# Patient Record
Sex: MALE | Race: BLACK OR AFRICAN AMERICAN | Employment: FULL TIME | URBAN - METROPOLITAN AREA
[De-identification: names, ages, dates, MRNs, and addresses within clinical notes are randomized per-mention and may not be internally consistent; named-entity substitution may affect disease eponyms.]

---

## 2021-02-20 ENCOUNTER — HOSPITAL ENCOUNTER (EMERGENCY)
Age: 26
Discharge: HOME | End: 2021-02-21
Attending: EMERGENCY MEDICINE

## 2021-02-20 VITALS
WEIGHT: 220 LBS | HEIGHT: 75 IN | BODY MASS INDEX: 27.35 KG/M2 | SYSTOLIC BLOOD PRESSURE: 153 MMHG | HEART RATE: 99 BPM | OXYGEN SATURATION: 98 % | RESPIRATION RATE: 16 BRPM | DIASTOLIC BLOOD PRESSURE: 85 MMHG | TEMPERATURE: 98 F

## 2021-02-20 DIAGNOSIS — L02.419 AXILLARY ABSCESS: Primary | ICD-10-CM

## 2021-02-20 DIAGNOSIS — L05.91 PILONIDAL CYST: ICD-10-CM

## 2021-02-20 PROCEDURE — 99283 EMERGENCY DEPT VISIT LOW MDM: CPT

## 2021-02-20 PROCEDURE — 75810000289 HC I&D ABSCESS SIMP/COMP/MULT

## 2021-02-20 NOTE — Clinical Note
[facility] EMERGENCY DEPT 
[address] [facility] [place] [address] 
[phone] Work/School Note Date: 2/20/2021 To Whom It May concern: 
 
David Dueñas was seen and treated today in the emergency room by the following provider(s): 
Attending Provider: Felipe Sierra MD. David Dueñas is excused from work/school on 2/21/2021 through 2/24/2021. He is medically clear to return to work/school on 2/25/2021. Sincerely, Janelle Galloway MD

## 2021-02-21 PROCEDURE — 74011250637 HC RX REV CODE- 250/637: Performed by: EMERGENCY MEDICINE

## 2021-02-21 PROCEDURE — 74011000250 HC RX REV CODE- 250: Performed by: EMERGENCY MEDICINE

## 2021-02-21 RX ORDER — LIDOCAINE HYDROCHLORIDE AND EPINEPHRINE 20; 10 MG/ML; UG/ML
1.5 INJECTION, SOLUTION INFILTRATION; PERINEURAL
Status: COMPLETED | OUTPATIENT
Start: 2021-02-21 | End: 2021-02-21

## 2021-02-21 RX ORDER — DOXYCYCLINE HYCLATE 100 MG
100 TABLET ORAL
Status: COMPLETED | OUTPATIENT
Start: 2021-02-21 | End: 2021-02-21

## 2021-02-21 RX ORDER — OXYCODONE AND ACETAMINOPHEN 5; 325 MG/1; MG/1
1 TABLET ORAL
Qty: 18 TAB | Refills: 0 | Status: SHIPPED | OUTPATIENT
Start: 2021-02-21 | End: 2021-02-26

## 2021-02-21 RX ORDER — OXYCODONE AND ACETAMINOPHEN 5; 325 MG/1; MG/1
2 TABLET ORAL
Status: COMPLETED | OUTPATIENT
Start: 2021-02-21 | End: 2021-02-21

## 2021-02-21 RX ORDER — DOXYCYCLINE HYCLATE 100 MG
100 TABLET ORAL 2 TIMES DAILY
Qty: 14 TAB | Refills: 0 | Status: SHIPPED | OUTPATIENT
Start: 2021-02-21 | End: 2021-02-28

## 2021-02-21 RX ADMIN — LIDOCAINE HYDROCHLORIDE AND EPINEPHRINE 30 MG: 20; 10 INJECTION, SOLUTION INFILTRATION; PERINEURAL at 00:17

## 2021-02-21 RX ADMIN — OXYCODONE AND ACETAMINOPHEN 2 TABLET: 5; 325 TABLET ORAL at 00:17

## 2021-02-21 RX ADMIN — DOXYCYCLINE HYCLATE 100 MG: 100 TABLET, COATED ORAL at 01:08

## 2021-02-21 NOTE — ED NOTES
Patient c/o abscess to both arm pits for \"months\". Right arm pit has open wound, not draining. Skin warm, and swollen. Abscess to left armpit, skin is intact but warm and swollen. Denies fever. Resting quietly on stretcher in NAD. Emergency Department Nursing Plan of Care       The Nursing Plan of Care is developed from the Nursing assessment and Emergency Department Attending provider initial evaluation. The plan of care may be reviewed in the ED Provider note.     The Plan of Care was developed with the following considerations:   Patient / Family readiness to learn indicated by:verbalized understanding  Persons(s) to be included in education: patient  Barriers to Learning/Limitations:No    Signed     [place]    2/21/2021   12:23 AM

## 2021-02-21 NOTE — ED TRIAGE NOTES
Patient presents to the ED with c/o his \"skin leaking\" on the right arm and lower back/upper buttocks. Pt stated he has a cut on the right arm. Unsure of when it appeared and doesn't remember cutting himself. Pt changing into gown.

## 2021-02-21 NOTE — ED NOTES

## 2021-02-21 NOTE — ED NOTES
Patient has been instructed that they have been given oxycodone which contains opioids, benzodiazepines, or other sedating drugs. Patient is aware that they  will need to refrain from driving or operating heavy machinery after taking this medication. Patient also instructed that they need to avoid drinking alcohol and using other products containing opioids, benzodiazepines, or other sedating drugs. Patient verbalized understanding. Patient states he is walking not driving. Adrenal insufficiency

## 2021-02-23 NOTE — ED PROVIDER NOTES
EMERGENCY DEPARTMENT HISTORY AND PHYSICAL EXAM    Please note that this dictation was completed with [employer], the computer voice recognition software. Quite often unanticipated grammatical, syntax, homophones, and other interpretive errors are inadvertently transcribed by the computer software. Please disregard these errors. Please excuse any errors that have escaped final proofreading. Date: 2/20/2021  Patient Name: Meghann Rendon  Patient Age and Sex: 22 y.o. male    History of Presenting Illness     Chief Complaint   Patient presents with    Skin Problem       History Provided By: Patient    HPI: Meghann Rendon, is a 22 y.o. male who has no known past medical history, presents to the ED with a tender and enlarging mass in left armpit, a similar mass was on the right side but has popped and drained purulent fluid. He also has some drainage from a lesion in his lower back. He denies a history of recurrent abscesses. States that all lesions were first noticed by the patient at the beginning of February, he initially assumed that it was an ingrown hair and would eventually pop and improved. However the mass in his left armpit has been steadily growing and becoming more painful. He has no systemic symptoms. No other complaints. He works as a  at [employer]. No recent travel or unusual exposures. Pt denies any other alleviating or exacerbating factors. No other associated signs or symptoms. There are no other complaints, changes or physical findings at this time. PCP: Brandee Moran MD    Past History   All documented elements of the PSFH reviewed and verified by me. -Teo Box MD    Past Medical History:  History reviewed. No pertinent past medical history. Past Surgical History:  History reviewed. No pertinent surgical history. Family History:  History reviewed. No pertinent family history.     Social History:  Social History     Tobacco Use    Smoking status: Never Smoker   Substance Use Topics    Alcohol use: No     Frequency: Never    Drug use: No       Allergies: Allergies   Allergen Reactions    Penicillins Anaphylaxis    Pcn [Penicillins] Swelling       Review of Systems   All other systems reviewed and negative    Review of Systems   Constitutional: Negative for appetite change and fever. HENT: Negative. Eyes: Negative. Respiratory: Negative for cough and shortness of breath. Cardiovascular: Negative for chest pain and palpitations. Gastrointestinal: Negative for abdominal pain, nausea and vomiting. Endocrine: Negative. Genitourinary: Negative for dysuria, flank pain and hematuria. Musculoskeletal: Negative for back pain and joint swelling. Skin:        Axillary abscess, one has drained. Draining smaller lesion to low back. Neurological: Negative for dizziness, weakness, light-headedness, numbness and headaches. Hematological: Negative for adenopathy. All other systems reviewed and are negative. Physical Exam   Reviewed patients vital signs and nursing note    Physical Exam  Vitals signs and nursing note reviewed. HENT:      Head: Atraumatic. Mouth/Throat:      Mouth: Mucous membranes are moist.   Eyes:      General: No scleral icterus. Extraocular Movements: Extraocular movements intact. Conjunctiva/sclera: Conjunctivae normal.      Pupils: Pupils are equal, round, and reactive to light. Neck:      Musculoskeletal: Normal range of motion and neck supple. Cardiovascular:      Rate and Rhythm: Normal rate and regular rhythm. Pulses: Normal pulses. Heart sounds: Normal heart sounds. Pulmonary:      Effort: Pulmonary effort is normal.      Breath sounds: Normal breath sounds. Abdominal:      Palpations: Abdomen is soft. Tenderness: There is no abdominal tenderness. Musculoskeletal: Normal range of motion. Skin:     General: Skin is warm and dry.       Capillary Refill: Capillary refill takes less than 2 seconds. Comments: Left axilla: fluctuant mass, tender to touch, c/w abscess  Right axilla: 0.5cm opening where what was an abscess seems to have now fully drained. No erythema or fluctuance noted  Low back: pilonidal cyst most likely with open sinuses. No swelling, fluctuance, erythema or tenderness. Neurological:      General: No focal deficit present. Mental Status: He is alert. Psychiatric:         Mood and Affect: Mood normal.         Behavior: Behavior normal.         Diagnostic Study Results     Labs - I have personally reviewed and interpreted all laboratory results. Sara Benitez MD, MSc  No results found for this or any previous visit (from the past 24 hour(s)). Radiologic Studies - I have personally reviewed and interpreted all imaging studies and agree with radiology interpretation and report. Musa Dailey MD, MSc  No orders to display         US of left axilla - circumscribed fluid accumulation, cw abscess    Medical Decision Making   I am the first provider for this patient. Records Reviewed: I reviewed our electronic medical record system for any past medical records that were available that may contribute to the patient's current condition, including their PMH, surgical history, social and family history. Reviewed the nursing notes and vital signs from today's visit. Nursing notes will be reviewed as they become available in realtime while the pt has been in the ED. In addition, I read most recent discharge summaries, if available and reviewed prior ECGs or imaging studies for comparison purposes. Sara Benitez MD Msc    Vital Signs-Reviewed the patient's vital signs.  - no fever    Provider Notes (Medical Decision Making): The patient is a well-appearing 20-year-old male who presents with 3 distinct lesions, all of which he first noticed approximately a month ago. No fevers, chills, night sweats, changes in appetite or any other symptoms. He states that he has never had an abscesses in the past.  On exam:  -Left axilla: Palpable and fluctuant mass that is tender to touch, warm, ultrasound images as pictured above. Concerning for abscess. Right axilla:  -0.5 cm linear opening where an accumulated abscess seems to have drained. There is no further fluid palpable in the area. The still open but no longer draining. Does not seem to be infected. Low back:  -Open sinus at the top of the cleft of the buttocks, intermittently draining clear fluid. There is no surrounding erythema, fluctuance, swelling. Not tender to touch. Not warm to touch. DDx: Abscess, Hidradenitis suppurativa, pilonidal cyst, pilonidal abscess, pilonidal sinus  Plan: Incision and drainage of left axilla. Irrigation and wound care of left, no fluids to be drained from that site is did subcutaneously. Pilonidal sinus has no evidence of abscess formation. However, as there is high risk for abscess and infection, close follow-up is absolutely necessary. Procedure Note - Incision and Drainage:   5:20 AM  Performed by: laron  Complexity: complex  Skin prepped with Betadine. Sterile field established. Anesthesia achieved with 5 mLs of Lidocaine 1% with epinephrine using a local infiltration of 5 mL lidocaine 1% with epinephrine. Abscess to axilla(e):left was incised with # 11 blade, and 6mLs of purulent and serous drainage was expressed. Wound probed and irrigated. Area was packed using 1 inch iodoform gauze. Sterile dressing applied. Estimated blood loss: minimal  The procedure took 31-45 minutes, and pt tolerated well. Procedure Note - Bedside Ultrasound:  5:20 AM  Performed by: laron  US of left axilla, right axilla, lower back performed at beside, showing circumscribed fluid collection in left axilla. No fluid collection on right. No fluid collection under pilonidal sinus. The procedure took 16-30 minutes, and pt tolerated well.     ED Course:   Initial assessment performed. The patients presenting problems have been discussed, and they are in agreement with the care plan formulated and outlined with them. I have encouraged them to ask questions as they arise throughout their visit. Progress note:  Patient has been reassessed and reports feeling considerably better, has normal vital signs and feels comfortable going home. I think this is reasonable as no findings today suggest a life-threatening condition. Very close follow-up either with primary care doctor or with emergency department/urgent care in 3 days for wound check, gauze repacking, assessment of pilonidal sinus. Patient confirms ability that he will do so. We will send him out on oral antibiotics, strict return precautions reviewed. DISPOSITION: DISCHARGE  The patient's results have been reviewed with patient and available family and/or caregiver. They verbally convey their understanding and agreement of the patient's signs, symptoms, diagnosis, treatment and prognosis and additionally agree to follow up as recommended in the discharge instructions or to return to the Emergency Department should the patient's condition change prior to their follow-up appointment. The patient and available family and/or caregiver verbally agree with the care plan and all of their questions have been answered. The discharge instructions have also been provided to the them with educational information regarding the patient's diagnosis as well a list of reasons why the patient would want to return to the ER prior to their follow-up appointment should any concerns arise, the patient's condition change or symptoms worsen. Radha Welch MD, Msc    PLAN:  Discharge Medication List as of 2/21/2021  2:00 AM      START taking these medications    Details   doxycycline (VIBRA-TABS) 100 mg tablet Take 1 Tab by mouth two (2) times a day for 7 days. , Normal, Disp-14 Tab, R-0      oxyCODONE-acetaminophen (Percocet) 5-325 mg per tablet Take 1 Tab by mouth every six (6) hours as needed for Pain for up to 5 days. Max Daily Amount: 4 Tabs., Normal, Disp-18 Tab, R-0         CONTINUE these medications which have NOT CHANGED    Details   ibuprofen (MOTRIN) 400 mg tablet Take 1 Tab by mouth every six (6) hours as needed for Pain. Print, 400 mg, Disp-20 Tab, R-0      HYDROcodone-acetaminophen (NORCO) 5-325 mg per tablet Take 1 Tab by mouth every four (4) hours as needed for Pain. Print, 1 Tab, Disp-20 Tab, R-0         1.   2.     Follow-up Information     Follow up With Specialties Details Why Contact Info    primary care doctor  Schedule an appointment as soon as possible for a visit   refer to attached list    [facility] EMERGENCY DEPT Emergency Medicine In 3 days For wound re-check [address]        3. Return to ED if worse        I, Marysol Khan MD, am the attending of record for this patient encounter. Diagnosis     Clinical Impression:   1. Axillary abscess    2. Pilonidal cyst        Attestation:  I personally performed the services described in this documentation on this date 2/20/2021 for patient Julius Trevino.   Yuliya Davis MD

## 2021-03-17 ENCOUNTER — HOSPITAL ENCOUNTER (EMERGENCY)
Age: 26
Discharge: HOME | End: 2021-03-17
Attending: EMERGENCY MEDICINE

## 2021-03-17 ENCOUNTER — APPOINTMENT (OUTPATIENT)
Dept: GENERAL RADIOLOGY | Age: 26
End: 2021-03-17
Attending: PHYSICIAN ASSISTANT

## 2021-03-17 VITALS
DIASTOLIC BLOOD PRESSURE: 57 MMHG | TEMPERATURE: 99.4 F | HEART RATE: 101 BPM | RESPIRATION RATE: 20 BRPM | SYSTOLIC BLOOD PRESSURE: 137 MMHG | HEIGHT: 75 IN | BODY MASS INDEX: 27.35 KG/M2 | WEIGHT: 220 LBS | OXYGEN SATURATION: 99 %

## 2021-03-17 DIAGNOSIS — E88.09 HYPOALBUMINEMIA: ICD-10-CM

## 2021-03-17 DIAGNOSIS — R60.9 PERIPHERAL EDEMA: Primary | ICD-10-CM

## 2021-03-17 LAB
ALBUMIN SERPL-MCNC: 2.9 G/DL (ref 3.5–5)
ALBUMIN/GLOB SERPL: 0.5 {RATIO} (ref 1.1–2.2)
ALP SERPL-CCNC: 145 U/L (ref 45–117)
ALT SERPL-CCNC: 64 U/L (ref 12–78)
ANION GAP SERPL CALC-SCNC: 10 MMOL/L (ref 5–15)
APPEARANCE UR: ABNORMAL
AST SERPL-CCNC: 33 U/L (ref 15–37)
BACTERIA URNS QL MICRO: ABNORMAL /HPF
BASOPHILS # BLD: 0 K/UL (ref 0–0.1)
BASOPHILS NFR BLD: 0 % (ref 0–1)
BILIRUB SERPL-MCNC: 0.6 MG/DL (ref 0.2–1)
BILIRUB UR QL: NEGATIVE
BNP SERPL-MCNC: 61 PG/ML (ref 0–125)
BUN SERPL-MCNC: 11 MG/DL (ref 6–20)
BUN/CREAT SERPL: 9 (ref 12–20)
CALCIUM SERPL-MCNC: 9.2 MG/DL (ref 8.5–10.1)
CHLORIDE SERPL-SCNC: 98 MMOL/L (ref 97–108)
CO2 SERPL-SCNC: 28 MMOL/L (ref 21–32)
COLOR UR: ABNORMAL
CREAT SERPL-MCNC: 1.24 MG/DL (ref 0.7–1.3)
DIFFERENTIAL METHOD BLD: ABNORMAL
EOSINOPHIL # BLD: 0.3 K/UL (ref 0–0.4)
EOSINOPHIL NFR BLD: 2 % (ref 0–7)
EPITH CASTS URNS QL MICRO: ABNORMAL /LPF
ERYTHROCYTE [DISTWIDTH] IN BLOOD BY AUTOMATED COUNT: 14.1 % (ref 11.5–14.5)
GLOBULIN SER CALC-MCNC: 5.8 G/DL (ref 2–4)
GLUCOSE SERPL-MCNC: 118 MG/DL (ref 65–100)
GLUCOSE UR STRIP.AUTO-MCNC: NEGATIVE MG/DL
HCT VFR BLD AUTO: 37.2 % (ref 36.6–50.3)
HGB BLD-MCNC: 11.4 G/DL (ref 12.1–17)
HGB UR QL STRIP: ABNORMAL
IMM GRANULOCYTES # BLD AUTO: 0.1 K/UL (ref 0–0.04)
IMM GRANULOCYTES NFR BLD AUTO: 1 % (ref 0–0.5)
KETONES UR QL STRIP.AUTO: NEGATIVE MG/DL
LEUKOCYTE ESTERASE UR QL STRIP.AUTO: ABNORMAL
LYMPHOCYTES # BLD: 1.2 K/UL (ref 0.8–3.5)
LYMPHOCYTES NFR BLD: 8 % (ref 12–49)
MCH RBC QN AUTO: 22.5 PG (ref 26–34)
MCHC RBC AUTO-ENTMCNC: 30.6 G/DL (ref 30–36.5)
MCV RBC AUTO: 73.4 FL (ref 80–99)
MONOCYTES # BLD: 1 K/UL (ref 0–1)
MONOCYTES NFR BLD: 7 % (ref 5–13)
NEUTS SEG # BLD: 12.3 K/UL (ref 1.8–8)
NEUTS SEG NFR BLD: 82 % (ref 32–75)
NITRITE UR QL STRIP.AUTO: NEGATIVE
NRBC # BLD: 0 K/UL (ref 0–0.01)
NRBC BLD-RTO: 0 PER 100 WBC
PH UR STRIP: 7 [PH] (ref 5–8)
PLATELET # BLD AUTO: 473 K/UL (ref 150–400)
PMV BLD AUTO: 8.6 FL (ref 8.9–12.9)
POTASSIUM SERPL-SCNC: 4 MMOL/L (ref 3.5–5.1)
PROT SERPL-MCNC: 8.7 G/DL (ref 6.4–8.2)
PROT UR STRIP-MCNC: ABNORMAL MG/DL
RBC # BLD AUTO: 5.07 M/UL (ref 4.1–5.7)
RBC #/AREA URNS HPF: ABNORMAL /HPF (ref 0–5)
SODIUM SERPL-SCNC: 136 MMOL/L (ref 136–145)
SP GR UR REFRACTOMETRY: 1.02 (ref 1–1.03)
TROPONIN I SERPL-MCNC: <0.05 NG/ML
UROBILINOGEN UR QL STRIP.AUTO: 2 EU/DL (ref 0.2–1)
WBC # BLD AUTO: 14.8 K/UL (ref 4.1–11.1)
WBC URNS QL MICRO: ABNORMAL /HPF (ref 0–4)

## 2021-03-17 PROCEDURE — 87591 N.GONORRHOEAE DNA AMP PROB: CPT

## 2021-03-17 PROCEDURE — 85025 COMPLETE CBC W/AUTO DIFF WBC: CPT

## 2021-03-17 PROCEDURE — 99284 EMERGENCY DEPT VISIT MOD MDM: CPT

## 2021-03-17 PROCEDURE — 71045 X-RAY EXAM CHEST 1 VIEW: CPT

## 2021-03-17 PROCEDURE — 36415 COLL VENOUS BLD VENIPUNCTURE: CPT

## 2021-03-17 PROCEDURE — 83880 ASSAY OF NATRIURETIC PEPTIDE: CPT

## 2021-03-17 PROCEDURE — 81001 URINALYSIS AUTO W/SCOPE: CPT

## 2021-03-17 PROCEDURE — 74011250637 HC RX REV CODE- 250/637: Performed by: PHYSICIAN ASSISTANT

## 2021-03-17 PROCEDURE — 80053 COMPREHEN METABOLIC PANEL: CPT

## 2021-03-17 PROCEDURE — 84484 ASSAY OF TROPONIN QUANT: CPT

## 2021-03-17 RX ORDER — FUROSEMIDE 40 MG/1
40 TABLET ORAL
Status: COMPLETED | OUTPATIENT
Start: 2021-03-17 | End: 2021-03-17

## 2021-03-17 RX ORDER — FUROSEMIDE 40 MG/1
40 TABLET ORAL DAILY
Qty: 7 TAB | Refills: 0 | Status: SHIPPED | OUTPATIENT
Start: 2021-03-17 | End: 2021-03-24

## 2021-03-17 RX ORDER — FUROSEMIDE 40 MG/1
40 TABLET ORAL DAILY
Status: DISCONTINUED | OUTPATIENT
Start: 2021-03-18 | End: 2021-03-17

## 2021-03-17 RX ADMIN — FUROSEMIDE 40 MG: 40 TABLET ORAL at 20:12

## 2021-03-17 NOTE — LETTER
[address] 
[facility] EMERGENCY DEPT 
[address] [place] [id number] 
[phone] Work/School Note Date: 3/17/2021 To Whom It May concern: 
 
Lisset Perea was seen and treated today in the emergency room by the following provider(s): 
Attending Provider: Audie Roque MD 
Physician Assistant: Dona Boateng. Lisset Perea may return to work on 3/20/2021 or earlier if feeling better. Sincerely, Joy De Dios RN

## 2021-03-17 NOTE — ED NOTES
Pt arrives in the ED with complaints of bilateral lower leg swelling with pain x 17 days. Pt denies cardiac history or history of similar problem.

## 2021-03-17 NOTE — ED PROVIDER NOTES
EMERGENCY DEPARTMENT HISTORY AND PHYSICAL EXAM      Date: 3/17/2021  Patient Name: Ghanshyam Figueroa    History of Presenting Illness     Chief Complaint   Patient presents with    Leg Pain    Skin Problem       History Provided By: Patient and girlfriend    HPI: Ghanshyam Figueroa, 22 y.o. male with no significant PMHx presents to the ED with cc of b/l LE edema, worsening x 2 weeks. Patient states his legs feel heavy and tense, and he is fatigued easily going up and down the stairs because of the weight of his legs. Patient acknowledges that he can no longer see the bones of either of his ankles. Patient denies history of similar sxs. He denies similar edema elsewhere, chest pain, shortness of breath, changes in urination, fevers, recent illness, new medication use, heavy alcohol use, calf pain or history of blood clots. There are no other complaints, changes, or physical findings at this time. PCP: You Moran MD    No current facility-administered medications on file prior to encounter. Current Outpatient Medications on File Prior to Encounter   Medication Sig Dispense Refill    ibuprofen (MOTRIN) 400 mg tablet Take 1 Tab by mouth every six (6) hours as needed for Pain. 20 Tab 0    HYDROcodone-acetaminophen (NORCO) 5-325 mg per tablet Take 1 Tab by mouth every four (4) hours as needed for Pain. 20 Tab 0       Past History     Past Medical History:  History reviewed. No pertinent past medical history. Past Surgical History:  History reviewed. No pertinent surgical history. Family History:  History reviewed. No pertinent family history. Social History:  Social History     Tobacco Use    Smoking status: Never Smoker    Smokeless tobacco: Never Used   Substance Use Topics    Alcohol use: No     Frequency: Never    Drug use: No       Allergies:   Allergies   Allergen Reactions    Penicillins Anaphylaxis    Pcn [Penicillins] Swelling         Review of Systems   Review of Systems   Constitutional: Negative for fever. HENT: Negative for congestion. Eyes: Negative for visual disturbance. Respiratory: Negative for shortness of breath. Cardiovascular: Negative for chest pain. Gastrointestinal: Negative for abdominal pain. Endocrine: Negative for polydipsia and polyuria. Genitourinary: Negative for difficulty urinating. Musculoskeletal:        Bilateral lower extremity edema   Skin: Negative for rash. Allergic/Immunologic: Negative for immunocompromised state. Neurological: Negative for dizziness. Hematological: Does not bruise/bleed easily. Psychiatric/Behavioral: Negative for agitation. Physical Exam   Physical Exam  Vitals signs and nursing note reviewed. Constitutional:       General: He is not in acute distress. Appearance: Normal appearance. He is not toxic-appearing or diaphoretic. HENT:      Head: Normocephalic and atraumatic. Nose: Nose normal.      Mouth/Throat:      Mouth: Mucous membranes are moist.   Eyes:      Extraocular Movements: Extraocular movements intact. Conjunctiva/sclera: Conjunctivae normal.      Pupils: Pupils are equal, round, and reactive to light. Neck:      Musculoskeletal: Normal range of motion and neck supple. Trachea: Phonation normal.   Cardiovascular:      Rate and Rhythm: Regular rhythm. Tachycardia present. Comments: 2+ DP pulses b/l  Pulmonary:      Effort: Pulmonary effort is normal.      Breath sounds: Normal breath sounds. Abdominal:      General: There is no distension. Palpations: Abdomen is soft. Tenderness: There is no abdominal tenderness. There is no guarding. Musculoskeletal: Normal range of motion. Comments: Significant edema of the feet and ankles, causing loss of bony contour of ankles, trace pitting edema extends up to mid shin. No posterior calf tenderness, edema, or erythema. Ambulatory. Skin:     General: Skin is warm and dry.    Neurological: General: No focal deficit present. Mental Status: He is alert and oriented to person, place, and time. GCS: GCS eye subscore is 4. GCS verbal subscore is 5. GCS motor subscore is 6. Psychiatric:         Mood and Affect: Mood normal.         Behavior: Behavior normal.         Diagnostic Study Results     Labs -     Recent Results (from the past 12 hour(s))   CBC WITH AUTOMATED DIFF    Collection Time: 03/17/21  6:45 PM   Result Value Ref Range    WBC 14.8 (H) 4.1 - 11.1 K/uL    RBC 5.07 4. 10 - 5.70 M/uL    HGB 11.4 (L) 12.1 - 17.0 g/dL    HCT 37.2 36.6 - 50.3 %    MCV 73.4 (L) 80.0 - 99.0 FL    MCH 22.5 (L) 26.0 - 34.0 PG    MCHC 30.6 30.0 - 36.5 g/dL    RDW 14.1 11.5 - 14.5 %    PLATELET 311 (H) 323 - 400 K/uL    MPV 8.6 (L) 8.9 - 12.9 FL    NRBC 0.0 0  WBC    ABSOLUTE NRBC 0.00 0.00 - 0.01 K/uL    NEUTROPHILS 82 (H) 32 - 75 %    LYMPHOCYTES 8 (L) 12 - 49 %    MONOCYTES 7 5 - 13 %    EOSINOPHILS 2 0 - 7 %    BASOPHILS 0 0 - 1 %    IMMATURE GRANULOCYTES 1 (H) 0.0 - 0.5 %    ABS. NEUTROPHILS 12.3 (H) 1.8 - 8.0 K/UL    ABS. LYMPHOCYTES 1.2 0.8 - 3.5 K/UL    ABS. MONOCYTES 1.0 0.0 - 1.0 K/UL    ABS. EOSINOPHILS 0.3 0.0 - 0.4 K/UL    ABS. BASOPHILS 0.0 0.0 - 0.1 K/UL    ABS. IMM. GRANS. 0.1 (H) 0.00 - 0.04 K/UL    DF AUTOMATED     METABOLIC PANEL, COMPREHENSIVE    Collection Time: 03/17/21  6:45 PM   Result Value Ref Range    Sodium 136 136 - 145 mmol/L    Potassium 4.0 3.5 - 5.1 mmol/L    Chloride 98 97 - 108 mmol/L    CO2 28 21 - 32 mmol/L    Anion gap 10 5 - 15 mmol/L    Glucose 118 (H) 65 - 100 mg/dL    BUN 11 6 - 20 MG/DL    Creatinine 1.24 0.70 - 1.30 MG/DL    BUN/Creatinine ratio 9 (L) 12 - 20      GFR est AA >60 >60 ml/min/1.73m2    GFR est non-AA >60 >60 ml/min/1.73m2    Calcium 9.2 8.5 - 10.1 MG/DL    Bilirubin, total 0.6 0.2 - 1.0 MG/DL    ALT (SGPT) 64 12 - 78 U/L    AST (SGOT) 33 15 - 37 U/L    Alk.  phosphatase 145 (H) 45 - 117 U/L    Protein, total 8.7 (H) 6.4 - 8.2 g/dL    Albumin 2.9 (L) 3.5 - 5.0 g/dL    Globulin 5.8 (H) 2.0 - 4.0 g/dL    A-G Ratio 0.5 (L) 1.1 - 2.2     NT-PRO BNP    Collection Time: 03/17/21  6:45 PM   Result Value Ref Range    NT pro-BNP 61 0 - 125 PG/ML   TROPONIN I    Collection Time: 03/17/21  6:45 PM   Result Value Ref Range    Troponin-I, Qt. <0.05 <0.05 ng/mL   URINALYSIS W/ RFLX MICROSCOPIC    Collection Time: 03/17/21  7:29 PM   Result Value Ref Range    Color YELLOW/STRAW      Appearance CLOUDY (A) CLEAR      Specific gravity 1.025 1.003 - 1.030      pH (UA) 7.0 5.0 - 8.0      Protein TRACE (A) NEG mg/dL    Glucose Negative NEG mg/dL    Ketone Negative NEG mg/dL    Bilirubin Negative NEG      Blood SMALL (A) NEG      Urobilinogen 2.0 (H) 0.2 - 1.0 EU/dL    Nitrites Negative NEG      Leukocyte Esterase TRACE (A) NEG     URINE MICROSCOPIC ONLY    Collection Time: 03/17/21  7:29 PM   Result Value Ref Range    WBC 0-4 0 - 4 /hpf    RBC 0-5 0 - 5 /hpf    Epithelial cells FEW FEW /lpf    Bacteria 2+ (A) NEG /hpf       Radiologic Studies -   XR CHEST PORT   Final Result   No evidence of acute cardiopulmonary process. CT Results  (Last 48 hours)    None        CXR Results  (Last 48 hours)               03/17/21 1840  XR CHEST PORT Final result    Impression:  No evidence of acute cardiopulmonary process. Narrative:  INDICATION: Peripheral edema. FINDINGS: AP portable imaging of the chest performed at 637 demonstrates a   normal cardiomediastinal silhouette. The lungs are clear bilaterally. No   significant osseous abnormalities are seen. Medical Decision Making   I am the first provider for this patient. I reviewed the vital signs, available nursing notes, past medical history, past surgical history, family history and social history. Vital Signs-Reviewed the patient's vital signs.   Patient Vitals for the past 12 hrs:   Temp Pulse Resp BP SpO2   03/17/21 2000  (!) 101 20 (!) 137/57 99 %   03/17/21 1930  99 20 129/70 99 % 03/17/21 1900  (!) 106 18 137/65 100 %   03/17/21 1800 99.4 °F (37.4 °C) (!) 103 18 117/73 96 %       Records Reviewed: Nursing Notes    Provider Notes (Medical Decision Making):   Previously healthy 40-year-old male presents with lower extremity edema of unclear etiology, worsening over the last 2 weeks. Prior to onset 2 weeks ago, patient was in his normal state of health. Discussed all work-up findings with patient. He has trace leuk esterase in his urine, which I believe is unrelated. Patient is agreeable to adding on GC/CT. Regarding the edema, will start pt on lasix 40 mg daily x 7 days. Pt to f/u with PCP (referral provided) for further workup, and cardiology for likely outpt echo. ED return precautions given. Pt is in agreement with this plan. ED Course:   Initial assessment performed. The patients presenting problems have been discussed, and they are in agreement with the care plan formulated and outlined with them. I have encouraged them to ask questions as they arise throughout their visit. Critical Care Time: None    Disposition:  D/c    PLAN:  1. Discharge Medication List as of 3/17/2021  7:58 PM      START taking these medications    Details   furosemide (Lasix) 40 mg tablet Take 1 Tab by mouth daily for 7 days. , Normal, Disp-7 Tab, R-0         CONTINUE these medications which have NOT CHANGED    Details   ibuprofen (MOTRIN) 400 mg tablet Take 1 Tab by mouth every six (6) hours as needed for Pain. Print, 400 mg, Disp-20 Tab, R-0      HYDROcodone-acetaminophen (NORCO) 5-325 mg per tablet Take 1 Tab by mouth every four (4) hours as needed for Pain. Print, 1 Tab, Disp-20 Tab, R-0           2.    Follow-up Information     Follow up With Specialties Details Why [address] [facility]  Call  For follow up [facility] [phone] [facility]    Charles Fitzpatrick NP Cardiology, Nurse Practitioner Call  For follow up  [address] [place] [address]  [phone]          Return to ED if worse     Diagnosis     Clinical Impression:   1. Peripheral edema    2. Hypoalbuminemia          Please note that this dictation was completed with [employer], the computer voice recognition software. Quite often unanticipated grammatical, syntax, homophones, and other interpretive errors are inadvertently transcribed by the computer software. Please disregards these errors. Please excuse any errors that have escaped final proofreading.

## 2021-03-17 NOTE — ED NOTES
Emergency Department Nursing Plan of Care       The Nursing Plan of Care is developed from the Nursing assessment and Emergency Department Attending provider initial evaluation. The plan of care may be reviewed in the ED Provider note.     The Plan of Care was developed with the following considerations:   Patient / Family readiness to learn indicated by:successful return demonstration  Persons(s) to be included in education: patient  Barriers to Learning/Limitations:No    [facility]    3/17/2021   6:08 PM

## 2021-03-17 NOTE — ED TRIAGE NOTES
C/o bilateral lower leg, ankle, and foot pain x the beginning of march without injury. Pt also reporting skin problem to bilateral lower legs x 2-3 days.

## 2021-03-18 NOTE — ED NOTES
Discharge instructions were given to the patient by Suzan Quick RN. The patient left the Emergency Department ambulatory, alert and oriented and in no acute distress with 1 prescriptions. The patient was encouraged to call or return to the ED for worsening issues or problems and was encouraged to schedule a follow up appointment for continuing care. Case management to be consulted to help get pt insurance and a follow up cardiology appointment for additional testing. The patient verbalized understanding of discharge instructions and prescriptions, all questions were answered. The patient has no further concerns at this time.

## 2021-03-18 NOTE — ED NOTES
Assumed pt care at this time. Pt noted to need a lot of explanation and comforting during process, pt appears anxious. Pt visitor at bedside appears supportive. Pt verbalizes all understanding.

## 2021-03-19 LAB
C TRACH DNA SPEC QL NAA+PROBE: NEGATIVE
N GONORRHOEA DNA SPEC QL NAA+PROBE: NEGATIVE
SAMPLE TYPE: NORMAL
SERVICE CMNT-IMP: NORMAL
SPECIMEN SOURCE: NORMAL

## 2021-03-26 ENCOUNTER — TELEPHONE (OUTPATIENT)
Dept: CASE MANAGEMENT | Age: 26
End: 2021-03-26

## 2021-03-26 NOTE — TELEPHONE ENCOUNTER
CM 2nd call to patient needs INS, PCP and cardiology follow-up. CM left patient a VM to call back.     [address] [facility]  [phone]

## 2021-04-13 ENCOUNTER — HOSPITAL ENCOUNTER (EMERGENCY)
Age: 26
Discharge: HOME | End: 2021-04-14
Attending: EMERGENCY MEDICINE
Payer: MEDICAID

## 2021-04-13 DIAGNOSIS — L73.2 HIDRADENITIS SUPPURATIVA OF LEFT AXILLA: Primary | ICD-10-CM

## 2021-04-13 DIAGNOSIS — L73.2 HIDRADENITIS SUPPURATIVA OF RIGHT AXILLA: ICD-10-CM

## 2021-04-13 PROCEDURE — 99283 EMERGENCY DEPT VISIT LOW MDM: CPT

## 2021-04-14 VITALS
RESPIRATION RATE: 16 BRPM | HEART RATE: 103 BPM | TEMPERATURE: 98 F | BODY MASS INDEX: 22.5 KG/M2 | WEIGHT: 181 LBS | DIASTOLIC BLOOD PRESSURE: 57 MMHG | OXYGEN SATURATION: 98 % | HEIGHT: 75 IN | SYSTOLIC BLOOD PRESSURE: 113 MMHG

## 2021-04-14 PROCEDURE — 74011250637 HC RX REV CODE- 250/637: Performed by: EMERGENCY MEDICINE

## 2021-04-14 RX ORDER — DOXYCYCLINE HYCLATE 100 MG
100 TABLET ORAL 2 TIMES DAILY
Qty: 224 TAB | Refills: 0 | Status: SHIPPED | OUTPATIENT
Start: 2021-04-14 | End: 2021-06-02

## 2021-04-14 RX ORDER — DOXYCYCLINE HYCLATE 100 MG
100 TABLET ORAL
Status: COMPLETED | OUTPATIENT
Start: 2021-04-14 | End: 2021-04-14

## 2021-04-14 RX ORDER — CLINDAMYCIN PHOSPHATE 10 UG/ML
LOTION TOPICAL 2 TIMES DAILY
Qty: 1 BOTTLE | Refills: 3 | Status: SHIPPED | OUTPATIENT
Start: 2021-04-14 | End: 2021-06-02

## 2021-04-14 RX ADMIN — DOXYCYCLINE HYCLATE 100 MG: 100 TABLET, COATED ORAL at 01:04

## 2021-04-14 NOTE — ED TRIAGE NOTES
Pt arrives with c/o pain/ foul smelling draining to arm pits x Jan 2021. Pt states it didn't get bad until March. Pt states \"it smells like something is decaying. \"

## 2021-04-14 NOTE — ED NOTES
Non adherent pads applied to bilateral axillas. Several 4x4 guaze pads applied and secured w/ multiple wraps of bulky/fluff dressing. N/V intact prior to and post application of dressing. Pt provided w/ materials for wound care and dressing changes at home.

## 2021-04-14 NOTE — ED NOTES
Emergency Department Nursing Plan of Care       The Nursing Plan of Care is developed from the Nursing assessment and Emergency Department Attending provider initial evaluation. The plan of care may be reviewed in the ED Provider note.     The Plan of Care was developed with the following considerations:   Patient / Family readiness to learn indicated by:verbalized understanding  Persons(s) to be included in education: patient  Barriers to Learning/Limitations:No    Signed     Juanito Martinez    4/14/2021   1:45 AM

## 2021-04-14 NOTE — ED NOTES
Patient (s) given copy of dc instructions and 2 script(s). Patient (s) verbalized understanding of instructions and script (s). Patient given a current medication reconciliation form and verbalized understanding of their medications. Patient (s) verbalized understanding of the importance of discussing medications with  his or her physician or clinic they will be following up with. Patient alert and oriented and in no acute distress. Patient discharged home ambulatory.      Pt to follow up w/ [facility]

## 2021-04-14 NOTE — ED PROVIDER NOTES
EMERGENCY DEPARTMENT HISTORY AND PHYSICAL EXAM      Date: 4/13/2021  Patient Name: Saray Estes    History of Presenting Illness     Chief Complaint   Patient presents with    Abscess       History Provided By: Patient    HPI: Saray Estes, 22 y.o. male  Without significant past medical history presenting today with bilateral axilla swelling and drainage. The patient says that he has been dealing with this since January. He says that he started having swelling in bilateral axilla in January, came to the emergency department in February and had a axillary abscess in the left armpit that was drained. He says that since that time he has continued to have drainage, foul-smelling leakage of fluid and he notes that the armpits of his shirts have become soiled daily. He denies any fevers. He has not seen any physician ever since the initial abscess regarding this problem. The patient also has not seen anybody as an outpatient for the symptoms. No other complaints at this time. There are no other complaints, changes, or physical findings at this time. PCP: Anna Moran MD    No current facility-administered medications on file prior to encounter. Current Outpatient Medications on File Prior to Encounter   Medication Sig Dispense Refill    ibuprofen (MOTRIN) 400 mg tablet Take 1 Tab by mouth every six (6) hours as needed for Pain. 20 Tab 0    HYDROcodone-acetaminophen (NORCO) 5-325 mg per tablet Take 1 Tab by mouth every four (4) hours as needed for Pain. 20 Tab 0       Past History     Past Medical History:  History reviewed. No pertinent past medical history. Past Surgical History:  History reviewed. No pertinent surgical history. Family History:  History reviewed. No pertinent family history.     Social History:  Social History     Tobacco Use    Smoking status: Never Smoker    Smokeless tobacco: Never Used   Substance Use Topics    Alcohol use: Yes     Frequency: Never Comment: Socially     Drug use: No       Allergies: Allergies   Allergen Reactions    Penicillins Anaphylaxis    Pcn [Penicillins] Swelling         Review of Systems   Constitutional: No  fever  Skin: +  rash  HEENT: No  nasal congestion  Resp: No cough  CV: No chest pain  GI: No vomiting  : No dysuria  MSK: No joint pain  Neuro: No numbness  Psych: No anxiety      Physical Exam     Patient Vitals for the past 12 hrs:   Temp Pulse Resp BP SpO2   04/14/21 0125  (!) 103 16 (!) 113/57 98 %   04/14/21 0040     97 %   04/14/21 0000    106/63 97 %   04/13/21 2345 98 °F (36.7 °C) (!) 122 18 130/69 97 %     General: alert, No acute distress  Eyes: EOMI, normal conjunctiva  ENT: moist mucous membranes. Neck: Active, full ROM of neck. Skin: Bilateral axilla with swelling, purulent drainage material, erythematous tissue, and 1 area on the left axilla with skin opening and drainage, 1 area on the right axilla 0.5 cm with skin opening approximately 0.5 cm in drainage, purulent and foul-smelling bilaterally            Lungs: Equal chest expansion. no respiratory distress. clear to auscultation bilaterally No accessory muscle usage  Heart: regular rate     no peripheral edema   2+ radial pulses and DPs bilaterally  Abd:  non distended soft, nontender. No rebound tenderness. No guarding  Back: Full ROM  MSK: Full, active ROM in all 4 extremities. Neuro: Alert and oriented to Person, Place, Time and Situation; normal speech;   Psych: Cooperative with exam; Appropriate mood and affect             Diagnostic Study Results     Labs -   No results found for this or any previous visit (from the past 12 hour(s)). Radiologic Studies -   No orders to display     CT Results  (Last 48 hours)    None        CXR Results  (Last 48 hours)    None          Medical Decision Making   I am the first provider for this patient.     I reviewed the vital signs, available nursing notes, past medical history, past surgical history, family history and social history. Vital Signs-Reviewed the patient's vital signs. Patient Vitals for the past 12 hrs:   Temp Pulse Resp BP SpO2   04/14/21 0125  (!) 103 16 (!) 113/57 98 %   04/14/21 0040     97 %   04/14/21 0000    106/63 97 %   04/13/21 2345 98 °F (36.7 °C) (!) 122 18 130/69 97 %         Provider Notes (Medical Decision Making):     Differential Diagnosis: Abscess, cellulitis, hidradenitis suppurativa    Initial Plan: We will treat with oral and topical antibiotics. We discussed following up with dermatology and we discussed that surgery is a option in the future. No signs of systemic infection at this point. ED Course:   Initial assessment performed. The patients presenting problems have been discussed, and they are in agreement with the care plan formulated and outlined with them. I have encouraged them to ask questions as they arise throughout their visit. Rafa Marsh MD, am the attending of record for this patient encounter. Dispo: Discharged. The patient has been re-evaluated and is ready for discharge. Reviewed available results with patient. Counseled patient on diagnosis and care plan. Patient has expressed understanding, and all questions have been answered. Patient agrees with plan and agrees to follow up as recommended, or to return to the ED if their symptoms worsen. Discharge instructions have been provided and explained to the patient, along with reasons to return to the ED. PLAN:  Discharge Medication List as of 4/14/2021 12:57 AM      START taking these medications    Details   doxycycline (VIBRA-TABS) 100 mg tablet Take 1 Tab by mouth two (2) times a day for 112 days. , Normal, Disp-224 Tab, R-0      clindamycin (CLEOCIN T) 1 % lotion Apply  to affected area two (2) times a day.  use thin film on affected area, Normal, Disp-1 Bottle, R-3         CONTINUE these medications which have NOT CHANGED    Details   ibuprofen (MOTRIN) 400 mg tablet Take 1 Tab by mouth every six (6) hours as needed for Pain. Print, 400 mg, Disp-20 Tab, R-0      HYDROcodone-acetaminophen (NORCO) 5-325 mg per tablet Take 1 Tab by mouth every four (4) hours as needed for Pain. Print, 1 Tab, Disp-20 Tab, R-0           2. Follow-up Information     Follow up With Specialties Details Why [facility]    [address]   [address] [id number] [phone]        3. Return to ED if worse       Diagnosis     Clinical Impression:   1. Hidradenitis suppurativa of left axilla    2. Hidradenitis suppurativa of right axilla        Attestations:    Marnie Pierson MD    Please note that this dictation was completed with [employer], the computer voice recognition software. Quite often unanticipated grammatical, syntax, homophones, and other interpretive errors are inadvertently transcribed by the computer software. Please disregard these errors. Please excuse any errors that have escaped final proofreading. Thank you.

## 2021-05-16 NOTE — PROGRESS NOTES
[facility] @ [address] , [place]  Subjective/HPI: New Patient     Flori Jimenez is a 22 y.o. male with no significant medical history has been referred from the ER for lower extremity edema. Seen 3/17/21 ProBNP 61.  Patient's weight at the time 220 pounds was given 1 week dose of furosemide 40 mg with near resolution of his lower extremity edema, has had significant weight loss of 50 pounds in 2 months. He reports he does not have an appetite, staying hydrated with 5-6 bottles of water a day, no over-the-counter supplements or over-the-counter medications. He denies alcohol tobacco or drug use. Denies any family history of CAD or heart conditions. He denies orthopnea or paroxysmal nocturnal dyspnea. Intermittently feels dizzy without any precipitating or alleviating factors. Denies fluttering or palpitations. PCP Provider  Natalya Moran MD  No past medical history on file. No past surgical history on file. Allergies   Allergen Reactions    Penicillins Anaphylaxis    Pcn [Penicillins] Swelling      No family history on file. Current Outpatient Medications   Medication Sig    doxycycline (VIBRA-TABS) 100 mg tablet Take 1 Tab by mouth two (2) times a day for 112 days.  clindamycin (CLEOCIN T) 1 % lotion Apply  to affected area two (2) times a day. use thin film on affected area    ibuprofen (MOTRIN) 400 mg tablet Take 1 Tab by mouth every six (6) hours as needed for Pain.  HYDROcodone-acetaminophen (NORCO) 5-325 mg per tablet Take 1 Tab by mouth every four (4) hours as needed for Pain. No current facility-administered medications for this visit.        Vitals:    05/17/21 1518 05/17/21 1601   BP: (!) 88/69 122/84   Pulse: (!) 123 96   Resp: 16    SpO2: 96%    Weight: 172 lb (78 kg)    Height: 6' 3\" (1.905 m)      Social History     Socioeconomic History    Marital status: SINGLE     Spouse name: Not on file    Number of children: Not on file    Years of education: Not on file    Highest education level: Not on file   Occupational History    Not on file   Social Needs    Financial resource strain: Not on file    Food insecurity     Worry: Not on file     Inability: Not on file    Transportation needs     Medical: Not on file     Non-medical: Not on file   Tobacco Use    Smoking status: Never Smoker    Smokeless tobacco: Never Used   Substance and Sexual Activity    Alcohol use: Yes     Frequency: Monthly or less     Drinks per session: 1 or 2     Comment: Socially     Drug use: Never    Sexual activity: Not on file   Lifestyle    Physical activity     Days per week: Not on file     Minutes per session: Not on file    Stress: Not on file   Relationships    Social connections     Talks on phone: Not on file     Gets together: Not on file     Attends Tenriism service: Not on file     Active member of club or organization: Not on file     Attends meetings of clubs or organizations: Not on file     Relationship status: Not on file    Intimate partner violence     Fear of current or ex partner: Not on file     Emotionally abused: Not on file     Physically abused: Not on file     Forced sexual activity: Not on file   Other Topics Concern    Not on file   Social History Narrative    ** Merged History Encounter **            I have reviewed the nurses notes, vitals, problem list, allergy list, medical history, family, social history and medications. Review of Symptoms  11 systems reviewed, negative other than as stated in the HPI      Physical Exam:      General: Well developed, in no acute distress, cooperative, fatigued appearing  HEENT: No carotid bruits, no JVD, trach is midline. Neck Supple, PERRL, EOM intact. Heart:  Normal S1/S2 negative S3 or S4. Regular, no murmur, gallop or rub.    Mildly tachycardic  Respiratory: Clear bilaterally x 4, no wheezing or rales  Abdomen:   Soft, non-tender, no masses, bowel sounds are active. Extremities:  No edema, normal cap refill, no cyanosis, atraumatic. Neuro: A&Ox3, speech clear, gait stable. Skin: Skin color is normal. No rashes or lesions. Non diaphoretic  Vascular: 2+ pulses symmetric in all extremities    Cardiographics    ECG: Sinus tachycardia        Cardiology Labs:  No results found for: CHOL, CHOLX, CHLST, CHOLV, 513164, HDL, HDLP, LDL, LDLC, DLDLP, TGLX, TRIGL, TRIGP, CHHD, CHHDX    Lab Results   Component Value Date/Time    Sodium 136 03/17/2021 06:45 PM    Potassium 4.0 03/17/2021 06:45 PM    Chloride 98 03/17/2021 06:45 PM    CO2 28 03/17/2021 06:45 PM    Anion gap 10 03/17/2021 06:45 PM    Glucose 118 (H) 03/17/2021 06:45 PM    BUN 11 03/17/2021 06:45 PM    Creatinine 1.24 03/17/2021 06:45 PM    BUN/Creatinine ratio 9 (L) 03/17/2021 06:45 PM    GFR est AA >60 03/17/2021 06:45 PM    GFR est non-AA >60 03/17/2021 06:45 PM    Calcium 9.2 03/17/2021 06:45 PM    Bilirubin, total 0.6 03/17/2021 06:45 PM    Alk. phosphatase 145 (H) 03/17/2021 06:45 PM    Protein, total 8.7 (H) 03/17/2021 06:45 PM    Albumin 2.9 (L) 03/17/2021 06:45 PM    Globulin 5.8 (H) 03/17/2021 06:45 PM    A-G Ratio 0.5 (L) 03/17/2021 06:45 PM    ALT (SGPT) 64 03/17/2021 06:45 PM           Assessment:     Assessment:     Diagnoses and all orders for this visit:    1. Tachycardia  -     ECHO ADULT COMPLETE; Future  -     MAGNESIUM; Future  -     METABOLIC PANEL, COMPREHENSIVE  -     TSH 3RD GENERATION    2. Leg swelling  -     AMB POC EKG ROUTINE W/ 12 LEADS, INTER & REP  -     ECHO ADULT COMPLETE; Future  -     MAGNESIUM; Future  -     METABOLIC PANEL, COMPREHENSIVE  -     TSH 3RD GENERATION    3. Leg edema  -     ECHO ADULT COMPLETE; Future  -     MAGNESIUM; Future  -     METABOLIC PANEL, COMPREHENSIVE  -     TSH 3RD GENERATION        ICD-10-CM ICD-9-CM    1.  Tachycardia  R00.0 785.0 ECHO ADULT COMPLETE      MAGNESIUM      METABOLIC PANEL, COMPREHENSIVE      TSH 3RD GENERATION      MAGNESIUM   2. Leg swelling  M79.89 729.81 AMB POC EKG ROUTINE W/ 12 LEADS, INTER & REP      ECHO ADULT COMPLETE      MAGNESIUM      METABOLIC PANEL, COMPREHENSIVE      TSH 3RD GENERATION      MAGNESIUM   3. Leg edema  R60.0 782.3 ECHO ADULT COMPLETE      MAGNESIUM      METABOLIC PANEL, COMPREHENSIVE      TSH 3RD GENERATION      MAGNESIUM     Orders Placed This Encounter    MAGNESIUM     Standing Status:   Future     Number of Occurrences:   1     Standing Expiration Date:   76/99/2626    METABOLIC PANEL, COMPREHENSIVE    TSH 3RD GENERATION    AMB POC EKG ROUTINE W/ 12 LEADS, INTER & REP     Order Specific Question:   Reason for Exam:     Answer:   screen        Plan:     Patient is a 45-year-old male referred by emergency room back in March for lower extremity edema had been placed on 1 week of furosemide 40 mg with resolution of the edema, he has had 50 pound weight loss since that ER visit according to our scale today. Mildly tachycardic, reporting decreased p.o. intake of food due to loss of appetite, staying well-hydrated. He has a upcoming appointment with primary care to discuss his weight loss, he had also been referred to dermatology at [facility] for recent ER visit with axillary abscesses. From cardiac standpoint we will evaluate tachycardia with 2D echocardiogram, TSH, magnesium and repeat is metabolic panel. We will follow-up with patient once results are posted. Ernestina Valle NP      Please note that this dictation was completed with [employer], the computer voice recognition software. Quite often unanticipated grammatical, syntax, homophones, and other interpretive errors are inadvertently transcribed by the computer software. Please disregard these errors. Please excuse any errors that have escaped final proofreading. Thank you.

## 2021-05-17 ENCOUNTER — OFFICE VISIT (OUTPATIENT)
Dept: CARDIOLOGY CLINIC | Age: 26
End: 2021-05-17
Payer: MEDICAID

## 2021-05-17 VITALS
OXYGEN SATURATION: 96 % | HEIGHT: 75 IN | DIASTOLIC BLOOD PRESSURE: 84 MMHG | BODY MASS INDEX: 21.39 KG/M2 | RESPIRATION RATE: 16 BRPM | HEART RATE: 96 BPM | SYSTOLIC BLOOD PRESSURE: 122 MMHG | WEIGHT: 172 LBS

## 2021-05-17 DIAGNOSIS — M79.89 LEG SWELLING: ICD-10-CM

## 2021-05-17 DIAGNOSIS — R00.0 TACHYCARDIA: Primary | ICD-10-CM

## 2021-05-17 DIAGNOSIS — R60.0 LEG EDEMA: ICD-10-CM

## 2021-05-17 PROCEDURE — 99203 OFFICE O/P NEW LOW 30 MIN: CPT | Performed by: NURSE PRACTITIONER

## 2021-05-17 PROCEDURE — 93000 ELECTROCARDIOGRAM COMPLETE: CPT | Performed by: NURSE PRACTITIONER

## 2021-05-17 NOTE — LETTER
5/17/2021 Patient: Conard Dubin YOB: 1995 Date of Visit: 5/17/2021 Brittney Moran MD 
[address] [id number] [address] Fax: [phone] Dear Chiquita Barber MD, Thank you for referring Mr. Conard Dubin to [address] [place] [address] for evaluation. My notes for this consultation are attached. If you have questions, please do not hesitate to call me. I look forward to following your patient along with you. Sincerely, Yaneth Bowen NP

## 2021-05-17 NOTE — PROGRESS NOTES
Identified pt with two pt identifiers(name and ). Reviewed record in preparation for visit and have obtained necessary documentation.   Chief Complaint   Patient presents with    New Patient    Leg Swelling        Health Maintenance Due   Topic    Hepatitis C Screening     COVID-19 Vaccine (1)    DTaP/Tdap/Td series (1 - Tdap)        Visit Vitals  BP (!) 88/69 (BP 1 Location: Right arm, BP Patient Position: Sitting, BP Cuff Size: Large adult)   Pulse (!) 123   Resp 16   Ht 6' 3\" (1.905 m)   Wt 172 lb (78 kg)   SpO2 96%   BMI 21.50 kg/m²     Pain Scale: 7/10

## 2021-05-21 ENCOUNTER — HOSPITAL ENCOUNTER (OUTPATIENT)
Dept: NON INVASIVE DIAGNOSTICS | Age: 26
Discharge: HOME | End: 2021-05-21
Payer: MEDICAID

## 2021-05-21 DIAGNOSIS — R00.0 TACHYCARDIA: ICD-10-CM

## 2021-05-21 DIAGNOSIS — R60.0 LEG EDEMA: ICD-10-CM

## 2021-05-21 DIAGNOSIS — M79.89 LEG SWELLING: ICD-10-CM

## 2021-05-21 LAB
ECHO AO ROOT DIAM: 2.54 CM
ECHO AV AREA PLAN: 2.67 CM2
ECHO EST RA PRESSURE: 5 MMHG
ECHO LA AREA 4C: 8.42 CM2
ECHO LA MAJOR AXIS: 2.73 CM
ECHO LA VOL 4C: 11.74 ML (ref 18–58)
ECHO LV EDV A4C: 120.4 ML
ECHO LV EJECTION FRACTION A4C: 75 PERCENT
ECHO LV ESV A4C: 30.07 ML
ECHO LV INTERNAL DIMENSION DIASTOLIC: 4.62 CM (ref 4.2–5.9)
ECHO LV INTERNAL DIMENSION SYSTOLIC: 3.34 CM
ECHO LV IVSD: 0.84 CM (ref 0.6–1)
ECHO LV MASS 2D: 157.8 G (ref 88–224)
ECHO LV POSTERIOR WALL DIASTOLIC: 1.14 CM (ref 0.6–1)
ECHO LVOT DIAM: 2.25 CM
ECHO LVOT PEAK GRADIENT: 5.23 MMHG
ECHO LVOT PEAK VELOCITY: 114.31 CM/S
ECHO MV A VELOCITY: 76.17 CM/S
ECHO MV AREA PHT: 10.34 CM2
ECHO MV AREA PHT: 8.66 CM2
ECHO MV AREA PLAN: 5.72 CM2
ECHO MV E DECELERATION TIME (DT): 73.35 MS
ECHO MV E VELOCITY: 53.42 CM/S
ECHO MV E/A RATIO: 0.7
ECHO MV MAX VELOCITY: 89.92 CM/S
ECHO MV MEAN GRADIENT: 1.45 MMHG
ECHO MV PEAK GRADIENT: 3.23 MMHG
ECHO MV PRESSURE HALF TIME (PHT): 21.27 MS
ECHO MV PRESSURE HALF TIME (PHT): 25.4 MS
ECHO MV VTI: 11.34 CM
ECHO RA AREA 4C: 13.06 CM2
ECHO TV REGURGITANT MAX VELOCITY: 166.84 CM/S
ECHO TV REGURGITANT MAX VELOCITY: 297.31 CM/S

## 2021-05-21 PROCEDURE — 93306 TTE W/DOPPLER COMPLETE: CPT

## 2021-05-24 NOTE — PROGRESS NOTES
Placed call to pt. Two pt identifiers confirmed. Pt informed that per NP Ricka Barley is normal.  Still needs to go for that lab work I had recommended/he should have a lab slip. \"  Pt verbalized understanding of information discussed w/ no further questions at this time.

## 2021-05-24 NOTE — PROGRESS NOTES
Q: Please call patient echocardiogram is normal.  Still needs to go for that lab work I had recommended/he should have a lab slip.

## 2021-06-02 ENCOUNTER — OFFICE VISIT (OUTPATIENT)
Dept: INTERNAL MEDICINE CLINIC | Age: 26
End: 2021-06-02
Payer: MEDICAID

## 2021-06-02 VITALS
BODY MASS INDEX: 19.89 KG/M2 | SYSTOLIC BLOOD PRESSURE: 93 MMHG | DIASTOLIC BLOOD PRESSURE: 66 MMHG | HEART RATE: 104 BPM | HEIGHT: 75 IN | WEIGHT: 160 LBS | OXYGEN SATURATION: 96 % | TEMPERATURE: 98 F | RESPIRATION RATE: 16 BRPM

## 2021-06-02 DIAGNOSIS — L73.2 HIDRADENITIS: ICD-10-CM

## 2021-06-02 DIAGNOSIS — R73.09 ELEVATED GLUCOSE: ICD-10-CM

## 2021-06-02 DIAGNOSIS — Z76.89 ESTABLISHING CARE WITH NEW DOCTOR, ENCOUNTER FOR: Primary | ICD-10-CM

## 2021-06-02 PROCEDURE — 83036 HEMOGLOBIN GLYCOSYLATED A1C: CPT | Performed by: NURSE PRACTITIONER

## 2021-06-02 PROCEDURE — 99203 OFFICE O/P NEW LOW 30 MIN: CPT | Performed by: NURSE PRACTITIONER

## 2021-06-02 RX ORDER — IBUPROFEN 800 MG/1
800 TABLET ORAL
Qty: 60 TABLET | Refills: 0 | Status: ON HOLD | OUTPATIENT
Start: 2021-06-02 | End: 2021-06-17

## 2021-06-02 RX ORDER — TRAMADOL HYDROCHLORIDE 50 MG/1
50 TABLET ORAL
Qty: 12 TABLET | Refills: 0 | Status: SHIPPED | OUTPATIENT
Start: 2021-06-02 | End: 2021-06-05

## 2021-06-02 RX ORDER — SULFAMETHOXAZOLE AND TRIMETHOPRIM 800; 160 MG/1; MG/1
1 TABLET ORAL 2 TIMES DAILY
Qty: 20 TABLET | Refills: 0 | Status: ON HOLD | OUTPATIENT
Start: 2021-06-02 | End: 2021-06-17

## 2021-06-02 NOTE — PATIENT INSTRUCTIONS
Cleanse with antibacterial soap, rinse with saline and change dressing twice daily Do NOT miss doses of the antibiotic

## 2021-06-02 NOTE — PROGRESS NOTES
Subjective: (As above and below)     Chief Complaint   Patient presents with   [facility].     pt dx w/ hidradenitis    Skin Problem     pt reports 2 holes on buttocks and arms that constantly draining every day since february, pt last in ED 4/13/2021;; pt would like skin under armpits looked at pt states skin feels \"unusal\"     Winona Prader is a 22y.o. year old male who presents to est care      Hidradenitis: dx in April- he has abscesses to both axilla and to gluteal fold  Since April s/s have not improved. He had an I&D to ? L axilla  Most recently he was rx'd doxycycline= he admits to not taking abx regularly  He has pain and purulent drainage from all sites  No fevers, chills  He is putting ?eucerin cream on and washing          Reviewed PmHx, RxHx, FmHx, SocHx, AllgHx and updated in chart. History reviewed. No pertinent family history. History reviewed. No pertinent past medical history. Social History     Socioeconomic History    Marital status: SINGLE     Spouse name: Not on file    Number of children: Not on file    Years of education: Not on file    Highest education level: Not on file   Tobacco Use    Smoking status: Never Smoker    Smokeless tobacco: Never Used   Vaping Use    Vaping Use: Never used   Substance and Sexual Activity    Alcohol use: Yes     Comment: Socially     Drug use: Never   Social History Narrative    ** Merged History Encounter **          Social Determinants of Health     Financial Resource Strain:     Difficulty of Paying Living Expenses:    Food Insecurity:     Worried About Running Out of Food in the Last Year:     [address] in the Last Year:    Transportation Needs:     Lack of Transportation (Medical):      Lack of Transportation (Non-Medical):    Physical Activity:     Days of Exercise per Week:     Minutes of Exercise per Session:    Stress:     Feeling of Stress :    Social Connections:     Frequency of Communication with Friends and Family:     Frequency of Social Gatherings with Friends and Family:     Attends [employer] Services:     Active Member of Clubs or Organizations:     Attends Club or Organization Meetings:     Marital Status:           Current Outpatient Medications   Medication Sig    trimethoprim-sulfamethoxazole (BACTRIM DS, SEPTRA DS) 160-800 mg per tablet Take 1 Tablet by mouth two (2) times a day for 10 days.  traMADoL (ULTRAM) 50 mg tablet Take 1 Tablet by mouth every eight (8) hours as needed for Pain for up to 3 days. Max Daily Amount: 150 mg. For severe pain    ibuprofen (MOTRIN) 800 mg tablet Take 1 Tablet by mouth every eight (8) hours as needed for Pain. With food     No current facility-administered medications for this visit. Review of Systems:   Constitutional:    Negative for fever and chills, negative diaphoresis. HEENT:              Negative for neck pain and stiffness. Eyes:                  Negative for visual disturbance, itching, redness or discharge. Respiratory:        Negative for cough and shortness of breath. Cardiovascular:  Negative for chest pain and palpitations. Gastrointestinal: Negative for nausea, vomiting, abdominal pain, diarrhea or constipation. Genitourinary:     Negative for dysuria and frequency. Musculoskeletal: Negative for falls, tenderness and swelling. Skin:                   +abscess  Neurological:       Negative for dizzyness, seizure, loss of consciousness, weakness and numbness.      Objective:     Vitals:    06/02/21 0810 06/02/21 0854   BP: 93/66    Pulse: (!) 140 (!) 104   Resp: 16    Temp: 98 °F (36.7 °C)    TempSrc: Temporal    SpO2: 96%    Weight: 160 lb (72.6 kg)    Height: 6' 3\" (1.905 m)            Physical Examination: General appearance - oriented to person, place, and time and appears in pain  Mental status - alert, oriented to person, place, and time  Chest - clear to auscultation, no wheezes, rales or rhonchi, symmetric air entry  Heart - normal rate, regular rhythm, normal S1, S2, no murmurs, rubs, clicks or gallops  Skin - bilateral axilla: he has foul smelling, purulent drainage coming from both boils  Gluteal fold: also w/ foul smell and purulent drainage  No surrounding cellulitis        Assessment/normal coloration and turgor, no rashes, no suspicious skin lesions noted Plan:      Cleansed wounds w/ saline, expressed drainage as able due to pain,   Discussed need to take abx as rx'd, add bactrim as PCN allergic  No eucerin, discussed wound care      1. Establishing care with new doctor, encounter for    - HIV 1/2 AG/AB, 4TH GENERATION,W RFLX CONFIRM; Future    2. Hidradenitis    - trimethoprim-sulfamethoxazole (BACTRIM DS, SEPTRA DS) 160-800 mg per tablet; Take 1 Tablet by mouth two (2) times a day for 10 days. Dispense: 20 Tablet; Refill: 0  - REFERRAL TO GENERAL SURGERY  - traMADoL (ULTRAM) 50 mg tablet; Take 1 Tablet by mouth every eight (8) hours as needed for Pain for up to 3 days. Max Daily Amount: 150 mg. For severe pain  Dispense: 12 Tablet; Refill: 0    3. Elevated glucose    - AMB POC HEMOGLOBIN A1C        I have discussed the diagnosis with the patient and the intended plan as seen in the above orders. The patient has received an after-visit summary and questions were answered concerning future plans. Pt conveyed understanding of plan. Medication Side Effects and Warnings were discussed with patient: yes  Patient Labs were reviewed: yes  Patient Past Records were reviewed:  yes    Tiki Terrazas.  Sudha Alonso NP

## 2021-06-02 NOTE — PROGRESS NOTES
Chief Complaint   Patient presents with   [facility].     pt dx w/ hidradenitis    Skin Problem     pt reports 2 holes on buttocks and arms that constantly draining every day since february, pt last in ED 4/13/2021;; pt would like skin under armpits looked at pt states skin feels \"unusal\"       1. Have you been to the ER, urgent care clinic since your last visit? Hospitalized since your last visit? Yes When: 04/13/2021 Where: [facility] Reason for visit: hidradenitis    2. Have you seen or consulted any other health care providers outside of the [address] [place] [address] since your last visit? Include any pap smears or colon screening.  No

## 2021-06-03 ENCOUNTER — TELEPHONE (OUTPATIENT)
Dept: CARDIOLOGY CLINIC | Age: 26
End: 2021-06-03

## 2021-06-03 LAB
ALBUMIN SERPL-MCNC: 3.6 G/DL (ref 4.1–5.2)
ALBUMIN/GLOB SERPL: 0.8 {RATIO} (ref 1.2–2.2)
ALP SERPL-CCNC: 192 IU/L (ref 48–121)
ALT SERPL-CCNC: 14 IU/L (ref 0–44)
AST SERPL-CCNC: 13 IU/L (ref 0–40)
BILIRUB SERPL-MCNC: 0.5 MG/DL (ref 0–1.2)
BUN SERPL-MCNC: 9 MG/DL (ref 6–20)
BUN/CREAT SERPL: 11 (ref 9–20)
CALCIUM SERPL-MCNC: 9.6 MG/DL (ref 8.7–10.2)
CHLORIDE SERPL-SCNC: 93 MMOL/L (ref 96–106)
CO2 SERPL-SCNC: 25 MMOL/L (ref 20–29)
CREAT SERPL-MCNC: 0.85 MG/DL (ref 0.76–1.27)
GLOBULIN SER CALC-MCNC: 4.4 G/DL (ref 1.5–4.5)
GLUCOSE SERPL-MCNC: 126 MG/DL (ref 65–99)
HBA1C MFR BLD HPLC: 5.5 %
MAGNESIUM SERPL-MCNC: 2.1 MG/DL (ref 1.6–2.3)
POTASSIUM SERPL-SCNC: 4.8 MMOL/L (ref 3.5–5.2)
PROT SERPL-MCNC: 8 G/DL (ref 6–8.5)
SODIUM SERPL-SCNC: 136 MMOL/L (ref 134–144)
TSH SERPL DL<=0.005 MIU/L-ACNC: 1.84 UIU/ML (ref 0.45–4.5)

## 2021-06-03 NOTE — TELEPHONE ENCOUNTER
----- Message from Milo Correa NP sent at 6/3/2021 10:35 AM EDT -----  Natalia Wellington please call patient lab work shows his thyroid function is normal, kidney function is normal, electrolytes are normal.  Will send results to his primary care.

## 2021-06-03 NOTE — PROGRESS NOTES
Waqas Vu please call patient lab work shows his thyroid function is normal, kidney function is normal, electrolytes are normal.  Will send results to his primary care.

## 2021-06-03 NOTE — TELEPHONE ENCOUNTER
Pt informed of test results and recommendations. Pt verbalized understanding. Per Pt PCP changed to DAVION Young. Labs faxed.

## 2021-06-04 ENCOUNTER — HOSPITAL ENCOUNTER (EMERGENCY)
Age: 26
Discharge: ANOTHER HOSPITAL | End: 2021-06-04
Attending: EMERGENCY MEDICINE | Admitting: EMERGENCY MEDICINE
Payer: MEDICAID

## 2021-06-04 ENCOUNTER — OFFICE VISIT (OUTPATIENT)
Dept: INTERNAL MEDICINE CLINIC | Age: 26
End: 2021-06-04
Payer: MEDICAID

## 2021-06-04 ENCOUNTER — HOSPITAL ENCOUNTER (INPATIENT)
Age: 26
LOS: 7 days | Discharge: ANOTHER HOSPITAL | DRG: 720 | End: 2021-06-11
Attending: INTERNAL MEDICINE | Admitting: INTERNAL MEDICINE
Payer: MEDICAID

## 2021-06-04 VITALS
OXYGEN SATURATION: 97 % | HEART RATE: 138 BPM | WEIGHT: 150 LBS | SYSTOLIC BLOOD PRESSURE: 98 MMHG | RESPIRATION RATE: 20 BRPM | DIASTOLIC BLOOD PRESSURE: 57 MMHG | TEMPERATURE: 97.6 F | BODY MASS INDEX: 18.65 KG/M2 | HEIGHT: 75 IN

## 2021-06-04 VITALS
DIASTOLIC BLOOD PRESSURE: 62 MMHG | RESPIRATION RATE: 18 BRPM | TEMPERATURE: 98 F | HEIGHT: 75 IN | BODY MASS INDEX: 18.9 KG/M2 | SYSTOLIC BLOOD PRESSURE: 117 MMHG | OXYGEN SATURATION: 99 % | HEART RATE: 83 BPM | WEIGHT: 152 LBS

## 2021-06-04 DIAGNOSIS — L73.2 HIDRADENITIS SUPPURATIVA: Primary | ICD-10-CM

## 2021-06-04 DIAGNOSIS — L73.2 HIDRADENITIS SUPPURATIVA: ICD-10-CM

## 2021-06-04 DIAGNOSIS — L73.2 HIDRADENITIS: Primary | ICD-10-CM

## 2021-06-04 PROBLEM — L08.9 SOFT TISSUE INFECTION: Status: ACTIVE | Noted: 2021-06-04

## 2021-06-04 LAB
ALBUMIN SERPL-MCNC: 2.3 G/DL (ref 3.5–5)
ALBUMIN/GLOB SERPL: 0.4 {RATIO} (ref 1.1–2.2)
ALP SERPL-CCNC: 152 U/L (ref 45–117)
ALT SERPL-CCNC: 15 U/L (ref 12–78)
ANION GAP SERPL CALC-SCNC: 9 MMOL/L (ref 5–15)
AST SERPL-CCNC: 14 U/L (ref 15–37)
BASOPHILS # BLD: 0 K/UL (ref 0–0.1)
BASOPHILS NFR BLD: 0 % (ref 0–1)
BILIRUB SERPL-MCNC: 0.5 MG/DL (ref 0.2–1)
BUN SERPL-MCNC: 5 MG/DL (ref 6–20)
BUN/CREAT SERPL: 5 (ref 12–20)
CALCIUM SERPL-MCNC: 9 MG/DL (ref 8.5–10.1)
CHLORIDE SERPL-SCNC: 97 MMOL/L (ref 97–108)
CO2 SERPL-SCNC: 30 MMOL/L (ref 21–32)
CREAT SERPL-MCNC: 0.97 MG/DL (ref 0.7–1.3)
DIFFERENTIAL METHOD BLD: ABNORMAL
EOSINOPHIL # BLD: 0.2 K/UL (ref 0–0.4)
EOSINOPHIL NFR BLD: 1 % (ref 0–7)
ERYTHROCYTE [DISTWIDTH] IN BLOOD BY AUTOMATED COUNT: 16.3 % (ref 11.5–14.5)
GLOBULIN SER CALC-MCNC: 5.8 G/DL (ref 2–4)
GLUCOSE SERPL-MCNC: 112 MG/DL (ref 65–100)
HCT VFR BLD AUTO: 30.1 % (ref 36.6–50.3)
HGB BLD-MCNC: 9.1 G/DL (ref 12.1–17)
IMM GRANULOCYTES # BLD AUTO: 0.2 K/UL (ref 0–0.04)
IMM GRANULOCYTES NFR BLD AUTO: 1 % (ref 0–0.5)
LACTATE SERPL-SCNC: 1.9 MMOL/L (ref 0.4–2)
LYMPHOCYTES # BLD: 0.7 K/UL (ref 0.8–3.5)
LYMPHOCYTES NFR BLD: 4 % (ref 12–49)
MCH RBC QN AUTO: 21.7 PG (ref 26–34)
MCHC RBC AUTO-ENTMCNC: 30.2 G/DL (ref 30–36.5)
MCV RBC AUTO: 71.7 FL (ref 80–99)
MONOCYTES # BLD: 1 K/UL (ref 0–1)
MONOCYTES NFR BLD: 6 % (ref 5–13)
NEUTS SEG # BLD: 15.1 K/UL (ref 1.8–8)
NEUTS SEG NFR BLD: 88 % (ref 32–75)
NRBC # BLD: 0 K/UL (ref 0–0.01)
NRBC BLD-RTO: 0 PER 100 WBC
PLATELET # BLD AUTO: 470 K/UL (ref 150–400)
PMV BLD AUTO: 8.7 FL (ref 8.9–12.9)
POTASSIUM SERPL-SCNC: 4.1 MMOL/L (ref 3.5–5.1)
PROT SERPL-MCNC: 8.1 G/DL (ref 6.4–8.2)
RBC # BLD AUTO: 4.2 M/UL (ref 4.1–5.7)
RBC MORPH BLD: ABNORMAL
SODIUM SERPL-SCNC: 136 MMOL/L (ref 136–145)
WBC # BLD AUTO: 17.2 K/UL (ref 4.1–11.1)

## 2021-06-04 PROCEDURE — 74011000258 HC RX REV CODE- 258: Performed by: NURSE PRACTITIONER

## 2021-06-04 PROCEDURE — 96366 THER/PROPH/DIAG IV INF ADDON: CPT

## 2021-06-04 PROCEDURE — 96365 THER/PROPH/DIAG IV INF INIT: CPT

## 2021-06-04 PROCEDURE — 74011250637 HC RX REV CODE- 250/637: Performed by: NURSE PRACTITIONER

## 2021-06-04 PROCEDURE — 80053 COMPREHEN METABOLIC PANEL: CPT

## 2021-06-04 PROCEDURE — 99285 EMERGENCY DEPT VISIT HI MDM: CPT

## 2021-06-04 PROCEDURE — 36415 COLL VENOUS BLD VENIPUNCTURE: CPT

## 2021-06-04 PROCEDURE — 85025 COMPLETE CBC W/AUTO DIFF WBC: CPT

## 2021-06-04 PROCEDURE — 74011250636 HC RX REV CODE- 250/636: Performed by: NURSE PRACTITIONER

## 2021-06-04 PROCEDURE — 65270000029 HC RM PRIVATE

## 2021-06-04 PROCEDURE — 99213 OFFICE O/P EST LOW 20 MIN: CPT | Performed by: NURSE PRACTITIONER

## 2021-06-04 PROCEDURE — 96374 THER/PROPH/DIAG INJ IV PUSH: CPT

## 2021-06-04 PROCEDURE — 83605 ASSAY OF LACTIC ACID: CPT

## 2021-06-04 PROCEDURE — 87040 BLOOD CULTURE FOR BACTERIA: CPT

## 2021-06-04 RX ORDER — CLINDAMYCIN PHOSPHATE 600 MG/50ML
600 INJECTION INTRAVENOUS
Status: DISCONTINUED | OUTPATIENT
Start: 2021-06-04 | End: 2021-06-04

## 2021-06-04 RX ORDER — HYDROCODONE BITARTRATE AND ACETAMINOPHEN 5; 325 MG/1; MG/1
1 TABLET ORAL
Status: CANCELLED | OUTPATIENT
Start: 2021-06-04 | End: 2021-06-04

## 2021-06-04 RX ORDER — LEVOFLOXACIN 5 MG/ML
500 INJECTION, SOLUTION INTRAVENOUS
Status: DISCONTINUED | OUTPATIENT
Start: 2021-06-04 | End: 2021-06-04

## 2021-06-04 RX ORDER — HYDROMORPHONE HYDROCHLORIDE 1 MG/ML
1 INJECTION, SOLUTION INTRAMUSCULAR; INTRAVENOUS; SUBCUTANEOUS ONCE
Status: COMPLETED | OUTPATIENT
Start: 2021-06-04 | End: 2021-06-04

## 2021-06-04 RX ORDER — HYDROCODONE BITARTRATE AND ACETAMINOPHEN 5; 325 MG/1; MG/1
1 TABLET ORAL
Status: COMPLETED | OUTPATIENT
Start: 2021-06-04 | End: 2021-06-04

## 2021-06-04 RX ORDER — SODIUM CHLORIDE 0.9 % (FLUSH) 0.9 %
5-10 SYRINGE (ML) INJECTION AS NEEDED
Status: DISCONTINUED | OUTPATIENT
Start: 2021-06-04 | End: 2021-06-04 | Stop reason: HOSPADM

## 2021-06-04 RX ADMIN — SODIUM CHLORIDE 1000 ML: 9 INJECTION, SOLUTION INTRAVENOUS at 16:00

## 2021-06-04 RX ADMIN — SODIUM CHLORIDE 1000 ML: 9 INJECTION, SOLUTION INTRAVENOUS at 16:53

## 2021-06-04 RX ADMIN — MEROPENEM 500 MG: 500 INJECTION, POWDER, FOR SOLUTION INTRAVENOUS at 16:53

## 2021-06-04 RX ADMIN — VANCOMYCIN HYDROCHLORIDE 1500 MG: 1 INJECTION, POWDER, LYOPHILIZED, FOR SOLUTION INTRAVENOUS at 17:32

## 2021-06-04 RX ADMIN — HYDROMORPHONE HYDROCHLORIDE 1 MG: 1 INJECTION, SOLUTION INTRAMUSCULAR; INTRAVENOUS; SUBCUTANEOUS at 16:53

## 2021-06-04 RX ADMIN — HYDROCODONE BITARTRATE AND ACETAMINOPHEN 1 TABLET: 5; 325 TABLET ORAL at 14:59

## 2021-06-04 NOTE — PROGRESS NOTES
Chief Complaint   Patient presents with    Dressing Change     pt states he is feeling more dizzy than usual        1. Have you been to the ER, urgent care clinic since your last visit? Hospitalized since your last visit? No    2. Have you seen or consulted any other health care providers outside of the [address] [place] [address] since your last visit? Include any pap smears or colon screening.  No

## 2021-06-04 NOTE — ED NOTES
TRANSFER - OUT REPORT:    Verbal report given to Artmonserrat Alvarez RN(name) on Gerry Pavon  being transferred to Telemetry(unit) for routine progression of care       Report consisted of patients Situation, Background, Assessment and   Recommendations(SBAR). Information from the following report(s) SBAR, Kardex, ED Summary, [facility] and Recent Results was reviewed with the receiving nurse. Lines: 20 gauge left and right AC  Peripheral IV 06/04/21 Left Antecubital (Active)   Site Assessment Clean, dry, & intact 06/04/21 1551   Phlebitis Assessment 0 06/04/21 1551   Infiltration Assessment 0 06/04/21 1551   Dressing Status Clean, dry, & intact 06/04/21 1551   Dressing Type 4 X 4;Tape;Transparent 06/04/21 1551   Hub Color/Line Status Orange;Flushed;Patent 06/04/21 1551   Action Taken Blood drawn 06/04/21 1551       Peripheral IV 06/04/21 Right Forearm (Active)   Site Assessment Clean, dry, & intact 06/04/21 1622   Phlebitis Assessment 0 06/04/21 1622   Infiltration Assessment 0 06/04/21 1622   Dressing Status Clean, dry, & intact 06/04/21 1622   Dressing Type Transparent;Tape 06/04/21 1622   Hub Color/Line Status Pink;Patent; Flushed 06/04/21 1622   Alcohol Cap Used No 06/04/21 1622        Opportunity for questions and clarification was provided.       Patient transported with:   Monitor

## 2021-06-04 NOTE — ED NOTES
Patient is alert and oriented x 4 and in no acute distress at this time. Respirations are at a regular rate, depth, and pattern. Patient updated on plan of care and has no questions or concerns at this time. Call bell within reach. Will continue to monitor. Please reference nursing assessment. Emergency Department Nursing Plan of Care       The Nursing Plan of Care is developed from the Nursing assessment and Emergency Department Attending provider initial evaluation. The plan of care may be reviewed in the ED Provider note.     The Plan of Care was developed with the following considerations:   Patient / Family readiness to learn indicated by:verbalized understanding and successful return demonstration  Persons(s) to be included in education: patient  Barriers to Learning/Limitations:No    Signed     [address] Jaziel Mancini RN    6/4/2021   2:45 PM

## 2021-06-04 NOTE — PROGRESS NOTES
Subjective: (As above and below)     Chief Complaint   Patient presents with    Dressing Change     pt states he is feeling more dizzy than usual      Winona Prader is a 22y.o. year old male who presents for fu on wound check- infected abscesses to bilateral axilla and buttocks    He is taking doxycycline but did not  the bactrim that was rx'd 2 days ago due to transportation, cost issues    His wounds are draining, he is dizzy, he is in a lot of pain  No fevers    Reviewed PmHx, RxHx, FmHx, SocHx, AllgHx and updated in chart. History reviewed. No pertinent family history. History reviewed. No pertinent past medical history. Social History     Socioeconomic History    Marital status: SINGLE     Spouse name: Not on file    Number of children: Not on file    Years of education: Not on file    Highest education level: Not on file   Tobacco Use    Smoking status: Never Smoker    Smokeless tobacco: Never Used   Vaping Use    Vaping Use: Never used   Substance and Sexual Activity    Alcohol use: Yes     Comment: Socially     Drug use: Never   Social History Narrative    ** Merged History Encounter **          Social Determinants of Health     Financial Resource Strain:     Difficulty of Paying Living Expenses:    Food Insecurity:     Worried About Running Out of Food in the Last Year:     [address] in the Last Year:    Transportation Needs:     Lack of Transportation (Medical):      Lack of Transportation (Non-Medical):    Physical Activity:     Days of Exercise per Week:     Minutes of Exercise per Session:    Stress:     Feeling of Stress :    Social Connections:     Frequency of Communication with Friends and Family:     Frequency of Social Gatherings with Friends and Family:     Attends [employer] Services:     Active Member of Clubs or Organizations:     Attends Club or Organization Meetings:     Marital Status:           Current Outpatient Medications   Medication Sig  trimethoprim-sulfamethoxazole (BACTRIM DS, SEPTRA DS) 160-800 mg per tablet Take 1 Tablet by mouth two (2) times a day for 10 days.  traMADoL (ULTRAM) 50 mg tablet Take 1 Tablet by mouth every eight (8) hours as needed for Pain for up to 3 days. Max Daily Amount: 150 mg. For severe pain (Patient not taking: Reported on 6/4/2021)    ibuprofen (MOTRIN) 800 mg tablet Take 1 Tablet by mouth every eight (8) hours as needed for Pain. With food (Patient not taking: Reported on 6/4/2021)     No current facility-administered medications for this visit. Review of Systems:   Constitutional:    Negative for fever and chills, negative diaphoresis. HEENT:              Negative for neck pain and stiffness. Eyes:                  Negative for visual disturbance, itching, redness or discharge. Respiratory:        Negative for cough and shortness of breath. Cardiovascular:  Negative for chest pain and palpitations. Gastrointestinal: Negative for nausea, vomiting, abdominal pain, diarrhea or constipation. Genitourinary:     Negative for dysuria and frequency. Musculoskeletal: Negative for falls, tenderness and swelling. Skin:                    Negative for rash, masses or lesions. +infected abscess  Neurological:       Negative for seizure, loss of consciousness, weakness and numbness.      Objective:     Vitals:    06/04/21 1308   BP: (!) 98/57   Pulse: (!) 138   Resp: 20   Temp: 97.6 °F (36.4 °C)   TempSrc: Temporal   SpO2: 97%   Weight: 150 lb (68 kg)   Height: 6' 3\" (1.905 m)       Results for orders placed or performed in visit on 06/02/21   HIV 1/2 AG/AB, 4TH GENERATION,W RFLX CONFIRM   Result Value Ref Range    HIV 1/2 Interpretation NONREACTIVE NONREACTIVE      HIV 1/2 result comment SEE NOTE           Physical Examination: General appearance - in pain, can barely take off his shirt or position himself on exam table  Skin - abscess: draining large amounts of purulent foul smelling drainage  Unable to tolerate wound care    Assessment/ Plan:      1. Hidradenitis  He did not get 2nd abx, he is in pain unable to tolerate wound care, foul smelling infection, dizziness, tachycardia and low BP, pt is advised to go to the ED he states he will        I have discussed the diagnosis with the patient and the intended plan as seen in the above orders. The patient has received an after-visit summary and questions were answered concerning future plans. Pt conveyed understanding of plan. Medication Side Effects and Warnings were discussed with patient: yes  Patient Labs were reviewed: yes  Patient Past Records were reviewed:  yes    Kanwal Colin.  Luis Antonio Winston NP

## 2021-06-05 LAB
ANION GAP SERPL CALC-SCNC: 6 MMOL/L (ref 5–15)
BASOPHILS # BLD: 0 K/UL (ref 0–0.1)
BASOPHILS NFR BLD: 0 % (ref 0–1)
BUN SERPL-MCNC: 4 MG/DL (ref 6–20)
BUN/CREAT SERPL: 9 (ref 12–20)
CALCIUM SERPL-MCNC: 8.3 MG/DL (ref 8.5–10.1)
CHLORIDE SERPL-SCNC: 104 MMOL/L (ref 97–108)
CO2 SERPL-SCNC: 27 MMOL/L (ref 21–32)
CREAT SERPL-MCNC: 0.46 MG/DL (ref 0.7–1.3)
DIFFERENTIAL METHOD BLD: ABNORMAL
EOSINOPHIL # BLD: 0.3 K/UL (ref 0–0.4)
EOSINOPHIL NFR BLD: 2 % (ref 0–7)
ERYTHROCYTE [DISTWIDTH] IN BLOOD BY AUTOMATED COUNT: 16.4 % (ref 11.5–14.5)
FERRITIN SERPL-MCNC: 570 NG/ML (ref 26–388)
GLUCOSE SERPL-MCNC: 88 MG/DL (ref 65–100)
HCT VFR BLD AUTO: 25.2 % (ref 36.6–50.3)
HGB BLD-MCNC: 7.4 G/DL (ref 12.1–17)
IMM GRANULOCYTES # BLD AUTO: 0.2 K/UL (ref 0–0.04)
IMM GRANULOCYTES NFR BLD AUTO: 1 % (ref 0–0.5)
IRON SATN MFR SERPL: 14 % (ref 20–50)
IRON SERPL-MCNC: 17 UG/DL (ref 35–150)
LYMPHOCYTES # BLD: 1.2 K/UL (ref 0.8–3.5)
LYMPHOCYTES NFR BLD: 9 % (ref 12–49)
MCH RBC QN AUTO: 21.4 PG (ref 26–34)
MCHC RBC AUTO-ENTMCNC: 29.4 G/DL (ref 30–36.5)
MCV RBC AUTO: 72.8 FL (ref 80–99)
MONOCYTES # BLD: 1.3 K/UL (ref 0–1)
MONOCYTES NFR BLD: 9 % (ref 5–13)
NEUTS SEG # BLD: 11.4 K/UL (ref 1.8–8)
NEUTS SEG NFR BLD: 79 % (ref 32–75)
NRBC # BLD: 0 K/UL (ref 0–0.01)
NRBC BLD-RTO: 0 PER 100 WBC
PLATELET # BLD AUTO: 399 K/UL (ref 150–400)
PMV BLD AUTO: 8.8 FL (ref 8.9–12.9)
POTASSIUM SERPL-SCNC: 3.5 MMOL/L (ref 3.5–5.1)
RBC # BLD AUTO: 3.46 M/UL (ref 4.1–5.7)
SODIUM SERPL-SCNC: 137 MMOL/L (ref 136–145)
TIBC SERPL-MCNC: 122 UG/DL (ref 250–450)
WBC # BLD AUTO: 14.5 K/UL (ref 4.1–11.1)

## 2021-06-05 PROCEDURE — 36415 COLL VENOUS BLD VENIPUNCTURE: CPT

## 2021-06-05 PROCEDURE — 94760 N-INVAS EAR/PLS OXIMETRY 1: CPT

## 2021-06-05 PROCEDURE — 83550 IRON BINDING TEST: CPT

## 2021-06-05 PROCEDURE — 80048 BASIC METABOLIC PNL TOTAL CA: CPT

## 2021-06-05 PROCEDURE — 87147 CULTURE TYPE IMMUNOLOGIC: CPT

## 2021-06-05 PROCEDURE — 65270000029 HC RM PRIVATE

## 2021-06-05 PROCEDURE — 85025 COMPLETE CBC W/AUTO DIFF WBC: CPT

## 2021-06-05 PROCEDURE — 74011250637 HC RX REV CODE- 250/637: Performed by: INTERNAL MEDICINE

## 2021-06-05 PROCEDURE — 82728 ASSAY OF FERRITIN: CPT

## 2021-06-05 PROCEDURE — 74011250636 HC RX REV CODE- 250/636: Performed by: INTERNAL MEDICINE

## 2021-06-05 PROCEDURE — 87070 CULTURE OTHR SPECIMN AEROBIC: CPT

## 2021-06-05 RX ORDER — ENOXAPARIN SODIUM 100 MG/ML
40 INJECTION SUBCUTANEOUS DAILY
Status: DISCONTINUED | OUTPATIENT
Start: 2021-06-05 | End: 2021-06-11 | Stop reason: HOSPADM

## 2021-06-05 RX ORDER — ACETAMINOPHEN 650 MG/1
650 SUPPOSITORY RECTAL
Status: DISCONTINUED | OUTPATIENT
Start: 2021-06-05 | End: 2021-06-11 | Stop reason: HOSPADM

## 2021-06-05 RX ORDER — POLYETHYLENE GLYCOL 3350 17 G/17G
17 POWDER, FOR SOLUTION ORAL DAILY
Status: DISCONTINUED | OUTPATIENT
Start: 2021-06-05 | End: 2021-06-11 | Stop reason: HOSPADM

## 2021-06-05 RX ORDER — ONDANSETRON 2 MG/ML
4 INJECTION INTRAMUSCULAR; INTRAVENOUS
Status: DISCONTINUED | OUTPATIENT
Start: 2021-06-05 | End: 2021-06-11 | Stop reason: HOSPADM

## 2021-06-05 RX ORDER — BISACODYL 5 MG
5 TABLET, DELAYED RELEASE (ENTERIC COATED) ORAL DAILY PRN
Status: DISCONTINUED | OUTPATIENT
Start: 2021-06-05 | End: 2021-06-11 | Stop reason: HOSPADM

## 2021-06-05 RX ORDER — OXYCODONE HYDROCHLORIDE 5 MG/1
10 TABLET ORAL
Status: DISCONTINUED | OUTPATIENT
Start: 2021-06-05 | End: 2021-06-07

## 2021-06-05 RX ORDER — PROMETHAZINE HYDROCHLORIDE 25 MG/1
12.5 TABLET ORAL
Status: DISCONTINUED | OUTPATIENT
Start: 2021-06-05 | End: 2021-06-11 | Stop reason: HOSPADM

## 2021-06-05 RX ORDER — ACETAMINOPHEN 325 MG/1
650 TABLET ORAL
Status: DISCONTINUED | OUTPATIENT
Start: 2021-06-05 | End: 2021-06-11 | Stop reason: HOSPADM

## 2021-06-05 RX ORDER — SODIUM CHLORIDE 0.9 % (FLUSH) 0.9 %
5-40 SYRINGE (ML) INJECTION EVERY 8 HOURS
Status: DISCONTINUED | OUTPATIENT
Start: 2021-06-05 | End: 2021-06-11 | Stop reason: HOSPADM

## 2021-06-05 RX ORDER — OXYCODONE HYDROCHLORIDE 5 MG/1
5 TABLET ORAL
Status: DISCONTINUED | OUTPATIENT
Start: 2021-06-05 | End: 2021-06-07

## 2021-06-05 RX ORDER — LEVOFLOXACIN 750 MG/1
750 TABLET ORAL EVERY 24 HOURS
Status: DISCONTINUED | OUTPATIENT
Start: 2021-06-05 | End: 2021-06-11 | Stop reason: HOSPADM

## 2021-06-05 RX ORDER — SODIUM CHLORIDE 0.9 % (FLUSH) 0.9 %
5-40 SYRINGE (ML) INJECTION AS NEEDED
Status: DISCONTINUED | OUTPATIENT
Start: 2021-06-05 | End: 2021-06-11 | Stop reason: HOSPADM

## 2021-06-05 RX ADMIN — LEVOFLOXACIN 750 MG: 750 TABLET, FILM COATED ORAL at 02:17

## 2021-06-05 RX ADMIN — Medication 10 ML: at 18:08

## 2021-06-05 RX ADMIN — OXYCODONE 5 MG: 5 TABLET ORAL at 18:21

## 2021-06-05 RX ADMIN — Medication 10 ML: at 01:53

## 2021-06-05 RX ADMIN — Medication 10 ML: at 22:31

## 2021-06-05 RX ADMIN — POLYETHYLENE GLYCOL 3350 17 G: 17 POWDER, FOR SOLUTION ORAL at 08:49

## 2021-06-05 RX ADMIN — VANCOMYCIN HYDROCHLORIDE 750 MG: 750 INJECTION, POWDER, LYOPHILIZED, FOR SOLUTION INTRAVENOUS at 09:12

## 2021-06-05 RX ADMIN — ENOXAPARIN SODIUM 40 MG: 40 INJECTION, SOLUTION INTRAVENOUS; SUBCUTANEOUS at 08:49

## 2021-06-05 RX ADMIN — OXYCODONE 5 MG: 5 TABLET ORAL at 01:52

## 2021-06-05 RX ADMIN — VANCOMYCIN HYDROCHLORIDE 750 MG: 750 INJECTION, POWDER, LYOPHILIZED, FOR SOLUTION INTRAVENOUS at 01:52

## 2021-06-05 RX ADMIN — VANCOMYCIN HYDROCHLORIDE 750 MG: 750 INJECTION, POWDER, LYOPHILIZED, FOR SOLUTION INTRAVENOUS at 18:08

## 2021-06-05 NOTE — PROGRESS NOTES
Patient arrived to unit via [facility] approx at. 2030. Patient was cleaned up and wounds assessed. Patient has  multiple abcess/lesions under gloria axilla, groin, and coccyx. Wounds weeping a large amount of puss looking drainage. Wounds were cleaned with wound cleanser. gauzed soaked with wound cleanser place in the folds of axilla and groin. Patient was asked if he wanted to be on his side for comfort off his coccyx. He stated he wanted to be on his back for a bit. Pillows placed in room for when patient wants to turn. Patient given a urinal and call bell.  Hospitalist called and made aware that patient was on floor

## 2021-06-05 NOTE — PROGRESS NOTES
Problem: Pressure Injury - Risk of  Goal: *Prevention of pressure injury  Description: Document Dann Scale and appropriate interventions in the flowsheet.   Outcome: Progressing Towards Goal  Note: Pressure Injury Interventions:                                            Problem: Patient Education: Go to Patient Education Activity  Goal: Patient/Family Education  Outcome: Progressing Towards Goal

## 2021-06-05 NOTE — PROGRESS NOTES
Hospitalist Progress Note    NAME: Winona Prader   :  1995   MRN:  [id number]       Assessment / Plan:  Sepsis POA  Hidradenitis suppurativa POA with draining abscesses in groin and axilla  Has failed outpatient treatment  Unable to follow-up with dermatology due to logistics and cost  Taking courses of antibiotics including tetracyclines without improvement  Copious drainage from skin lesions in the groin and axilla bilaterally  Elevated white count, tachycardic, pain  IV vancomycin and Levaquin, transition back to oral antibiotics at discharge based on cultures  Check cultures of drainage from groin wounds  Cultures in lab  And will definitely need outpatient dermatology follow-up on discharge. Wound care consult  Infectious disease consulted as well. We will follow-up recommendations. Anemia likely chronic disease POA  HgB 9.1, slowly dropping  Suspect chronic disease  Pending  iron studies  DVT prophylaxis with lovenox  Code status: Full code  Body mass index is 18.99 kg/m².: 18.5 - 24.9 Normal weight     Subjective:     Chief Complaint / Reason for Physician Visit  \"Patient said that he does not have fever but has pain in the armpit areas he could not move his upper extremities as he want. A lot of drainage from the armpits. \". Discussed with RN events overnight. Review of Systems:  Symptom Y/N Comments  Symptom Y/N Comments   Fever/Chills y   Chest Pain y    Poor Appetite    Edema     Cough y   Abdominal Pain y    Sputum    Joint Pain     SOB/THAKUR y   Pruritis/Rash     Nausea/vomit    Tolerating PT/OT     Diarrhea    Tolerating Diet     Constipation    Other       Could NOT obtain due to:      Objective:     VITALS:   Last 24hrs VS reviewed since prior progress note.  Most recent are:  Patient Vitals for the past 24 hrs:   Temp Pulse Resp BP SpO2   21 0815 99.2 °F (37.3 °C) 93 18 131/75 98 %   21 0310 99.7 °F (37.6 °C) 97 18 132/69 99 %   21 2201 98.4 °F (36.9 °C) 83 18 133/74 100 %     No intake or output data in the 24 hours ending 06/05/21 1023     PHYSICAL EXAM:  General: WD, WN. Alert, cooperative, no acute distress    EENT:  EOMI. Anicteric sclerae. MMM  Resp:  CTA bilaterally, no wheezing or rales. No accessory muscle use  CV:  Regular  rhythm,  No edema  GI:  Soft, Non distended, Non tender.  +Bowel sounds  Neurologic:  Alert and oriented X 3, normal speech,   Psych:   Good insight. Not anxious nor agitated  Skin:  No rashes. No jaundice  Both armpits and groin area have draining nodular cystic infected lesions. Reviewed most current lab test results and cultures  YES  Reviewed most current radiology test results   YES  Review and summation of old records today    NO  Reviewed patient's current orders and MAR    YES  PMH/ reviewed - no change compared to H&P  ________________________________________________________________________  Care Plan discussed with:    Comments   Patient y    Family      RN y    Care Manager     Consultant                       y Multidiciplinary team rounds were held today with , nursing, pharmacist and clinical coordinator. Patient's plan of care was discussed; medications were reviewed and discharge planning was addressed. ________________________________________________________________________  Total NON critical care TIME: 35   Minutes    Total CRITICAL CARE TIME Spent:   Minutes non procedure based      Comments   >50% of visit spent in counseling and coordination of care     ________________________________________________________________________  Caitlyn Mc MD     Procedures: see electronic medical records for all procedures/Xrays and details which were not copied into this note but were reviewed prior to creation of Plan. LABS:  I reviewed today's most current labs and imaging studies.   Pertinent labs include:  Recent Labs     06/05/21  0149 06/04/21  1544   WBC 14.5* 17.2*   HGB 7.4* 9.1*   HCT 25.2* 30.1*    470*     Recent Labs     06/05/21  0149 06/04/21  1544    136   K 3.5 4.1    97   CO2 27 30   GLU 88 112*   BUN 4* 5*   CREA 0.46* 0.97   CA 8.3* 9.0   ALB  --  2.3*   TBILI  --  0.5   ALT  --  15       Signed:  Len Blake MD

## 2021-06-05 NOTE — H&P
Hospitalist Admission Note    NAME:  Maria Del Carmen Cerrato   :   1995   MRN:   [id number]     Date of admit: 2021    PCP: Ella Garcia., NP    Assessment/Plan:     Sepsis POA  Hidradenitis suppurativa POA with draining abscesses in groin and axilla  Has failed outpatient treatment   Unable to follow-up with dermatology due to logistics and cost   Taking courses of antibiotics including tetracyclines without improvement  Copious drainage from skin lesions in the groin and axilla bilaterally  Moderate to severe pain  Being followed at [address] [facility], seen several days ago   Patient could not get the Bactrim prescription filled  Came back for wound check today at [address] [place] [address]  Elevated white count, tachycardic, pain  They want admit for IV antibiotics, [address] [place] [address] hospitalist preferred transfer for ID consult at [facility]  IV vancomycin and Levaquin, transition back to oral antibiotics at discharge based on cultures  Check cultures of drainage from groin wounds  Cultures in lab  AED consult  What he really needs is referral to a dermatology specialist for long term management         He has severe disease and likely needs escalation of therapy beyond antibiotics    He apparently tried to get into [facility], was referred to centers either [place] or [address] [place] [address]   Could not travel to these clinics  Wound care consult  May eventually need surgery evaluation  As needed oxycodone    Anemia likely chronic disease POA  HgB 9.1, slowly dropping  Suspect chronic disease  Check iron studies    Given the patient's current clinical presentation, I have a high level of concern for decompensation if discharged from the emergency department. My assessment of this patient's clinical condition and my plan of care is as noted above.     DVT prophylaxis with lovenox    Code status: Full code  NOK:    History     CHIEF COMPLAINT: Transferred from [address] [place] [address] with sepsis and hidradenitis suppurativa refractory to outpatient treatment    HISTORY OF PRESENT ILLNESS:    69-year-old male    Several month history of painful skin lesions in the axillae and groin bilaterally  Diagnosed with hidradenitis suppurativa  Has taken courses of antibiotics including tetracyclines without improvement  Been followed at [facility] recently  Seen several days ago with increasing pain and drainage, placed on Bactrim  For logistical reasons he apparently could not fill the prescription  Came back today for wound check, noted to be septic, increased drainage from wounds  Was to admit for IV antibiotics  Hospitalist at [facility] preferred transfer to [facility]    Currently stable except for moderate to severe pain in the wounds especially with cleaning  There was copious drainage or any other wounds bilaterally especially in the groin  Were called to admit the patient    Past medical history:  1. Hidradenitis suppurativa      Social History     Tobacco Use    Smoking status: Never Smoker    Smokeless tobacco: Never Used   Substance Use Topics    Alcohol use: Yes     Comment: Socially         Family history:  No family history of hidradenitis suppurativa  No family history of diabetes  No children of his own    Allergies   Allergen Reactions    Penicillins Anaphylaxis    Pcn [Penicillins] Swelling        Prior to Admission medications    Medication Sig Start Date End Date Taking? Authorizing Provider   trimethoprim-sulfamethoxazole (BACTRIM DS, SEPTRA DS) 160-800 mg per tablet Take 1 Tablet by mouth two (2) times a day for 10 days. 6/2/21 6/12/21  Monica Montana, NP   traMADoL Ova Cristino) 50 mg tablet Take 1 Tablet by mouth every eight (8) hours as needed for Pain for up to 3 days. Max Daily Amount: 150 mg. For severe pain  Patient not taking: Reported on 6/4/2021 6/2/21 6/5/21  Monica Montana, NP   ibuprofen (MOTRIN) 800 mg tablet Take 1 Tablet by mouth every eight (8) hours as needed for Pain.  With food  Patient not taking: Reported on 2021   Jenny Escamilla NP       Review of symptoms:     POSITIVE= Bold  Negative = not bold  General:  fever, chills, sweats, generalized weakness  Eyes:    blurred vision, eye pain, double vision  ENT:    Coryza, sore throat, trouble swallowing  Respiratory:   cough, sputum, SOB  Cardiology:   chest pain, orthopnea, PND, edema  Gastrointestinal:  abdominal pain , N/V, diarrhea, constipation, melena or BRBPR  Genitourinary:  Urgency, dysuria, hematuria  Muskuloskeletal :  Joint redness, swelling or acute joint pain, myalgias  Hematology:  easy bruising, nose or gum bleeding  Dermatological: Rash with abscesses and drainage, ulceration  Endocrine:   Polyuria or polydipsia, heat or hold intolerance  Neurological:  Headache, focal motor or sensory changes     Speech difficulties, memory loss  Psychological: depression, agitation      Objective:   VITALS:    Patient Vitals for the past 24 hrs:   Temp Pulse Resp BP SpO2   21 2201 98.4 °F (36.9 °C) 83 18 133/74 100 %     Temp (24hrs), Av.1 °F (36.7 °C), Min:97.6 °F (36.4 °C), Max:98.4 °F (36.9 °C)           Wt Readings from Last 12 Encounters:   21 68.9 kg (152 lb)   21 68 kg (150 lb)   21 72.6 kg (160 lb)   21 78 kg (172 lb)   21 82.1 kg (181 lb)   21 99.8 kg (220 lb)   21 99.8 kg (220 lb)   11/15/14 76.9 kg (169 lb 8.5 oz) (73 %, Z= 0.62)*   13 74.1 kg (74 %, Z= 0.65)*   11 68.9 kg (78 %, Z= 0.76)*   11 68.9 kg (78 %, Z= 0.77)*     * Growth percentiles are based on CDC (Boys, 2-20 Years) data.          PHYSICAL EXAM:     I had a face to face encounter and independently examined this patient on 21  as outlined below:    General:    Alert, cooperative in no distress     HEENT: Normocephalic, atraumatic    PERRL, Sclera no icterus    Nasal mucosa without masses or discharge  Hearing intact to voice    Oropharynx without erythema or exudate Neck:  No meningismus, trachea midline, no carotid bruits     Thyroid not enlarged, no nodules or tenderness  Lungs:   Clear to auscultation bilaterally. No wheezing or rales    No accessory muscle use or retractions. Heart:   Regular rate and rhythm,  no murmur or gallop. No LE edema  Abdomen:   Soft, non-tender. Not distended. Bowel sounds normal.     No masses, No Hepatosplenomegaly, No Rebound or guarding  Lymph nodes: No cervical or inguinal SANDY  Musculoskeletal:  No Joint swelling, erythema, warmth. No Cyanosis or clubbing  Skin:     Multiple draining skin lesions in axilla and groin bilaterally draining copious pus    Not Jaundiced   No nodules or thickening  Neurologic: Alert and oriented X 3, follows commands     Cranial nerves 2 to 12 intact    Symmetric motor strength bilaterally       LAB DATA REVIEWED:    Recent Results (from the past 12 hour(s))   LACTIC ACID    Collection Time: 06/04/21  3:44 PM   Result Value Ref Range    Lactic acid 1.9 0.4 - 2.0 MMOL/L   METABOLIC PANEL, COMPREHENSIVE    Collection Time: 06/04/21  3:44 PM   Result Value Ref Range    Sodium 136 136 - 145 mmol/L    Potassium 4.1 3.5 - 5.1 mmol/L    Chloride 97 97 - 108 mmol/L    CO2 30 21 - 32 mmol/L    Anion gap 9 5 - 15 mmol/L    Glucose 112 (H) 65 - 100 mg/dL    BUN 5 (L) 6 - 20 MG/DL    Creatinine 0.97 0.70 - 1.30 MG/DL    BUN/Creatinine ratio 5 (L) 12 - 20      GFR est AA >60 >60 ml/min/1.73m2    GFR est non-AA >60 >60 ml/min/1.73m2    Calcium 9.0 8.5 - 10.1 MG/DL    Bilirubin, total 0.5 0.2 - 1.0 MG/DL    ALT (SGPT) 15 12 - 78 U/L    AST (SGOT) 14 (L) 15 - 37 U/L    Alk. phosphatase 152 (H) 45 - 117 U/L    Protein, total 8.1 6.4 - 8.2 g/dL    Albumin 2.3 (L) 3.5 - 5.0 g/dL    Globulin 5.8 (H) 2.0 - 4.0 g/dL    A-G Ratio 0.4 (L) 1.1 - 2.2     CBC WITH AUTOMATED DIFF    Collection Time: 06/04/21  3:44 PM   Result Value Ref Range    WBC 17.2 (H) 4.1 - 11.1 K/uL    RBC 4.20 4. 10 - 5.70 M/uL    HGB 9.1 (L) 12.1 - 17.0 g/dL HCT 30.1 (L) 36.6 - 50.3 %    MCV 71.7 (L) 80.0 - 99.0 FL    MCH 21.7 (L) 26.0 - 34.0 PG    MCHC 30.2 30.0 - 36.5 g/dL    RDW 16.3 (H) 11.5 - 14.5 %    PLATELET 459 (H) 724 - 400 K/uL    MPV 8.7 (L) 8.9 - 12.9 FL    NRBC 0.0 0  WBC    ABSOLUTE NRBC 0.00 0.00 - 0.01 K/uL    NEUTROPHILS 88 (H) 32 - 75 %    LYMPHOCYTES 4 (L) 12 - 49 %    MONOCYTES 6 5 - 13 %    EOSINOPHILS 1 0 - 7 %    BASOPHILS 0 0 - 1 %    IMMATURE GRANULOCYTES 1 (H) 0.0 - 0.5 %    ABS. NEUTROPHILS 15.1 (H) 1.8 - 8.0 K/UL    ABS. LYMPHOCYTES 0.7 (L) 0.8 - 3.5 K/UL    ABS. MONOCYTES 1.0 0.0 - 1.0 K/UL    ABS. EOSINOPHILS 0.2 0.0 - 0.4 K/UL    ABS. BASOPHILS 0.0 0.0 - 0.1 K/UL    ABS. IMM. GRANS. 0.2 (H) 0.00 - 0.04 K/UL    DF SMEAR SCANNED      RBC COMMENTS ANISOCYTOSIS  1+           I saw the patient personally, took a history and did a complete physical exam at the bedside. I performed complex decision making in coming up with a diagnostic and treatment plan for the patient. I reviewed the patient's past medical records, current laboratory and radiology results, and actual Xray films/EKG. I have also discussed this case with the involved ED physician.     Care Plan discussed with:    Patient, Family, ED Doc    Risk of deterioration:  High    Total Time Coordinating Admission:  55   minutes    Total Critical Care Time:         King Davis MD

## 2021-06-05 NOTE — PROGRESS NOTES
Problem: Pressure Injury - Risk of  Goal: *Prevention of pressure injury  Description: Document Dann Scale and appropriate interventions in the flowsheet. Outcome: Progressing Towards Goal  Note: Pressure Injury Interventions:  Sensory Interventions: Assess changes in LOC, Assess need for specialty bed         Activity Interventions: PT/OT evaluation, Chair cushion    Mobility Interventions: Assess need for specialty bed, Pressure redistribution bed/mattress (bed type), Turn and reposition approx. every two hours(pillow and wedges)    Nutrition Interventions: Document food/fluid/supplement intake, Discuss nutritional consult with provider    Friction and Shear Interventions: Apply protective barrier, creams and emollients, HOB 30 degrees or less                Problem: Falls - Risk of  Goal: *Absence of Falls  Description: Document Omid Fall Risk and appropriate interventions in the flowsheet.   Outcome: Progressing Towards Goal  Note: Fall Risk Interventions:            Medication Interventions: Bed/chair exit alarm, Patient to call before getting OOB, Teach patient to arise slowly

## 2021-06-05 NOTE — PROGRESS NOTES
Consult received. Chart reviewed. Abscess drainage cultures sent. Continue vancomycin and levofloxacin. Leukocytosis is coming down. Allergy of anaphylaxis listed to penicillins. Will see in consultation 6/7/21. Perferctserve with Qs.          Burak Ortiz MD  Infectious Diseases

## 2021-06-06 LAB
ALBUMIN SERPL-MCNC: 2.1 G/DL (ref 3.5–5)
ALBUMIN/GLOB SERPL: 0.4 {RATIO} (ref 1.1–2.2)
ALP SERPL-CCNC: 159 U/L (ref 45–117)
ALT SERPL-CCNC: 12 U/L (ref 12–78)
ANION GAP SERPL CALC-SCNC: 4 MMOL/L (ref 5–15)
AST SERPL-CCNC: 13 U/L (ref 15–37)
BASOPHILS # BLD: 0 K/UL (ref 0–0.1)
BASOPHILS NFR BLD: 0 % (ref 0–1)
BILIRUB SERPL-MCNC: 0.6 MG/DL (ref 0.2–1)
BUN SERPL-MCNC: 5 MG/DL (ref 6–20)
BUN/CREAT SERPL: 10 (ref 12–20)
CALCIUM SERPL-MCNC: 9 MG/DL (ref 8.5–10.1)
CHLORIDE SERPL-SCNC: 100 MMOL/L (ref 97–108)
CO2 SERPL-SCNC: 31 MMOL/L (ref 21–32)
CREAT SERPL-MCNC: 0.51 MG/DL (ref 0.7–1.3)
DATE LAST DOSE: NORMAL
DATE LAST DOSE: NORMAL
DIFFERENTIAL METHOD BLD: ABNORMAL
EOSINOPHIL # BLD: 0.7 K/UL (ref 0–0.4)
EOSINOPHIL NFR BLD: 4 % (ref 0–7)
ERYTHROCYTE [DISTWIDTH] IN BLOOD BY AUTOMATED COUNT: 16.5 % (ref 11.5–14.5)
GLOBULIN SER CALC-MCNC: 5.4 G/DL (ref 2–4)
GLUCOSE SERPL-MCNC: 91 MG/DL (ref 65–100)
HCT VFR BLD AUTO: 28.3 % (ref 36.6–50.3)
HGB BLD-MCNC: 8.3 G/DL (ref 12.1–17)
IMM GRANULOCYTES # BLD AUTO: 0 K/UL (ref 0–0.04)
IMM GRANULOCYTES NFR BLD AUTO: 0 % (ref 0–0.5)
LYMPHOCYTES # BLD: 1.5 K/UL (ref 0.8–3.5)
LYMPHOCYTES NFR BLD: 9 % (ref 12–49)
MCH RBC QN AUTO: 21.2 PG (ref 26–34)
MCHC RBC AUTO-ENTMCNC: 29.3 G/DL (ref 30–36.5)
MCV RBC AUTO: 72.2 FL (ref 80–99)
MONOCYTES # BLD: 1.1 K/UL (ref 0–1)
MONOCYTES NFR BLD: 7 % (ref 5–13)
NEUTS BAND NFR BLD MANUAL: 2 %
NEUTS SEG # BLD: 13 K/UL (ref 1.8–8)
NEUTS SEG NFR BLD: 78 % (ref 32–75)
NRBC # BLD: 0 K/UL (ref 0–0.01)
NRBC BLD-RTO: 0 PER 100 WBC
PLATELET # BLD AUTO: 445 K/UL (ref 150–400)
PMV BLD AUTO: 8.7 FL (ref 8.9–12.9)
POTASSIUM SERPL-SCNC: 3.8 MMOL/L (ref 3.5–5.1)
PROT SERPL-MCNC: 7.5 G/DL (ref 6.4–8.2)
RBC # BLD AUTO: 3.92 M/UL (ref 4.1–5.7)
RBC MORPH BLD: ABNORMAL
REPORTED DOSE,DOSE: NORMAL UNITS
REPORTED DOSE,DOSE: NORMAL UNITS
REPORTED DOSE/TIME,TMG: NORMAL
REPORTED DOSE/TIME,TMG: NORMAL
SODIUM SERPL-SCNC: 135 MMOL/L (ref 136–145)
VANCOMYCIN TROUGH SERPL-MCNC: 6.9 UG/ML (ref 5–10)
VANCOMYCIN TROUGH SERPL-MCNC: 9.4 UG/ML (ref 5–10)
WBC # BLD AUTO: 16.3 K/UL (ref 4.1–11.1)

## 2021-06-06 PROCEDURE — 74011250637 HC RX REV CODE- 250/637: Performed by: INTERNAL MEDICINE

## 2021-06-06 PROCEDURE — 77030040718 HC DRSG HYDRGEL SKINTEGRITY MDII -A

## 2021-06-06 PROCEDURE — 80202 ASSAY OF VANCOMYCIN: CPT

## 2021-06-06 PROCEDURE — 36415 COLL VENOUS BLD VENIPUNCTURE: CPT

## 2021-06-06 PROCEDURE — 74011250636 HC RX REV CODE- 250/636: Performed by: INTERNAL MEDICINE

## 2021-06-06 PROCEDURE — 94760 N-INVAS EAR/PLS OXIMETRY 1: CPT

## 2021-06-06 PROCEDURE — 65270000029 HC RM PRIVATE

## 2021-06-06 PROCEDURE — 74011250636 HC RX REV CODE- 250/636: Performed by: STUDENT IN AN ORGANIZED HEALTH CARE EDUCATION/TRAINING PROGRAM

## 2021-06-06 PROCEDURE — 85025 COMPLETE CBC W/AUTO DIFF WBC: CPT

## 2021-06-06 PROCEDURE — 80053 COMPREHEN METABOLIC PANEL: CPT

## 2021-06-06 RX ORDER — METRONIDAZOLE 500 MG/100ML
500 INJECTION, SOLUTION INTRAVENOUS EVERY 12 HOURS
Status: DISCONTINUED | OUTPATIENT
Start: 2021-06-06 | End: 2021-06-10

## 2021-06-06 RX ADMIN — VANCOMYCIN HYDROCHLORIDE 750 MG: 750 INJECTION, POWDER, LYOPHILIZED, FOR SOLUTION INTRAVENOUS at 02:15

## 2021-06-06 RX ADMIN — Medication 10 ML: at 15:21

## 2021-06-06 RX ADMIN — VANCOMYCIN HYDROCHLORIDE 750 MG: 750 INJECTION, POWDER, LYOPHILIZED, FOR SOLUTION INTRAVENOUS at 21:48

## 2021-06-06 RX ADMIN — ENOXAPARIN SODIUM 40 MG: 40 INJECTION, SOLUTION INTRAVENOUS; SUBCUTANEOUS at 08:40

## 2021-06-06 RX ADMIN — OXYCODONE 5 MG: 5 TABLET ORAL at 08:38

## 2021-06-06 RX ADMIN — METRONIDAZOLE 500 MG: 500 INJECTION, SOLUTION INTRAVENOUS at 17:43

## 2021-06-06 RX ADMIN — Medication 10 ML: at 21:48

## 2021-06-06 RX ADMIN — OXYCODONE 10 MG: 5 TABLET ORAL at 01:05

## 2021-06-06 RX ADMIN — OXYCODONE 5 MG: 5 TABLET ORAL at 17:53

## 2021-06-06 RX ADMIN — Medication 10 ML: at 05:25

## 2021-06-06 RX ADMIN — VANCOMYCIN HYDROCHLORIDE 750 MG: 750 INJECTION, POWDER, LYOPHILIZED, FOR SOLUTION INTRAVENOUS at 10:34

## 2021-06-06 RX ADMIN — LEVOFLOXACIN 750 MG: 750 TABLET, FILM COATED ORAL at 02:14

## 2021-06-06 RX ADMIN — OXYCODONE 10 MG: 5 TABLET ORAL at 21:47

## 2021-06-06 RX ADMIN — VANCOMYCIN HYDROCHLORIDE 750 MG: 750 INJECTION, POWDER, LYOPHILIZED, FOR SOLUTION INTRAVENOUS at 15:19

## 2021-06-06 NOTE — PROGRESS NOTES
Patient seen in consultation this morning. Full note to follow. Low grade fever yesterday. Leukocytosis persistent. Continue empiric vanc-levaquin. Will also start IV flagyl. Await drainage cultures. Patient reports he does not know or remember his allergy to penicillins. He does NOT remember anyone telling him symptoms/signs of hx of anaphylaxis in him. He does not even remember when he last took it. These questionable allergies were listed in 2011 and 2014 in the chart here. I discussed with patient that allergies are usually outgrown with time, and his history is suggesting true allergy to be questionable. I have offered him that we can try him on a low dose pencicillins while in the hospital as already being monitored. I discussed with him that if this works, then it opens him to receive several simpler and less toxic and better abx for his conditions in the future. Patient said he will think about it. If he decides to go ahead with it, I will discuss with pharmacy and we will go ahead with testing. This is NOT going to happen on 6/6/21. For his HS, patient needs aggressive dermatological evaluation. Will follow.        Maurizio Rodriguez MD  Infectious Diseases

## 2021-06-06 NOTE — PROGRESS NOTES
Pt has several abcesses located in the following areas: groin(left and right), axilla (left and right), and gluteal cleft. Wound care performed using wound cleanser, aquacel ag, medihoney, exudry, and abd pads. Pt tolerated procedure well.

## 2021-06-06 NOTE — PROGRESS NOTES
Pharmacy Automatic Renal Dosing Protocol - Antimicrobials    Indication for Antimicrobials: Hidradenitis suppurativa    Current Regimen of Each Antimicrobial:  Vancomycin 750 mg IV every 8 hr (Start Date ; Day # 3/7)  Levofloxacin 750 mg PO every 24 hours (Start Date ; Day #2)    Previous Antimicrobial Therapy:  Meropenem    Goal Level: 10-15  (AUC: 400 - 600 mg/hr/Liter/day)     Date Dose & Interval Measured (mcg/mL) Extrapolated (mcg/mL)    @ 0120 750 mg every 8 hours 6.9 6 ()                 Date & time of next level:  @ 2100    Significant Cultures:    Blood: NGTD- final   Wound: pending   Wound: pending    Paralysis, amputations, malnutrition: None     Labs:  Recent Labs     21  0120 21  0149 21  1544   CREA 0.51* 0.46* 0.97   BUN 5* 4* 5*   WBC 16.3* 14.5* 17.2*     Temp (24hrs), Av.2 °F (37.3 °C), Min:98.5 °F (36.9 °C), Max:100.5 °F (38.1 °C)      Is the Patient on Dialysis? No    Creatinine Clearance (mL/min):   CrCl (Actual Body Weight): 157.2  CrCl (Adjusted Body Weight): 178.6  CrCl (Ideal Body Weight): 192.8    Impression/Plan:   Vancomycin trough resulted as subtherapeutic at 6 mcg/mL. Will change to 750 mg IV every 6 hours for an estimated trough of 9.9 mcg/mL. Will be conservative with increasing at this time and given the frequency of the interval a level can be drawn quickly. Will continue current regimen for levofloxacin as appropriate for indication and renal function. Antimicrobial stop date 7 days     Pharmacy will follow daily and adjust medications as appropriate for renal function and/or serum levels. Thank you,  Stuart Pendleton, SUREKHAD    Recommended duration of therapy  [email or web address]/Pharmacy/Clinical%20Companion/[email or web address]%20ABX%20therapy. docx    Renal Dosing  [email or web address]/Pharmacy/Clinical%20Companion/Renal%[email or web address]. pdf

## 2021-06-06 NOTE — PROGRESS NOTES
Hospitalist Progress Note    NAME: Arlon Gaucher   :  1995   MRN:  [id number]       Assessment / Plan:  Sepsis POA  Hidradenitis suppurativa POA with draining abscesses in groin and axilla  Has failed outpatient treatment  Unable to follow-up with dermatology due to logistics and cost  Taking courses of antibiotics including tetracyclines without improvement  Copious drainage from skin lesions in the groin and axilla bilaterally  Wounds in the armpit seen today nothing to drain but it is open and continuously draining. IV vancomycin and Levaquin ongoing. ID to see patient tomorrow as well. Check cultures of drainage from groin wounds  And will definitely need outpatient dermatology follow-up on discharge. Wound care consult  Anemia likely chronic disease POA  HgB 9.1, slowly dropping  Suspect chronic disease  Pending  iron studies  DVT prophylaxis with lovenox  Code status: Full code  Body mass index is 18.99 kg/m².: 18.5 - 24.9 Normal weight     Subjective:     Chief Complaint / Reason for Physician Visit  \"Patient was seen and examined very grateful on our services. Still having difficulty opening the armpits from the pain in both armpits. Denied any fever or chills. He admitted that he still having a lot of drainage from both armpits. \". Discussed with RN events overnight. Review of Systems:  Symptom Y/N Comments  Symptom Y/N Comments   Fever/Chills y   Chest Pain y    Poor Appetite    Edema     Cough y   Abdominal Pain y    Sputum    Joint Pain     SOB/THAKUR y   Pruritis/Rash     Nausea/vomit    Tolerating PT/OT     Diarrhea    Tolerating Diet     Constipation    Other       Could NOT obtain due to:      Objective:     VITALS:   Last 24hrs VS reviewed since prior progress note.  Most recent are:  Patient Vitals for the past 24 hrs:   Temp Pulse Resp BP SpO2   21 1159 98 °F (36.7 °C) 88 16 110/62 100 %   21 0736 98.5 °F (36.9 °C) 85 18 (!) 101/59 100 %   21 0345 98.8 °F (37.1 °C) 90 18 114/67 98 %   06/05/21 2307 98.7 °F (37.1 °C) 95 18 (!) 131/57 98 %   06/05/21 1821 (!) 100.5 °F (38.1 °C) 97 18 (!) 106/55 97 %   06/05/21 1548 99.4 °F (37.4 °C) 94 18 119/66 100 %       Intake/Output Summary (Last 24 hours) at 6/6/2021 1313  Last data filed at 6/6/2021 1038  Gross per 24 hour   Intake 480 ml   Output 3625 ml   Net -3145 ml        PHYSICAL EXAM:  General: WD, WN. Alert, cooperative, no acute distress    EENT:  EOMI. Anicteric sclerae. MMM  Resp:  CTA bilaterally, no wheezing or rales. No accessory muscle use  CV:  Regular  rhythm,  No edema  GI:  Soft, Non distended, Non tender.  +Bowel sounds  Neurologic:  Alert and oriented X 3, normal speech,   Psych:   Good insight. Not anxious nor agitated  Skin:  No rashes. No jaundice  Both armpits and groin area have draining nodular cystic infected lesions. Reviewed most current lab test results and cultures  YES  Reviewed most current radiology test results   YES  Review and summation of old records today    NO  Reviewed patient's current orders and MAR    YES  PMH/ reviewed - no change compared to H&P  ________________________________________________________________________  Care Plan discussed with:    Comments   Patient y    Family      RN y    Care Manager     Consultant                       y Multidiciplinary team rounds were held today with , nursing, pharmacist and clinical coordinator. Patient's plan of care was discussed; medications were reviewed and discharge planning was addressed.      ________________________________________________________________________  Total NON critical care TIME: 35   Minutes    Total CRITICAL CARE TIME Spent:   Minutes non procedure based      Comments   >50% of visit spent in counseling and coordination of care     ________________________________________________________________________  Darrian Stokes MD     Procedures: see electronic medical records for all procedures/Xrays and details which were not copied into this note but were reviewed prior to creation of Plan. LABS:  I reviewed today's most current labs and imaging studies. Pertinent labs include:  Recent Labs     06/06/21 0120 06/05/21 0149 06/04/21  1544   WBC 16.3* 14.5* 17.2*   HGB 8.3* 7.4* 9.1*   HCT 28.3* 25.2* 30.1*   * 399 470*     Recent Labs     06/06/21 0120 06/05/21 0149 06/04/21  1544   * 137 136   K 3.8 3.5 4.1    104 97   CO2 31 27 30   GLU 91 88 112*   BUN 5* 4* 5*   CREA 0.51* 0.46* 0.97   CA 9.0 8.3* 9.0   ALB 2.1*  --  2.3*   TBILI 0.6  --  0.5   ALT 12  --  15       Signed:  Todd Winkler MD

## 2021-06-07 LAB
ALBUMIN SERPL-MCNC: 2.3 G/DL (ref 3.5–5)
ALBUMIN/GLOB SERPL: 0.4 {RATIO} (ref 1.1–2.2)
ALP SERPL-CCNC: 134 U/L (ref 45–117)
ALT SERPL-CCNC: 13 U/L (ref 12–78)
ANION GAP SERPL CALC-SCNC: 5 MMOL/L (ref 5–15)
AST SERPL-CCNC: 15 U/L (ref 15–37)
BACTERIA SPEC CULT: ABNORMAL
BASOPHILS # BLD: 0 K/UL (ref 0–0.1)
BASOPHILS NFR BLD: 0 % (ref 0–1)
BILIRUB SERPL-MCNC: 0.3 MG/DL (ref 0.2–1)
BUN SERPL-MCNC: 5 MG/DL (ref 6–20)
BUN/CREAT SERPL: 8 (ref 12–20)
CALCIUM SERPL-MCNC: 9.1 MG/DL (ref 8.5–10.1)
CHLORIDE SERPL-SCNC: 99 MMOL/L (ref 97–108)
CO2 SERPL-SCNC: 31 MMOL/L (ref 21–32)
CREAT SERPL-MCNC: 0.66 MG/DL (ref 0.7–1.3)
DIFFERENTIAL METHOD BLD: ABNORMAL
EOSINOPHIL # BLD: 0.8 K/UL (ref 0–0.4)
EOSINOPHIL NFR BLD: 5 % (ref 0–7)
ERYTHROCYTE [DISTWIDTH] IN BLOOD BY AUTOMATED COUNT: 16.7 % (ref 11.5–14.5)
GLOBULIN SER CALC-MCNC: 5.7 G/DL (ref 2–4)
GLUCOSE SERPL-MCNC: 99 MG/DL (ref 65–100)
GRAM STN SPEC: ABNORMAL
HCT VFR BLD AUTO: 31.8 % (ref 36.6–50.3)
HGB BLD-MCNC: 9.3 G/DL (ref 12.1–17)
IMM GRANULOCYTES # BLD AUTO: 0 K/UL (ref 0–0.04)
IMM GRANULOCYTES NFR BLD AUTO: 0 % (ref 0–0.5)
LYMPHOCYTES # BLD: 1.5 K/UL (ref 0.8–3.5)
LYMPHOCYTES NFR BLD: 10 % (ref 12–49)
MCH RBC QN AUTO: 21.2 PG (ref 26–34)
MCHC RBC AUTO-ENTMCNC: 29.2 G/DL (ref 30–36.5)
MCV RBC AUTO: 72.4 FL (ref 80–99)
METAMYELOCYTES NFR BLD MANUAL: 2 %
MONOCYTES # BLD: 0.6 K/UL (ref 0–1)
MONOCYTES NFR BLD: 4 % (ref 5–13)
NEUTS BAND NFR BLD MANUAL: 2 %
NEUTS SEG # BLD: 12.1 K/UL (ref 1.8–8)
NEUTS SEG NFR BLD: 77 % (ref 32–75)
NRBC # BLD: 0 K/UL (ref 0–0.01)
NRBC BLD-RTO: 0 PER 100 WBC
PLATELET # BLD AUTO: 462 K/UL (ref 150–400)
PMV BLD AUTO: 8.6 FL (ref 8.9–12.9)
POTASSIUM SERPL-SCNC: 4 MMOL/L (ref 3.5–5.1)
PROT SERPL-MCNC: 8 G/DL (ref 6.4–8.2)
RBC # BLD AUTO: 4.39 M/UL (ref 4.1–5.7)
RBC MORPH BLD: ABNORMAL
SERVICE CMNT-IMP: ABNORMAL
SERVICE CMNT-IMP: ABNORMAL
SODIUM SERPL-SCNC: 135 MMOL/L (ref 136–145)
WBC # BLD AUTO: 15.3 K/UL (ref 4.1–11.1)

## 2021-06-07 PROCEDURE — 80053 COMPREHEN METABOLIC PANEL: CPT

## 2021-06-07 PROCEDURE — 65270000029 HC RM PRIVATE

## 2021-06-07 PROCEDURE — 74011250636 HC RX REV CODE- 250/636: Performed by: STUDENT IN AN ORGANIZED HEALTH CARE EDUCATION/TRAINING PROGRAM

## 2021-06-07 PROCEDURE — 74011250637 HC RX REV CODE- 250/637: Performed by: NURSE PRACTITIONER

## 2021-06-07 PROCEDURE — 74011250636 HC RX REV CODE- 250/636: Performed by: INTERNAL MEDICINE

## 2021-06-07 PROCEDURE — 36415 COLL VENOUS BLD VENIPUNCTURE: CPT

## 2021-06-07 PROCEDURE — 2709999900 HC NON-CHARGEABLE SUPPLY

## 2021-06-07 PROCEDURE — 94760 N-INVAS EAR/PLS OXIMETRY 1: CPT

## 2021-06-07 PROCEDURE — 99253 IP/OBS CNSLTJ NEW/EST LOW 45: CPT | Performed by: STUDENT IN AN ORGANIZED HEALTH CARE EDUCATION/TRAINING PROGRAM

## 2021-06-07 PROCEDURE — 85025 COMPLETE CBC W/AUTO DIFF WBC: CPT

## 2021-06-07 PROCEDURE — 74011250637 HC RX REV CODE- 250/637: Performed by: INTERNAL MEDICINE

## 2021-06-07 RX ORDER — OXYCODONE HYDROCHLORIDE 5 MG/1
5 TABLET ORAL
Status: DISCONTINUED | OUTPATIENT
Start: 2021-06-07 | End: 2021-06-11 | Stop reason: HOSPADM

## 2021-06-07 RX ORDER — OXYCODONE HYDROCHLORIDE 5 MG/1
10 TABLET ORAL
Status: DISCONTINUED | OUTPATIENT
Start: 2021-06-07 | End: 2021-06-11 | Stop reason: HOSPADM

## 2021-06-07 RX ORDER — IBUPROFEN 400 MG/1
800 TABLET ORAL
Status: DISCONTINUED | OUTPATIENT
Start: 2021-06-07 | End: 2021-06-11 | Stop reason: HOSPADM

## 2021-06-07 RX ADMIN — METRONIDAZOLE 500 MG: 500 INJECTION, SOLUTION INTRAVENOUS at 16:12

## 2021-06-07 RX ADMIN — OXYCODONE 5 MG: 5 TABLET ORAL at 17:45

## 2021-06-07 RX ADMIN — OXYCODONE 10 MG: 5 TABLET ORAL at 02:32

## 2021-06-07 RX ADMIN — ENOXAPARIN SODIUM 40 MG: 40 INJECTION, SOLUTION INTRAVENOUS; SUBCUTANEOUS at 11:18

## 2021-06-07 RX ADMIN — VANCOMYCIN HYDROCHLORIDE 1000 MG: 1 INJECTION, POWDER, LYOPHILIZED, FOR SOLUTION INTRAVENOUS at 17:46

## 2021-06-07 RX ADMIN — LEVOFLOXACIN 750 MG: 750 TABLET, FILM COATED ORAL at 01:41

## 2021-06-07 RX ADMIN — OXYCODONE 10 MG: 5 TABLET ORAL at 21:45

## 2021-06-07 RX ADMIN — VANCOMYCIN HYDROCHLORIDE 1000 MG: 1 INJECTION, POWDER, LYOPHILIZED, FOR SOLUTION INTRAVENOUS at 01:26

## 2021-06-07 RX ADMIN — VANCOMYCIN HYDROCHLORIDE 1000 MG: 1 INJECTION, POWDER, LYOPHILIZED, FOR SOLUTION INTRAVENOUS at 22:07

## 2021-06-07 RX ADMIN — Medication 10 ML: at 05:06

## 2021-06-07 RX ADMIN — VANCOMYCIN HYDROCHLORIDE 1000 MG: 1 INJECTION, POWDER, LYOPHILIZED, FOR SOLUTION INTRAVENOUS at 11:15

## 2021-06-07 RX ADMIN — Medication 10 ML: at 21:46

## 2021-06-07 RX ADMIN — Medication 10 ML: at 16:17

## 2021-06-07 RX ADMIN — METRONIDAZOLE 500 MG: 500 INJECTION, SOLUTION INTRAVENOUS at 04:31

## 2021-06-07 NOTE — PROGRESS NOTES
Bedside shift change report given to Tori Colvin (oncoming nurse) by Amy Carrillo RN (offgoing nurse).   Report included the following information SBAR

## 2021-06-07 NOTE — PROGRESS NOTES
Problem: Pressure Injury - Risk of  Goal: *Prevention of pressure injury  Description: Document Dann Scale and appropriate interventions in the flowsheet. Outcome: Progressing Towards Goal  Note: Pressure Injury Interventions:  Sensory Interventions: Assess changes in LOC         Activity Interventions: Chair cushion, Increase time out of bed, Pressure redistribution bed/mattress(bed type)    Mobility Interventions: Chair cushion, HOB 30 degrees or less, Suspension boots    Nutrition Interventions: Document food/fluid/supplement intake    Friction and Shear Interventions: Feet elevated on foot rest, Lift sheet, Sit at 90-degree angle                Problem: Patient Education: Go to Patient Education Activity  Goal: Patient/Family Education  Outcome: Progressing Towards Goal     Problem: Falls - Risk of  Goal: *Absence of Falls  Description: Document [facility] Fall Risk and appropriate interventions in the flowsheet.   Outcome: Progressing Towards Goal  Note: Fall Risk Interventions:            Medication Interventions: Bed/chair exit alarm, Patient to call before getting OOB                   Problem: Patient Education: Go to Patient Education Activity  Goal: Patient/Family Education  Outcome: Progressing Towards Goal

## 2021-06-07 NOTE — PROGRESS NOTES
Transition of Care Plan:    RUR:  8    Per Rounds: MD anticipated DC 6/9 or 6/10? Disposition: to be determined. Looking for IV ABX and Wound Care Instructions. Pt does not have insurance. Pt has applied for Medicaid but it is PENDING.    -CM provided him with a copy of Care Card Application. Make sure Pt can afford medication if Pt goes home. If Pt needs Long Term IV ABX/Wound Care might need to look into possible transfer to [address] [place] [address]? Follow up appointments: PCP: DAVION Duenas:   Specialist: ID?  DME needed: to be determined. Transportation at Discharge: Aunt: Tee Alvarado can transport him home at discharge. Pt does not drive. He uses Lyft or friends and family to transport him. Keys or means to access home:      yes  IM Medicare letter: n/a no insurance  Caregiver Contact:GrandMother; Mavis Hess: [phone]  AuntRmaya Beltran: [phone]  Discharge Caregiver contacted prior to discharge? CM called and talked with Pt Grandmother, Mavis Hess and let her know that we will be giving updates as the plan progresses. CM left  for his Yumiko Lynn since she will probably be able to transport him home just to touch base with her. Reason for Admission:  Pt was admitted on 6/4/21 for painful and worsening axillary and groin wounds that started about 6 months ago. Dx: Hidradentis Suppurativa. RUR Score:          8           Plan for utilizing home health: To Be Determined. - No insurance? Medicaid is pending.      PCP: First and Last name:  Que Villegas NP     Name of Practice:  [facility]   Are you a current patient: Yes/No:  yes   Approximate date of last visit: 6/4/21 Can you participate in a virtual visit with your PCP:  yes                    Mayra Coleman (ACP) Conversation      Date of Conversation: 06/07/21  Conducted with: Patient with [address]: Grandmother: Elliot Byrne: [phone]    Content/Action Overview:   DECLINED ACP conversation - will revisit periodically   Reviewed DNR/DNI and patient elects Full Code (Attempt Resuscitation)    Length of Voluntary ACP Conversation in minutes:  <16 minutes (Non-Billable)    Ephraim Cody     Pt is alert and oriented. Lives with 3 roommates in two story home with 14 steps to enter. Grandmother/Aunt lives closeby. No DME  No HH or SNF experience. Pharmacy: [facility].   No Insurance at this time. CM will continue to monitor discharge plan. Care Management Interventions  PCP Verified by CM: Yes  Palliative Care Criteria Met (RRAT>21 & CHF Dx)?: No  Mode of Transport at Discharge: Other (see comment)  Transition of Care Consult (CM Consult): Discharge Planning  MyChart Signup: No  Discharge Durable Medical Equipment: No  Physical Therapy Consult: No  Occupational Therapy Consult: No  Current Support Network:  Other  Confirm Follow Up Transport: Family  Discharge Location  Discharge Placement: [address] [place]  Ext [phone]

## 2021-06-07 NOTE — PROGRESS NOTES
Vancomycin trough is 9.4 (extrapolates to 9.36 / auc 344)  Will adjust to vancomycin 1000 mg iv every 6 hr for projected trough of 12.48 / auc 458  Will begin dosing at 0200 on 6/7 when calculated trough is [address]

## 2021-06-07 NOTE — PROGRESS NOTES
End of Shift Note    Bedside shift change report given to Aleksey Lopez (oncoming nurse) by Elaine De La Cruz RN (offgoing nurse). Report included the following information SBAR    Shift worked:  7p-7a     Shift summary and any significant changes:    none   Concerns for physician to address: none   Zone phone for oncoming shift:  [phone]     Activity:  Activity Level: Up with Assistance  Number times ambulated in hallways past shift: 0  Number of times OOB to chair past shift: 0    Cardiac:   Cardiac Monitoring: No      Cardiac Rhythm: Sinus Rhythm    Access:   Current line(s): PIV     Genitourinary:   Urinary status: voiding    Respiratory:   O2 Device: None (Room air)  Chronic home O2 use?: NO  Incentive spirometer at bedside: NO     GI:  Last Bowel Movement Date: 06/04/21  Current diet:  ADULT DIET Regular  Passing flatus: YES  Tolerating current diet: YES       Pain Management:   Patient states pain is manageable on current regimen: YES    Skin:  Dann Score: 19  Interventions: float heels and increase time out of bed    Patient Safety:  Fall Score:  Total Score: 1  Interventions: gripper socks and pt to call before getting OOB       Length of Stay:  Expected LOS: - - -  Actual LOS: [facility]

## 2021-06-07 NOTE — PROGRESS NOTES
Hospitalist Progress Note    NAME: Portia Faustin   :  1995   MRN:  [id number]       Assessment / Plan:  Sepsis POA  Hidradenitis suppurativa POA with draining abscesses in groin and axilla  Has failed outpatient treatment  Unable to follow-up with dermatology due to logistics and cost  Taking courses of antibiotics including tetracyclines without improvement  Copious drainage from skin lesions in the groin and axilla bilaterally  Wounds in the armpit seen today nothing to drain but it is open and continuously draining. IV vancomycin and Levaquin ongoing. ID On board already. And Flagyl added. Check cultures of drainage from groin wounds  And will definitely need outpatient dermatology follow-up on discharge. Wound care consulted, advised team to have them today. Anemia likely chronic disease POA  HgB 9.1, slowly dropping  Suspect chronic disease  Pending  iron studies  DVT prophylaxis with lovenox  Code status: Full code  Body mass index is 18.99 kg/m².: 18.5 - 24.9 Normal weight     Subjective:     Chief Complaint / Reason for Physician Visit  Was seen and examined. Patient said that he still having the drainage from both armpits. He is groin and back is better  Still having the pain on the armpits . But overall fine he said. \". Discussed with RN events overnight. Review of Systems:  Symptom Y/N Comments  Symptom Y/N Comments   Fever/Chills y   Chest Pain y    Poor Appetite    Edema     Cough y   Abdominal Pain y    Sputum    Joint Pain     SOB/THAKUR y   Pruritis/Rash     Nausea/vomit    Tolerating PT/OT     Diarrhea    Tolerating Diet     Constipation    Other       Could NOT obtain due to:      Objective:     VITALS:   Last 24hrs VS reviewed since prior progress note.  Most recent are:  Patient Vitals for the past 24 hrs:   Temp Pulse Resp BP SpO2   21 2222 98.9 °F (37.2 °C) 97 18 108/62 97 %   21 2051 98.6 °F (37 °C) 87 18 116/64 97 %   21 1550 98.9 °F (37.2 °C) 86 18 120/71 100 %   06/06/21 1159 98 °F (36.7 °C) 88 16 110/62 100 %       Intake/Output Summary (Last 24 hours) at 6/7/2021 0753  Last data filed at 6/7/2021 0142  Gross per 24 hour   Intake 980 ml   Output 2300 ml   Net -1320 ml        PHYSICAL EXAM:  General: WD, WN. Alert, cooperative, no acute distress    EENT:  EOMI. Anicteric sclerae. MMM  Resp:  CTA bilaterally, no wheezing or rales. No accessory muscle use  CV:  Regular  rhythm,  No edema  GI:  Soft, Non distended, Non tender.  +Bowel sounds  Neurologic:  Alert and oriented X 3, normal speech,   Psych:   Good insight. Not anxious nor agitated  Skin:  No rashes. No jaundice  Both armpits and groin area have draining nodular cystic infected lesions. Reviewed most current lab test results and cultures  YES  Reviewed most current radiology test results   YES  Review and summation of old records today    NO  Reviewed patient's current orders and MAR    YES  PMH/ reviewed - no change compared to H&P  ________________________________________________________________________  Care Plan discussed with:    Comments   Patient y    Family      RN y    Care Manager     Consultant                       y Multidiciplinary team rounds were held today with , nursing, pharmacist and clinical coordinator. Patient's plan of care was discussed; medications were reviewed and discharge planning was addressed. ________________________________________________________________________  Total NON critical care TIME: 35   Minutes    Total CRITICAL CARE TIME Spent:   Minutes non procedure based      Comments   >50% of visit spent in counseling and coordination of care     ________________________________________________________________________  Aries Mcconnell MD     Procedures: see electronic medical records for all procedures/Xrays and details which were not copied into this note but were reviewed prior to creation of Plan.       LABS:  I reviewed today's most current labs and imaging studies. Pertinent labs include:  Recent Labs     06/07/21  0042 06/06/21  0120 06/05/21  0149   WBC 15.3* 16.3* 14.5*   HGB 9.3* 8.3* 7.4*   HCT 31.8* 28.3* 25.2*   * 445* 399     Recent Labs     06/07/21  0042 06/06/21  0120 06/05/21  0149 06/04/21  1544   * 135* 137 136   K 4.0 3.8 3.5 4.1   CL 99 100 104 97   CO2 31 31 27 30   GLU 99 91 88 112*   BUN 5* 5* 4* 5*   CREA 0.66* 0.51* 0.46* 0.97   CA 9.1 9.0 8.3* 9.0   ALB 2.3* 2.1*  --  2.3*   TBILI 0.3 0.6  --  0.5   ALT 13 12  --  15       Signed:  Gigi Yates MD

## 2021-06-07 NOTE — CONSULTS
Infectious Disease Consult Note      Reason for Consult: Hidradenitis suppurativa wounds    Date of Consultation: June 7, 2021  Date of Admission: 6/4/2021  Referring Physician: Dr. Uri Sibley      HPI:  Jonelle Dao is a 22y.o. year old male with no significant PMH, who presented on 6/4/21 for painful and worsening axillary and groin wounds. Patient reports that he started having these wounds around 6 months ago. He has been evaluated by EDs and PCP who have diagnosed it as Hidradenitis suppurativa. His PCP put him on doxycycline 100mg BID about a month ago for this. However, he reports it to not be helping him at all. He was seen at [facility] on 4/13/21. He has been referred to Dermatology at [facility] who patient reports have said them have no availability and was asked to go to dermatology in [facility] or [facility] but this was not feasible for him to drive to. In the ED here, patient has been afebrile. HDS. WBC 17.2 (was 14.8 in March 2021). Blood cultures negative. Patient was seen to have HS wound that were draining from bilateral axilla, groins and inner thighs. Drainage sent for cultures and on gram stain GNR and GPC, cultures have Streptococcus so far. Patient was started in vancomycin, levaquin on admission. He has no hx of diabetes. He denies fevers/chills. He has allergies of \"anaphylaxis\" listed to penicillins from 2011 and 2014. Upon asking patient today, he reports no recall of what reaction he has had, when he had it. His mother said he had it. He has never received amoxicillin or Keflex as far as he recalls. Past Medical History:  No other PMH    Surgical History:  No surgical hx    Family History:   None    Social History:     · From [place]. Has a girlfriend. Quit smoking 2019. Alcohol 3 times per year. Works as security personnel. Allergies:   Allergies   Allergen Reactions    Penicillins Anaphylaxis    Pcn [Penicillins] Swelling         Review of Systems:    See HPI, all other systems negative. Medications:  No current facility-administered medications on file prior to encounter. Current Outpatient Medications on File Prior to Encounter   Medication Sig Dispense Refill    trimethoprim-sulfamethoxazole (BACTRIM DS, SEPTRA DS) 160-800 mg per tablet Take 1 Tablet by mouth two (2) times a day for 10 days. 20 Tablet 0    ibuprofen (MOTRIN) 800 mg tablet Take 1 Tablet by mouth every eight (8) hours as needed for Pain. With food (Patient not taking: Reported on 6/4/2021) 60 Tablet 0           Physical Exam:    Vitals:   Patient Vitals for the past 24 hrs:   Temp Pulse Resp BP SpO2   06/07/21 0825 97.3 °F (36.3 °C) 87 16 117/69 96 %   06/06/21 2222 98.9 °F (37.2 °C) 97 18 108/62 97 %   06/06/21 2051 98.6 °F (37 °C) 87 18 116/64 97 %   06/06/21 1550 98.9 °F (37.2 °C) 86 18 120/71 100 %   06/06/21 1159 98 °F (36.7 °C) 88 16 110/62 100 %   ·   · GEN: NAD  · HEENT: Normocephalic, atraumatic, PERRL, no scleral icterus  · CV: S1, S2 heard regularly, no murmurs  · Lungs: Clear to auscultation bilaterally  · Abdomen: soft, non distended, non tender  · Genitourinary: , no cuevas  · Extremities: no edema  · Neuro: Alert, oriented to time, place and situation, moves all extremities to commands, verbal   · Skin: draining HS wound in both axilla groins and inner thighs. Very tender. · Psych: good affect, good eye contact, non tearful   · Lines: PIVs      Labs:   Recent Results (from the past 24 hour(s))   VANCOMYCIN, TROUGH    Collection Time: 06/06/21  9:27 PM   Result Value Ref Range    Vancomycin,trough 9.4 5.0 - 10.0 ug/mL    Reported dose date NOT PROVIDED      Reported dose time: NOT PROVIDED      Reported dose: NOT PROVIDED UNITS   CBC WITH AUTOMATED DIFF    Collection Time: 06/07/21 12:42 AM   Result Value Ref Range    WBC 15.3 (H) 4.1 - 11.1 K/uL    RBC 4.39 4. 10 - 5.70 M/uL    HGB 9.3 (L) 12.1 - 17.0 g/dL    HCT 31.8 (L) 36.6 - 50.3 %    MCV 72.4 (L) 80.0 - 99.0 FL    MCH 21.2 (L) 26.0 - 34.0 PG    MCHC 29.2 (L) 30.0 - 36.5 g/dL    RDW 16.7 (H) 11.5 - 14.5 %    PLATELET 663 (H) 812 - 400 K/uL    MPV 8.6 (L) 8.9 - 12.9 FL    NRBC 0.0 0  WBC    ABSOLUTE NRBC 0.00 0.00 - 0.01 K/uL    NEUTROPHILS 77 (H) 32 - 75 %    BAND NEUTROPHILS 2 %    LYMPHOCYTES 10 (L) 12 - 49 %    MONOCYTES 4 (L) 5 - 13 %    EOSINOPHILS 5 0 - 7 %    BASOPHILS 0 0 - 1 %    METAMYELOCYTES 2 %    IMMATURE GRANULOCYTES 0 0.0 - 0.5 %    ABS. NEUTROPHILS 12.1 (H) 1.8 - 8.0 K/UL    ABS. LYMPHOCYTES 1.5 0.8 - 3.5 K/UL    ABS. MONOCYTES 0.6 0.0 - 1.0 K/UL    ABS. EOSINOPHILS 0.8 (H) 0.0 - 0.4 K/UL    ABS. BASOPHILS 0.0 0.0 - 0.1 K/UL    ABS. IMM. GRANS. 0.0 0.00 - 0.04 K/UL    DF MANUAL      RBC COMMENTS ANISOCYTOSIS  2+        RBC COMMENTS TARGET CELLS  PRESENT        RBC COMMENTS HYPOCHROMIA  1+        RBC COMMENTS POLYCHROMASIA  PRESENT       METABOLIC PANEL, COMPREHENSIVE    Collection Time: 06/07/21 12:42 AM   Result Value Ref Range    Sodium 135 (L) 136 - 145 mmol/L    Potassium 4.0 3.5 - 5.1 mmol/L    Chloride 99 97 - 108 mmol/L    CO2 31 21 - 32 mmol/L    Anion gap 5 5 - 15 mmol/L    Glucose 99 65 - 100 mg/dL    BUN 5 (L) 6 - 20 MG/DL    Creatinine 0.66 (L) 0.70 - 1.30 MG/DL    BUN/Creatinine ratio 8 (L) 12 - 20      GFR est AA >60 >60 ml/min/1.73m2    GFR est non-AA >60 >60 ml/min/1.73m2    Calcium 9.1 8.5 - 10.1 MG/DL    Bilirubin, total 0.3 0.2 - 1.0 MG/DL    ALT (SGPT) 13 12 - 78 U/L    AST (SGOT) 15 15 - 37 U/L    Alk. phosphatase 134 (H) 45 - 117 U/L    Protein, total 8.0 6.4 - 8.2 g/dL    Albumin 2.3 (L) 3.5 - 5.0 g/dL    Globulin 5.7 (H) 2.0 - 4.0 g/dL    A-G Ratio 0.4 (L) 1.1 - 2.2         Microbiology Data:     See HPI        Assessment:     23 yo M with:    - Hidradenitis suppurativa, draining wounds  - HIV negative 6/2/21.   -  Allergies of \"anaphylaxis\" listed to penicillins from 2011 and 2014, with no clear evidence or history from patient supporting it. Recommendations:  - Wound care evaluation. Based on wound care evaluation, surgery evaluation might be needed as well. - As outpatient, he will need aggressive dermatological evaluation as antimicrobials alone will not be solving the underlying problem. - Patient reports he does not know or remember his allergy to penicillins. He does NOT remember anyone telling him symptoms/signs of hx of anaphylaxis in him. He does not even remember when he last took it. His mother said he had it. He has never received amoxicillin or Keflex as far as he recalls. These questionable allergies were listed in 2011 and 2014 in the chart here. I discussed with patient that allergies are usually outgrown with time, and his history is suggesting true allergy to be questionable. I have offered him that we can try him on a low dose penicillins while in the hospital as already being monitored. I discussed with him that if this works, then it opens him to receive several simpler and less toxic and better abx for his conditions in the future. Patient is willing to pursue allergy testing for penicillins. Will discuss with pharmacy and review feasibility of this especially since a serious reaction of \"anaphylaxis\" listed. - Continue vancomycin, levaquin and flagyl for now. Will follow. Thank for the opportunity to participate in the care of this patient. Please contact with questions or concerns.            Antonieta Horta MD  Infectious Diseases

## 2021-06-07 NOTE — WOUND CARE
Wound Care Consult for the Hidradenitis wounds in the Axilla bilaterally and the groins, posterior scrotum. All of these areas are draining purulent fluid (Staph). Staff are currently applying silver dressings and absorbent dressing once daily but he will need wound care / dressing changes more often than that. The axilla and groin / scrotum wounds were visualized as much as possible. Patient refused any photos verbally today. Assessment: the left arm and right upper arm are the draining points for these wound. The visible wound beds are red, painful and inflamed. It hurts to touch, cleanse or dress the wounds and patient may need pain medication prior to each wound / dressing change. Treatment today: the skin in the Axilla and groins was cleansed with warm CHG wipes and wiped well to remove the old drainage. This skin needs to be cleansed several times each day (at least three). Most people with HS have to take 2-3 showers per day. It is always recommended that they use Hibiclens soap OTC or CHG Soap cleanser to clean the skin all over the body and on the wounds and rinse with warm tap water and dry the skin well. It is also advisable to keep air short in all of these areas. Today the skin was cleanse and then bulky amounts of Kerlix gauze was rolled up and placed in the axilla and groin just to effectively absorb the drainage and moisture from the skin to protect the skin. Applying a thin layer of zinc oxide cream can also protect the skin. Discussed several options with RN, Damien Issa. Orders today:  Offer pain medications to the patient as allowed for this patient / discuss with attending. Cleanse the skin with CHG warm wipes and then apply bulky amounts of Bulkee gauze (kerlix) roll gauze. Wrap the arm wounds where the axilla are draining from.     Do the same with the groin wounds - cleanse the skin and then apply absorbent gauze and change the dressings several times each day (at least three times) to keep the abscesses draining out.   
Bryanna Perry RN, BSN, [employer]

## 2021-06-08 LAB
ALBUMIN SERPL-MCNC: 2.2 G/DL (ref 3.5–5)
ALBUMIN/GLOB SERPL: 0.4 {RATIO} (ref 1.1–2.2)
ALP SERPL-CCNC: 122 U/L (ref 45–117)
ALT SERPL-CCNC: 16 U/L (ref 12–78)
ANION GAP SERPL CALC-SCNC: 4 MMOL/L (ref 5–15)
AST SERPL-CCNC: 15 U/L (ref 15–37)
BASOPHILS # BLD: 0.2 K/UL (ref 0–0.1)
BASOPHILS NFR BLD: 1 % (ref 0–1)
BILIRUB SERPL-MCNC: 0.4 MG/DL (ref 0.2–1)
BUN SERPL-MCNC: 9 MG/DL (ref 6–20)
BUN/CREAT SERPL: 14 (ref 12–20)
CALCIUM SERPL-MCNC: 9 MG/DL (ref 8.5–10.1)
CHLORIDE SERPL-SCNC: 101 MMOL/L (ref 97–108)
CO2 SERPL-SCNC: 30 MMOL/L (ref 21–32)
CREAT SERPL-MCNC: 0.66 MG/DL (ref 0.7–1.3)
DATE LAST DOSE: ABNORMAL
DIFFERENTIAL METHOD BLD: ABNORMAL
EOSINOPHIL # BLD: 0.6 K/UL (ref 0–0.4)
EOSINOPHIL NFR BLD: 4 % (ref 0–7)
ERYTHROCYTE [DISTWIDTH] IN BLOOD BY AUTOMATED COUNT: 16.6 % (ref 11.5–14.5)
GLOBULIN SER CALC-MCNC: 5.4 G/DL (ref 2–4)
GLUCOSE SERPL-MCNC: 106 MG/DL (ref 65–100)
HCT VFR BLD AUTO: 28.9 % (ref 36.6–50.3)
HGB BLD-MCNC: 8.5 G/DL (ref 12.1–17)
IMM GRANULOCYTES # BLD AUTO: 0 K/UL (ref 0–0.04)
IMM GRANULOCYTES NFR BLD AUTO: 0 % (ref 0–0.5)
LYMPHOCYTES # BLD: 0.6 K/UL (ref 0.8–3.5)
LYMPHOCYTES NFR BLD: 4 % (ref 12–49)
MCH RBC QN AUTO: 21.6 PG (ref 26–34)
MCHC RBC AUTO-ENTMCNC: 29.4 G/DL (ref 30–36.5)
MCV RBC AUTO: 73.4 FL (ref 80–99)
METAMYELOCYTES NFR BLD MANUAL: 1 %
MONOCYTES # BLD: 0.9 K/UL (ref 0–1)
MONOCYTES NFR BLD: 6 % (ref 5–13)
NEUTS SEG # BLD: 12.7 K/UL (ref 1.8–8)
NEUTS SEG NFR BLD: 84 % (ref 32–75)
NRBC # BLD: 0 K/UL (ref 0–0.01)
NRBC BLD-RTO: 0 PER 100 WBC
PLATELET # BLD AUTO: 467 K/UL (ref 150–400)
PMV BLD AUTO: 8.3 FL (ref 8.9–12.9)
POTASSIUM SERPL-SCNC: 3.6 MMOL/L (ref 3.5–5.1)
PROT SERPL-MCNC: 7.6 G/DL (ref 6.4–8.2)
RBC # BLD AUTO: 3.94 M/UL (ref 4.1–5.7)
RBC MORPH BLD: ABNORMAL
REPORTED DOSE,DOSE: ABNORMAL UNITS
REPORTED DOSE/TIME,TMG: ABNORMAL
SODIUM SERPL-SCNC: 135 MMOL/L (ref 136–145)
VANCOMYCIN TROUGH SERPL-MCNC: 18.8 UG/ML (ref 5–10)
WBC # BLD AUTO: 15.1 K/UL (ref 4.1–11.1)

## 2021-06-08 PROCEDURE — 65270000029 HC RM PRIVATE

## 2021-06-08 PROCEDURE — 74011250636 HC RX REV CODE- 250/636: Performed by: INTERNAL MEDICINE

## 2021-06-08 PROCEDURE — 85025 COMPLETE CBC W/AUTO DIFF WBC: CPT

## 2021-06-08 PROCEDURE — 99222 1ST HOSP IP/OBS MODERATE 55: CPT | Performed by: SURGERY

## 2021-06-08 PROCEDURE — 74011250636 HC RX REV CODE- 250/636: Performed by: STUDENT IN AN ORGANIZED HEALTH CARE EDUCATION/TRAINING PROGRAM

## 2021-06-08 PROCEDURE — 94760 N-INVAS EAR/PLS OXIMETRY 1: CPT

## 2021-06-08 PROCEDURE — 74011250637 HC RX REV CODE- 250/637: Performed by: INTERNAL MEDICINE

## 2021-06-08 PROCEDURE — 80053 COMPREHEN METABOLIC PANEL: CPT

## 2021-06-08 PROCEDURE — 36415 COLL VENOUS BLD VENIPUNCTURE: CPT

## 2021-06-08 PROCEDURE — 80202 ASSAY OF VANCOMYCIN: CPT

## 2021-06-08 PROCEDURE — 74011250637 HC RX REV CODE- 250/637: Performed by: NURSE PRACTITIONER

## 2021-06-08 RX ORDER — LANOLIN ALCOHOL/MO/W.PET/CERES
1 CREAM (GRAM) TOPICAL
Status: DISCONTINUED | OUTPATIENT
Start: 2021-06-09 | End: 2021-06-11 | Stop reason: HOSPADM

## 2021-06-08 RX ORDER — VANCOMYCIN HYDROCHLORIDE
1250 EVERY 6 HOURS
Status: DISCONTINUED | OUTPATIENT
Start: 2021-06-08 | End: 2021-06-09

## 2021-06-08 RX ADMIN — IBUPROFEN 800 MG: 400 TABLET, FILM COATED ORAL at 01:59

## 2021-06-08 RX ADMIN — Medication 10 ML: at 05:16

## 2021-06-08 RX ADMIN — Medication 10 ML: at 14:00

## 2021-06-08 RX ADMIN — METRONIDAZOLE 500 MG: 500 INJECTION, SOLUTION INTRAVENOUS at 04:36

## 2021-06-08 RX ADMIN — ENOXAPARIN SODIUM 40 MG: 40 INJECTION, SOLUTION INTRAVENOUS; SUBCUTANEOUS at 09:25

## 2021-06-08 RX ADMIN — OXYCODONE 5 MG: 5 TABLET ORAL at 09:28

## 2021-06-08 RX ADMIN — POLYETHYLENE GLYCOL 3350 17 G: 17 POWDER, FOR SOLUTION ORAL at 09:25

## 2021-06-08 RX ADMIN — OXYCODONE 10 MG: 5 TABLET ORAL at 15:18

## 2021-06-08 RX ADMIN — OXYCODONE 10 MG: 5 TABLET ORAL at 04:29

## 2021-06-08 RX ADMIN — VANCOMYCIN HYDROCHLORIDE 1250 MG: 10 INJECTION, POWDER, LYOPHILIZED, FOR SOLUTION INTRAVENOUS at 17:23

## 2021-06-08 RX ADMIN — VANCOMYCIN HYDROCHLORIDE 1000 MG: 1 INJECTION, POWDER, LYOPHILIZED, FOR SOLUTION INTRAVENOUS at 04:29

## 2021-06-08 RX ADMIN — METRONIDAZOLE 500 MG: 500 INJECTION, SOLUTION INTRAVENOUS at 15:18

## 2021-06-08 RX ADMIN — LEVOFLOXACIN 750 MG: 750 TABLET, FILM COATED ORAL at 01:59

## 2021-06-08 RX ADMIN — Medication 10 ML: at 23:09

## 2021-06-08 RX ADMIN — OXYCODONE 10 MG: 5 TABLET ORAL at 23:09

## 2021-06-08 RX ADMIN — VANCOMYCIN HYDROCHLORIDE 1250 MG: 10 INJECTION, POWDER, LYOPHILIZED, FOR SOLUTION INTRAVENOUS at 23:09

## 2021-06-08 RX ADMIN — OXYCODONE 10 MG: 5 TABLET ORAL at 00:46

## 2021-06-08 RX ADMIN — VANCOMYCIN HYDROCHLORIDE 1250 MG: 10 INJECTION, POWDER, LYOPHILIZED, FOR SOLUTION INTRAVENOUS at 11:22

## 2021-06-08 RX ADMIN — ACETAMINOPHEN 650 MG: 325 TABLET ORAL at 21:34

## 2021-06-08 NOTE — PROGRESS NOTES
End of Shift Note    Bedside shift change report given to Candis Mauricio, RN (oncoming nurse) by HERACLIO Roy, RN (offgoing nurse). Report included the following information SBAR and Kardex    Shift worked:  7a-7p     Shift summary and any significant changes:    Pain meds given, wound care completed x2, gen surg consult, pt requesting to go to [address] [place] [address] to help with wound care and IV antibiotic administration     Concerns for physician to address: pt requesting to go to [address] [place] [address] to help with wound care and IV antibiotic administration     Zone phone for oncoming shift:  [phone]     Activity:  Activity Level: Up with Assistance  Number times ambulated in hallways past shift: 0  Number of times OOB to chair past shift: 0    Cardiac:   Cardiac Monitoring: No      Cardiac Rhythm: Sinus Rhythm    Access:   Current line(s): PIV     Genitourinary:   Urinary status: voiding    Respiratory:   O2 Device: None (Room air)  Chronic home O2 use?: NO  Incentive spirometer at bedside: NO     GI:  Last Bowel Movement Date: 06/04/21  Current diet:  ADULT DIET Regular  Passing flatus: YES  Tolerating current diet: YES       Pain Management:   Patient states pain is manageable on current regimen: YES    Skin:  Dann Score: 18  Interventions: float heels and increase time out of bed    Patient Safety:  Fall Score:  Total Score: 1  Interventions: gripper socks and pt to call before getting OOB       Length of Stay:  Expected LOS: 3d 12h  Actual LOS: [address]

## 2021-06-08 NOTE — PROGRESS NOTES
Pharmacy Automatic Renal Dosing Protocol - Antimicrobials    Indication for Antimicrobials: Hidradenitis suppurativa    Current Regimen of Each Antimicrobial:  Vancomycin 1000 mg IV every 6 hr (Start Date ; Day # )  Levofloxacin 750 mg PO every 24 hours (Start Date ; Day # )    Previous Antimicrobial Therapy:  Meropenem    Goal Level: 10-15  (AUC: 400 - 600 mg/hr/Liter/day)     Date Dose & Interval Measured (mcg/mL) Extrapolated (mcg/mL)    @ 0120 750 mg every 8 hours 6.9 6 ()    @ 7 750 mg q6h 9.4 9.36 ()   21 00:07 1000 mg Q6H 18.8 3.75      Date & time of next level:  @ 0200    Significant Cultures:    Blood: NGTD- final   Wound: BETA HEMOLYTIC GROUP C STREP   Wound: BETA HEMOLYTIC GROUP C STREP    Paralysis, amputations, malnutrition: None     Labs:  Recent Labs     21  0007 21  0042 21  0120   CREA 0.66* 0.66* 0.51*   BUN 9 5* 5*   WBC 15.1* 15.3* 16.3*     Temp (24hrs), Av.3 °F (36.8 °C), Min:97.3 °F (36.3 °C), Max:99 °F (37.2 °C)      Is the Patient on Dialysis? No    Creatinine Clearance (mL/min):   CrCl (Actual Body Weight): 157.2  CrCl (Adjusted Body Weight): 178.6  CrCl (Ideal Body Weight): 192.8    Impression/Plan:   Increase vancomycin to 1250 mg IV q6h  Will continue current regimen for levofloxacin and metronidazole as appropriate for indication and renal function. Discussing antibiotics with Dr. Lester Stout - Consider doing a graded cephalosporin challenge tomorrow. Protocol below:  Antimicrobial stop date 7 + days              Graded Cephalosporin Challenge        Procedure -- When graded challenges are performed to exclude immediate allergic reactions, patients should not be pretreated with antihistamines or glucocorticoids because these agents may mask early signs of an allergic reaction. Challenges to exclude immediate reactions can be performed in a variety of ways.  The starting dose is usually 1/4th or 1/10th of the full dose. The patient is observed for 30 to 60 minutes after this initial dose, then if no symptoms develop, a full dose is given and the patient is observed for another 30 to 60 minutes. As an example, a test dose for oral cephalexin could be performed as follows, using a standard oral suspension (250 mg/5 mL): Give 1/10th of a dose (25 mg or 0.5 mL of the full strength suspension, given with a glass of water), followed by 60 minutes of observation. If no symptoms, give 250 mg, followed by 60 minutes of observation. Interpretation -- Although simple in theory, graded challenges can require experience to interpret. A minority of patients develop nonspecific symptoms during the procedure, which can mimic symptoms of true allergy [50]. Most commonly, these include perioral tingling, pruritus without urticaria, throat and lip discomfort, headache, tachycardia, and nausea. These may be anxiety-related, and spending time with the patient explaining the safety of challenge procedures in advance may help to reduce the incidence of these nonspecific reactions. ?If the patient develops convincing signs and symptoms consistent with an immediate reaction during or shortly after the graded challenge (within a few hours of receiving the full dose), no further drug should be given, and symptoms should be treated appropriately. These patients should be diagnosed with IgE-mediated allergy. If they require the drug in question in the future, it should be administered via a formal desensitization protocol. (See \"Penicillin allergy: Immediate reactions\", section on 'Desensitization'.)     ? If only subjective symptoms are reported, repeat challenge including placebo controls and masked observations may occasionally be warranted. ? Patients with a negative challenge may still develop delayed reactions to the drug in question, but these should not be serious.  This was illustrated in a multicenter study of 118 history-positive patients who had both negative skin tests and negative challenges to beta-lactams and were subsequently treated with a beta-lactam [51]. The negative predictive value of the evaluation was 94 percent for any type of reaction. Nine patients experienced symptoms with re-exposure, all of which were mild and delayed (beginning >1 hour after administration). Symptoms consisted of urticaria in five patients, exanthema in three, and one undefined cutaneous reaction. Pharmacy will follow daily and adjust medications as appropriate for renal function and/or serum levels.     Thank you,  Rae Hammans, SUREKHAD

## 2021-06-08 NOTE — PROGRESS NOTES
Hospitalist Progress Note    NAME: Santi Persaud   :  1995   MRN:  [id number]       Assessment / Plan:  Sepsis POA  Hidradenitis suppurativa POA with draining abscesses in groin and axilla  Has failed outpatient treatment  Unable to follow-up with dermatology due to logistics and cost  Taking courses of antibiotics including tetracyclines without improvement  Copious drainage from skin lesions in the groin and axilla bilaterally  Continue with wound care, surgery input appreciated, no surgical indication  Continue with IV Vanco Levaquin and Flagyl  Wound culture showing strep beta-hemolytic group C sensitive to penicillin, there is a reported allergy to penicillin has anaphylactic reaction, patient does not remember having anaphylactic reaction, and could not be verified as his mom passed away  Discussed with Dr. Judy Kinsey who wants to try penicillin allergy testing while patient is in the hospital for closer monitoring.   Plan to do it tomorrow while Dr. Judy Kinsey is on, discussed with pharmacy will need to clarify the protocol  Patient most likely will need to be transferred to [facility] to continue wound care as patient does not have insurance and can benefit from home health services    Anemia, most likely iron deficiency  HgB 8.5, iron studies showed iron deficiency anemia  We will start ferrous sulfate  Updated grandmother at the bedside    Most likely discharge in 1 or 2 days, on 06/10  DVT prophylaxis with lovenox  Code status: Full code  Body mass index is 18.99 kg/m².: 18.5 - 24.9 Normal weight     Subjective:     Chief Complaint / Reason for Physician Visit  Follow-up sepsis/Hidradenitis suppurativa  Complains of 5 out of 10 pain  Seen by surgery  Review of Systems:  Symptom Y/N Comments  Symptom Y/N Comments   Fever/Chills y   Chest Pain y    Poor Appetite    Edema     Cough y   Abdominal Pain y    Sputum    Joint Pain     SOB/THAKUR y   Pruritis/Rash     Nausea/vomit Tolerating PT/OT     Diarrhea    Tolerating Diet     Constipation    Other       Could NOT obtain due to:      Objective:     VITALS:   Last 24hrs VS reviewed since prior progress note. Most recent are:  Patient Vitals for the past 24 hrs:   Temp Pulse Resp BP SpO2   06/08/21 0020 98.8 °F (37.1 °C) 84 18 120/71 98 %   06/07/21 1944 98.8 °F (37.1 °C) (!) 102 16 118/67 100 %   06/07/21 1757 99 °F (37.2 °C) (!) 104 16 (!) 131/92 100 %   06/07/21 1301 97.4 °F (36.3 °C) 88 16 113/72 97 %   06/07/21 0825 97.3 °F (36.3 °C) 87 16 117/69 96 %       Intake/Output Summary (Last 24 hours) at 6/8/2021 2253  Last data filed at 6/8/2021 0517  Gross per 24 hour   Intake 250 ml   Output 1200 ml   Net -950 ml        PHYSICAL EXAM:  General: WD, WN. Alert, cooperative, no acute distress    EENT:  EOMI. Anicteric sclerae. MMM  Resp:  CTA bilaterally, no wheezing or rales. No accessory muscle use  CV:  Regular  rhythm,  No edema  GI:  Soft, Non distended, Non tender.  +Bowel sounds  Neurologic:  Alert and oriented X 3, normal speech,   Psych:   Good insight. Not anxious nor agitated  Skin:  No rashes. No jaundice  Both armpits and groin area have draining nodular cystic infected lesions. Reviewed most current lab test results and cultures  YES  Reviewed most current radiology test results   YES  Review and summation of old records today    NO  Reviewed patient's current orders and MAR    YES  PMH/SH reviewed - no change compared to H&P  ________________________________________________________________________  Care Plan discussed with:    Comments   Patient y    Family  y  grandmother   RN y    Care Manager     Consultant                       y Multidiciplinary team rounds were held today with , nursing, pharmacist and clinical coordinator. Patient's plan of care was discussed; medications were reviewed and discharge planning was addressed. ________________________________________________________________________  Total NON critical care TIME: 35   Minutes    Total CRITICAL CARE TIME Spent:   Minutes non procedure based      Comments   >50% of visit spent in counseling and coordination of care     ________________________________________________________________________  Riaz Johnson MD     Procedures: see electronic medical records for all procedures/Xrays and details which were not copied into this note but were reviewed prior to creation of Plan. LABS:  I reviewed today's most current labs and imaging studies.   Pertinent labs include:  Recent Labs     06/08/21  0007 06/07/21  0042 06/06/21  0120   WBC 15.1* 15.3* 16.3*   HGB 8.5* 9.3* 8.3*   HCT 28.9* 31.8* 28.3*   * 462* 445*     Recent Labs     06/08/21  0007 06/07/21  0042 06/06/21  0120   * 135* 135*   K 3.6 4.0 3.8    99 100   CO2 30 31 31   * 99 91   BUN 9 5* 5*   CREA 0.66* 0.66* 0.51*   CA 9.0 9.1 9.0   ALB 2.2* 2.3* 2.1*   TBILI 0.4 0.3 0.6   ALT 16 13 12       Signed: Riaz Johnson MD

## 2021-06-08 NOTE — CONSULTS
Consult Date: 2021    IP CONSULT TO GENERAL SURGERY  Consult performed by: Terri Dey MD  Consult ordered by: Sanchez Pop MD  Reason for consult: hydradenitits  Assessment/Recommendations:           I had an extensive and thorough discussion with Santi Persaud and his family regarding the nature of hidradenitis. he understands this is a chronic disease without a cure. We reviewed all modalities of treatment includin   Large cysts should be incised and drained, he is currently free of any lesions that require drainage @ this time. 2.  We discussed antibiotics are the mainstay of treatment, especially for the early stages of the disease. Long-term oral antibiotics such as tetracycline (500 mg twice daily), erythromycin (500 mg twice daily), doxycycline (100 mg twice daily), or minocycline (100 mg twice daily) may prevent disease activation. High dosages are effective for active disease. Lower doses may be effective for maintenance once control is established. 3.  Once acute infection not controlled if chronic areas remain, consider excision. We discussed possible causes. These relate to inflammatory conditions. Most common triggering factors include obesity and nicotine. It is unusual that no triggering factor has been identified in Santi Persaud. He in fact has had a significant weight loss recently and denies nicotine. Could he be immunosuppressed? HIV negative 21  Other viral or bacterial infection? Agree with antibiotics and wound care. Currently nothing to drain. Once the infection is controlled, would recommend outpatient referral to dermatology and plastic surgery. Thank you for this consult. Subjective      Met with patient and several family members in the room. Had a difficult time interviewing the patient due to the conversation going on in the room.   Asked them to step out to better communicate with the patient. Currently admitted with complex hidradenitis of bilateral axillas bilateral groins the buttock. He is in a rather rapid progression of severe disease. First started in January. He is at least 1 I&D done in the emergency room. He has been on several rounds of antibiotics. Denies nicotine  Or illicit drugs. No history of diabetes. Labs revealed elevated white count and lymphocytopenia          No past medical history on file. No past surgical history on file. No family history on file.    Social History     Tobacco Use    Smoking status: Never Smoker    Smokeless tobacco: Never Used   Substance Use Topics    Alcohol use: Yes     Comment: Socially        Current Facility-Administered Medications   Medication Dose Route Frequency Provider Last Rate Last Admin    vancomycin (VANCOCIN) 1250 mg in  ml infusion  1,250 mg IntraVENous Q6H Shahab Ibrahim  mL/hr at 06/08/21 1122 1,250 mg at 06/08/21 1122    [START ON 6/9/2021] Vancomycin Level  1 Each Other ONCE Shahab Ibrahim MD        ibuprofen (MOTRIN) tablet 800 mg  800 mg Oral Q6H PRN Jessica Jackson NP   800 mg at 06/08/21 0159    oxyCODONE IR (ROXICODONE) tablet 10 mg  10 mg Oral Q4H PRN Jessica Jackson NP   10 mg at 06/08/21 0429    oxyCODONE IR (ROXICODONE) tablet 5 mg  5 mg Oral Q3H PRN Chanel Edwards NP   5 mg at 06/08/21 0928    metroNIDAZOLE (FLAGYL) IVPB premix 500 mg  500 mg IntraVENous Q12H Noah Read  mL/hr at 06/08/21 0436 500 mg at 06/08/21 0436    sodium chloride (NS) flush 5-40 mL  5-40 mL IntraVENous Q8H Bebo Syed MD   10 mL at 06/08/21 0516    sodium chloride (NS) flush 5-40 mL  5-40 mL IntraVENous PRN Bebo Syed MD        acetaminophen (TYLENOL) tablet 650 mg  650 mg Oral Q6H PRN Bebo Syed MD        Or    acetaminophen (TYLENOL) suppository 650 mg  650 mg Rectal Q6H PRN Bebo Syed MD        polyethylene glycol (MIRALAX) packet 17 g  17 g Oral DAILY Crow Wagoner MD   17 g at 06/08/21 1440    bisacodyL (DULCOLAX) tablet 5 mg  5 mg Oral DAILY PRN Crow Wagoner MD        promethazine (PHENERGAN) tablet 12.5 mg  12.5 mg Oral Q6H PRN Crow Wagoner MD        Or    ondansetron [facility]) injection 4 mg  4 mg IntraVENous Q6H PRN Crow Wagoner MD        enoxaparin (LOVENOX) injection 40 mg  40 mg SubCUTAneous DAILY Crow Wagoner MD   40 mg at 06/08/21 0925    levoFLOXacin (LEVAQUIN) tablet 750 mg  750 mg Oral Q24H Crow Wagoner MD   750 mg at 06/08/21 0159        Review of Systems    Objective     Vital signs for last 24 hours:  Visit Vitals  /82 (BP 1 Location: Left lower arm, BP Patient Position: At rest;Lying)   Pulse 87   Temp 98.6 °F (37 °C)   Resp 16   Ht 6' 3\" (1.905 m)   Wt 68.9 kg (151 lb 14.4 oz)   SpO2 100%   BMI 18.99 kg/m²       Intake/Output this shift:  Current Shift: No intake/output data recorded.   Last 3 Shifts: 06/06 1901 - 06/08 0700  In: 750 [I.V.:750]  Out: 1900 [Urine:1900]    Data Review:   Recent Results (from the past 24 hour(s))   VANCOMYCIN, TROUGH    Collection Time: 06/08/21 12:07 AM   Result Value Ref Range    Vancomycin,trough 18.8 (H) 5.0 - 10.0 ug/mL    Reported dose date NOT PROVIDED      Reported dose time: NOT PROVIDED      Reported dose: NOT PROVIDED UNITS   CBC WITH AUTOMATED DIFF    Collection Time: 06/08/21 12:07 AM   Result Value Ref Range    WBC 15.1 (H) 4.1 - 11.1 K/uL    RBC 3.94 (L) 4.10 - 5.70 M/uL    HGB 8.5 (L) 12.1 - 17.0 g/dL    HCT 28.9 (L) 36.6 - 50.3 %    MCV 73.4 (L) 80.0 - 99.0 FL    MCH 21.6 (L) 26.0 - 34.0 PG    MCHC 29.4 (L) 30.0 - 36.5 g/dL    RDW 16.6 (H) 11.5 - 14.5 %    PLATELET 451 (H) 800 - 400 K/uL    MPV 8.3 (L) 8.9 - 12.9 FL    NRBC 0.0 0  WBC    ABSOLUTE NRBC 0.00 0.00 - 0.01 K/uL    NEUTROPHILS 84 (H) 32 - 75 %    LYMPHOCYTES 4 (L) 12 - 49 %    MONOCYTES 6 5 - 13 %    EOSINOPHILS 4 0 - 7 %    BASOPHILS 1 0 - 1 %    METAMYELOCYTES 1 % IMMATURE GRANULOCYTES 0 0.0 - 0.5 %    ABS. NEUTROPHILS 12.7 (H) 1.8 - 8.0 K/UL    ABS. LYMPHOCYTES 0.6 (L) 0.8 - 3.5 K/UL    ABS. MONOCYTES 0.9 0.0 - 1.0 K/UL    ABS. EOSINOPHILS 0.6 (H) 0.0 - 0.4 K/UL    ABS. BASOPHILS 0.2 (H) 0.0 - 0.1 K/UL    ABS. IMM. GRANS. 0.0 0.00 - 0.04 K/UL    DF MANUAL      RBC COMMENTS ANISOCYTOSIS  1+        RBC COMMENTS MICROCYTOSIS  1+        RBC COMMENTS HYPOCHROMIA  1+       METABOLIC PANEL, COMPREHENSIVE    Collection Time: 06/08/21 12:07 AM   Result Value Ref Range    Sodium 135 (L) 136 - 145 mmol/L    Potassium 3.6 3.5 - 5.1 mmol/L    Chloride 101 97 - 108 mmol/L    CO2 30 21 - 32 mmol/L    Anion gap 4 (L) 5 - 15 mmol/L    Glucose 106 (H) 65 - 100 mg/dL    BUN 9 6 - 20 MG/DL    Creatinine 0.66 (L) 0.70 - 1.30 MG/DL    BUN/Creatinine ratio 14 12 - 20      GFR est AA >60 >60 ml/min/1.73m2    GFR est non-AA >60 >60 ml/min/1.73m2    Calcium 9.0 8.5 - 10.1 MG/DL    Bilirubin, total 0.4 0.2 - 1.0 MG/DL    ALT (SGPT) 16 12 - 78 U/L    AST (SGOT) 15 15 - 37 U/L    Alk.  phosphatase 122 (H) 45 - 117 U/L    Protein, total 7.6 6.4 - 8.2 g/dL    Albumin 2.2 (L) 3.5 - 5.0 g/dL    Globulin 5.4 (H) 2.0 - 4.0 g/dL    A-G Ratio 0.4 (L) 1.1 - 2.2         Physical Exam

## 2021-06-08 NOTE — PROGRESS NOTES
Received notification from bedside RN about patient with regards to: painful, draining skin lesions needing frequent wound care and dressing changes. Requesting more frequent pain medication regimen to coincide with wound intervention  VS: /67, , RR 16, O2 sat 100% on RA    Intervention given: adjusted Roxicodone 10 and 5 to q 4 and q 3 prn respectively.  Resumed home pain regimen of Ibuprofen prn

## 2021-06-08 NOTE — PROGRESS NOTES
End of Shift Note    Bedside shift change report given to ALESSANDRO Diaz (oncoming nurse) by Addi Moreno RN (offgoing nurse). Report included the following information SBAR and Kardex    Shift worked:  7p-7a     Shift summary and any significant changes:    Pain medication frequency increased for pain control with wound care and general movement. Concerns for physician to address: none   Zone phone for oncoming shift:  [phone]     Activity:  Activity Level: Up with Assistance  Number times ambulated in hallways past shift: 0  Number of times OOB to chair past shift: 0    Cardiac:   Cardiac Monitoring: No      Cardiac Rhythm: Sinus Rhythm    Access:   Current line(s): PIV     Genitourinary:   Urinary status: voiding    Respiratory:   O2 Device: None (Room air)  Chronic home O2 use?: NO  Incentive spirometer at bedside: NO     GI:  Last Bowel Movement Date: 06/04/21  Current diet:  ADULT DIET Regular  Passing flatus: YES  Tolerating current diet: YES       Pain Management:   Patient states pain is manageable on current regimen: YES    Skin:  Dann Score: 18  Interventions: float heels and increase time out of bed    Patient Safety:  Fall Score:  Total Score: 1  Interventions: gripper socks and pt to call before getting OOB       Length of Stay:  Expected LOS: 3d 12h  Actual LOS: [address]

## 2021-06-09 LAB
ANION GAP SERPL CALC-SCNC: 5 MMOL/L (ref 5–15)
BACTERIA SPEC CULT: NORMAL
BACTERIA SPEC CULT: NORMAL
BASOPHILS # BLD: 0 K/UL (ref 0–0.1)
BASOPHILS NFR BLD: 0 % (ref 0–1)
BUN SERPL-MCNC: 8 MG/DL (ref 6–20)
BUN/CREAT SERPL: 15 (ref 12–20)
CALCIUM SERPL-MCNC: 8.8 MG/DL (ref 8.5–10.1)
CHLORIDE SERPL-SCNC: 100 MMOL/L (ref 97–108)
CO2 SERPL-SCNC: 29 MMOL/L (ref 21–32)
CREAT SERPL-MCNC: 0.53 MG/DL (ref 0.7–1.3)
DIFFERENTIAL METHOD BLD: ABNORMAL
EOSINOPHIL # BLD: 0.9 K/UL (ref 0–0.4)
EOSINOPHIL NFR BLD: 6 % (ref 0–7)
ERYTHROCYTE [DISTWIDTH] IN BLOOD BY AUTOMATED COUNT: 16.7 % (ref 11.5–14.5)
GLUCOSE SERPL-MCNC: 83 MG/DL (ref 65–100)
HCT VFR BLD AUTO: 29.7 % (ref 36.6–50.3)
HGB BLD-MCNC: 8.7 G/DL (ref 12.1–17)
IMM GRANULOCYTES # BLD AUTO: 0 K/UL (ref 0–0.04)
IMM GRANULOCYTES NFR BLD AUTO: 0 % (ref 0–0.5)
LYMPHOCYTES # BLD: 0.9 K/UL (ref 0.8–3.5)
LYMPHOCYTES NFR BLD: 6 % (ref 12–49)
MCH RBC QN AUTO: 21.3 PG (ref 26–34)
MCHC RBC AUTO-ENTMCNC: 29.3 G/DL (ref 30–36.5)
MCV RBC AUTO: 72.8 FL (ref 80–99)
METAMYELOCYTES NFR BLD MANUAL: 1 %
MONOCYTES # BLD: 0.7 K/UL (ref 0–1)
MONOCYTES NFR BLD: 5 % (ref 5–13)
NEUTS SEG # BLD: 12.1 K/UL (ref 1.8–8)
NEUTS SEG NFR BLD: 82 % (ref 32–75)
NRBC # BLD: 0 K/UL (ref 0–0.01)
NRBC BLD-RTO: 0 PER 100 WBC
PLATELET # BLD AUTO: 481 K/UL (ref 150–400)
PMV BLD AUTO: 8.7 FL (ref 8.9–12.9)
POTASSIUM SERPL-SCNC: 4.2 MMOL/L (ref 3.5–5.1)
RBC # BLD AUTO: 4.08 M/UL (ref 4.1–5.7)
RBC MORPH BLD: ABNORMAL
SERVICE CMNT-IMP: NORMAL
SERVICE CMNT-IMP: NORMAL
SODIUM SERPL-SCNC: 134 MMOL/L (ref 136–145)
VANCOMYCIN SERPL-MCNC: 23.8 UG/ML
WBC # BLD AUTO: 14.8 K/UL (ref 4.1–11.1)

## 2021-06-09 PROCEDURE — 65270000029 HC RM PRIVATE

## 2021-06-09 PROCEDURE — 36415 COLL VENOUS BLD VENIPUNCTURE: CPT

## 2021-06-09 PROCEDURE — 74011250637 HC RX REV CODE- 250/637: Performed by: NURSE PRACTITIONER

## 2021-06-09 PROCEDURE — 74011250636 HC RX REV CODE- 250/636: Performed by: STUDENT IN AN ORGANIZED HEALTH CARE EDUCATION/TRAINING PROGRAM

## 2021-06-09 PROCEDURE — 74011250637 HC RX REV CODE- 250/637: Performed by: INTERNAL MEDICINE

## 2021-06-09 PROCEDURE — 80048 BASIC METABOLIC PNL TOTAL CA: CPT

## 2021-06-09 PROCEDURE — 99233 SBSQ HOSP IP/OBS HIGH 50: CPT | Performed by: STUDENT IN AN ORGANIZED HEALTH CARE EDUCATION/TRAINING PROGRAM

## 2021-06-09 PROCEDURE — 97116 GAIT TRAINING THERAPY: CPT

## 2021-06-09 PROCEDURE — 97165 OT EVAL LOW COMPLEX 30 MIN: CPT

## 2021-06-09 PROCEDURE — 80202 ASSAY OF VANCOMYCIN: CPT

## 2021-06-09 PROCEDURE — 74011250636 HC RX REV CODE- 250/636: Performed by: INTERNAL MEDICINE

## 2021-06-09 PROCEDURE — 97535 SELF CARE MNGMENT TRAINING: CPT

## 2021-06-09 PROCEDURE — 97161 PT EVAL LOW COMPLEX 20 MIN: CPT

## 2021-06-09 PROCEDURE — 99232 SBSQ HOSP IP/OBS MODERATE 35: CPT | Performed by: SURGERY

## 2021-06-09 PROCEDURE — 85025 COMPLETE CBC W/AUTO DIFF WBC: CPT

## 2021-06-09 PROCEDURE — 94760 N-INVAS EAR/PLS OXIMETRY 1: CPT

## 2021-06-09 PROCEDURE — 97530 THERAPEUTIC ACTIVITIES: CPT

## 2021-06-09 PROCEDURE — 2709999900 HC NON-CHARGEABLE SUPPLY

## 2021-06-09 RX ORDER — HYDROMORPHONE HYDROCHLORIDE 1 MG/ML
0.5 INJECTION, SOLUTION INTRAMUSCULAR; INTRAVENOUS; SUBCUTANEOUS
Status: DISCONTINUED | OUTPATIENT
Start: 2021-06-09 | End: 2021-06-10

## 2021-06-09 RX ADMIN — Medication 10 ML: at 14:00

## 2021-06-09 RX ADMIN — Medication 10 ML: at 06:01

## 2021-06-09 RX ADMIN — METRONIDAZOLE 500 MG: 500 INJECTION, SOLUTION INTRAVENOUS at 16:57

## 2021-06-09 RX ADMIN — OXYCODONE 10 MG: 5 TABLET ORAL at 03:14

## 2021-06-09 RX ADMIN — VANCOMYCIN HYDROCHLORIDE 1000 MG: 1 INJECTION, POWDER, LYOPHILIZED, FOR SOLUTION INTRAVENOUS at 18:10

## 2021-06-09 RX ADMIN — ENOXAPARIN SODIUM 40 MG: 40 INJECTION, SOLUTION INTRAVENOUS; SUBCUTANEOUS at 09:09

## 2021-06-09 RX ADMIN — VANCOMYCIN HYDROCHLORIDE 1000 MG: 1 INJECTION, POWDER, LYOPHILIZED, FOR SOLUTION INTRAVENOUS at 11:56

## 2021-06-09 RX ADMIN — LEVOFLOXACIN 750 MG: 750 TABLET, FILM COATED ORAL at 03:14

## 2021-06-09 RX ADMIN — FERROUS SULFATE TAB 325 MG (65 MG ELEMENTAL FE) 325 MG: 325 (65 FE) TAB at 09:09

## 2021-06-09 RX ADMIN — METRONIDAZOLE 500 MG: 500 INJECTION, SOLUTION INTRAVENOUS at 03:14

## 2021-06-09 RX ADMIN — Medication 10 ML: at 22:00

## 2021-06-09 RX ADMIN — OXYCODONE 5 MG: 5 TABLET ORAL at 10:31

## 2021-06-09 RX ADMIN — HYDROMORPHONE HYDROCHLORIDE 0.5 MG: 1 INJECTION, SOLUTION INTRAMUSCULAR; INTRAVENOUS; SUBCUTANEOUS at 11:55

## 2021-06-09 RX ADMIN — VANCOMYCIN HYDROCHLORIDE 1250 MG: 10 INJECTION, POWDER, LYOPHILIZED, FOR SOLUTION INTRAVENOUS at 06:01

## 2021-06-09 RX ADMIN — POLYETHYLENE GLYCOL 3350 17 G: 17 POWDER, FOR SOLUTION ORAL at 09:09

## 2021-06-09 RX ADMIN — OXYCODONE 10 MG: 5 TABLET ORAL at 17:47

## 2021-06-09 NOTE — PROGRESS NOTES
Hospitalist Progress Note    NAME: Jonelle Dao   :  1995   MRN:  [id number]       Assessment / Plan:  Sepsis POA  Hidradenitis suppurativa POA with draining abscesses in groin and axilla  Wound on his sacrum area  Has failed outpatient treatment  Unable to follow-up with dermatology due to logistics and cost  Taking courses of antibiotics including tetracyclines without improvement  Copious drainage from skin lesions in the groin and axilla bilaterally  Continue with wound care, surgery input appreciated, no surgical indication  Continue with IV Vanco Levaquin and Flagyl  Wound culture showing strep beta-hemolytic group C sensitive to penicillin, there is a reported allergy to penicillin has anaphylactic reaction, patient does not remember having anaphylactic reaction, and could not be verified as his mom passed away  Discussed with Dr. Sheri Marie who wants to try penicillin allergy testing while patient is in the hospital for closer monitoring.   Plan to do it tomorrow while Dr. Sheri Marie is on, discussed with pharmacy will need to clarify the protocol  Patient most likely will need to be transferred to [facility] to continue wound care as patient does not have insurance and can benefit from home health services  : Dr. Sheri Marie decided to cancel the patient still allergy testing  Patient has a lot of pain during wound care  No need for further abx per ID ,patient patient needs a close follow-up with dermatology  Wound care nurse on board    Anemia, most likely iron deficiency  HgB 8.5, iron studies showed iron deficiency anemia  C/w  ferrous sulfate  Updated grandmother at the bedside    Most likely discharge in 1 or 2 days, on 06/10  DVT prophylaxis with lovenox  Code status: Full code  Body mass index is 18.99 kg/m².: 18.5 - 24.9 Normal weight     Subjective:     Chief Complaint / Reason for Physician Visit  Follow-up sepsis/Hidradenitis suppurativa  Reported little pain while working with PT  And also reported sacral wound which was draining serosanguineous  Wound care nurse to see  Review of Systems:  Symptom Y/N Comments  Symptom Y/N Comments   Fever/Chills y   Chest Pain y    Poor Appetite    Edema     Cough y   Abdominal Pain n    Sputum    Joint Pain     SOB/THAKUR y   Pruritis/Rash     Nausea/vomit    Tolerating PT/OT     Diarrhea    Tolerating Diet     Constipation    Other       Could NOT obtain due to:      Objective:     VITALS:   Last 24hrs VS reviewed since prior progress note. Most recent are:  Patient Vitals for the past 24 hrs:   Temp Pulse Resp BP SpO2   06/09/21 0310 98.1 °F (36.7 °C) 87 16 114/68 100 %   06/09/21 0028 98.3 °F (36.8 °C) 92 16 (!) 106/57 98 %   06/08/21 1954 100.1 °F (37.8 °C) (!) 103 16 101/65 98 %   06/08/21 1515 97.5 °F (36.4 °C) 99 16 116/65 100 %   06/08/21 1153 97.8 °F (36.6 °C) 89 16 120/75 100 %   06/08/21 0846 98.6 °F (37 °C) 87 16 124/82 100 %       Intake/Output Summary (Last 24 hours) at 6/9/2021 0833  Last data filed at 6/8/2021 2311  Gross per 24 hour   Intake 470 ml   Output 1600 ml   Net -1130 ml        PHYSICAL EXAM:  General: WD, WN. Alert, cooperative, no acute distress    EENT:  EOMI. Anicteric sclerae. MMM  Resp:  CTA bilaterally, no wheezing or rales. No accessory muscle use  CV:  Regular  rhythm,  No edema  GI:  Soft, Non distended, Non tender.  +Bowel sounds  Neurologic:  Alert and oriented X 3, normal speech,   Psych:   Good insight. Not anxious nor agitated  Skin:  No rashes. No jaundice  Both armpits and groin area have draining nodular cystic infected lesions.     Reviewed most current lab test results and cultures  YES  Reviewed most current radiology test results   YES  Review and summation of old records today    NO  Reviewed patient's current orders and MAR    YES  PMH/SH reviewed - no change compared to H&P  ________________________________________________________________________  Care Plan discussed with:    Comments Patient y    Family  y  grandmother   RN y    Care Manager     Consultant                       y Multidiciplinary team rounds were held today with , nursing, pharmacist and clinical coordinator. Patient's plan of care was discussed; medications were reviewed and discharge planning was addressed. ________________________________________________________________________  Total NON critical care TIME: 35   Minutes    Total CRITICAL CARE TIME Spent:   Minutes non procedure based      Comments   >50% of visit spent in counseling and coordination of care     ________________________________________________________________________  Gopal Wilks MD     Procedures: see electronic medical records for all procedures/Xrays and details which were not copied into this note but were reviewed prior to creation of Plan. LABS:  I reviewed today's most current labs and imaging studies.   Pertinent labs include:  Recent Labs     06/09/21 0358 06/08/21 0007 06/07/21 0042   WBC 14.8* 15.1* 15.3*   HGB 8.7* 8.5* 9.3*   HCT 29.7* 28.9* 31.8*   * 467* 462*     Recent Labs     06/09/21 0358 06/08/21 0007 06/07/21  0042   * 135* 135*   K 4.2 3.6 4.0    101 99   CO2 29 30 31   GLU 83 106* 99   BUN 8 9 5*   CREA 0.53* 0.66* 0.66*   CA 8.8 9.0 9.1   ALB  --  2.2* 2.3*   TBILI  --  0.4 0.3   ALT  --  16 13       Signed: Gopal Wilks MD

## 2021-06-09 NOTE — PROGRESS NOTES
End of Shift Note    Bedside shift change report given to Deirdre Oliveira, RN (oncoming nurse) by HERACLIO Manuel, RN (offgoing nurse). Report included the following information SBAR and Kardex    Shift worked:  days      Shift summary and any significant changes:    Pain meds given x2, wound care completed x2, significant drainage from sacrum MD notified & wound care re-consulted, gen-surg re-consulted, pt worked with pt/ot only able to sit in chair for 2 mins due to excruiating pain, dilaudid . 5mg added for pain control, iv vanco and flagyl     Concerns for physician to address: Deep sacrum wound with sanguineous drainage    Zone phone for oncoming shift:  [phone]       Activity:  Activity Level: Up with Assistance  Number times ambulated in hallways past shift: 0  Number of times OOB to chair past shift: 0    Cardiac:   Cardiac Monitoring: No      Cardiac Rhythm: Sinus Rhythm    Access:   Current line(s): PIV     Genitourinary:   Urinary status: voiding    Respiratory:   O2 Device: None (Room air)  Chronic home O2 use?: NO  Incentive spirometer at bedside: NO     GI:  Last Bowel Movement Date: 06/04/21  Current diet:  ADULT DIET Regular  Passing flatus: YES  Tolerating current diet: YES       Pain Management:   Patient states pain is manageable on current regimen: YES    Skin:  Dann Score: 17  Interventions: float heels and increase time out of bed    Patient Safety:  Fall Score:  Total Score: 1  Interventions: gripper socks and pt to call before getting OOB       Length of Stay:  Expected LOS: 3d 12h  Actual LOS: HERACLIO [place]oyos

## 2021-06-09 NOTE — PROGRESS NOTES
Notified MD pt having some purulent drainage from axilla and sacrum, cleaned with normal saline and wound cleanser, ABD pad applied and gauze german applied to sites.

## 2021-06-09 NOTE — PROGRESS NOTES
Problem: Mobility Impaired (Adult and Pediatric)  Goal: *Acute Goals and Plan of Care (Insert Text)  Description: FUNCTIONAL STATUS PRIOR TO ADMISSION: Patient was independent and active without use of DME prior to onset of painful wounds. However, over previous few weeks/months, patient reports being mostly sedentary, staying in bed to keep weight off buttocks/sacral wounds. Reports trying to ambulate in room intermittently during the day as tolerated. Reports difficulty with prolonged standing as well as ascending/descending steps. Patient not cooking recently 2/2 difficulty / pain in extended standing. Also reports feeling more weak / fatigued, with several episodes of lightheadedness    HOME SUPPORT PRIOR TO ADMISSION: The patient lived with 3 roommates. Supportive grandmother in the area. Physical Therapy Goals  Initiated 6/9/2021  1. Patient will move from supine to sit and sit to supine , scoot up and down, and roll side to side in bed with modified independence within 7 day(s). 2.  Patient will transfer from bed to chair and chair to bed with supervision/set-up using the least restrictive device within 7 day(s). 3.  Patient will perform sit to stand with supervision/set-up within 7 day(s). 4.  Patient will ambulate with supervision/set-up for 150 feet with the least restrictive device within 7 day(s). 5.  Patient will ascend/descend 14 stairs with 1 handrail(s) with supervision/set-up within 7 day(s). Outcome: Progressing Towards Goal   PHYSICAL THERAPY EVALUATION  Patient: Thomas Velez (22 y.o. male)  Date: 6/9/2021  Primary Diagnosis: Hidradenitis suppurativa [L73.2]  Soft tissue infection [L08.9]        Precautions:        ASSESSMENT  Based on the objective data described below, the patient presents with generalized weakness, severe pain associated with axillary and sacral wounds, anxiety, decreased activity tolerance, and overall impaired functional mobility.  Pt received pain medication prior to therapy session however hesitant to move secondary to pain. Pt utilized log roll in order to assume seated position EOB. Unable to full achieve seated position secondary to severe pain in L buttocks associated with wounds, offloading to R side. Remaining mobility occurred with SB/CGAx1 including sit<>stand transfer and ambulation trial of 15ft x2 (seated rest break in bedside chair). Gait slow and shuffled with minimal step length bilaterally as pt attempting to avoiding eliciting increased pain levels. Pt unable to tolerate seated position in bedside chair despite use of pillow (pt reports previously using donut pillow however stated it did not help), assisted to supine position at completion of therapy session. Pt functioning below his baseline independent level and would benefit from additional skilled therapy to address the above mentioned deficits as well as HHPT at discharge. Current Level of Function Impacting Discharge (mobility/balance): SB/CGAx1    Functional Outcome Measure: The patient scored 55/100 on the Barthel Index outcome measure which is indicative of moderate impairment in ADLs and functional mobility. Other factors to consider for discharge: poor activity tolerance due to severe pain associated with wounds     Patient will benefit from skilled therapy intervention to address the above noted impairments. PLAN :  Recommendations and Planned Interventions: bed mobility training, transfer training, gait training, therapeutic exercises, patient and family training/education, and therapeutic activities      Frequency/Duration: Patient will be followed by physical therapy:  2 times a week to address goals.     Recommendation for discharge: (in order for the patient to meet his/her long term goals)  Physical therapy at least 2 days/week in the home     This discharge recommendation:  Has been made in collaboration with the attending provider and/or case management    IF patient discharges home will need the following DME: none         SUBJECTIVE:   Patient stated I don't think this is happening today, I can't.     OBJECTIVE DATA SUMMARY:   HISTORY:    No past medical history on file. No past surgical history on file. Personal factors and/or comorbidities impacting plan of care: hidradenitis suppurativa     Home Situation  Home Environment: Private residence  # Steps to Enter: 14 (7-10 steps from street, 4 steps up to porch)  Rails to [employer]: No  One/Two Story Residence: Two story  # of Interior Steps: 15  Interior Rails: Right  Lift Chair Available: No  Living Alone: No  Support Systems: Family member(s), Friends \ neighbors  Patient Expects to be Discharged to[de-identified] Apartment  Current DME Used/Available at Home: None    EXAMINATION/PRESENTATION/DECISION MAKING:   Critical Behavior:  Neurologic State: Alert  Orientation Level: Oriented X4  Cognition: Follows commands  Safety/Judgement: Awareness of environment, Fall prevention  Hearing: Auditory  Auditory Impairment: None  Skin:  dressings covering wound saturated with bright red blood - RN notified  Edema: BLE  Range Of Motion:  AROM: Generally decreased, functional (limited by severe pain)                       Strength:    Strength: Generally decreased, functional                    Tone & Sensation:                                  Coordination:  Coordination: Generally decreased, functional (limited 2/2 severe pain)  Vision:   Acuity: Within Defined Limits  Functional Mobility:  Bed Mobility:  Rolling: Supervision (log roll)  Supine to Sit: Minimum assistance; Additional time  Sit to Supine: Minimum assistance; Additional time  Scooting: Contact guard assistance; Additional time  Transfers:  Sit to Stand: Contact guard assistance  Stand to Sit: Contact guard assistance        Bed to Chair: Contact guard assistance              Balance:      Ambulation/Gait Training:  Distance (ft): 30 Feet (ft) (15ft x2 w seated rest break b/t)  Assistive Device: Gait belt  Ambulation - Level of Assistance: Contact guard assistance        Gait Abnormalities: Decreased step clearance        Base of Support: Narrowed     Speed/Bonnie: Slow  Step Length: Left shortened;Right shortened                     Functional Measure:  Barthel Index:    Bathin  Bladder: 10  Bowels: 10  Groomin  Dressin  Feeding: 10  Mobility: 0  Stairs: 0  Toilet Use: 5  Transfer (Bed to Chair and Back): 10  Total: 55/100       The Barthel ADL Index: Guidelines  1. The index should be used as a record of what a patient does, not as a record of what a patient could do. 2. The main aim is to establish degree of independence from any help, physical or verbal, however minor and for whatever reason. 3. The need for supervision renders the patient not independent. 4. A patient's performance should be established using the best available evidence. Asking the patient, friends/relatives and nurses are the usual sources, but direct observation and common sense are also important. However direct testing is not needed. 5. Usually the patient's performance over the preceding 24-48 hours is important, but occasionally longer periods will be relevant. 6. Middle categories imply that the patient supplies over 50 per cent of the effort. 7. Use of aids to be independent is allowed. Colin Groves., Barthel, D.W. (2613). Functional evaluation: the Barthel Index. [facility] (14)2. DIA Soto, Negin Carrera., Mallory Vasquez., [place], [address] [place] [address] (). Measuring the change indisability after inpatient rehabilitation; comparison of the responsiveness of the Barthel Index and Functional [place] Measure. Journal of Neurology, Neurosurgery, and Psychiatry, 66(4), 124-965. Dalila Thompson, N.J.A, NOHEMY Wiggins, & Romain Martin MRonaldA. (2004.) Assessment of post-stroke quality of life in cost-effectiveness studies:  The usefulness of the Barthel Index and the EuroQoL-5D. Quality of Life Research, 15, 114-85           Physical Therapy Evaluation Charge Determination   History Examination Presentation Decision-Making   MEDIUM  Complexity : 1-2 comorbidities / personal factors will impact the outcome/ POC  MEDIUM Complexity : 3 Standardized tests and measures addressing body structure, function, activity limitation and / or participation in recreation  MEDIUM Complexity : Evolving with changing characteristics  MEDIUM Complexity : FOTO score of 26-74      Based on the above components, the patient evaluation is determined to be of the following complexity level: MEDIUM    Pain Rating:  10/10 pain in axilla and sacral wounds    Activity Tolerance:   Limited by severe pain    After treatment patient left in no apparent distress:   Patient positioned in R sidelying for pressure relief, Call bell within reach, Caregiver / family present, and Side rails x 3    COMMUNICATION/EDUCATION:   The patients plan of care was discussed with: Occupational therapist and Registered nurse. Fall prevention education was provided and the patient/caregiver indicated understanding., Patient/family have participated as able in goal setting and plan of care. , and Patient/family agree to work toward stated goals and plan of care.     Thank you for this referral.  Patsy Hawley, PT, DPT   Time Calculation: 28 mins

## 2021-06-09 NOTE — PROGRESS NOTES
Surgical Note    Date/Time:  2021        Assessment :    Plan:  Patient Active Problem List   Diagnosis Code    Hidradenitis suppurativa L73.2    Soft tissue infection L08.9        Continue antibiotics and wound care    I encouraged patient to take a shower to help with wound care yesterday, he has not done so yet. Encouraged him to shower today. Encouraged him to ambulate to help wound drain. Try mesh underwear to hold dressings in place           Subjective:     Chief Complaint:      Review of Systems:  Y  N       Y  N  [] [x]  Fever/chills                                               [] [x]  Chest Pain  [] [x]  Cough                                                       [] [x]  Diarrhea   [] [x]  Sputum                                                     [] [x]  Constipation  [] [x]  SOB/THAKUR                                                [] [x]  Nausea/Vomit  [] [x]  Abd Pain                                                    [x] []  Tolerating diet  [] [x]  Dysuria                                                           []Unable to obtain  ROS due to  []mental status change  []sedated   []intubated    Objective:     VITALS:   Last 24hrs VS reviewed since prior hospitalist progress note.  Most recent are:  Visit Vitals  /63 (BP 1 Location: Left lower arm, BP Patient Position: At rest)   Pulse 97   Temp 98.8 °F (37.1 °C)   Resp 16   Ht 6' 3\" (1.905 m)   Wt 68.9 kg (151 lb 14.4 oz)   SpO2 100%   BMI 18.99 kg/m²     Temp (24hrs), Av.6 °F (37 °C), Min:97.5 °F (36.4 °C), Max:100.1 °F (37.8 °C)      Intake/Output Summary (Last 24 hours) at 2021 1315  Last data filed at 2021 2311  Gross per 24 hour   Intake 420 ml   Output 1600 ml   Net -1180 ml         []Salgado []NGT  []Intubated on vent    PHYSICAL EXAM:  Gen:  [] A&O  []non-toxic  [x] No acute distress             [] ill apearing  []  Critical        HEENT:   [x]NC/AT/PERRLA/EOMI    []pink conjunctivae      []pale conjunctivae PERRL  []yes  []no      []moist mucosa    []dry mucosa    hearing intact to voice []yes  []No    RESP:   [x]CTA bialterally/no wheezing/rhonchi/rales/crackles    []clear bilaterally  []wheezing   []rhonchi   []crackles     use of accessory muscles []yes []no    CARD:   [x] regular rate and rhythm/No murmurs/rubs/gallops    murmur  []yes ()  []no      Rubs  []yes  []no       Gallops []yes  []no    Rate [] regular  [] irregular        carotid bruits  []Right  [] Left                 LE edema []yes  []no           JVP  [] yes   [] no    ABD:    [x]soft  [x]non distended  [x]non tender  [] NABS    SKIN:   Severe hidradenitis bilateral axillas, bilateral groins, buttocks    Dressings in place    NEUR:   [x]cranial nerves II-XII grossly intact       []Cranial nerves deficit                 [] facial droop    [] slurred speech   []aphasic     []Strength normal     [] weakness  [] LUE  []  RUE/ [] LLE  []  RLE    follows commands  [] yes [] no           PSYCH:   insight []poor [x]good   Alert and Oriented to  [x]person  [x]place  [x] time                    []depressed []anxious []agitated  []lethargic []stuporous  []sedated           Lab Data Reviewed: (see below)    Medications Reviewed: (see below)    PMH/SH reviewed - no change compared to H&P    Care Plan discussed with:  [x]Patient   []Family    []Care Manager   []RN    []Consultant/Specialist :    Prophylaxis:  []Lovenox  []Coumadin  []Hep SQ  []SCDs  []H2B/PPI    Disposition:  []Home w/ Family   []HH PT,OT,RN   []SNF/LTC   []SAH/Rehab    Ancillary Serices:   [] PT     []OT      []SW      []Nut      []HH ________________________________________________________________________  LABS:  Recent Labs     06/09/21 0358 06/08/21 0007   WBC 14.8* 15.1*   HGB 8.7* 8.5*   HCT 29.7* 28.9*   * 467*     Recent Labs     06/09/21 0358 06/08/21 0007 06/07/21  0042   * 135* 135*   K 4.2 3.6 4.0    101 99   CO2 29 30 31   BUN 8 9 5*   CREA 0.53* 0.66* 0.66*   GLU 83 106* 99   CA 8.8 9.0 9.1     Recent Labs     06/08/21  0007 06/07/21  0042   ALT 16 13   * 134*   TBILI 0.4 0.3   TP 7.6 8.0   ALB 2.2* 2.3*   GLOB 5.4* 5.7*     No results for input(s): INR, PTP, APTT, INREXT in the last 72 hours. No results for input(s): FE, TIBC, PSAT, FERR in the last 72 hours. No results for input(s): PH, PCO2, PO2 in the last 72 hours. No results for input(s): CPK, CKMB in the last 72 hours.     No lab exists for component: TROPONINI  No results found for: GLUCPOC    MEDICATIONS:  Current Facility-Administered Medications   Medication Dose Route Frequency    vancomycin (VANCOCIN) 1,000 mg in 0.9% sodium chloride 250 mL (VIAL-MATE)  1,000 mg IntraVENous Q6H    HYDROmorphone (DILAUDID) injection 0.5 mg  0.5 mg IntraVENous Q4H PRN    ferrous sulfate tablet 325 mg  1 Tablet Oral DAILY WITH BREAKFAST    ibuprofen (MOTRIN) tablet 800 mg  800 mg Oral Q6H PRN    oxyCODONE IR (ROXICODONE) tablet 10 mg  10 mg Oral Q4H PRN    oxyCODONE IR (ROXICODONE) tablet 5 mg  5 mg Oral Q3H PRN    metroNIDAZOLE (FLAGYL) IVPB premix 500 mg  500 mg IntraVENous Q12H    sodium chloride (NS) flush 5-40 mL  5-40 mL IntraVENous Q8H    sodium chloride (NS) flush 5-40 mL  5-40 mL IntraVENous PRN    acetaminophen (TYLENOL) tablet 650 mg  650 mg Oral Q6H PRN    Or    acetaminophen (TYLENOL) suppository 650 mg  650 mg Rectal Q6H PRN    polyethylene glycol (MIRALAX) packet 17 g  17 g Oral DAILY    bisacodyL (DULCOLAX) tablet 5 mg  5 mg Oral DAILY PRN    promethazine (PHENERGAN) tablet 12.5 mg  12.5 mg Oral Q6H PRN    Or    ondansetron (ZOFRAN) injection 4 mg  4 mg IntraVENous Q6H PRN    enoxaparin (LOVENOX) injection 40 mg  40 mg SubCUTAneous DAILY    levoFLOXacin (LEVAQUIN) tablet 750 mg  750 mg Oral Q24H

## 2021-06-09 NOTE — PROGRESS NOTES
Patient worked with pt/ot when he had bright red drainage coming from sacrum area. Assessed pt and cleaned sacrum area, wound on sacrum leaking sanguineous drainage, MD notified.

## 2021-06-09 NOTE — PROGRESS NOTES
Pharmacy Automatic Renal Dosing Protocol - Antimicrobials    Indication for Antimicrobials: Hidradenitis suppurativa    Current Regimen of Each Antimicrobial:  Vancomycin 1250 mg IV every 6 hr (Start Date ; Day # )  Levofloxacin 750 mg PO every 24 hours (Start Date ; Day # )  Metronidazole 500 mg q12h (, day )    Previous Antimicrobial Therapy:  Meropenem    Goal Level: 10-15  (AUC: 400 - 600 mg/hr/Liter/day)     Date Dose & Interval Measured (mcg/mL) Extrapolated (mcg/mL)    @ 0120 750 mg every 8 hours 6.9 6 ()    @ 2127 750 mg q6h 9.4 9.36 ()   21 00:07 1000 mg Q6H 18.8 3.75    21 3:56 1250mg q6h 23.8 (random) 19.5      Date & time of next level:     Significant Cultures:    Blood: NGTD- final   Wound: BETA HEMOLYTIC GROUP C STREP   Wound: BETA HEMOLYTIC GROUP C STREP    Paralysis, amputations, malnutrition: None     Labs:  Recent Labs     21  0358 21  0007 21  0042   CREA 0.53* 0.66* 0.66*   BUN 8 9 5*   WBC 14.8* 15.1* 15.3*     Temp (24hrs), Av.4 °F (36.9 °C), Min:97.5 °F (36.4 °C), Max:100.1 °F (37.8 °C)      Is the Patient on Dialysis? No    Creatinine Clearance (mL/min):   CrCl (Actual Body Weight): 157.2  CrCl (Adjusted Body Weight): 178.6  CrCl (Ideal Body Weight): 192.8    Impression/Plan:   Vancomycin level and AUC above goal, decrease to 1000 mg IV q6h, last day tomorrow so I did not order a level  Will continue current regimen for levofloxacin and metronidazole as appropriate for indication and renal function. ID considering a graded cephalosporin challenge tomorrow. Protocol below:  Antimicrobial stop date 7 + days      Graded Cephalosporin Challenge        Procedure -- When graded challenges are performed to exclude immediate allergic reactions, patients should not be pretreated with antihistamines or glucocorticoids because these agents may mask early signs of an allergic reaction.      Challenges to exclude immediate reactions can be performed in a variety of ways. The starting dose is usually 1/4th or 1/10th of the full dose. The patient is observed for 30 to 60 minutes after this initial dose, then if no symptoms develop, a full dose is given and the patient is observed for another 30 to 60 minutes. As an example, a test dose for oral cephalexin could be performed as follows, using a standard oral suspension (250 mg/5 mL): Give 1/10th of a dose (25 mg or 0.5 mL of the full strength suspension, given with a glass of water), followed by 60 minutes of observation. If no symptoms, give 250 mg, followed by 60 minutes of observation. Interpretation -- Although simple in theory, graded challenges can require experience to interpret. A minority of patients develop nonspecific symptoms during the procedure, which can mimic symptoms of true allergy [50]. Most commonly, these include perioral tingling, pruritus without urticaria, throat and lip discomfort, headache, tachycardia, and nausea. These may be anxiety-related, and spending time with the patient explaining the safety of challenge procedures in advance may help to reduce the incidence of these nonspecific reactions. ?If the patient develops convincing signs and symptoms consistent with an immediate reaction during or shortly after the graded challenge (within a few hours of receiving the full dose), no further drug should be given, and symptoms should be treated appropriately. These patients should be diagnosed with IgE-mediated allergy. If they require the drug in question in the future, it should be administered via a formal desensitization protocol. (See \"Penicillin allergy: Immediate reactions\", section on 'Desensitization'.)     ? If only subjective symptoms are reported, repeat challenge including placebo controls and masked observations may occasionally be warranted. ? Patients with a negative challenge may still develop delayed reactions to the drug in question, but these should not be serious. This was illustrated in a multicenter study of 118 history-positive patients who had both negative skin tests and negative challenges to beta-lactams and were subsequently treated with a beta-lactam [51]. The negative predictive value of the evaluation was 94 percent for any type of reaction. Nine patients experienced symptoms with re-exposure, all of which were mild and delayed (beginning >1 hour after administration). Symptoms consisted of urticaria in five patients, exanthema in three, and one undefined cutaneous reaction. Pharmacy will follow daily and adjust medications as appropriate for renal function and/or serum levels.     Thank you,  Juju Charles, PHARMD

## 2021-06-09 NOTE — PROGRESS NOTES
End of Shift Note    Bedside shift change report given to Sabi Amor RN (oncoming nurse) by Belem Silver RN, RN (offgoing nurse). Report included the following information SBAR and Kardex    Shift worked:  nights      Shift summary and any significant changes:    Pain meds given x2, wound care completed approx 4am, iv vanco and flagyl     Concerns for physician to address: pt requesting to go to [address] [place] [address] to help with wound care and IV antibiotic administration     Zone phone for oncoming shift:  [phone]       Activity:  Activity Level: Up with Assistance  Number times ambulated in hallways past shift: 0  Number of times OOB to chair past shift: 0    Cardiac:   Cardiac Monitoring: No      Cardiac Rhythm: Sinus Rhythm    Access:   Current line(s): PIV     Genitourinary:   Urinary status: voiding    Respiratory:   O2 Device: None (Room air)  Chronic home O2 use?: NO  Incentive spirometer at bedside: NO     GI:  Last Bowel Movement Date: 06/04/21  Current diet:  ADULT DIET Regular  Passing flatus: YES  Tolerating current diet: YES       Pain Management:   Patient states pain is manageable on current regimen: YES    Skin:  Dann Score: 17  Interventions: float heels and increase time out of bed    Patient Safety:  Fall Score:  Total Score: 2  Interventions: gripper socks and pt to call before getting OOB       Length of Stay:  Expected LOS: 3d 12h  Actual LOS: [address], RN

## 2021-06-09 NOTE — PROGRESS NOTES
Spiritual Care Assessment/Progress Note  [facility]      NAME: Michel Wade      MRN: [id number]  AGE: 22 y.o. SEX: male  Denominational Affiliation: Non Caodaism   Language: English     6/9/2021     Total Time (in minutes): 8     Spiritual Assessment begun in [facility] through conversation with:         []Patient        [] Family    [] Friend(s)        Reason for Consult: Initial/Spiritual assessment, patient floor     Spiritual beliefs: (Please include comment if needed)     [] Identifies with a chas tradition:         [] Supported by a chas community:            [] Claims no spiritual orientation:           [] Seeking spiritual identity:                [] Adheres to an individual form of spirituality:           [x] Not able to assess:                           Identified resources for coping:      [] Prayer                               [] Music                  [] Guided Imagery     [] Family/friends                 [] Pet visits     [] Devotional reading                         [x] Unknown     [] Other:                                               Interventions offered during this visit: (See comments for more details)    Patient Interventions:  Other (comment) (Pt was asleep)           Plan of Care:     [] Support spiritual and/or cultural needs    [] Support AMD and/or advance care planning process      [] Support grieving process   [] Coordinate Rites and/or Rituals    [] Coordination with community clergy   [] No spiritual needs identified at this time   [] Detailed Plan of Care below (See Comments)  [] Make referral to Music Therapy  [] Make referral to Pet Therapy     [] Make referral to Addiction services  [] Make referral to [facility]  [] Make referral to Spiritual Care Partner  [] No future visits requested        [x] Follow up upon further referrals     Comments: Attempted to provide initial spiritual assessment for pt Nicolás Deluca on [facility]. Consult with RN regarding his work today and the pain he has been experiencing. Discussed perceived family supports/dynamics. However,  was unable to meet with pt as he was fast asleep in the room, sleeping on his side with tv on/lights off. No family/friends were present at bedside during attempted visit. To respect pt need for rest, visit was not completed. Advised staff of  services.     Önorbert 1 RICARDO Velasquez 1 Provider   Paging Service [phone])

## 2021-06-09 NOTE — PROGRESS NOTES
Transition of Care Plan:     RUR:  9%     Disposition: to be determined. Looking for IV ABX and Wound Care Instructions.      Pt does not have insurance. Pt is overresourced for Medicaid. -CM provided him with a copy of Care Card Application.      Make sure Pt can afford medication if Pt goes home.     If Pt needs Long Term IV ABX/Wound Care might need to look into possible transfer to [facility] and set up at the wound clinic?     Follow up appointments: PCP: DAVION Dozier Major:   Specialist: ID? Wound Care Clinic:  7/13/21 at 8 a.m.  DME needed: to be determined. Transportation at Discharge: Aunt: Francisco Moe can transport him home at discharge. Pt does not drive. He uses Lyft or friends and family to transport him. Keys or means to access home:      yes  IM Medicare letter: n/a no insurance  Caregiver Contact:GrandMother; Miguel Angel Levine: [phone]  AunDragan Jeronimo: [phone]  Discharge Caregiver contacted prior to discharge? CM will contact at d/c if pt desires. 3:20 p.m. [facility] accepted pt.    2:44 p.m. [address] [place] [address] unwilling to accept pt. CM will send referral to [facility]. 2:41 p.m. CM sent referral to [facility] to ask if they can accept. 2:37 p.m.  CM obtained appt at the [address] [place] [address] wound care clinic for July 13, 2021 at 8 a.m.    12:16 p.m. Attending physician in agreement with plan. CM will proceed. CM spoke with CM team led regarding discussion of pt going to [address] [place] [address]. CM  stated that it looked as if IV ABT end tomorrow and that pt's wounds could possibly be handled at the [address] [place] [address] wound clinic if they are chronic. CM could attempt to find a [place] that would take a robert case. CM sent attending physician a message to get feedback on plan.       Darwin Curtis,   Care Management, [address]  [phone]

## 2021-06-09 NOTE — PROGRESS NOTES
Problem: Self Care Deficits Care Plan (Adult)  Goal: *Acute Goals and Plan of Care (Insert Text)  Description:   FUNCTIONAL STATUS PRIOR TO ADMISSION: Patient was independent and active without use of DME prior to onset of painful wounds. In last few weeks, patient reports being fairly sedentary, lying in bed to keep weight off buttocks/sacral wounds. Reports trying to ambulate in room intermittently during the day as tolerated. Patient not cooking recently 2/2 difficulty / pain in extended standing. Also reports feeling more weak / fatigued, with several episodes of lightheadedness. HOME SUPPORT: The patient lived with roommates - per patient, extremely painful going up and down stairs recently so roommates have been bringing items to him as needed. Occupational Therapy Goals  Initiated 6/9/2021  1. Patient will tolerate sitting upright for ADL participation with use of waffle cushion within 7 days. 2.  Patient will perform lower body dressing with supervision/set-up using reacher, long handled shoehorn, and sock aid prn within 7 day(s). 3.  Patient will perform toilet transfers with supervision/set-up within 7 day(s). 4.  Patient will perform all aspects of toileting with supervision/set-up within 7 day(s). 5.  Patient will utilize energy conservation, fall prevention, and pain management techniques during functional activities with verbal cues within 7 day(s). Outcome: Progressing Towards Goal   OCCUPATIONAL THERAPY EVALUATION  Patient: Jonelle Dao (22 y.o. male)  Date: 6/9/2021  Primary Diagnosis: Hidradenitis suppurativa [L73.2]  Soft tissue infection [L08.9]        Precautions:      ASSESSMENT  Based on the objective data described below, the patient presents with decreased activity tolerance, limited functional reach to lower body, and inability to tolerate upright seated position s/p admission for hidradenitis suppurativa and soft tissue infection.  Patient received meds prior to session - observed bandages in place on BUEs near axilla as well as at buttocks / sacral area. Although hesitant to move 2/2 pain, patient very agreeable and with excellent effort. Patient requesting soft mesh underwear to don prior to mobility to facilitate keeping bandages in place - per patient, uses reacher for distal LE threading at home. This session, patient requiring significant assist to thread LEs at bed level, then tolerating brief bridging to pull up over hips. Overall, patient mobilizing and transferring with min assist - patient with R lean in seated to offset weight from wounds on L buttock. Patient only able to tolerate sitting EOB briefly before needing to stand. Patient assisted back to bed at end of session as unable to tolerate sitting in bedside chair - pillows placed to facilitate comfort in sidelying with pain improved per patient report. Anticipate patient may benefit from AE training and additional education regarding energy conservation, pain management, and compensatory ADL strategies. Hopeful for no follow-up OT needs at discharge. At this time, patient's pain is greatest - and very significant - barrier to functional performance. Current Level of Function Impacting Discharge (ADLs/self-care): up to mod A for lower body    Functional Outcome Measure: The patient scored 50/100 on the Barthel Index. Other factors to consider for discharge: PAIN     Patient will benefit from skilled therapy intervention to address the above noted impairments. PLAN :  Recommendations and Planned Interventions: self care training, therapeutic activities, patient education, and home safety training    Frequency/Duration: Patient will be followed by occupational therapy 2 times a week to address goals. Recommendation for discharge: (in order for the patient to meet his/her long term goals)  No skilled occupational therapy/ follow up rehabilitation needs identified at this time.     This discharge recommendation:  Has not yet been discussed the attending provider and/or case management    IF patient discharges home will need the following DME: AE: long handled bathing and AE: long handled dressing       SUBJECTIVE:   Patient stated I can try. Jacqueline Heman Jacqueline Heman I don't think this is going to work today\" in reference to sitting up in chair - severe pain. OBJECTIVE DATA SUMMARY:   HISTORY:   No past medical history on file. No past surgical history on file. Expanded or extensive additional review of patient history:     Home Situation  Home Environment: Private residence  # Steps to Enter: 14 (7-10 steps from street, 4 steps up to porch)  Rails to [email or web address]: No  One/Two Story Residence: Two story  # of Interior Steps: 15  Interior Rails: Right  Lift Chair Available: No  Living Alone: No  Support Systems: Family member(s), Friends \ neighbors  Patient Expects to be Discharged to[de-identified] Apartment  Current DME Used/Available at Home: None    Hand dominance: Right    EXAMINATION OF PERFORMANCE DEFICITS:  Cognitive/Behavioral Status:  Neurologic State: Alert  Orientation Level: Oriented X4  Cognition: Follows commands  Perception: Appears intact  Perseveration: No perseveration noted  Safety/Judgement: Awareness of environment; Fall prevention    Edema: Bandages on BUEs near axilla. Bandages at buttocks / sacral area. With mobilization, observed drainage from axilla wounds and bright red bleeding from sacral area - RN notified. Hearing: Auditory  Auditory Impairment: None    Vision/Perceptual:    Acuity: Within Defined Limits       Range of Motion:  AROM: Generally decreased, functional (limited 2/2 severe pain)    Strength:  Strength: Generally decreased, functional    Coordination:  Coordination: Generally decreased, functional (limited 2/2 severe pain)  Fine Motor Skills-Upper: Left Intact; Right Intact    Gross Motor Skills-Upper:  (BUE bandages near axilla)    Balance:   Patient with significant R lean to offset weight from L buttock. Patient balance fair in standing - patient moving very slowly, cautiously with severe pain. Functional Mobility and Transfers for ADLs:  Bed Mobility:  Supine to Sit: Minimum assistance; Additional time  Sit to Supine: Minimum assistance;Assist x2; Additional time  Scooting: Contact guard assistance; Additional time    Transfers:  Sit to Stand: Minimum assistance  Stand to Sit: Minimum assistance  Bed to Chair: Minimum assistance;Contact guard assistance;Assist x2    ADL Assessment:  Feeding: Setup    Oral Facial Hygiene/Grooming: Setup    Bathing: Moderate assistance    Upper Body Dressing: Minimum assistance    Lower Body Dressing: Maximum assistance (uses reacher at home )    Toileting: Moderate assistance    ADL Intervention and task modifications:  Lower Body Dressing Assistance  Dressing Assistance: Maximum assistance  Underpants: Maximum assistance  Slip on Shoes with Back: Moderate assistance  Position Performed: Seated edge of bed  Cues: Verbal cues provided;Visual cues provided;Physical assistance    Cognitive Retraining  Safety/Judgement: Awareness of environment; Fall prevention     Functional Measure:  Barthel Index:    Bathin  Bladder: 10  Bowels: 10  Groomin  Dressin  Feeding: 10  Mobility: 0  Stairs: 0  Toilet Use: 5  Transfer (Bed to Chair and Back): 5  Total: 50/100        The Barthel ADL Index: Guidelines  1. The index should be used as a record of what a patient does, not as a record of what a patient could do. 2. The main aim is to establish degree of independence from any help, physical or verbal, however minor and for whatever reason. 3. The need for supervision renders the patient not independent. 4. A patient's performance should be established using the best available evidence. Asking the patient, friends/relatives and nurses are the usual sources, but direct observation and common sense are also important. However direct testing is not needed.   5. Usually the patient's performance over the preceding 24-48 hours is important, but occasionally longer periods will be relevant. 6. Middle categories imply that the patient supplies over 50 per cent of the effort. 7. Use of aids to be independent is allowed. Taunya Dubin., Barthel, D.W. ([id number]). Functional evaluation: the Barthel Index. [facility] (14)2. DIA Bob, Eugena Mcburney., Piedad He., [facility], [facility] (1999). Measuring the change indisability after inpatient rehabilitation; comparison of the responsiveness of the Barthel Index and Functional Thornton Measure. Journal of Neurology, Neurosurgery, and Psychiatry, 66(4), 544-290. ZAINA Saez, NOHEMY Wiggins, & Joi Chow M.A. (2004.) Assessment of post-stroke quality of life in cost-effectiveness studies: The usefulness of the Barthel Index and the EuroQoL-5D. Quality of Life Research, 15, 687-95         Occupational Therapy Evaluation Charge Determination   History Examination Decision-Making   LOW Complexity : Brief history review  MEDIUM Complexity : 3-5 performance deficits relating to physical, cognitive , or psychosocial skils that result in activity limitations and / or participation restrictions MEDIUM Complexity : Patient may present with comorbidities that affect occupational performnce. Miniml to moderate modification of tasks or assistance (eg, physical or verbal ) with assesment(s) is necessary to enable patient to complete evaluation       Based on the above components, the patient evaluation is determined to be of the following complexity level: LOW   Pain Rating:  Severe pain - received meds prior to session. RN notified post-session of patient performance.     Activity Tolerance:   Fair, Poor, requires frequent rest breaks, and limited by pain    After treatment patient left in no apparent distress:    Patient positioned in R sidelying for pressure relief, Call bell within reach, Caregiver / family present, Side rails x 3, and pillow between LEs and at back    COMMUNICATION/EDUCATION:   The patients plan of care was discussed with: Physical therapist and Registered nurse. Home safety education was provided and the patient/caregiver indicated understanding., Patient/family have participated as able in goal setting and plan of care. , and Patient/family agree to work toward stated goals and plan of care.     Thank you for this referral.  Savanna David OT  Time Calculation: 28 mins

## 2021-06-09 NOTE — PROGRESS NOTES
Infectious Disease Progress Note         Interval:  NAEO    Subjective:   Reports feeling OK. Objective:    Vitals:   Patient Vitals for the past 24 hrs:   Temp Pulse Resp BP SpO2   06/09/21 0310 98.1 °F (36.7 °C) 87 16 114/68 100 %   06/09/21 0028 98.3 °F (36.8 °C) 92 16 (!) 106/57 98 %   06/08/21 1954 100.1 °F (37.8 °C) (!) 103 16 101/65 98 %   06/08/21 1515 97.5 °F (36.4 °C) 99 16 116/65 100 %   06/08/21 1153 97.8 °F (36.6 °C) 89 16 120/75 100 %   06/08/21 0846 98.6 °F (37 °C) 87 16 124/82 100 %       Physical Exam:  ?    ? GEN: NAD  ? HEENT: Normocephalic, atraumatic, PERRL, no scleral icterus  ? CV: HDS  ? Lungs: on room air  ? Abdomen: soft, non distended  ? Genitourinary: , no cuevas  ? Extremities: no edema  ? Neuro: Alert, oriented to time, place and situation, moves all extremities to commands, verbal   ? Skin: Wounds in dressing  ? Psych: good affect, good eye contact, non tearful   ? Lines: PIVs      Labs:  Recent Results (from the past 24 hour(s))   VANCOMYCIN, RANDOM    Collection Time: 06/09/21  3:58 AM   Result Value Ref Range    Vancomycin, random 23.8 UG/ML   CBC WITH AUTOMATED DIFF    Collection Time: 06/09/21  3:58 AM   Result Value Ref Range    WBC 14.8 (H) 4.1 - 11.1 K/uL    RBC 4.08 (L) 4.10 - 5.70 M/uL    HGB 8.7 (L) 12.1 - 17.0 g/dL    HCT 29.7 (L) 36.6 - 50.3 %    MCV 72.8 (L) 80.0 - 99.0 FL    MCH 21.3 (L) 26.0 - 34.0 PG    MCHC 29.3 (L) 30.0 - 36.5 g/dL    RDW 16.7 (H) 11.5 - 14.5 %    PLATELET 379 (H) 215 - 400 K/uL    MPV 8.7 (L) 8.9 - 12.9 FL    NRBC 0.0 0  WBC    ABSOLUTE NRBC 0.00 0.00 - 0.01 K/uL    NEUTROPHILS 82 (H) 32 - 75 %    LYMPHOCYTES 6 (L) 12 - 49 %    MONOCYTES 5 5 - 13 %    EOSINOPHILS 6 0 - 7 %    BASOPHILS 0 0 - 1 %    METAMYELOCYTES 1 %    IMMATURE GRANULOCYTES 0 0.0 - 0.5 %    ABS. NEUTROPHILS 12.1 (H) 1.8 - 8.0 K/UL    ABS. LYMPHOCYTES 0.9 0.8 - 3.5 K/UL    ABS. MONOCYTES 0.7 0.0 - 1.0 K/UL    ABS. EOSINOPHILS 0.9 (H) 0.0 - 0.4 K/UL    ABS. BASOPHILS 0.0 0.0 - 0.1 K/UL    ABS. IMM. GRANS. 0.0 0.00 - 0.04 K/UL    DF MANUAL      RBC COMMENTS ANISOCYTOSIS  1+        RBC COMMENTS MICROCYTOSIS  1+        RBC COMMENTS HYPOCHROMIA  2+       METABOLIC PANEL, BASIC    Collection Time: 06/09/21  3:58 AM   Result Value Ref Range    Sodium 134 (L) 136 - 145 mmol/L    Potassium 4.2 3.5 - 5.1 mmol/L    Chloride 100 97 - 108 mmol/L    CO2 29 21 - 32 mmol/L    Anion gap 5 5 - 15 mmol/L    Glucose 83 65 - 100 mg/dL    BUN 8 6 - 20 MG/DL    Creatinine 0.53 (L) 0.70 - 1.30 MG/DL    BUN/Creatinine ratio 15 12 - 20      GFR est AA >60 >60 ml/min/1.73m2    GFR est non-AA >60 >60 ml/min/1.73m2    Calcium 8.8 8.5 - 10.1 MG/DL               Assessment:  23 yo M with:     - Hidradenitis suppurativa, draining wounds. Cultures growing Streptococcus beta hemolytic group C.  - HIV negative 6/2/21.   -  Allergies of \"anaphylaxis\" listed to penicillins from 2011 and 2014, with no clear evidence or history from patient supporting it. Recommendations:  - Finish 7 days of vancomycin, levaquin on 6/10/21. Flagyl can also be stopped 6/10/21. Leukocytosis is secondary to the underlying inflammation from the primary condition (HS).  - I spoke to patient's father today to get more history of the penicillin reaction, and this was around 12-15 years ago, but the reaction is not recalled. Overall the chances of allergy seem questionable per the history we have been able to obtained so far (pharmacy at [address] [address] [facility] also did extensive review on the patient), given severe reaction listed \"anaphylaxis\", testing for this ideally needs an allergist/immunologist to be involved as well. Hence, we will hold off on this as of now. At this time, patient is not in an urgent need for penicillins.    However, given his young age, and anticipating need for antibiotics in the future, and as part of antibiotic stewardship guidelines, it is strongly recommended that the patient get evaluated for penicillin allergy testing by an Allergist. Allergist/Immunologist Dr. Marilee De Santiago at [facility] ([phone]) could be reached for this. -  As outpatient, he will need aggressive dermatological evaluation as soon as possible as antimicrobials alone will not be solving the underlying problem, and the condition will worsen without proper evaluation. Patient was on doxycycline as outpatient for the HS wounds, but this was not effective for him. We need dermatology to evaluate stage of severity and need and choice for antibiotics in Hidradenitis suppurativa wounds. - Continue wound care per wound care instructions. Above discussed with hospitalist Dr. Jonny Nur. We will sign off now, please recall as needed. Thank you for the opportunity to participate in the care of this patient. Please contact with questions or concerns.       Penny Khoury MD  Infectious Diseases

## 2021-06-10 LAB
ANION GAP SERPL CALC-SCNC: 4 MMOL/L (ref 5–15)
BASOPHILS # BLD: 0 K/UL (ref 0–0.1)
BASOPHILS NFR BLD: 0 % (ref 0–1)
BUN SERPL-MCNC: 7 MG/DL (ref 6–20)
BUN/CREAT SERPL: 15 (ref 12–20)
CALCIUM SERPL-MCNC: 8.7 MG/DL (ref 8.5–10.1)
CHLORIDE SERPL-SCNC: 101 MMOL/L (ref 97–108)
CO2 SERPL-SCNC: 31 MMOL/L (ref 21–32)
CREAT SERPL-MCNC: 0.47 MG/DL (ref 0.7–1.3)
DATE LAST DOSE: ABNORMAL
DIFFERENTIAL METHOD BLD: ABNORMAL
EOSINOPHIL # BLD: 0.6 K/UL (ref 0–0.4)
EOSINOPHIL NFR BLD: 5 % (ref 0–7)
ERYTHROCYTE [DISTWIDTH] IN BLOOD BY AUTOMATED COUNT: 16.4 % (ref 11.5–14.5)
GLUCOSE SERPL-MCNC: 85 MG/DL (ref 65–100)
HCT VFR BLD AUTO: 30.6 % (ref 36.6–50.3)
HGB BLD-MCNC: 8.9 G/DL (ref 12.1–17)
IMM GRANULOCYTES # BLD AUTO: 0.3 K/UL (ref 0–0.04)
IMM GRANULOCYTES NFR BLD AUTO: 2 % (ref 0–0.5)
LYMPHOCYTES # BLD: 1.1 K/UL (ref 0.8–3.5)
LYMPHOCYTES NFR BLD: 9 % (ref 12–49)
MCH RBC QN AUTO: 21.3 PG (ref 26–34)
MCHC RBC AUTO-ENTMCNC: 29.1 G/DL (ref 30–36.5)
MCV RBC AUTO: 73.2 FL (ref 80–99)
MONOCYTES # BLD: 1 K/UL (ref 0–1)
MONOCYTES NFR BLD: 8 % (ref 5–13)
NEUTS SEG # BLD: 9.5 K/UL (ref 1.8–8)
NEUTS SEG NFR BLD: 76 % (ref 32–75)
NRBC # BLD: 0 K/UL (ref 0–0.01)
NRBC BLD-RTO: 0 PER 100 WBC
PLATELET # BLD AUTO: 437 K/UL (ref 150–400)
PMV BLD AUTO: 8.3 FL (ref 8.9–12.9)
POTASSIUM SERPL-SCNC: 4 MMOL/L (ref 3.5–5.1)
RBC # BLD AUTO: 4.18 M/UL (ref 4.1–5.7)
RBC MORPH BLD: ABNORMAL
REPORTED DOSE,DOSE: ABNORMAL UNITS
REPORTED DOSE/TIME,TMG: ABNORMAL
SODIUM SERPL-SCNC: 136 MMOL/L (ref 136–145)
VANCOMYCIN TROUGH SERPL-MCNC: 18.6 UG/ML (ref 5–10)
WBC # BLD AUTO: 12.5 K/UL (ref 4.1–11.1)

## 2021-06-10 PROCEDURE — 99233 SBSQ HOSP IP/OBS HIGH 50: CPT | Performed by: STUDENT IN AN ORGANIZED HEALTH CARE EDUCATION/TRAINING PROGRAM

## 2021-06-10 PROCEDURE — 74011250636 HC RX REV CODE- 250/636: Performed by: INTERNAL MEDICINE

## 2021-06-10 PROCEDURE — 2709999900 HC NON-CHARGEABLE SUPPLY

## 2021-06-10 PROCEDURE — 85025 COMPLETE CBC W/AUTO DIFF WBC: CPT

## 2021-06-10 PROCEDURE — 36415 COLL VENOUS BLD VENIPUNCTURE: CPT

## 2021-06-10 PROCEDURE — 94760 N-INVAS EAR/PLS OXIMETRY 1: CPT

## 2021-06-10 PROCEDURE — 74011250637 HC RX REV CODE- 250/637: Performed by: INTERNAL MEDICINE

## 2021-06-10 PROCEDURE — 65270000029 HC RM PRIVATE

## 2021-06-10 PROCEDURE — 74011250636 HC RX REV CODE- 250/636: Performed by: STUDENT IN AN ORGANIZED HEALTH CARE EDUCATION/TRAINING PROGRAM

## 2021-06-10 PROCEDURE — 77030041076 HC DRSG AG OPTICELL MDII -A

## 2021-06-10 PROCEDURE — 74011250637 HC RX REV CODE- 250/637: Performed by: NURSE PRACTITIONER

## 2021-06-10 PROCEDURE — 77030040392 HC DRSG OPTIFOAM MDII -A

## 2021-06-10 PROCEDURE — 74011250637 HC RX REV CODE- 250/637: Performed by: STUDENT IN AN ORGANIZED HEALTH CARE EDUCATION/TRAINING PROGRAM

## 2021-06-10 PROCEDURE — 80202 ASSAY OF VANCOMYCIN: CPT

## 2021-06-10 PROCEDURE — 80048 BASIC METABOLIC PNL TOTAL CA: CPT

## 2021-06-10 PROCEDURE — 77030013575 HC DRSG HYDROFERA HOLL -B

## 2021-06-10 RX ORDER — METRONIDAZOLE 250 MG/1
500 TABLET ORAL EVERY 12 HOURS
Status: DISCONTINUED | OUTPATIENT
Start: 2021-06-10 | End: 2021-06-11 | Stop reason: HOSPADM

## 2021-06-10 RX ORDER — LIDOCAINE HYDROCHLORIDE 20 MG/ML
JELLY TOPICAL DAILY
Status: DISCONTINUED | OUTPATIENT
Start: 2021-06-11 | End: 2021-06-11 | Stop reason: HOSPADM

## 2021-06-10 RX ORDER — HYDROMORPHONE HYDROCHLORIDE 1 MG/ML
1 INJECTION, SOLUTION INTRAMUSCULAR; INTRAVENOUS; SUBCUTANEOUS
Status: DISCONTINUED | OUTPATIENT
Start: 2021-06-10 | End: 2021-06-11 | Stop reason: HOSPADM

## 2021-06-10 RX ADMIN — FERROUS SULFATE TAB 325 MG (65 MG ELEMENTAL FE) 325 MG: 325 (65 FE) TAB at 09:42

## 2021-06-10 RX ADMIN — Medication 10 ML: at 23:42

## 2021-06-10 RX ADMIN — VANCOMYCIN HYDROCHLORIDE 1000 MG: 1 INJECTION, POWDER, LYOPHILIZED, FOR SOLUTION INTRAVENOUS at 06:00

## 2021-06-10 RX ADMIN — Medication 10 ML: at 14:00

## 2021-06-10 RX ADMIN — LEVOFLOXACIN 750 MG: 750 TABLET, FILM COATED ORAL at 02:14

## 2021-06-10 RX ADMIN — HYDROMORPHONE HYDROCHLORIDE 1 MG: 1 INJECTION, SOLUTION INTRAMUSCULAR; INTRAVENOUS; SUBCUTANEOUS at 13:14

## 2021-06-10 RX ADMIN — VANCOMYCIN HYDROCHLORIDE 1000 MG: 1 INJECTION, POWDER, LYOPHILIZED, FOR SOLUTION INTRAVENOUS at 18:08

## 2021-06-10 RX ADMIN — OXYCODONE 10 MG: 5 TABLET ORAL at 05:05

## 2021-06-10 RX ADMIN — METRONIDAZOLE 500 MG: 500 INJECTION, SOLUTION INTRAVENOUS at 03:55

## 2021-06-10 RX ADMIN — METRONIDAZOLE 500 MG: 250 TABLET ORAL at 15:52

## 2021-06-10 RX ADMIN — VANCOMYCIN HYDROCHLORIDE 1000 MG: 1 INJECTION, POWDER, LYOPHILIZED, FOR SOLUTION INTRAVENOUS at 12:39

## 2021-06-10 RX ADMIN — HYDROMORPHONE HYDROCHLORIDE 1 MG: 1 INJECTION, SOLUTION INTRAMUSCULAR; INTRAVENOUS; SUBCUTANEOUS at 18:07

## 2021-06-10 RX ADMIN — POLYETHYLENE GLYCOL 3350 17 G: 17 POWDER, FOR SOLUTION ORAL at 09:42

## 2021-06-10 RX ADMIN — OXYCODONE 10 MG: 5 TABLET ORAL at 00:53

## 2021-06-10 RX ADMIN — HYDROMORPHONE HYDROCHLORIDE 0.5 MG: 1 INJECTION, SOLUTION INTRAMUSCULAR; INTRAVENOUS; SUBCUTANEOUS at 03:56

## 2021-06-10 RX ADMIN — OXYCODONE 10 MG: 5 TABLET ORAL at 21:29

## 2021-06-10 RX ADMIN — OXYCODONE 5 MG: 5 TABLET ORAL at 09:42

## 2021-06-10 RX ADMIN — VANCOMYCIN HYDROCHLORIDE 1000 MG: 1 INJECTION, POWDER, LYOPHILIZED, FOR SOLUTION INTRAVENOUS at 00:52

## 2021-06-10 RX ADMIN — OXYCODONE 10 MG: 5 TABLET ORAL at 12:39

## 2021-06-10 RX ADMIN — Medication 10 ML: at 06:01

## 2021-06-10 NOTE — PROGRESS NOTES
Patient needing total assistance with wound care, pt has significant pain with turning. Wound care done to coccyx area. MD assessed sacral area wound.

## 2021-06-10 NOTE — PROGRESS NOTES
End of Shift Note    Bedside shift change report given to Kamlesh Lowry RN (oncoming nurse) by HERACLIO Calabrese RN (offgoing nurse). Report included the following information SBAR and Kardex    Shift worked:  days      Shift summary and any significant changes:    Pain meds given x2, wound care completed x3, dilaudid 1 mg x2 and kenzie x2, wound care and infectious disease and came and saw the pt, wounds packed, notified night nurse of wound care and not to take packing out of the sacrum   Concerns for physician to address: Pain management     Zone phone for oncoming shift:  [phone]       Activity:  Activity Level: Up with Assistance  Number times ambulated in hallways past shift: 0  Number of times OOB to chair past shift: 0    Cardiac:   Cardiac Monitoring: No      Cardiac Rhythm: Sinus Rhythm    Access:   Current line(s): PIV     Genitourinary:   Urinary status: voiding    Respiratory:   O2 Device: None (Room air)  Chronic home O2 use?: NO  Incentive spirometer at bedside: NO     GI:  Last Bowel Movement Date: 06/04/21  Current diet:  ADULT DIET Regular  ADULT ORAL NUTRITION SUPPLEMENT Breakfast, Dinner; Low Calorie/High Protein  Passing flatus: YES  Tolerating current diet: YES       Pain Management:   Patient states pain is manageable on current regimen: YES    Skin:  Dann Score: 14  Interventions: float heels and increase time out of bed    Patient Safety:  Fall Score:  Total Score: 2  Interventions: gripper socks and pt to call before getting OOB       Length of Stay:  Expected LOS: 3d 12h  Actual LOS: 79-25 HERACLIO Ospina

## 2021-06-10 NOTE — PROGRESS NOTES
Infectious Disease Progress Note         Interval:  NAEO    Subjective:   Seen with wound care today          Objective:    Vitals:   Patient Vitals for the past 24 hrs:   Temp Pulse Resp BP SpO2   06/10/21 1315  91  133/71    06/10/21 1230 98.6 °F (37 °C) 98 16 98/76 100 %   06/10/21 0734 98.5 °F (36.9 °C) 84 18 138/72 100 %   06/10/21 0407 98.6 °F (37 °C) 95  115/66 99 %   06/10/21 0028 99 °F (37.2 °C) 95 18 (!) 120/59 98 %   06/09/21 2007 98.5 °F (36.9 °C) 100 18 (!) 114/56 100 %       Physical Exam:  ?    ? GEN: NAD  ? HEENT: Normocephalic, atraumatic, PERRL, no scleral icterus  ? CV: HDS  ? Lungs: on room air  ? Abdomen: soft, non distended  ? Genitourinary: , no cuevas  ? Extremities: no edema  ? Neuro: Alert, oriented to time, place and situation, moves all extremities to commands, verbal   ? Skin: Bilateral axilla, gluteal cleft, groins and under scrotum :  Wounds with sinus tracts. Very tender. ? Psych: good affect, good eye contact, non tearful   ? Lines: PIVs      Labs:  Recent Results (from the past 24 hour(s))   CBC WITH AUTOMATED DIFF    Collection Time: 06/10/21  4:25 AM   Result Value Ref Range    WBC 12.5 (H) 4.1 - 11.1 K/uL    RBC 4.18 4.10 - 5.70 M/uL    HGB 8.9 (L) 12.1 - 17.0 g/dL    HCT 30.6 (L) 36.6 - 50.3 %    MCV 73.2 (L) 80.0 - 99.0 FL    MCH 21.3 (L) 26.0 - 34.0 PG    MCHC 29.1 (L) 30.0 - 36.5 g/dL    RDW 16.4 (H) 11.5 - 14.5 %    PLATELET 307 (H) 945 - 400 K/uL    MPV 8.3 (L) 8.9 - 12.9 FL    NRBC 0.0 0  WBC    ABSOLUTE NRBC 0.00 0.00 - 0.01 K/uL    NEUTROPHILS 76 (H) 32 - 75 %    LYMPHOCYTES 9 (L) 12 - 49 %    MONOCYTES 8 5 - 13 %    EOSINOPHILS 5 0 - 7 %    BASOPHILS 0 0 - 1 %    IMMATURE GRANULOCYTES 2 (H) 0.0 - 0.5 %    ABS. NEUTROPHILS 9.5 (H) 1.8 - 8.0 K/UL    ABS. LYMPHOCYTES 1.1 0.8 - 3.5 K/UL    ABS. MONOCYTES 1.0 0.0 - 1.0 K/UL    ABS. EOSINOPHILS 0.6 (H) 0.0 - 0.4 K/UL    ABS. BASOPHILS 0.0 0.0 - 0.1 K/UL    ABS. IMM.  GRANS. 0.3 (H) 0.00 - 0.04 K/UL    DF SMEAR SCANNED      RBC COMMENTS ANISOCYTOSIS  2+        RBC COMMENTS TARGET CELLS  PRESENT        RBC COMMENTS HYPOCHROMIA  1+        RBC COMMENTS MICROCYTOSIS  1+       METABOLIC PANEL, BASIC    Collection Time: 06/10/21  4:25 AM   Result Value Ref Range    Sodium 136 136 - 145 mmol/L    Potassium 4.0 3.5 - 5.1 mmol/L    Chloride 101 97 - 108 mmol/L    CO2 31 21 - 32 mmol/L    Anion gap 4 (L) 5 - 15 mmol/L    Glucose 85 65 - 100 mg/dL    BUN 7 6 - 20 MG/DL    Creatinine 0.47 (L) 0.70 - 1.30 MG/DL    BUN/Creatinine ratio 15 12 - 20      GFR est AA >60 >60 ml/min/1.73m2    GFR est non-AA >60 >60 ml/min/1.73m2    Calcium 8.7 8.5 - 10.1 MG/DL               Assessment:  21 yo M with:     - Hidradenitis suppurativa, draining wounds. Cultures growing Streptococcus beta hemolytic group C.  - HIV negative 6/2/21.   -  Allergies of \"anaphylaxis\" listed to penicillins from 2011 and 2014, with no clear evidence or history from patient supporting it. Recommendations:  - All wounds seen with wound care team today. Most wounds have active sinus tracts and fistulas. Based on the severity of the wounds, this would be classified as atleast Rodríguez Stage III severity. After the wound care done on 6/7/21, no geraldine purulence was seen on exam today. - IV Vancomycin 14 days, tentative end 6/17/21.   - Levaquin and Flagyl can be made oral, and will also recommend for 14 days, end 6/17/21.     - At discharge, patient can continue doxycycline 100mg BID he was on prior to admission.     -Given his young age, and anticipating need for antibiotics in the future, and as part of antibiotic stewardship guidelines, it is strongly recommended that the patient get evaluated for penicillin allergy testing by an Allergist. Allergist/Immunologist Dr. Jake Lipscomb at [facility] ([phone]) could be reached for this.     -  As outpatient, he will need aggressive dermatological evaluation as soon as possible as antimicrobials alone will not be solving the underlying problem, and the condition will worsen without proper evaluation. Patient was on doxycycline as outpatient for the HS wounds, but this was not effective for him. We need dermatology to evaluate stage of severity and need and choice for antibiotics in Hidradenitis suppurativa wounds. - Continue wound care per wound care instructions.    -I have discussed with Case Management today to assist in regards to obtaining appointment for patient at the Hidradenitis suppurativa (HS) clinic with Dermatology at [facility]. Evaluation and long-term antibiotics, wound care is best evaluated and managed per HS-dermatology specialists. Greatly appreciate Wound care assessment and help. Will follow peripherally. Thank you for the opportunity to participate in the care of this patient. Please contact with questions or concerns.       Irina Sommers MD  Infectious Diseases

## 2021-06-10 NOTE — PROGRESS NOTES
Hospitalist Progress Note    NAME: Gerry Pavon   :  1995   MRN:  [id number]       Assessment / Plan:  Sepsis POA  Hidradenitis suppurativa POA with draining abscesses in groin and axilla  Wound on his sacrum area  Has failed outpatient treatment  Unable to follow-up with dermatology due to logistics and cost  Taking courses of antibiotics including tetracyclines without improvement  Copious drainage from skin lesions in the groin and axilla bilaterally  Continue with wound care, surgery input appreciated, no surgical indication  Continue with IV Vanco Levaquin and Flagyl  Wound culture showing strep beta-hemolytic group C sensitive to penicillin, there is a reported allergy to penicillin has anaphylactic reaction, patient does not remember having anaphylactic reaction, and could not be verified as his mom passed away  Discussed with Dr. Xavier Evans who wants to try penicillin allergy testing while patient is in the hospital for closer monitoring.   Plan to do it tomorrow while Dr. Xavier Evans is on, discussed with pharmacy will need to clarify the protocol  Patient most likely will need to be transferred to [facility] to continue wound care as patient does not have insurance and can benefit from home health services  : Dr. Xavier Evans decided to cancel the patient still allergy testing  Patient has a lot of pain during wound care  No need for further abx per ID ,patient patient needs a close follow-up with dermatology  Wound care nurse on board  06/10: Patient is having purulent drainage from his wound, also has a sacral wound which is draining serosanguineous fluid  Antibiotic extended for 7 more days  Patient has a lot of pain during wound care    Anemia, most likely iron deficiency  HgB 8.5, iron studies showed iron deficiency anemia  C/w  ferrous sulfate  Updated grandmother at the bedside    Most likely discharge in 1 or 2 days, on 06/10  DVT prophylaxis with lovenox  Code status: Full code  Body mass index is 18.99 kg/m².: 18.5 - 24.9 Normal weight     Subjective:     Chief Complaint / Reason for Physician Visit  Follow-up sepsis/Hidradenitis suppurativa  Still needing a lot of extensive wound care  Afebrile today  Review of Systems:  Symptom Y/N Comments  Symptom Y/N Comments   Fever/Chills y   Chest Pain y    Poor Appetite    Edema     Cough y   Abdominal Pain n    Sputum    Joint Pain     SOB/THAKUR y   Pruritis/Rash     Nausea/vomit    Tolerating PT/OT     Diarrhea    Tolerating Diet     Constipation    Other       Could NOT obtain due to:      Objective:     VITALS:   Last 24hrs VS reviewed since prior progress note. Most recent are:  Patient Vitals for the past 24 hrs:   Temp Pulse Resp BP SpO2   06/10/21 1510 99.3 °F (37.4 °C) 89 14 102/73 99 %   06/10/21 1315  91  133/71    06/10/21 1230 98.6 °F (37 °C) 98 16 98/76 100 %   06/10/21 0734 98.5 °F (36.9 °C) 84 18 138/72 100 %   06/10/21 0407 98.6 °F (37 °C) 95  115/66 99 %   06/10/21 0028 99 °F (37.2 °C) 95 18 (!) 120/59 98 %   06/09/21 2007 98.5 °F (36.9 °C) 100 18 (!) 114/56 100 %       Intake/Output Summary (Last 24 hours) at 6/10/2021 1608  Last data filed at 6/10/2021 1225  Gross per 24 hour   Intake    Output 1600 ml   Net -1600 ml        PHYSICAL EXAM:  General: WD, WN. Alert, cooperative, no acute distress    EENT:  EOMI. Anicteric sclerae. MMM  Resp:  CTA bilaterally, no wheezing or rales. No accessory muscle use  CV:  Regular  rhythm,  No edema  GI:  Soft, Non distended, Non tender.  +Bowel sounds  Neurologic:  Alert and oriented X 3, normal speech,   Psych:   Good insight. Not anxious nor agitated  Skin:  No rashes. No jaundice  Both armpits and groin area have draining nodular cystic infected lesions.     Reviewed most current lab test results and cultures  YES  Reviewed most current radiology test results   YES  Review and summation of old records today    NO  Reviewed patient's current orders and MAR    YES  PMH/SH reviewed - no change compared to H&P  ________________________________________________________________________  Care Plan discussed with:    Comments   Patient y    Family  y  grandmother   RN y    Care Manager     Consultant                       y Multidiciplinary team rounds were held today with , nursing, pharmacist and clinical coordinator. Patient's plan of care was discussed; medications were reviewed and discharge planning was addressed. ________________________________________________________________________  Total NON critical care TIME: 35   Minutes    Total CRITICAL CARE TIME Spent:   Minutes non procedure based      Comments   >50% of visit spent in counseling and coordination of care     ________________________________________________________________________  Vicenta Pompa MD     Procedures: see electronic medical records for all procedures/Xrays and details which were not copied into this note but were reviewed prior to creation of Plan. LABS:  I reviewed today's most current labs and imaging studies.   Pertinent labs include:  Recent Labs     06/10/21  0425 06/09/21  0358 06/08/21  0007   WBC 12.5* 14.8* 15.1*   HGB 8.9* 8.7* 8.5*   HCT 30.6* 29.7* 28.9*   * 481* 467*     Recent Labs     06/10/21  0425 06/09/21  0358 06/08/21  0007    134* 135*   K 4.0 4.2 3.6    100 101   CO2 31 29 30   GLU 85 83 106*   BUN 7 8 9   CREA 0.47* 0.53* 0.66*   CA 8.7 8.8 9.0   ALB  --   --  2.2*   TBILI  --   --  0.4   ALT  --   --  16       Signed: Vicenta Pompa MD

## 2021-06-10 NOTE — PROGRESS NOTES
Pharmacy Automatic Renal Dosing Protocol - Antimicrobials    Goal Level: 10-15  (AUC: 400 - 600 mg/hr/Liter/day)     Date Dose & Interval Measured (mcg/mL) Extrapolated (mcg/mL)   6/6 @ 0120 750mg q8h 6.9 6 ()   6/6 @ 2127 750mg q6h 9.4 9.36 ()   6/8 @ 0007 1000mg q6h 18.8 3.75 ()   6/9 @ 0356 1250mg q6h 23.8 (random) 19.5 ()   6/10 @ 1722 1000mg q6h 18.6 15.1 ()       Impression/Plan:   The vancomycin trough resulted at 15.1 mcg/ml (). Will continue with the current regimen of 1000 mg IV every 6 hours. Pharmacy will follow daily and adjust medications as appropriate for renal function and/or serum levels.     Thank you,  Gino Syed, PHARMD

## 2021-06-10 NOTE — PROGRESS NOTES
Transition of Care Plan:    RUR:9  Disposition: Possible [facility] Transfer vs Home with [facility] Case from [facility]. Email sent to [facility] for review. See Below:    Pt does not have insurance and will need supplies and make sure that Pt can afford prescription drugs if Pt goes home. Follow up appointments: PCP: Ella Garcia., NP    ID MD wants CM to make appt with Hidradenitis Suppurativa clinic info at [employer] by contacting office below:     [facility] at [facility]  [address], [address], [place] [address]  [facility], [address]  Phone: [phone]  Fax: [phone] Dermatology  Phone: [phone]  Fax: [phone]     DME needed: Wound Care supplies. Transportation at Discharge: [facility] or means to access home:        IM Medicare letter:n/a  Caregiver Contact: GrandmotherEdda North [phone]  Discharge Caregiver contacted prior to discharge? CM discussed care options with Pt and his grandmother present. Pt is not sure that he will be able to dress all the areas on his own. He lives with 3 roommates that are very supportive of him but still he is concerned that they will get worse without the appropriate care. Per ID Recommendations: All wounds seen with wound care team today. Most wounds have active sinus tracts and fistulas. Based on the severity of the wounds, this would be classified as atleast Rodríguez Stage III severity. After the wound care done on 6/7/21, no geraldine purulence was seen on exam today.     - IV Vancomycin 14 days, tentative end 6/17/21.   - Levaquin and Flagyl can be made oral, and will also recommend for 14 days, end 6/17/21. Per Wound Care Note:   Plan: This wound care will have to be done by nursing staff for at least the next 7-10 days to monitor for re- infection.  We are still formulating a plan for what will work for him as the wounds are starting to improve with regard to the type and amount of drainage. These are painful and will require some topical lidocaine jelly just to clean and pack any of the wounds. He only will allow warm saline rinses and gently wiping and still screams. Will have a better idea of the discharge needs of this patient by Monday. For this reason, CM feels that to avoid a readmission on this Pt that does not have insurance and complex medical condition it would be beneficial to keep possibly transfer him to [facility] to make sure his needs are met. Pt has applied for Medicaid and it is pending but no insurance and little income at this time to afford supplies and medications needed to do this at home. CM will continue to monitor discharge plan.      Ene Enriquez [place]  Ext [phone]

## 2021-06-10 NOTE — WOUND CARE
Wound Care consult:  Reassessment of the hidradenitis Supertiva wounds that have drained over the last few days. The antibiotics are starting to allow healing. The wounds are now draining serosanguinous fluid without odor but the painful wounds are not allowing for optimum wound care / packing of the wounds. He will need topical Lidocaine jelly or 4% cream (LMX) to pack the wounds well and he will need absorptive dressings. Pt. Required IV pain medication today. See description  Of each photo below and treatment:  
 
Gluteal Cleft open wound witn pink wound bed - needs 
 packing and absorptive dressing like foam:  
   
The gluteal cleft wound was packed with TriTex ribbon (sample - there is no more of this). Topped with an Absorptive Sacrum Foam dressing. The TriTex ribbon should remain in place for up to two days and change only the outer dressings. Left side Groin / posterior scrotum: The Pus has drained. Remaining wound beds Are red, painful with granulation tissue: The groin wounds were cleansed with warmed Normal saline and wiped gently with 4x4's to remove the old drainage. Dabbed it dry and then dressed with the rest of the TriTex ribbon. Will be using Hydrofera Blue starting tomorrow. Same dressings below: 
Right Side Groin and scrotum: granulation Tissue macerated skin. Left Axilla skinwounds that have drained out of  
the upper arm Wound (pink) that is full thickness. Cleansed with warmed saline and then topically dressed on the upper arm with Opticell Ag and an absorbent dressing. The rest of the Axilla was covered with Kerlix roll gauze and wrapped with the same and extended around the neck to the other Axilla and arm that was dressed the same way. Right Axilla wound undermines widely. Needs packing OR removal of the skin to treat with topical dressings. Today patient cannot tolerate the pain of packing. Dressed with a topical silver dressing (Opticell Ag) and An absorptive pad. Plan: This wound care will have to be done by nursing staff for at least the next 7-10 days to monitor for re- infection. We are still formulating a plan for what will work for him as the wounds are starting to improve with regard to the type and amount of drainage. These are painful and will require some topical lidocaine jelly just to clean and pack any of the wounds. He only will allow warm saline rinses and gently wiping and still screams. Will have a better idea of the discharge needs of this patient by Monday.   
Katherin Watts RN, BSN, [employer]

## 2021-06-10 NOTE — PROGRESS NOTES
Comprehensive Nutrition Assessment    Type and Reason for Visit: Initial, RD nutrition re-screen/LOS, Wound    Nutrition Recommendations/Plan:  Continue Regular diet  Add Ensure high protein BID     Nutrition Assessment:     Patient medically noted for hydradenitis suppurativa. Patient reports eating well; appetite much better currently than earlier in admission. Eating % of meals per flowsheets. Menu provided to help with meal selections. He reports a weight of ~230# in February with a progressive downtrend over the last few months down to current weight. He attributes a lot of it to fluid loss in his legs but intake was fluctuating some at home. He hasn't tried supplements/protein shakes before but is open to them and has thought about incorporating them into his diet at home. Will send ensure high protein for patient to try. Wound care in to assess patient by end of visit. Encourage intake of meals. Patient Vitals for the past 168 hrs:   % Diet Eaten   06/10/21 1231 76 - 100%   06/08/21 1755 76 - 100%   06/08/21 1518 51 - 75%   06/08/21 0846 51 - 75%   06/06/21 0850 76 - 100%     Estimated Daily Nutrient Needs:  Energy (kcal): 2289 kcal (BMR 1761 x 1. 3AF); Weight Used for Energy Requirements: Current  Protein (g): 83g (1.2 g/kg bw); Weight Used for Protein Requirements: Current  Fluid (ml/day): 2200 mL; Method Used for Fluid Requirements: 1 ml/kcal    Nutrition Related Findings:       BG 04--99  BM 6/4  Ferrous sulfate, Miralax, PRN dulcolax     Wounds:    Open wounds (abscesses)     Stage 4 documented in flowsheets by RN but not sure this is accurate - patient with documented abscesses, wound care reconsulted      Current Nutrition Therapies:  ADULT DIET Regular    Anthropometric Measures:  · Height:  6' 3\" (190.5 cm)  · Current Body Wt:  68.9 kg (152 lb)   · BMI Category:  Normal weight (BMI 18.5-24. 9)       Nutrition Diagnosis:   · Unintended weight loss related to  (fluid loss per patient, slight decrease in PO) as evidenced by  (reported 68-78# weight loss x 4 months?)    Nutrition Interventions:   Food and/or Nutrient Delivery: Continue current diet, Start oral nutrition supplement  Nutrition Education and Counseling: No recommendations at this time  Coordination of Nutrition Care: No recommendation at this time    Goals:  PO intake >70% of meals/supplements next 3-5 days       Nutrition Monitoring and Evaluation:   Behavioral-Environmental Outcomes: None identified  Food/Nutrient Intake Outcomes: Food and nutrient intake, Supplement intake  Physical Signs/Symptoms Outcomes: Biochemical data, Weight    Discharge Planning:    Continue current diet     Electronically signed by Lashell Parish RD on 6/10/2021 at 1:41 PM    Contact: ext [phone]

## 2021-06-10 NOTE — PROGRESS NOTES
End of Shift Note    Bedside shift change report given to Jessica Rollins RN (oncoming nurse) by Bertrand Gonzalez RN, RN (offgoing nurse). Report included the following information SBAR and Kardex    Shift worked:  days      Shift summary and any significant changes:    Pain meds given x2, wound care completed x2, dilaudid . 5mg x1 and kenzie x2,    Concerns for physician to address: Purulent drainage from axilla    Zone phone for oncoming shift:  [phone]       Activity:  Activity Level: Up with Assistance  Number times ambulated in hallways past shift: 0  Number of times OOB to chair past shift: 0    Cardiac:   Cardiac Monitoring: No      Cardiac Rhythm: Sinus Rhythm    Access:   Current line(s): PIV     Genitourinary:   Urinary status: voiding    Respiratory:   O2 Device: None (Room air)  Chronic home O2 use?: NO  Incentive spirometer at bedside: NO     GI:  Last Bowel Movement Date: 06/04/21  Current diet:  ADULT DIET Regular  Passing flatus: YES  Tolerating current diet: YES       Pain Management:   Patient states pain is manageable on current regimen: YES    Skin:  Dann Score: 17  Interventions: float heels and increase time out of bed    Patient Safety:  Fall Score:  Total Score: 2  Interventions: gripper socks and pt to call before getting OOB       Length of Stay:  Expected LOS: 3d 12h  Actual LOS: 6      Bertrand Gonzalez RN

## 2021-06-11 ENCOUNTER — HOSPITAL ENCOUNTER (INPATIENT)
Age: 26
LOS: 18 days | Discharge: HOME HEALTH | DRG: 385 | End: 2021-06-29
Attending: STUDENT IN AN ORGANIZED HEALTH CARE EDUCATION/TRAINING PROGRAM | Admitting: STUDENT IN AN ORGANIZED HEALTH CARE EDUCATION/TRAINING PROGRAM
Payer: MEDICAID

## 2021-06-11 VITALS
HEIGHT: 75 IN | RESPIRATION RATE: 18 BRPM | BODY MASS INDEX: 18.89 KG/M2 | WEIGHT: 151.9 LBS | TEMPERATURE: 98.7 F | OXYGEN SATURATION: 99 % | SYSTOLIC BLOOD PRESSURE: 109 MMHG | DIASTOLIC BLOOD PRESSURE: 63 MMHG | HEART RATE: 99 BPM

## 2021-06-11 DIAGNOSIS — L73.2 HIDRADENITIS SUPPURATIVA: ICD-10-CM

## 2021-06-11 DIAGNOSIS — L73.2 HIDRADENITIS: ICD-10-CM

## 2021-06-11 DIAGNOSIS — S31.109A WOUND OF GROIN: Primary | ICD-10-CM

## 2021-06-11 LAB
ANION GAP SERPL CALC-SCNC: 2 MMOL/L (ref 5–15)
BASOPHILS # BLD: 0 K/UL (ref 0–0.1)
BASOPHILS NFR BLD: 0 % (ref 0–1)
BUN SERPL-MCNC: 7 MG/DL (ref 6–20)
BUN/CREAT SERPL: 16 (ref 12–20)
CALCIUM SERPL-MCNC: 8.9 MG/DL (ref 8.5–10.1)
CHLORIDE SERPL-SCNC: 100 MMOL/L (ref 97–108)
CO2 SERPL-SCNC: 32 MMOL/L (ref 21–32)
CREAT SERPL-MCNC: 0.45 MG/DL (ref 0.7–1.3)
DIFFERENTIAL METHOD BLD: ABNORMAL
EOSINOPHIL # BLD: 0.7 K/UL (ref 0–0.4)
EOSINOPHIL NFR BLD: 5 % (ref 0–7)
ERYTHROCYTE [DISTWIDTH] IN BLOOD BY AUTOMATED COUNT: 16.6 % (ref 11.5–14.5)
GLUCOSE SERPL-MCNC: 92 MG/DL (ref 65–100)
HCT VFR BLD AUTO: 27.4 % (ref 36.6–50.3)
HGB BLD-MCNC: 8.2 G/DL (ref 12.1–17)
IMM GRANULOCYTES # BLD AUTO: 0.2 K/UL (ref 0–0.04)
IMM GRANULOCYTES NFR BLD AUTO: 2 % (ref 0–0.5)
LYMPHOCYTES # BLD: 1.1 K/UL (ref 0.8–3.5)
LYMPHOCYTES NFR BLD: 8 % (ref 12–49)
MCH RBC QN AUTO: 21.6 PG (ref 26–34)
MCHC RBC AUTO-ENTMCNC: 29.9 G/DL (ref 30–36.5)
MCV RBC AUTO: 72.1 FL (ref 80–99)
MONOCYTES # BLD: 1 K/UL (ref 0–1)
MONOCYTES NFR BLD: 7 % (ref 5–13)
NEUTS SEG # BLD: 10 K/UL (ref 1.8–8)
NEUTS SEG NFR BLD: 78 % (ref 32–75)
NRBC # BLD: 0 K/UL (ref 0–0.01)
NRBC BLD-RTO: 0 PER 100 WBC
PLATELET # BLD AUTO: 428 K/UL (ref 150–400)
PMV BLD AUTO: 8.6 FL (ref 8.9–12.9)
POTASSIUM SERPL-SCNC: 3.8 MMOL/L (ref 3.5–5.1)
RBC # BLD AUTO: 3.8 M/UL (ref 4.1–5.7)
SODIUM SERPL-SCNC: 134 MMOL/L (ref 136–145)
WBC # BLD AUTO: 12.9 K/UL (ref 4.1–11.1)

## 2021-06-11 PROCEDURE — 74011250636 HC RX REV CODE- 250/636: Performed by: STUDENT IN AN ORGANIZED HEALTH CARE EDUCATION/TRAINING PROGRAM

## 2021-06-11 PROCEDURE — 74011250637 HC RX REV CODE- 250/637: Performed by: STUDENT IN AN ORGANIZED HEALTH CARE EDUCATION/TRAINING PROGRAM

## 2021-06-11 PROCEDURE — 80048 BASIC METABOLIC PNL TOTAL CA: CPT

## 2021-06-11 PROCEDURE — 74011250637 HC RX REV CODE- 250/637: Performed by: INTERNAL MEDICINE

## 2021-06-11 PROCEDURE — 74011250637 HC RX REV CODE- 250/637: Performed by: NURSE PRACTITIONER

## 2021-06-11 PROCEDURE — 2709999900 HC NON-CHARGEABLE SUPPLY

## 2021-06-11 PROCEDURE — 36415 COLL VENOUS BLD VENIPUNCTURE: CPT

## 2021-06-11 PROCEDURE — 85025 COMPLETE CBC W/AUTO DIFF WBC: CPT

## 2021-06-11 PROCEDURE — 65270000029 HC RM PRIVATE

## 2021-06-11 PROCEDURE — 99232 SBSQ HOSP IP/OBS MODERATE 35: CPT | Performed by: SURGERY

## 2021-06-11 PROCEDURE — 74011250636 HC RX REV CODE- 250/636: Performed by: INTERNAL MEDICINE

## 2021-06-11 PROCEDURE — 94760 N-INVAS EAR/PLS OXIMETRY 1: CPT

## 2021-06-11 RX ORDER — BISACODYL 5 MG
5 TABLET, DELAYED RELEASE (ENTERIC COATED) ORAL DAILY PRN
Status: DISCONTINUED | OUTPATIENT
Start: 2021-06-11 | End: 2021-06-29 | Stop reason: HOSPADM

## 2021-06-11 RX ORDER — POLYETHYLENE GLYCOL 3350 17 G/17G
17 POWDER, FOR SOLUTION ORAL DAILY
Status: CANCELLED | OUTPATIENT
Start: 2021-06-12

## 2021-06-11 RX ORDER — OXYCODONE HYDROCHLORIDE 5 MG/1
5 TABLET ORAL
Status: CANCELLED | OUTPATIENT
Start: 2021-06-11

## 2021-06-11 RX ORDER — IBUPROFEN 400 MG/1
800 TABLET ORAL
Status: DISCONTINUED | OUTPATIENT
Start: 2021-06-11 | End: 2021-06-13

## 2021-06-11 RX ORDER — LEVOFLOXACIN 750 MG/1
750 TABLET ORAL EVERY 24 HOURS
Status: CANCELLED | OUTPATIENT
Start: 2021-06-12 | End: 2021-06-18

## 2021-06-11 RX ORDER — PROMETHAZINE HYDROCHLORIDE 25 MG/1
12.5 TABLET ORAL
Status: CANCELLED | OUTPATIENT
Start: 2021-06-11

## 2021-06-11 RX ORDER — ACETAMINOPHEN 650 MG/1
650 SUPPOSITORY RECTAL
Status: DISCONTINUED | OUTPATIENT
Start: 2021-06-11 | End: 2021-06-12

## 2021-06-11 RX ORDER — LANOLIN ALCOHOL/MO/W.PET/CERES
1 CREAM (GRAM) TOPICAL
Status: DISCONTINUED | OUTPATIENT
Start: 2021-06-12 | End: 2021-06-29 | Stop reason: HOSPADM

## 2021-06-11 RX ORDER — ACETAMINOPHEN 325 MG/1
650 TABLET ORAL
Status: CANCELLED | OUTPATIENT
Start: 2021-06-11

## 2021-06-11 RX ORDER — HYDROMORPHONE HYDROCHLORIDE 1 MG/ML
1 INJECTION, SOLUTION INTRAMUSCULAR; INTRAVENOUS; SUBCUTANEOUS
Status: CANCELLED | OUTPATIENT
Start: 2021-06-11

## 2021-06-11 RX ORDER — OXYCODONE HYDROCHLORIDE 5 MG/1
10 TABLET ORAL
Status: CANCELLED | OUTPATIENT
Start: 2021-06-11

## 2021-06-11 RX ORDER — SODIUM CHLORIDE 0.9 % (FLUSH) 0.9 %
5-40 SYRINGE (ML) INJECTION AS NEEDED
Status: CANCELLED | OUTPATIENT
Start: 2021-06-11

## 2021-06-11 RX ORDER — LANOLIN ALCOHOL/MO/W.PET/CERES
1 CREAM (GRAM) TOPICAL
Status: CANCELLED | OUTPATIENT
Start: 2021-06-12

## 2021-06-11 RX ORDER — ACETAMINOPHEN 650 MG/1
650 SUPPOSITORY RECTAL
Status: CANCELLED | OUTPATIENT
Start: 2021-06-11

## 2021-06-11 RX ORDER — OXYCODONE HYDROCHLORIDE 5 MG/1
10 TABLET ORAL
Status: DISCONTINUED | OUTPATIENT
Start: 2021-06-11 | End: 2021-06-12

## 2021-06-11 RX ORDER — SODIUM CHLORIDE 0.9 % (FLUSH) 0.9 %
5-40 SYRINGE (ML) INJECTION EVERY 8 HOURS
Status: CANCELLED | OUTPATIENT
Start: 2021-06-11

## 2021-06-11 RX ORDER — ACETAMINOPHEN 325 MG/1
650 TABLET ORAL
Status: DISCONTINUED | OUTPATIENT
Start: 2021-06-11 | End: 2021-06-12

## 2021-06-11 RX ORDER — SODIUM CHLORIDE 0.9 % (FLUSH) 0.9 %
5-40 SYRINGE (ML) INJECTION EVERY 8 HOURS
Status: DISCONTINUED | OUTPATIENT
Start: 2021-06-11 | End: 2021-06-26

## 2021-06-11 RX ORDER — PROMETHAZINE HYDROCHLORIDE 25 MG/1
12.5 TABLET ORAL
Status: DISCONTINUED | OUTPATIENT
Start: 2021-06-11 | End: 2021-06-29 | Stop reason: HOSPADM

## 2021-06-11 RX ORDER — METRONIDAZOLE 250 MG/1
500 TABLET ORAL EVERY 12 HOURS
Status: CANCELLED | OUTPATIENT
Start: 2021-06-12 | End: 2021-06-18

## 2021-06-11 RX ORDER — IBUPROFEN 400 MG/1
800 TABLET ORAL
Status: CANCELLED | OUTPATIENT
Start: 2021-06-11

## 2021-06-11 RX ORDER — POLYETHYLENE GLYCOL 3350 17 G/17G
17 POWDER, FOR SOLUTION ORAL DAILY
Status: DISCONTINUED | OUTPATIENT
Start: 2021-06-12 | End: 2021-06-23

## 2021-06-11 RX ORDER — LIDOCAINE HYDROCHLORIDE 20 MG/ML
JELLY TOPICAL DAILY
Status: CANCELLED | OUTPATIENT
Start: 2021-06-12

## 2021-06-11 RX ORDER — BISACODYL 5 MG
5 TABLET, DELAYED RELEASE (ENTERIC COATED) ORAL DAILY PRN
Status: CANCELLED | OUTPATIENT
Start: 2021-06-11

## 2021-06-11 RX ORDER — LEVOFLOXACIN 750 MG/1
750 TABLET ORAL EVERY 24 HOURS
Status: COMPLETED | OUTPATIENT
Start: 2021-06-12 | End: 2021-06-17

## 2021-06-11 RX ORDER — ENOXAPARIN SODIUM 100 MG/ML
40 INJECTION SUBCUTANEOUS DAILY
Status: CANCELLED | OUTPATIENT
Start: 2021-06-12

## 2021-06-11 RX ORDER — HYDROMORPHONE HYDROCHLORIDE 1 MG/ML
1 INJECTION, SOLUTION INTRAMUSCULAR; INTRAVENOUS; SUBCUTANEOUS
Status: DISCONTINUED | OUTPATIENT
Start: 2021-06-11 | End: 2021-06-12

## 2021-06-11 RX ORDER — ONDANSETRON 2 MG/ML
4 INJECTION INTRAMUSCULAR; INTRAVENOUS
Status: DISCONTINUED | OUTPATIENT
Start: 2021-06-11 | End: 2021-06-29 | Stop reason: HOSPADM

## 2021-06-11 RX ORDER — SODIUM CHLORIDE 0.9 % (FLUSH) 0.9 %
5-40 SYRINGE (ML) INJECTION AS NEEDED
Status: DISCONTINUED | OUTPATIENT
Start: 2021-06-11 | End: 2021-06-29 | Stop reason: HOSPADM

## 2021-06-11 RX ORDER — OXYCODONE HYDROCHLORIDE 5 MG/1
5 TABLET ORAL
Status: DISCONTINUED | OUTPATIENT
Start: 2021-06-11 | End: 2021-06-12

## 2021-06-11 RX ORDER — ONDANSETRON 2 MG/ML
4 INJECTION INTRAMUSCULAR; INTRAVENOUS
Status: CANCELLED | OUTPATIENT
Start: 2021-06-11

## 2021-06-11 RX ORDER — LIDOCAINE HYDROCHLORIDE 20 MG/ML
JELLY TOPICAL DAILY
Status: DISCONTINUED | OUTPATIENT
Start: 2021-06-12 | End: 2021-06-21

## 2021-06-11 RX ORDER — METRONIDAZOLE 500 MG/1
500 TABLET ORAL EVERY 12 HOURS
Status: COMPLETED | OUTPATIENT
Start: 2021-06-12 | End: 2021-06-17

## 2021-06-11 RX ORDER — ENOXAPARIN SODIUM 100 MG/ML
40 INJECTION SUBCUTANEOUS DAILY
Status: DISCONTINUED | OUTPATIENT
Start: 2021-06-12 | End: 2021-06-29 | Stop reason: HOSPADM

## 2021-06-11 RX ADMIN — VANCOMYCIN HYDROCHLORIDE 1000 MG: 1 INJECTION, POWDER, LYOPHILIZED, FOR SOLUTION INTRAVENOUS at 00:30

## 2021-06-11 RX ADMIN — VANCOMYCIN HYDROCHLORIDE 1000 MG: 1 INJECTION, POWDER, LYOPHILIZED, FOR SOLUTION INTRAVENOUS at 06:09

## 2021-06-11 RX ADMIN — FERROUS SULFATE TAB 325 MG (65 MG ELEMENTAL FE) 325 MG: 325 (65 FE) TAB at 09:45

## 2021-06-11 RX ADMIN — LEVOFLOXACIN 750 MG: 750 TABLET, FILM COATED ORAL at 02:23

## 2021-06-11 RX ADMIN — HYDROMORPHONE HYDROCHLORIDE 1 MG: 1 INJECTION, SOLUTION INTRAMUSCULAR; INTRAVENOUS; SUBCUTANEOUS at 22:42

## 2021-06-11 RX ADMIN — OXYCODONE 10 MG: 5 TABLET ORAL at 20:42

## 2021-06-11 RX ADMIN — VANCOMYCIN HYDROCHLORIDE 1000 MG: 1 INJECTION, POWDER, LYOPHILIZED, FOR SOLUTION INTRAVENOUS at 13:15

## 2021-06-11 RX ADMIN — OXYCODONE 10 MG: 5 TABLET ORAL at 14:10

## 2021-06-11 RX ADMIN — VANCOMYCIN HYDROCHLORIDE 1000 MG: 1 INJECTION, POWDER, LYOPHILIZED, FOR SOLUTION INTRAVENOUS at 18:06

## 2021-06-11 RX ADMIN — Medication 10 ML: at 06:13

## 2021-06-11 RX ADMIN — OXYCODONE 10 MG: 5 TABLET ORAL at 09:44

## 2021-06-11 RX ADMIN — OXYCODONE 10 MG: 5 TABLET ORAL at 02:23

## 2021-06-11 RX ADMIN — Medication 10 ML: at 13:28

## 2021-06-11 RX ADMIN — Medication 10 ML: at 21:53

## 2021-06-11 RX ADMIN — METRONIDAZOLE 500 MG: 250 TABLET ORAL at 04:18

## 2021-06-11 RX ADMIN — METRONIDAZOLE 500 MG: 250 TABLET ORAL at 18:07

## 2021-06-11 NOTE — PROGRESS NOTES
Chart reviewed per request of case management at [facility].  Questions sent to interdisciplinary team at [facility] per policy    Prudencio Mejia MD

## 2021-06-11 NOTE — DISCHARGE SUMMARY
Hospitalist Discharge Summary     Patient ID:  Martha Wooten  [id number]  25 y.o.  1995  6/4/2021    PCP on record: Andrea Mattson NP    Admit date: 6/4/2021  Discharge date and time: 6/11/2021    DISCHARGE DIAGNOSIS:  Sepsis POA  Hidradenitis suppurativa POA with draining abscesses in groin and axilla  Wound on his sacrum area   Anemia, most likely iron deficiency      CONSULTATIONS:  IP CONSULT TO INFECTIOUS DISEASES  IP CONSULT TO GENERAL SURGERY    Excerpted HPI from H&P of Dea Merchant MD:  CHIEF COMPLAINT: Transferred from [address] [place] [address] with sepsis and hidradenitis suppurativa refractory to outpatient treatment     HISTORY OF PRESENT ILLNESS:     59-year-old male     Several month history of painful skin lesions in the axillae and groin bilaterally  Diagnosed with hidradenitis suppurativa  Has taken courses of antibiotics including tetracyclines without improvement  Been followed at [address] [place] [address] ER recently  Seen several days ago with increasing pain and drainage, placed on Bactrim  For logistical reasons he apparently could not fill the prescription  Came back today for wound check, noted to be septic, increased drainage from wounds  Was to admit for IV antibiotics  Hospitalist at [address] [place] [address] preferred transfer to [facility] [address]     Currently stable except for moderate to severe pain in the wounds especially with cleaning  There was copious drainage or any other wounds bilaterally especially in the groin  Were called to admit the patient     Past medical history:  1. Hidradenitis suppurativa    ______________________________________________________________________  DISCHARGE SUMMARY/HOSPITAL COURSE:  for full details see H&P, daily progress notes, labs, consult notes.    Sepsis POA  Hidradenitis suppurativa POA with draining abscesses in groin and axilla  Wound on his sacrum area  Has failed outpatient treatment  Unable to follow-up with dermatology due to logistics and cost  Taking courses of antibiotics including tetracyclines without improvement  Copious drainage from skin lesions in the groin and axilla bilaterally  Continue with wound care, surgery input appreciated, no surgical indication  Continue with IV Vanco Levaquin and Flagyl  Wound culture showing strep beta-hemolytic group C sensitive to penicillin, there is a reported allergy to penicillin has anaphylactic reaction, patient does not remember having anaphylactic reaction, and could not be verified as his mom passed away  Discussed with Dr. Maik Jenkins who wants to try penicillin allergy testing while patient is in the hospital for closer monitoring but penicillin allergy testing consult  Patient most likely will need to be transferred to [facility] to continue wound care as patient does not have insurance and can t benefit from home health services  06/09: Dr. Maik Jenkins decided to cancel the patient still allergy testing  Patient has a lot of pain during wound care  No need for further abx per ID ,patient patient needs a close follow-up with dermatology  Wound care nurse on board  06/10: Patient is having purulent drainage from his wound, also has a sacral wound which is draining serosanguineous fluid  Antibiotic extended for 7 more days  Patient has a lot of pain during wound care     Anemia, most likely iron deficiency  HgB 8.5, iron studies showed iron deficiency anemia  C/w  ferrous sulfate  Updated grandmother at the bedside  _______________________________________________________________________  Patient seen and examined by me on discharge day. Pertinent Findings:  Gen:    Not in distress  Chest: Clear lungs  CVS:   Regular rhythm.   No edema  Abd:  Soft, not distended, not tender  Neuro:  Alert, oriented x4  _______________________________________________________________________  DISCHARGE MEDICATIONS:   Current Discharge Medication List            Patient Follow Up Instructions: Activity: Activity as tolerated  Diet: Regular Diet  Wound Care: As directed        Follow-up Information     Follow up With Specialties Details Why Contact Cem Bagley NP Nurse Practitioner   [facility]ly  [address] [place] [address] Street  [phone]          ________________________________________________________________    Risk of deterioration: Low    Condition at Discharge:  Stable  __________________________________________________________________    Disposition  Transferred to [address] [place] [address]    ____________________________________________________________________    Code Status: Full Code  ___________________________________________________________________      Total time in minutes spent coordinating this discharge (includes going over instructions, follow-up, prescriptions, and preparing report for sign off to her PCP) :  >30 minutes    Signed:  Vicenta Pompa MD

## 2021-06-11 NOTE — PROGRESS NOTES
Assessed Patient for Midline placement for long term antibiotics. Patient has open wounds in both upper arms therefore not able to place Midline or PICC line in either arm. Dr. Alise Pinto, charge nurse Vinny Barrow, RN and primary nurse notified. Patient currently has working 20g PIV.      Jamila Solano, RN, BSN, [facility]-BC  Vascular Access Team

## 2021-06-11 NOTE — PROGRESS NOTES
Surgical Note    Date/Time:  2021 8:25 AM        Assessment :    Plan:  Patient Active Problem List   Diagnosis Code    Hidradenitis suppurativa L73.2    Soft tissue infection L08.9        Dressings in place  Packed by wound care team yesterday  Discussed with Linda Vaughan with abx and wound care  Outpatient f/u at [address] [facility]    Nothing requiring debridement at this time   Will check back on Monday          Subjective:     Chief Complaint:      Review of Systems:  Y  N       Y  N  [] [x]  Fever/chills                                               [] [x]  Chest Pain  [] [x]  Cough                                                       [] [x]  Diarrhea   [] [x]  Sputum                                                     [] [x]  Constipation  [] [x]  SOB/THAKUR                                                [] [x]  Nausea/Vomit  [] [x]  Abd Pain                                                    [x] []  Tolerating diet  [] [x]  Dysuria                                                           []Unable to obtain  ROS due to  []mental status change  []sedated   []intubated    Objective:     VITALS:   Last 24hrs VS reviewed since prior hospitalist progress note.  Most recent are:  Visit Vitals  /60 (BP 1 Location: Left upper arm, BP Patient Position: At rest)   Pulse 92   Temp 99 °F (37.2 °C)   Resp 16   Ht 6' 3\" (1.905 m)   Wt 68.9 kg (151 lb 14.4 oz)   SpO2 98%   BMI 18.99 kg/m²     Temp (24hrs), Av.2 °F (37.3 °C), Min:98.6 °F (37 °C), Max:99.8 °F (37.7 °C)      Intake/Output Summary (Last 24 hours) at 2021 0825  Last data filed at 6/10/2021 1925  Gross per 24 hour   Intake 420 ml   Output 400 ml   Net 20 ml         []Salgado []NGT  []Intubated on vent    PHYSICAL EXAM:  Gen:  [] A&O  []non-toxic  [x] No acute distress             [] ill apearing  []  Critical        HEENT:   [x]NC/AT/PERRLA/EOMI    []pink conjunctivae      []pale conjunctivae                  PERRL  []yes  []no      []moist mucosa    []dry mucosa    hearing intact to voice []yes  []No    RESP:   [x]CTA bialterally/no wheezing/rhonchi/rales/crackles    []clear bilaterally  []wheezing   []rhonchi   []crackles     use of accessory muscles []yes []no    CARD:   [x] regular rate and rhythm/No murmurs/rubs/gallops    murmur  []yes ()  []no      Rubs  []yes  []no       Gallops []yes  []no    Rate [] regular  [] irregular        carotid bruits  []Right  [] Left                 LE edema []yes  []no           JVP  [] yes   [] no    ABD:    []soft  []non distended  []non tender  [] NABS    SKIN:   Dressings in place    NEUR:   [x]cranial nerves II-XII grossly intact       []Cranial nerves deficit                 [] facial droop    [] slurred speech   []aphasic     []Strength normal     [] weakness  [] LUE  []  RUE/ [] LLE  []  RLE    follows commands  [] yes [] no           PSYCH:   insight []poor [x]good   Alert and Oriented to  [x]person  [x]place  [x] time                    []depressed []anxious []agitated  []lethargic []stuporous  []sedated           Lab Data Reviewed: (see below)    Medications Reviewed: (see below)    PMH/SH reviewed - no change compared to H&P    Care Plan discussed with:  [x]Patient   []Family    []Care Manager   [x]RN    [x]Consultant/Specialist :    Prophylaxis:  []Lovenox  []Coumadin  []Hep SQ  []SCDs  []H2B/PPI    Disposition:  []Home w/ Family   []HH PT,OT,RN   []SNF/LTC   []SAH/Rehab    Ancillary Serices:   [] PT     []OT      []SW      []Nut      []HH ________________________________________________________________________  LABS:  Recent Labs     06/11/21  0428 06/10/21  0425   WBC 12.9* 12.5*   HGB 8.2* 8.9*   HCT 27.4* 30.6*   * 437*     Recent Labs     06/11/21  0428 06/10/21  0425 06/09/21  0358   * 136 134*   K 3.8 4.0 4.2    101 100   CO2 32 31 29   BUN 7 7 8   CREA 0.45* 0.47* 0.53*   GLU 92 85 83   CA 8.9 8.7 8.8     No results for input(s): ALT, AP, TBIL, TBILI, TP, ALB, GLOB, GGT, AML, LPSE in the last 72 hours. No lab exists for component: SGOT, GPT, AMYP, HLPSE  No results for input(s): INR, PTP, APTT, INREXT in the last 72 hours. No results for input(s): FE, TIBC, PSAT, FERR in the last 72 hours. No results for input(s): PH, PCO2, PO2 in the last 72 hours. No results for input(s): CPK, CKMB in the last 72 hours.     No lab exists for component: TROPONINI  No results found for: GLUCPOC    MEDICATIONS:  Current Facility-Administered Medications   Medication Dose Route Frequency    vancomycin (VANCOCIN) 1,000 mg in 0.9% sodium chloride 250 mL (VIAL-MATE)  1,000 mg IntraVENous Q6H    HYDROmorphone (DILAUDID) injection 1 mg  1 mg IntraVENous Q4H PRN    lidocaine (URO-JET/ GLYDO) 2 % jelly   Urethral DAILY    metroNIDAZOLE (FLAGYL) tablet 500 mg  500 mg Oral Q12H    ferrous sulfate tablet 325 mg  1 Tablet Oral DAILY WITH BREAKFAST    ibuprofen (MOTRIN) tablet 800 mg  800 mg Oral Q6H PRN    oxyCODONE IR (ROXICODONE) tablet 10 mg  10 mg Oral Q4H PRN    oxyCODONE IR (ROXICODONE) tablet 5 mg  5 mg Oral Q3H PRN    sodium chloride (NS) flush 5-40 mL  5-40 mL IntraVENous Q8H    sodium chloride (NS) flush 5-40 mL  5-40 mL IntraVENous PRN    acetaminophen (TYLENOL) tablet 650 mg  650 mg Oral Q6H PRN    Or    acetaminophen (TYLENOL) suppository 650 mg  650 mg Rectal Q6H PRN    polyethylene glycol (MIRALAX) packet 17 g  17 g Oral DAILY    bisacodyL (DULCOLAX) tablet 5 mg  5 mg Oral DAILY PRN    promethazine (PHENERGAN) tablet 12.5 mg  12.5 mg Oral Q6H PRN    Or    ondansetron (ZOFRAN) injection 4 mg  4 mg IntraVENous Q6H PRN    enoxaparin (LOVENOX) injection 40 mg  40 mg SubCUTAneous DAILY    levoFLOXacin (LEVAQUIN) tablet 750 mg  750 mg Oral Q24H

## 2021-06-11 NOTE — PROGRESS NOTES
Addendum:  2:55PM     Dr. Nakia Goodman and the team has accepted the patient for transfer to [facility] today. Rafal Penn Report  [phone]  He is going into room  204    Patient to receive PICC Line - if problems scheduling today- please leave the PIV Line in and the PICC Team has to commit to a time  coming to [facility] to get this done tomorrow . I talked to CM and received report -   She is working on Transportation. We are aware it may be late just let us know the time. Please give family and patient the # to the nursing station [phone]   #  Jasmin Casanova [phone]     If any issues please call my cell   [address] MSW RN          [facility] Coordinator for Care Management     Review of the chart due to request for transfer     Please have the hospitalist give Dr. Nakia Goodman a call to discuss the patient.  You can reach him at [phone]     For transfer Patient will also need midline unless contraindicated     Will follow-up with CM post this call     [address] MSW RN   [phone]

## 2021-06-11 NOTE — PROGRESS NOTES
ADDENDUM:  4:00pm  Per PICC Team - patient could not have a PICC due to location of wounds - would need SOLANGE or Helen Alejandra has approved for patient to transfer with peripheral IV - please DO NOT REMOVE IV prior to discharge! Patient has been accepted to [facility] and patient is in agreement with EMTALA transfer. Patient to receive PICC Line - if problems scheduling today- please leave the PIV Line in and the PICC Team has to commit to a time  coming to [facility] to get this done tomorrow . Nursing notified. EUNICE for Continued Medical Care    Patient going to Room 204 at [facility]. Nursing to call report to [phone]. Patient to report to family once he gets to [facility]. He declined to have CM call family.     Nichelle Rowland, RN, BSN, [facility]    Coordinator  [phone]

## 2021-06-11 NOTE — H&P
Hospitalist Admission Note    NAME: Kathy Gay   :  1995   MRN:  [id number]   Room Number: 785/29  @ [facility]     Date/Time:  2021 3:46 PM    Patient PCP: Pascual Stevens., NP  ______________________________________________________________________  Given the patient's current clinical presentation, I have a high level of concern for decompensation if discharged from the emergency department. Complex decision making was performed, which includes reviewing the patient's available past medical records, laboratory results, and x-ray films. My assessment of this patient's clinical condition and my plan of care is as follows. Assessment / Plan: Active Problems:    * No active hospital problems. *      # Hidradenitis suppurative stage III ( Hurely staging due to multiple abscesses/ sinus tracts)   - Failed outpatient Rx   - assessed by GS at [address]- No need for debridement per surgery   - Wound Cx : LIGHT STREPTOCOCCI, BETA HEMOLYTIC GROUP C  - Assessed by ID recommending Vanc, levofloxacin/flagyl till    - aggressive wound care   -  - to resume Doxycycline 100 mg BID after completion of broad Abx course   - Outpatient Follow up w/ [facility]     # Pain management   - Initiate Gabapentin 100 mg TID ( up titrate as needed )   - schedule tylenol 975 mg TID   - Motrin 800 mg Q6 PRN for mild pain   - Tramadol 50 mg Q4 for moderate pain   - start oxycodon taper for severe pain   - IV dilaudid only for dressing change   - patient updated and agreed w/ pain mgmt plan       # Anemia of chronic diease  - Hgb 8.2 w/ MCV 71   - Iron 17 , TIBC 122, Ferritin 570 , Iron sat 14  - Hold Iron in the setting of active infection     There is no height or weight on file to calculate BMI.   Code Status: Full   Surrogate Decision Maker:    DVT Prophylaxis: Lovenox  GI Prophylaxis: not indicated  Baseline:         Subjective:   CHIEF COMPLAINT: Long term patient for IV abx and wound care     HISTORY OF PRESENT ILLNESS:     Boneta Dandy is a 22 y. o.  male with PMH of above-mentioned problems who presents medical floor at [facility] as a direct transfer from [facility] for IV antibiotics and wound care. Patient initially presented to [facility] ED was found to be in sepsis secondary to HS flare. Transfer to [facility] where he was started on broad-spectrum antibiotics. Patient was asked by ID and general surgery. Patient is transferred to us  as he does not have insurance and cannot afford expensive wound care. We were asked to admit for work up and evaluation of the above problems. Wound care at [facility]  Offer pain medications to the patient as allowed for this patient / discuss with attending. Gather the supplies and the Lidocaine Jelly (Urojets) for ALL of the wounds. Remove the old dressings and rinse with warmed Normal saline to loosen the old drainage. Irrigate the openings with the warmed NS using a 20 ml syringe. Dress the wounds individually as follows: The axilla:  apply an Opticell Ag dressing and then a ABD (or Maxipad). Wrap the Axilla with Kerlix and anchor around the back of the neck to the other axilla. For the Groins:  Apply the NS moistened Hydrofera Blue strips and cover with ABD dressings (or folded Maxipads) and secure the dressings with post op pants. For the Gluteal Cleft wound:  apply the Lidocaine jelly and let it dwell for 5 minutes (wait). Pack the wounds with Hydrofera Blue. Cover with a small bulky amount of Kerlix and then top with a large Sacrum foam dressing formed into the gluteal cleft. No past medical history on file. No past surgical history on file.     Social History     Tobacco Use    Smoking status: Never Smoker    Smokeless tobacco: Never Used   Substance Use Topics    Alcohol use: Yes     Comment: Socially         No family history on file. Allergies   Allergen Reactions    Penicillins Anaphylaxis    Pcn [Penicillins] Swelling        Prior to Admission medications    Medication Sig Start Date End Date Taking? Authorizing Provider   trimethoprim-sulfamethoxazole (BACTRIM DS, SEPTRA DS) 160-800 mg per tablet Take 1 Tablet by mouth two (2) times a day for 10 days. 6/2/21 6/12/21  Franny Kolb NP   ibuprofen (MOTRIN) 800 mg tablet Take 1 Tablet by mouth every eight (8) hours as needed for Pain. With food  Patient not taking: Reported on 6/4/2021 6/2/21   Franny Kolb NP       REVIEW OF SYSTEMS:     I am not able to complete the review of systems because:    The patient is intubated and sedated    The patient has altered mental status due to his acute medical problems    The patient has baseline aphasia from prior stroke(s)    The patient has baseline dementia and is not reliable historian    The patient is in acute medical distress and unable to provide information           Total of 12 systems reviewed as follows:       POSITIVE= underlined text  Negative = text not underlined  General:  fever, chills, sweats, generalized weakness, weight loss/gain,      loss of appetite   Eyes:    blurred vision, eye pain, loss of vision, double vision  ENT:    rhinorrhea, pharyngitis   Respiratory:   cough, sputum production, SOB, THAKUR, wheezing, pleuritic pain   Cardiology:   chest pain, palpitations, orthopnea, PND, edema, syncope   Gastrointestinal:  abdominal pain , N/V, diarrhea, dysphagia, constipation, bleeding   Genitourinary:  frequency, urgency, dysuria, hematuria, incontinence   Muskuloskeletal :  arthralgia, myalgia, back pain  Hematology:  easy bruising, nose or gum bleeding, lymphadenopathy   Dermatological: rash, ulceration, pruritis, color change / jaundice  Endocrine:   hot flashes or polydipsia   Neurological:  headache, dizziness, confusion, focal weakness, paresthesia,     Speech difficulties, memory loss, gait difficulty  Psychological: Feelings of anxiety, depression, agitation    Objective:   VITALS:    There were no vitals taken for this visit. PHYSICAL EXAM:    General:    Alert, cooperative, no distress, appears stated age. HEENT: Atraumatic, anicteric sclerae, pink conjunctivae     No oral ulcers, mucosa moist, throat clear, dentition fair  Neck:  Supple, symmetrical,  thyroid: non tender  Lungs:   Clear to auscultation bilaterally. No Wheezing or Rhonchi. No rales. Chest wall:  No tenderness  No Accessory muscle use. Heart:   Regular  rhythm,  No  murmur   No edema  Abdomen:   Soft, non-tender. Not distended. Bowel sounds normal  Extremities: No cyanosis. No clubbing,      Skin turgor normal, Capillary refill normal, Radial dial pulse 2+  Skin:     Not pale. Not Jaundiced   Draining wound gluteal, axillary region   Psych:  Good insight. Not depressed. Not anxious or agitated. Neurologic: EOMs intact. No facial asymmetry. No aphasia or slurred speech. Symmetrical strength, Sensation grossly intact. Alert and oriented X 4.     ______________________________________________________________________    Care Plan discussed with:  Patient/Family    Expected  Disposition:  Home w/Family  ________________________________________________________________________  TOTAL TIME:  28 Minutes    Critical Care Provided     Minutes non procedure based      Comments    x Reviewed previous records   >50% of visit spent in counseling and coordination of care x Discussion with patient and/or family and questions answered       ________________________________________________________________________  Signed: Segun Contreras MD    Procedures: see electronic medical records for all procedures/Xrays and details which were not copied into this note but were reviewed prior to creation of Plan.     LAB DATA REVIEWED:    Recent Results (from the past 24 hour(s))   Dane Pica    Collection Time: 06/10/21  5:22 PM   Result Value Ref Range    Vancomycin,trough 18.6 (H) 5.0 - 10.0 ug/mL    Reported dose date NOT PROVIDED      Reported dose time: NOT PROVIDED      Reported dose: NOT PROVIDED UNITS   CBC WITH AUTOMATED DIFF    Collection Time: 06/11/21  4:28 AM   Result Value Ref Range    WBC 12.9 (H) 4.1 - 11.1 K/uL    RBC 3.80 (L) 4.10 - 5.70 M/uL    HGB 8.2 (L) 12.1 - 17.0 g/dL    HCT 27.4 (L) 36.6 - 50.3 %    MCV 72.1 (L) 80.0 - 99.0 FL    MCH 21.6 (L) 26.0 - 34.0 PG    MCHC 29.9 (L) 30.0 - 36.5 g/dL    RDW 16.6 (H) 11.5 - 14.5 %    PLATELET 578 (H) 729 - 400 K/uL    MPV 8.6 (L) 8.9 - 12.9 FL    NRBC 0.0 0  WBC    ABSOLUTE NRBC 0.00 0.00 - 0.01 K/uL    NEUTROPHILS 78 (H) 32 - 75 %    LYMPHOCYTES 8 (L) 12 - 49 %    MONOCYTES 7 5 - 13 %    EOSINOPHILS 5 0 - 7 %    BASOPHILS 0 0 - 1 %    IMMATURE GRANULOCYTES 2 (H) 0.0 - 0.5 %    ABS. NEUTROPHILS 10.0 (H) 1.8 - 8.0 K/UL    ABS. LYMPHOCYTES 1.1 0.8 - 3.5 K/UL    ABS. MONOCYTES 1.0 0.0 - 1.0 K/UL    ABS. EOSINOPHILS 0.7 (H) 0.0 - 0.4 K/UL    ABS. BASOPHILS 0.0 0.0 - 0.1 K/UL    ABS. IMM.  GRANS. 0.2 (H) 0.00 - 0.04 K/UL    DF AUTOMATED     METABOLIC PANEL, BASIC    Collection Time: 06/11/21  4:28 AM   Result Value Ref Range    Sodium 134 (L) 136 - 145 mmol/L    Potassium 3.8 3.5 - 5.1 mmol/L    Chloride 100 97 - 108 mmol/L    CO2 32 21 - 32 mmol/L    Anion gap 2 (L) 5 - 15 mmol/L    Glucose 92 65 - 100 mg/dL    BUN 7 6 - 20 MG/DL    Creatinine 0.45 (L) 0.70 - 1.30 MG/DL    BUN/Creatinine ratio 16 12 - 20      GFR est AA >60 >60 ml/min/1.73m2    GFR est non-AA >60 >60 ml/min/1.73m2    Calcium 8.9 8.5 - 10.1 MG/DL

## 2021-06-11 NOTE — PROGRESS NOTES
Transition of Care Plan:    RUR: 9%    Disposition: Transfer to [facility] - needs assistance with IV ABX and wound care    Follow up appointments: PCP, ID - Hidradenitis Suppurative Clinic at [facility] at [facility]  [address], [address], [place] [address]  [place], [address]  Phone: [phone]  Fax: [phone]     Medical Dermatology  Phone: [phone]  Fax: [phone]    DME needed: Wound Care Supplies    Transportation at Discharge: EMTAL[facility] to [place] [address] or means to access home:      Yes - Family     IM Medicare letter: N/A    Caregiver Contact:  Grandmother Dayron Landry - [phone]    Discharge Caregiver contacted prior to discharge? Perfect Serve Message to MD to contact Dr. Kanika Mittal at [facility] to discuss possible transfer today. Patient will need midline placed prior to transfer.     Nichelle Rowland, RN, BSN, [address] [facility]    Coordinator  [phone]

## 2021-06-12 PROBLEM — L73.2 HIDRADENITIS: Status: ACTIVE | Noted: 2021-06-12

## 2021-06-12 LAB
ANION GAP SERPL CALC-SCNC: 10 MMOL/L (ref 5–15)
APPEARANCE UR: CLEAR
BACTERIA URNS QL MICRO: NEGATIVE /HPF
BASOPHILS # BLD: 0 K/UL (ref 0–0.1)
BASOPHILS NFR BLD: 0 % (ref 0–1)
BILIRUB UR QL: NEGATIVE
BUN SERPL-MCNC: 6 MG/DL (ref 6–20)
BUN/CREAT SERPL: 9 (ref 12–20)
CALCIUM SERPL-MCNC: 9 MG/DL (ref 8.5–10.1)
CHLORIDE SERPL-SCNC: 100 MMOL/L (ref 97–108)
CO2 SERPL-SCNC: 29 MMOL/L (ref 21–32)
COLOR UR: NORMAL
CREAT SERPL-MCNC: 0.64 MG/DL (ref 0.7–1.3)
DATE LAST DOSE: NOT DETECTED
DIFFERENTIAL METHOD BLD: ABNORMAL
EOSINOPHIL # BLD: 0.2 K/UL (ref 0–0.4)
EOSINOPHIL NFR BLD: 2 % (ref 0–7)
EPITH CASTS URNS QL MICRO: NORMAL /LPF
ERYTHROCYTE [DISTWIDTH] IN BLOOD BY AUTOMATED COUNT: 16.3 % (ref 11.5–14.5)
GLUCOSE SERPL-MCNC: 88 MG/DL (ref 65–100)
GLUCOSE UR STRIP.AUTO-MCNC: NEGATIVE MG/DL
HCT VFR BLD AUTO: 28.8 % (ref 36.6–50.3)
HGB BLD-MCNC: 8.6 G/DL (ref 12.1–17)
HGB UR QL STRIP: NEGATIVE
IMM GRANULOCYTES # BLD AUTO: 0 K/UL
IMM GRANULOCYTES NFR BLD AUTO: 0 %
IRON SATN MFR SERPL: 18 % (ref 20–50)
IRON SERPL-MCNC: 27 UG/DL (ref 35–150)
KETONES UR QL STRIP.AUTO: NEGATIVE MG/DL
LEUKOCYTE ESTERASE UR QL STRIP.AUTO: NEGATIVE
LYMPHOCYTES # BLD: 1.1 K/UL (ref 0.8–3.5)
LYMPHOCYTES NFR BLD: 9 % (ref 12–49)
MCH RBC QN AUTO: 21.4 PG (ref 26–34)
MCHC RBC AUTO-ENTMCNC: 29.9 G/DL (ref 30–36.5)
MCV RBC AUTO: 71.8 FL (ref 80–99)
MONOCYTES # BLD: 0.6 K/UL (ref 0–1)
MONOCYTES NFR BLD: 5 % (ref 5–13)
MYELOCYTES NFR BLD MANUAL: 2 %
NEUTS BAND NFR BLD MANUAL: 4 % (ref 0–6)
NEUTS SEG # BLD: 9.6 K/UL (ref 1.8–8)
NEUTS SEG NFR BLD: 78 % (ref 32–75)
NITRITE UR QL STRIP.AUTO: NEGATIVE
NRBC # BLD: 0 K/UL (ref 0–0.01)
NRBC BLD-RTO: 0 PER 100 WBC
PH UR STRIP: 7 [PH] (ref 5–8)
PLATELET # BLD AUTO: 460 K/UL (ref 150–400)
PMV BLD AUTO: 8.3 FL (ref 8.9–12.9)
POTASSIUM SERPL-SCNC: 3.9 MMOL/L (ref 3.5–5.1)
PROT UR STRIP-MCNC: NEGATIVE MG/DL
RBC # BLD AUTO: 4.01 M/UL (ref 4.1–5.7)
RBC #/AREA URNS HPF: NORMAL /HPF (ref 0–5)
RBC MORPH BLD: ABNORMAL
REPORTED DOSE,DOSE: NOT DETECTED UNITS
REPORTED DOSE/TIME,TMG: NOT DETECTED
SODIUM SERPL-SCNC: 139 MMOL/L (ref 136–145)
SP GR UR REFRACTOMETRY: <1.005 (ref 1–1.03)
TIBC SERPL-MCNC: 150 UG/DL (ref 250–450)
UA: UC IF INDICATED,UAUC: NORMAL
UROBILINOGEN UR QL STRIP.AUTO: 1 EU/DL (ref 0.2–1)
VANCOMYCIN TROUGH SERPL-MCNC: 17.5 UG/ML (ref 5–10)
WBC # BLD AUTO: 11.7 K/UL (ref 4.1–11.1)
WBC URNS QL MICRO: NORMAL /HPF (ref 0–4)

## 2021-06-12 PROCEDURE — 81001 URINALYSIS AUTO W/SCOPE: CPT

## 2021-06-12 PROCEDURE — 74011250637 HC RX REV CODE- 250/637: Performed by: STUDENT IN AN ORGANIZED HEALTH CARE EDUCATION/TRAINING PROGRAM

## 2021-06-12 PROCEDURE — 65270000032 HC RM SEMIPRIVATE

## 2021-06-12 PROCEDURE — 80048 BASIC METABOLIC PNL TOTAL CA: CPT

## 2021-06-12 PROCEDURE — 74011250636 HC RX REV CODE- 250/636: Performed by: INTERNAL MEDICINE

## 2021-06-12 PROCEDURE — 87040 BLOOD CULTURE FOR BACTERIA: CPT

## 2021-06-12 PROCEDURE — 74011250636 HC RX REV CODE- 250/636: Performed by: STUDENT IN AN ORGANIZED HEALTH CARE EDUCATION/TRAINING PROGRAM

## 2021-06-12 PROCEDURE — 80202 ASSAY OF VANCOMYCIN: CPT

## 2021-06-12 PROCEDURE — 36415 COLL VENOUS BLD VENIPUNCTURE: CPT

## 2021-06-12 PROCEDURE — 85025 COMPLETE CBC W/AUTO DIFF WBC: CPT

## 2021-06-12 PROCEDURE — 83550 IRON BINDING TEST: CPT

## 2021-06-12 PROCEDURE — 74011250637 HC RX REV CODE- 250/637: Performed by: INTERNAL MEDICINE

## 2021-06-12 PROCEDURE — 94760 N-INVAS EAR/PLS OXIMETRY 1: CPT

## 2021-06-12 RX ORDER — ACETAMINOPHEN 325 MG/1
975 TABLET ORAL EVERY 6 HOURS
Status: DISCONTINUED | OUTPATIENT
Start: 2021-06-12 | End: 2021-06-29 | Stop reason: HOSPADM

## 2021-06-12 RX ORDER — GABAPENTIN 100 MG/1
100 CAPSULE ORAL 3 TIMES DAILY
Status: DISCONTINUED | OUTPATIENT
Start: 2021-06-12 | End: 2021-06-21

## 2021-06-12 RX ORDER — OXYCODONE HYDROCHLORIDE 5 MG/1
5 TABLET ORAL
Status: DISCONTINUED | OUTPATIENT
Start: 2021-06-27 | End: 2021-06-18

## 2021-06-12 RX ORDER — TRAMADOL HYDROCHLORIDE 50 MG/1
50 TABLET ORAL
Status: DISPENSED | OUTPATIENT
Start: 2021-06-12 | End: 2021-06-22

## 2021-06-12 RX ORDER — OXYCODONE HYDROCHLORIDE 5 MG/1
5 TABLET ORAL
Status: DISCONTINUED | OUTPATIENT
Start: 2021-06-22 | End: 2021-06-18

## 2021-06-12 RX ORDER — HYDROMORPHONE HYDROCHLORIDE 1 MG/ML
1 INJECTION, SOLUTION INTRAMUSCULAR; INTRAVENOUS; SUBCUTANEOUS
Status: DISCONTINUED | OUTPATIENT
Start: 2021-06-12 | End: 2021-06-18

## 2021-06-12 RX ORDER — OXYCODONE HYDROCHLORIDE 5 MG/1
10 TABLET ORAL
Status: DISPENSED | OUTPATIENT
Start: 2021-06-12 | End: 2021-06-17

## 2021-06-12 RX ORDER — OXYCODONE HYDROCHLORIDE 5 MG/1
10 TABLET ORAL
Status: DISCONTINUED | OUTPATIENT
Start: 2021-06-17 | End: 2021-06-18

## 2021-06-12 RX ORDER — OXYCODONE HYDROCHLORIDE 5 MG/1
5 TABLET ORAL
Status: DISCONTINUED | OUTPATIENT
Start: 2021-07-02 | End: 2021-06-18

## 2021-06-12 RX ADMIN — Medication 10 ML: at 06:35

## 2021-06-12 RX ADMIN — METRONIDAZOLE 500 MG: 500 TABLET ORAL at 17:40

## 2021-06-12 RX ADMIN — ACETAMINOPHEN 975 MG: 325 TABLET, FILM COATED ORAL at 17:40

## 2021-06-12 RX ADMIN — HYDROMORPHONE HYDROCHLORIDE 1 MG: 1 INJECTION, SOLUTION INTRAMUSCULAR; INTRAVENOUS; SUBCUTANEOUS at 08:28

## 2021-06-12 RX ADMIN — Medication 10 ML: at 13:05

## 2021-06-12 RX ADMIN — ACETAMINOPHEN 975 MG: 325 TABLET, FILM COATED ORAL at 12:32

## 2021-06-12 RX ADMIN — GABAPENTIN 100 MG: 100 CAPSULE ORAL at 17:40

## 2021-06-12 RX ADMIN — OXYCODONE HYDROCHLORIDE 10 MG: 5 TABLET ORAL at 14:33

## 2021-06-12 RX ADMIN — METRONIDAZOLE 500 MG: 500 TABLET ORAL at 03:37

## 2021-06-12 RX ADMIN — Medication 10 ML: at 21:00

## 2021-06-12 RX ADMIN — GABAPENTIN 100 MG: 100 CAPSULE ORAL at 21:00

## 2021-06-12 RX ADMIN — VANCOMYCIN HYDROCHLORIDE 1250 MG: 500 INJECTION, POWDER, LYOPHILIZED, FOR SOLUTION INTRAVENOUS at 20:59

## 2021-06-12 RX ADMIN — VANCOMYCIN HYDROCHLORIDE 1000 MG: 1 INJECTION, POWDER, LYOPHILIZED, FOR SOLUTION INTRAVENOUS at 06:35

## 2021-06-12 RX ADMIN — HYDROMORPHONE HYDROCHLORIDE 1 MG: 1 INJECTION, SOLUTION INTRAMUSCULAR; INTRAVENOUS; SUBCUTANEOUS at 15:25

## 2021-06-12 RX ADMIN — FERROUS SULFATE TAB 325 MG (65 MG ELEMENTAL FE) 325 MG: 325 (65 FE) TAB at 08:28

## 2021-06-12 RX ADMIN — POLYETHYLENE GLYCOL 3350 17 G: 17 POWDER, FOR SOLUTION ORAL at 08:28

## 2021-06-12 RX ADMIN — VANCOMYCIN HYDROCHLORIDE 1000 MG: 1 INJECTION, POWDER, LYOPHILIZED, FOR SOLUTION INTRAVENOUS at 01:15

## 2021-06-12 RX ADMIN — GABAPENTIN 100 MG: 100 CAPSULE ORAL at 12:32

## 2021-06-12 RX ADMIN — LEVOFLOXACIN 750 MG: 750 TABLET, FILM COATED ORAL at 01:15

## 2021-06-12 RX ADMIN — VANCOMYCIN HYDROCHLORIDE 1000 MG: 1 INJECTION, POWDER, LYOPHILIZED, FOR SOLUTION INTRAVENOUS at 13:04

## 2021-06-12 NOTE — PROGRESS NOTES
[address] [address] Pharmacy Dosing Services: Antimicrobial Stewardship    Consult for antibiotic dosing of vancomycin by Dr. Lia Lima  Indication: skin and soft tissue infection - Hidradenitis suppurativa  Day of Therapy 9 of 14 - stop date 6/17    Ht Readings from Last 1 Encounters:   06/10/21 190.5 cm (75\")        Wt Readings from Last 1 Encounters:   06/11/21 77.3 kg (170 lb 6.7 oz)      Vancomycin therapy:    Current maintenance dose: 1000 mg every 6 hours     Target trough : 10-15 mcg/mL  Last trough level 17.5 mcg/ml with extrapolated trough as 16.94 mcg/mL on 6/12 @ 12:45   SCr increased slightly 0.45 to 0.64 mg/dL  Plan for level / Adjustment in Therapy: Dose adjusted to vancomycin 1250 mg IV q8h    Dose administration notes: doses given appropriately    Date Dose & Interval Measured (mcg/mL) Extrapolated (mcg/mL)   6/6 @ 0120 750mg q8h 6.9 6 ()   6/6 @ 2127 750mg q6h 9.4 9.36 ()   6/8 @ 0007 1000mg q6h 18.8 3.75 ()   6/9 @ 0356 1250mg q6h 23.8 (random) 19.5 ()   6/10 @ 1722 1000mg q6h 18.6 15.1 ()   6/12 @ 1245 1000 mg q6h 17.5 16.94       Other Antimicrobial   (not dosed by pharmacist) Levofloxacin 750 mg PO every 24 hours - stop date 6/17  Metronidazole 500 mg PO every 24 hours - stop date 6/17  (Meropenem 500 mg IV x 1 on 6/4/21)   Cultures 6/4 Blood: NGTD- final  6/5 Wound: BETA HEMOLYTIC GROUP C STREP  6/5 Wound: BETA HEMOLYTIC GROUP C STREP   Serum Creatinine Lab Results   Component Value Date/Time    Creatinine 0.64 (L) 06/12/2021 03:14 AM         Creatinine Clearance 176.4 mL/min (actual body weight)     Temp Temp: 98.6 °F (37 °C)       WBC Lab Results   Component Value Date/Time    WBC 11.7 (H) 06/12/2021 03:14 AM        H/H Lab Results   Component Value Date/Time    HGB 8.6 (L) 06/12/2021 03:14 AM        Platelets    Lab Results   Component Value Date/Time    PLATELET 470 (H) 59/84/5528 03:14 AM                Recent Labs     06/11/21  0428 06/10/21  0425 06/09/21  0358 CREA 0.45* 0.47* 0.53*   BUN 7 7 8   WBC 12.9* 12.5* 14.8*     Impression/Plan:   The vancomycin trough resulted at trough level 17.5 mcg/ml with extrapolated trough as 16.94 mcg/mL on 6/12 @ 12:45   Dose adjusted to vancomycin 1250 mg IV q8h. Trough will be ordered around 4th dose of new regimen  Levofloxacin and metronidazole OK to convert to PO per Dr. Underwood Magdaleno   Will be discharged on doxycycline 100 mg BID that he was taking prior to admission  Per ID - 14 days of abx with stop date 6/17      Pharmacy will follow daily and adjust medications as appropriate for renal function and/or serum levels.     Pharmacist Cynthia Trevino RPh

## 2021-06-12 NOTE — PROGRESS NOTES
Matt.Karissa) Verbal shift change report given to Ximena Rust RN (oncoming nurse) by Irena Vargas RN (offgoing nurse). Report included the following information SBAR, Kardex, Intake/Output, MAR and Recent Results. 0820) Pt assessed and vital signs stable. BP elevated at 140/72. Pt c/o pain 7/10 to his back/coccyx. PRN dilaudid given along with scheduled meds. Pt left resting in bed eating breakfast.    0945) Pt resting in bed with no needs at this time    1000) Interdisciplinary team rounds were held 6/12/2021 with the following team members:Nursing and Physician. Plan of care discussed. See clinical pathway and/or care plan for interventions and desired outcomes. 1100) Students providing bed bath at this time. 1230) Vanc through attempted twice and unsuccessful by this writer. Amilcar to help with blood draw. Pt left resting in bed at this time. [phone] Rajinder Benton RN able to collect vanc through and blood cultures. Labs sent down. New urinal provided for urine specimen. Pt stated understanding. Pt left resting in bed with his grandmother at the bedside. 1430) PRN kenzie given for wound care prophylactic. Water pitcher refilled and pt left resting in bed.       1645) Pt resting in bed at this time. Attempting to complete wound care and orders state for Hydrofera blue, which is not carried at this facility. MD and supervisor made aware. Wound care nurse called and message left. This writer, nursing supervisor and MD consulted the wound care book, however, no alternatives mentioned. 1800) Wound care was performed on bilateral axilla. Wound care on groin not completed r/t lack of supplies. Supervisor attempting to retrieve supplies from [facility]. Pt tolerated wound care well. See chart.        1900) Bedside shift change report given to Irena Vargas RN (oncoming nurse) by Ximena Rust RN (offgoing nurse). Report included the following information SBAR, Kardex, Intake/Output, MAR and Recent Results.

## 2021-06-12 NOTE — PROGRESS NOTES
TRANSFER - IN REPORT:    Verbal report received from [facility] HARLEEN RN(name) on Deborah Adjutant  being received from [facility] (unit) for routine progression of care. Report consisted of patients Situation, Background, Assessment and   Recommendations(SBAR). Information from the following report(s)  SBAR and Kardex was reviewed with the receiving nurse. Opportunity for questions and clarification was provided. Assessment completed upon patients arrival to unit and care assumed. Patient resting quietly in bed at this time.

## 2021-06-12 NOTE — PROGRESS NOTES
[facility] Dosing Services: Antimicrobial Stewardship    Consult for antibiotic dosing of vancomycin by Dr. Ron Sherman  Indication: skin and soft tissue infection - Hidradenitis suppurativa  Day of Therapy 9 of 14 - stop date 6/17    Ht Readings from Last 1 Encounters:   06/10/21 190.5 cm (75\")        Wt Readings from Last 1 Encounters:   06/11/21 77.3 kg (170 lb 6.7 oz)      Vancomycin therapy:    Current maintenance dose: 1000 mg every 6 hours   Dose calculated to approximate a therapeutic trough of 10-15 mcg/mL.     Last trough level 15.1 mcg/ml on 6/10 @ 17:22 [facility])    Plan for level / Adjustment in Therapy: trough ordered today before 13:00 dose  Dose administration notes: continuing upon transfer from [facility]    Date Dose & Interval Measured (mcg/mL) Extrapolated (mcg/mL)   6/6 @ 0120 750mg q8h 6.9 6 ()   6/6 @ 2127 750mg q6h 9.4 9.36 ()   6/8 @ 0007 1000mg q6h 18.8 3.75 ()   6/9 @ 0356 1250mg q6h 23.8 (random) 19.5 ()   6/10 @ 1722 1000mg q6h 18.6 15.1 ()       Other Antimicrobial   (not dosed by pharmacist) Levofloxacin 750 mg PO every 24 hours - stop date 6/17  Metronidazole 500 mg PO every 24 hours - stop date 6/17  (Meropenem 500 mg IV x 1 on 6/4/21)   Cultures 6/4 Blood: NGTD- final  6/5 Wound: BETA HEMOLYTIC GROUP C STREP  6/5 Wound: BETA HEMOLYTIC GROUP C STREP   Serum Creatinine Lab Results   Component Value Date/Time    Creatinine 0.64 (L) 06/12/2021 03:14 AM         Creatinine Clearance 176.4 mL/min (actual body weight)     Temp Temp: 98.4 °F (36.9 °C)       WBC Lab Results   Component Value Date/Time    WBC 11.7 (H) 06/12/2021 03:14 AM        H/H Lab Results   Component Value Date/Time    HGB 8.6 (L) 06/12/2021 03:14 AM        Platelets    Lab Results   Component Value Date/Time    PLATELET 007 (H) 45/82/6584 03:14 AM                Recent Labs     06/11/21  0428 06/10/21  0425 06/09/21  0358   CREA 0.45* 0.47* 0.53*   BUN 7 7 8   WBC 12.9* 12.5* 14.8* Impression/Plan:   The vancomycin trough resulted at 15.1 mcg/ml () on 6/10 at [facility]. Will continue with the current regimen of 1000 mg IV every 6 hours. Trough ordered today before dose due @ 13:00  Levofloxacin and metronidazole OK to convert to PO per Dr. Hodan Vaughn   Will be discharged on doxycycline 100 mg BID that he was taking prior to admission  Per ID - 14 days of abx with stop date 6/17      Pharmacy will follow daily and adjust medications as appropriate for renal function and/or serum levels.     Pharmacist [place], [facility]

## 2021-06-12 NOTE — ACP (ADVANCE CARE PLANNING)
Patient understands advance directive discussion and wants CPR and ventilation if needed. He states his grandparent, Vince Ha as his emergency contact and next of kin.  ([phone])    Adela Canada RN/DEVIKA  [phone]

## 2021-06-12 NOTE — PROGRESS NOTES
Problem: Pressure Injury - Risk of  Goal: *Prevention of pressure injury  Description: Document Dann Scale and appropriate interventions in the flowsheet. Outcome: Progressing Towards Goal  Note: Pressure Injury Interventions:  Sensory Interventions: Assess need for specialty bed    Moisture Interventions: Limit adult briefs, Maintain skin hydration (lotion/cream), Minimize layers    Activity Interventions: Pressure redistribution bed/mattress(bed type)    Mobility Interventions: Trapeze to reposition    Nutrition Interventions: Document food/fluid/supplement intake    Friction and Shear Interventions: Foam dressings/transparent film/skin sealants                Problem: Activity Intolerance  Goal: *Oxygen saturation during activity within specified parameters  Outcome: Progressing Towards Goal     Problem: Falls - Risk of  Goal: *Absence of Falls  Description: Document Omid Fall Risk and appropriate interventions in the flowsheet. Outcome: Progressing Towards Goal  Note: Fall Risk Interventions:  Mobility Interventions: Communicate number of staff needed for ambulation/transfer         Medication Interventions: Teach patient to arise slowly         History of Falls Interventions: Door open when patient unattended         Problem: Pain  Goal: *Control of Pain  Outcome: Progressing Towards Goal     Problem:  Activity Intolerance  Goal: *Able to remain out of bed as prescribed  Outcome: Not Progressing Towards Goal

## 2021-06-12 NOTE — PROGRESS NOTES
Problem: Pressure Injury - Risk of  Goal: *Prevention of pressure injury  Description: Document Dann Scale and appropriate interventions in the flowsheet. Outcome: Progressing Towards Goal  Note: Pressure Injury Interventions:                                            Problem: Activity Intolerance  Goal: *Oxygen saturation during activity within specified parameters  Outcome: Progressing Towards Goal  Goal: *Able to remain out of bed as prescribed  Outcome: Progressing Towards Goal     Problem: Falls - Risk of  Goal: *Absence of Falls  Description: Document Rosbrynn Ano Fall Risk and appropriate interventions in the flowsheet.   Outcome: Progressing Towards Goal  Note: Fall Risk Interventions:                                Problem: General Infection Care Plan (Adult and Pediatric)  Goal: Improvement in signs and symptoms of infection  Outcome: Progressing Towards Goal  Goal: *Optimize nutritional status  Outcome: Progressing Towards Goal

## 2021-06-12 NOTE — PROGRESS NOTES
1513: In bed, resting quietly. Appears to be sleeping. 1617: In bed, resting quietly. On phone. No signs of distress. 1715: In bed, resting quietly. Watching TV. Denies any needs at this time. 1800: In bed. ALESSANDRO Bower and nursing students at bedside. Performing wound care. Denies any needs at this time.

## 2021-06-12 NOTE — PROGRESS NOTES
Reason for Admission:  Cassy Lima is a 22 y. o.  male with PMH of above-mentioned problems who presents medical floor at [facility] as a direct transfer from [facility] for IV antibiotics and wound care. Patient initially presented to [facility] was found to be in sepsis secondary to HS flare. Transfer to [facility] where he was started on broad-spectrum antibiotics. Patient was asked by ID and general surgery. Patient is transferred back as he does not have insurance and cannot afford expensive wound care.                        RUR Score:      9%               Plan for utilizing home health: unknown at this time         PCP: First and Last name:  Franki Gregorio, NP     Name of Practice:    Are you a current patient: Yes/No: yes   Approximate date of last visit: 6/4/21   Can you participate in a virtual visit with your PCP: no                    Current Advanced Directive/Advance Care Plan: Full Code Patient understands advance directive discussion and wants CPR and ventilation if needed. He states his grandparent, Rafa Jackson as his emergency contact and next of kin. ([phone])      Healthcare Decision Maker:   Click here to complete [address] including selection of the Healthcare Decision Maker Relationship (ie \"Primary\")                             Transition of Care Plan:   CM assessed patient at bedside. Extended care patient, transferred back to [facility] from [facility] for wound care and IV ABX till 6/17. Verified demographics with patient. Patient is at [address] [place] [address], [address] [facility]. He lives in a 2 story home, with 11 steps to get into the house and 14 steps inside the house. He lives with 3 roommates. He does not drive. His friends/ family help with rides. He occasionally uses Lyft. The best number to reach him at is [phone].  He works at [employer], but currently has reduced hours of work due to his condition. He does not receive any other form of income. He has a grabber at home, but no other assistive devices. Currently patient is self pay, a medicaid application was done already. His primary care physician is Lokesh Hernandez, who he last saw on 6/4/21. He uses the [facility] pharmacy on [address]. He does see any specialists. He takes no medications at home. Upon discharge he will need transport home. As per patient, his roommates are very helpful and they want him to recover. while discussing options, the patient mentioned that his roommates can help him with his wound care and dressing changes. He can speak to them to come in for a dressing change educational session with the RN and can learn the wound care. This option can be explored once the Wound care RN assesses the patient. The patient also mentioned he has been doing his own wound dressing changes and is able to reach his wounds on the back. CM: pcp follow up, specialty appt at [facility] clinic , as per CM note from [facility]:             \"ID MD wants CM to make appt with Hidradenitis Suppurativa clinic info at [facility] by contacting office below:   [address] at [facility]  [address] [place] [address], . [address], [place] [place] [address]  Phone: [phone]  Fax: [phone]  Phone: [phone]  Fax: [phone]     \"    Arrange transport upon discharge, and any other recommended services. Care Management Interventions  PCP Verified by CM: Yes  Last Visit to PCP: 06/04/21  Mode of Transport at Discharge: Other (see comment) (may need transport home)  Health Maintenance Reviewed: Yes  Current Support Network:  Other (lives with room mates)  Confirm Follow Up Transport: Self  The Plan for Transition of Care is Related to the Following Treatment Goals : pcp follow up, wound care, [facility] and dermatology speciality follow up  The Patient and/or Patient Representative was Provided with a Choice of Provider and Agrees with the Discharge Plan?: Yes  Freedom of Choice List was Provided with Basic Dialogue that Supports the Patient's Individualized Plan of Care/Goals, Treatment Preferences and Shares the Quality Data Associated with the Providers?: Yes  Discharge Location  Discharge Placement: [address] Farhad Campuzano RN/DEVIKA  [phone]

## 2021-06-12 NOTE — PROGRESS NOTES
Telemedicine cross cover:    Pt transferred from [facility] for extended IV abx therapy. Chart reviewed   - hospitalized for sepsis, hidradenitis suppuritiva with abscesses in groin and axilla.    - strep beta-hemolytic group C   - on IV Vanc and oral levaquin, flagyl       - check labs in am including iron studies   - pt will be assessed by day hospitalist.

## 2021-06-13 LAB
ANION GAP SERPL CALC-SCNC: 7 MMOL/L (ref 5–15)
BASOPHILS # BLD: 0.1 K/UL (ref 0–0.1)
BASOPHILS NFR BLD: 1 % (ref 0–1)
BUN SERPL-MCNC: 7 MG/DL (ref 6–20)
BUN/CREAT SERPL: 11 (ref 12–20)
CALCIUM SERPL-MCNC: 9.1 MG/DL (ref 8.5–10.1)
CHLORIDE SERPL-SCNC: 103 MMOL/L (ref 97–108)
CO2 SERPL-SCNC: 32 MMOL/L (ref 21–32)
CREAT SERPL-MCNC: 0.66 MG/DL (ref 0.7–1.3)
DIFFERENTIAL METHOD BLD: ABNORMAL
EOSINOPHIL # BLD: 0.5 K/UL (ref 0–0.4)
EOSINOPHIL NFR BLD: 4 % (ref 0–7)
ERYTHROCYTE [DISTWIDTH] IN BLOOD BY AUTOMATED COUNT: 16.6 % (ref 11.5–14.5)
GLUCOSE SERPL-MCNC: 93 MG/DL (ref 65–100)
HCT VFR BLD AUTO: 30.8 % (ref 36.6–50.3)
HGB BLD-MCNC: 9.1 G/DL (ref 12.1–17)
IMM GRANULOCYTES # BLD AUTO: 0.2 K/UL (ref 0–0.04)
IMM GRANULOCYTES NFR BLD AUTO: 2 % (ref 0–0.5)
LYMPHOCYTES # BLD: 1.2 K/UL (ref 0.8–3.5)
LYMPHOCYTES NFR BLD: 10 % (ref 12–49)
MCH RBC QN AUTO: 21.6 PG (ref 26–34)
MCHC RBC AUTO-ENTMCNC: 29.5 G/DL (ref 30–36.5)
MCV RBC AUTO: 73.2 FL (ref 80–99)
MONOCYTES # BLD: 0.9 K/UL (ref 0–1)
MONOCYTES NFR BLD: 8 % (ref 5–13)
NEUTS SEG # BLD: 9.1 K/UL (ref 1.8–8)
NEUTS SEG NFR BLD: 75 % (ref 32–75)
NRBC # BLD: 0 K/UL (ref 0–0.01)
NRBC BLD-RTO: 0 PER 100 WBC
PLATELET # BLD AUTO: 488 K/UL (ref 150–400)
PMV BLD AUTO: 8.3 FL (ref 8.9–12.9)
POTASSIUM SERPL-SCNC: 3.9 MMOL/L (ref 3.5–5.1)
RBC # BLD AUTO: 4.21 M/UL (ref 4.1–5.7)
SODIUM SERPL-SCNC: 142 MMOL/L (ref 136–145)
WBC # BLD AUTO: 12 K/UL (ref 4.1–11.1)

## 2021-06-13 PROCEDURE — 74011250637 HC RX REV CODE- 250/637: Performed by: STUDENT IN AN ORGANIZED HEALTH CARE EDUCATION/TRAINING PROGRAM

## 2021-06-13 PROCEDURE — 74011250636 HC RX REV CODE- 250/636: Performed by: STUDENT IN AN ORGANIZED HEALTH CARE EDUCATION/TRAINING PROGRAM

## 2021-06-13 PROCEDURE — 36415 COLL VENOUS BLD VENIPUNCTURE: CPT

## 2021-06-13 PROCEDURE — 94760 N-INVAS EAR/PLS OXIMETRY 1: CPT

## 2021-06-13 PROCEDURE — 74011250637 HC RX REV CODE- 250/637: Performed by: INTERNAL MEDICINE

## 2021-06-13 PROCEDURE — 65270000032 HC RM SEMIPRIVATE

## 2021-06-13 PROCEDURE — 74011000250 HC RX REV CODE- 250: Performed by: INTERNAL MEDICINE

## 2021-06-13 PROCEDURE — 85025 COMPLETE CBC W/AUTO DIFF WBC: CPT

## 2021-06-13 PROCEDURE — 77030041076 HC DRSG AG OPTICELL MDII -A

## 2021-06-13 PROCEDURE — 80048 BASIC METABOLIC PNL TOTAL CA: CPT

## 2021-06-13 RX ORDER — IBUPROFEN 400 MG/1
800 TABLET ORAL
Status: DISCONTINUED | OUTPATIENT
Start: 2021-06-13 | End: 2021-06-29 | Stop reason: HOSPADM

## 2021-06-13 RX ADMIN — GABAPENTIN 100 MG: 100 CAPSULE ORAL at 08:54

## 2021-06-13 RX ADMIN — LIDOCAINE HYDROCHLORIDE: 20 JELLY TOPICAL at 08:57

## 2021-06-13 RX ADMIN — GABAPENTIN 100 MG: 100 CAPSULE ORAL at 21:01

## 2021-06-13 RX ADMIN — HYDROMORPHONE HYDROCHLORIDE 1 MG: 1 INJECTION, SOLUTION INTRAMUSCULAR; INTRAVENOUS; SUBCUTANEOUS at 14:50

## 2021-06-13 RX ADMIN — Medication 10 ML: at 21:02

## 2021-06-13 RX ADMIN — ACETAMINOPHEN 975 MG: 325 TABLET, FILM COATED ORAL at 12:51

## 2021-06-13 RX ADMIN — VANCOMYCIN HYDROCHLORIDE 1250 MG: 500 INJECTION, POWDER, LYOPHILIZED, FOR SOLUTION INTRAVENOUS at 05:07

## 2021-06-13 RX ADMIN — POLYETHYLENE GLYCOL 3350 17 G: 17 POWDER, FOR SOLUTION ORAL at 08:54

## 2021-06-13 RX ADMIN — Medication 10 ML: at 14:51

## 2021-06-13 RX ADMIN — METRONIDAZOLE 500 MG: 500 TABLET ORAL at 16:23

## 2021-06-13 RX ADMIN — VANCOMYCIN HYDROCHLORIDE 1250 MG: 500 INJECTION, POWDER, LYOPHILIZED, FOR SOLUTION INTRAVENOUS at 13:11

## 2021-06-13 RX ADMIN — ACETAMINOPHEN 975 MG: 325 TABLET, FILM COATED ORAL at 17:36

## 2021-06-13 RX ADMIN — LEVOFLOXACIN 750 MG: 750 TABLET, FILM COATED ORAL at 03:16

## 2021-06-13 RX ADMIN — GABAPENTIN 100 MG: 100 CAPSULE ORAL at 16:23

## 2021-06-13 RX ADMIN — OXYCODONE HYDROCHLORIDE 10 MG: 5 TABLET ORAL at 03:16

## 2021-06-13 RX ADMIN — Medication 10 ML: at 05:07

## 2021-06-13 RX ADMIN — ACETAMINOPHEN 975 MG: 325 TABLET, FILM COATED ORAL at 05:07

## 2021-06-13 RX ADMIN — METRONIDAZOLE 500 MG: 500 TABLET ORAL at 03:15

## 2021-06-13 RX ADMIN — TRAMADOL HYDROCHLORIDE 50 MG: 50 TABLET, FILM COATED ORAL at 12:51

## 2021-06-13 RX ADMIN — Medication 5 ML: at 14:00

## 2021-06-13 RX ADMIN — VANCOMYCIN HYDROCHLORIDE 1250 MG: 500 INJECTION, POWDER, LYOPHILIZED, FOR SOLUTION INTRAVENOUS at 21:01

## 2021-06-13 NOTE — PROGRESS NOTES
Problem: Pressure Injury - Risk of  Goal: *Prevention of pressure injury  Description: Document Dann Scale and appropriate interventions in the flowsheet. Outcome: Progressing Towards Goal  Note: Pressure Injury Interventions:  Sensory Interventions: Keep linens dry and wrinkle-free, Minimize linen layers    Moisture Interventions: Minimize layers, Maintain skin hydration (lotion/cream)    Activity Interventions: Pressure redistribution bed/mattress(bed type), Increase time out of bed    Mobility Interventions: Pressure redistribution bed/mattress (bed type)    Nutrition Interventions: Document food/fluid/supplement intake, Offer support with meals,snacks and hydration    Friction and Shear Interventions: Minimize layers, Apply protective barrier, creams and emollients                Problem: Activity Intolerance  Goal: *Oxygen saturation during activity within specified parameters  Outcome: Progressing Towards Goal  Goal: *Able to remain out of bed as prescribed  Outcome: Progressing Towards Goal     Problem: Falls - Risk of  Goal: *Absence of Falls  Description: Document Karla Youssef Fall Risk and appropriate interventions in the flowsheet.   Outcome: Progressing Towards Goal  Note: Fall Risk Interventions:  Mobility Interventions: Utilize walker, cane, or other assistive device, Strengthening exercises (ROM-active/passive)         Medication Interventions: Teach patient to arise slowly         History of Falls Interventions: Bed/chair exit alarm, Door open when patient unattended, Room close to nurse's station         Problem: Pain  Goal: *Control of Pain  Outcome: Progressing Towards Goal  Goal: *PALLIATIVE CARE:  Alleviation of Pain  Outcome: Progressing Towards Goal

## 2021-06-13 NOTE — PROGRESS NOTES
Problem: Pressure Injury - Risk of  Goal: *Prevention of pressure injury  Description: Document Dann Scale and appropriate interventions in the flowsheet. Outcome: Progressing Towards Goal  Note: Pressure Injury Interventions:  Sensory Interventions: Keep linens dry and wrinkle-free, Minimize linen layers    Moisture Interventions: Minimize layers, Maintain skin hydration (lotion/cream)    Activity Interventions: Pressure redistribution bed/mattress(bed type), Increase time out of bed    Mobility Interventions: Pressure redistribution bed/mattress (bed type)    Nutrition Interventions: Document food/fluid/supplement intake, Offer support with meals,snacks and hydration    Friction and Shear Interventions: Minimize layers, Apply protective barrier, creams and emollients                Problem: Patient Education: Go to Patient Education Activity  Goal: Patient/Family Education  Outcome: Progressing Towards Goal     Problem: Activity Intolerance  Goal: *Oxygen saturation during activity within specified parameters  Outcome: Progressing Towards Goal  Goal: *Able to remain out of bed as prescribed  Outcome: Progressing Towards Goal     Problem: Patient Education: Go to Patient Education Activity  Goal: Patient/Family Education  Outcome: Progressing Towards Goal     Problem: Falls - Risk of  Goal: *Absence of Falls  Description: Document Belkys Flores Fall Risk and appropriate interventions in the flowsheet.   Outcome: Progressing Towards Goal  Note: Fall Risk Interventions:  Mobility Interventions: Utilize walker, cane, or other assistive device, Strengthening exercises (ROM-active/passive)         Medication Interventions: Teach patient to arise slowly         History of Falls Interventions: Bed/chair exit alarm, Door open when patient unattended, Room close to nurse's station         Problem: Patient Education: Go to Patient Education Activity  Goal: Patient/Family Education  Outcome: Progressing Towards Goal     Problem: Pain  Goal: *Control of Pain  Outcome: Progressing Towards Goal  Goal: *PALLIATIVE CARE:  Alleviation of Pain  Outcome: Progressing Towards Goal     Problem: Patient Education: Go to Patient Education Activity  Goal: Patient/Family Education  Outcome: Progressing Towards Goal     Problem: General Infection Care Plan (Adult and Pediatric)  Goal: Improvement in signs and symptoms of infection  Outcome: Progressing Towards Goal  Goal: *Optimize nutritional status  Outcome: Progressing Towards Goal     Problem: Patient Education: Go to Patient Education Activity  Goal: Patient/Family Education  Outcome: Progressing Towards Goal

## 2021-06-13 NOTE — PROGRESS NOTES
0730: VS obtained. In bed, lying supine. Resting quietly. Denies any needs at this time. 8453: In bed, resting quietly. On phone. 1000: In bed, resting quietly. 1100: In bed, resting quietly. 1200: In bed, eating lunch. Family member at bedside. Denies any needs at this time. 1313: Placed new ID band on pt. Verified with name and . 1330: In bed, resting quietly. Lying supine and appears to be sleeping. 1400: In bed, resting quietly. Appears to be sleeping. 1500: In bed, no signs of distress. RN and nursing students at bedside. 1615: In bed, no signs of distress. RN and nursing students at bedside performing wound care. 1700: In bed, resting quietly. Watching TV.   1736: In bed, resting quietly. Watching TV. Provided cranberry juice and ginger ale.

## 2021-06-13 NOTE — PROGRESS NOTES
Bedside shift change report given to Jones Douglas RN (oncoming nurse) by Veena Oerllana RN (offgoing nurse). Report included the following information SBAR, Intake/Output and MAR. Patient resting quietly in bed at this time.

## 2021-06-13 NOTE — PROGRESS NOTES
Bedside shift change report given to Annemarie Meeks RN (oncoming nurse) by Leigha Minor (offgoing nurse). Report included the following information SBAR, Intake/Output and MAR.     1130) Pt resting in bed, watching TV.    1251) Pt resting in bed, watching TV. Visitor present. [phone]) Staff with pt.    [phone] Pt resting in bed, watching TV.    1610) Wound care performed by students, instructor and this RN. 1751) Pt resting in bed, watching TV. Bedside shift change report given to Agata Devine RN (oncoming nurse) by Annemarie Meeks RN (offgoing nurse). Report included the following information SBAR, Intake/Output and MAR.

## 2021-06-13 NOTE — PROGRESS NOTES
Physical therapy:     Chart reviewed. Formal PT evaluation to follow Monday 6/14/2021.      Paul Malloy, PT

## 2021-06-13 NOTE — PROGRESS NOTES
Hospitalist Progress Note    NAME: Tyrese Rice   :  1995   MRN:  [id number]   Room Number:  481/93  @ [address] [place] [address] Summary: 22 y.o. male whom presented on 2021 with      Assessment / Plan:      # Hidradenitis suppurative stage III ( Hurely staging due to multiple abscesses/ sinus tracts)   - Failed outpatient Rx   - assessed by GS at [facility]- No need for debridement per surgery   - Wound Cx : LIGHT STREPTOCOCCI, BETA HEMOLYTIC GROUP C  - Assessed by ID recommending Vanc, levofloxacin/flagyl till    - aggressive wound care   -  - to resume Doxycycline 100 mg BID after completion of broad Abx course   - Outpatient Follow up w/ [facility] clinic      # Pain management   - Initiate Gabapentin 100 mg TID ( up titrate as needed )   - schedule tylenol 975 mg TID   - Motrin 800 mg Q6 PRN for mild pain   - Tramadol 50 mg Q4 for moderate pain   - start oxycodon taper for severe pain   - IV dilaudid only for dressing change   - patient updated and agreed w/ pain mgmt plan         # Anemia of chronic diease  - Hgb 8.2 w/ MCV 71   - Iron 17 , TIBC 122, Ferritin 570 , Iron sat 14  - Hold Iron in the setting of active infection      There is no height or weight on file to calculate BMI. Code Status: Full   Surrogate Decision Maker:     DVT Prophylaxis: Lovenox  GI Prophylaxis: not indicated  Baseline:          Subjective:     Chief Complaint / Reason for Physician Visit  Patrick Mcghee  Discussed with RN events overnight. Review of Systems:  No fevers, chills, appetite change, cough, sputum production, shortness of breath, dyspnea on exertion, nausea, vomitting, diarrhea, constipation, chest pain, leg edema, abdominal pain, joint pain, rash, itching. Tolerating PT/OT. Tolerating diet. Objective:     VITALS:   Last 24hrs VS reviewed since prior progress note.  Most recent are:  Patient Vitals for the past 24 hrs:   Temp Pulse Resp BP SpO2   21 0730 98.4 °F (36.9 °C) 97 16 131/60 96 %   06/13/21 0352 98.2 °F (36.8 °C) 84 15 124/76 99 %   06/12/21 1932 98.5 °F (36.9 °C) 83 17 133/89 98 %   06/12/21 1555 98.6 °F (37 °C) 96 16 117/67 97 %       Intake/Output Summary (Last 24 hours) at 6/13/2021 0829  Last data filed at 6/13/2021 0749  Gross per 24 hour   Intake --   Output 1300 ml   Net -1300 ml        PHYSICAL EXAM:  General: WD, WN. Alert, cooperative, no acute distress    EENT:  EOMI. Anicteric sclerae. MMM  Resp:  CTA bilaterally, no wheezing or rales. No accessory muscle use  CV:  Regular  rhythm,  normal S1/S2, no murmurs rubs gallops, No edema  GI:  Soft, Non distended, Non tender. +Bowel sounds  Neurologic:  Alert and oriented X 3, normal speech,   Psych:   Good insight. Not anxious nor agitated  Skin:  No rashes. No jaundice, axillary and gluteal wounds     Reviewed most current lab test results and cultures  YES  Reviewed most current radiology test results   YES  Review and summation of old records today    NO  Reviewed patient's current orders and MAR    YES  PMH/SH reviewed - no change compared to H&P  ________________________________________________________________________  Care Plan discussed with:    Comments   Patient x    Family      RN x    Care Manager     Consultant                        Multidiciplinary team rounds were held today with , nursing, pharmacist and clinical coordinator. Patient's plan of care was discussed; medications were reviewed and discharge planning was addressed.      ________________________________________________________________________  Total NON critical care TIME:  20  Minutes    Total CRITICAL CARE TIME Spent:   Minutes non procedure based      Comments   >50% of visit spent in counseling and coordination of care x    ________________________________________________________________________  Humberto Melton MD     Procedures: see electronic medical records for all procedures/Xrays and details which were not copied into this note but were reviewed prior to creation of Plan. LABS:  I reviewed today's most current labs and imaging studies.   Pertinent labs include:  Recent Labs     06/13/21 0256 06/12/21 0314 06/11/21 0428   WBC 12.0* 11.7* 12.9*   HGB 9.1* 8.6* 8.2*   HCT 30.8* 28.8* 27.4*   * 460* 428*     Recent Labs     06/13/21 0256 06/12/21 0314 06/11/21 0428    139 134*   K 3.9 3.9 3.8    100 100   CO2 32 29 32   GLU 93 88 92   BUN 7 6 7   CREA 0.66* 0.64* 0.45*   CA 9.1 9.0 8.9       Signed: Padmini Mchugh MD

## 2021-06-13 NOTE — PROGRESS NOTES
Verbal shift change report given to Maren Olson RN (oncoming nurse) by Rosa Albrecht RN (offgoing nurse). Report included the following information SBAR, Kardex, Intake/Output, MAR and Recent Results. Patient resting in bed quietly, no complaints at this time.

## 2021-06-13 NOTE — PROGRESS NOTES
Problem: Activity Intolerance  Goal: *Able to remain out of bed as prescribed  Outcome: Progressing Towards Goal     Problem: Falls - Risk of  Goal: *Absence of Falls  Description: Document Payal Argueta Fall Risk and appropriate interventions in the flowsheet.   Outcome: Progressing Towards Goal  Note: Fall Risk Interventions:  Mobility Interventions: Communicate number of staff needed for ambulation/transfer         Medication Interventions: Teach patient to arise slowly         History of Falls Interventions: Bed/chair exit alarm, Door open when patient unattended, Room close to nurse's station         Problem: Pain  Goal: *Control of Pain  Outcome: Progressing Towards Goal

## 2021-06-13 NOTE — PROGRESS NOTES
[address] [address] Pharmacy Dosing Services: Antimicrobial Stewardship    Consult for antibiotic dosing of vancomycin by Dr. Kirti Rajput  Indication: skin and soft tissue infection - Hidradenitis suppurativa  Day of Therapy 10 of 14 - stop date 6/17    Ht Readings from Last 1 Encounters:   06/10/21 190.5 cm (75\")        Wt Readings from Last 1 Encounters:   06/11/21 77.3 kg (170 lb 6.7 oz)      Vancomycin therapy:    Current maintenance dose: 1250 mg every 8 hours     Target trough : 10-15 mcg/mL  Last trough level 17.5 mcg/ml with extrapolated trough as 16.94 mcg/mL on 6/12 @ 12:45     SCr increased slightly 0.64 to 0.66 mg/dL  Plan for level / Adjustment in Therapy: Trough ordered @ 04:00 before dose due at 05:00 tomorrow 6/14/21    Dose administration notes: doses given appropriately    Date Dose & Interval Measured (mcg/mL) Extrapolated (mcg/mL)   6/6 @ 0120 750mg q8h 6.9 6 ()   6/6 @ 2127 750mg q6h 9.4 9.36 ()   6/8 @ 0007 1000mg q6h 18.8 3.75 ()   6/9 @ 0356 1250mg q6h 23.8 (random) 19.5 ()   6/10 @ 1722 1000mg q6h 18.6 15.1 ()   6/12 @ 1245 1000 mg q6h 17.5 16.94     Other Antimicrobial   (not dosed by pharmacist) Levofloxacin 750 mg PO every 24 hours - stop date 6/17  Metronidazole 500 mg PO every 24 hours - stop date 6/17  (Meropenem 500 mg IV x 1 on 6/4/21)   Cultures 6/4 Blood: NGTD- final  6/5 Wound: BETA HEMOLYTIC GROUP C STREP  6/5 Wound: BETA HEMOLYTIC GROUP C STREP   Serum Creatinine Lab Results   Component Value Date/Time    Creatinine 0.66 (L) 06/13/2021 02:56 AM         Creatinine Clearance Estimated Creatinine Clearance: 176.2 mL/min (A) (by C-G formula based on SCr of 0.66 mg/dL (L)).      Temp Temp: 98.4 °F (36.9 °C)       WBC Lab Results   Component Value Date/Time    WBC 12.0 (H) 06/13/2021 02:56 AM        H/H Lab Results   Component Value Date/Time    HGB 9.1 (L) 06/13/2021 02:56 AM        Platelets    Lab Results   Component Value Date/Time    PLATELET 423 (H) 06/13/2021 02:56 AM            Impression/Plan:   The vancomycin trough resulted at trough level 17.5 mcg/ml with extrapolated trough as 16.94 mcg/mL on 6/12 @ 12:45   Dose adjusted to vancomycin 1250 mg IV q8h. Trough ordered 6/14 @ 04:00  Levofloxacin and metronidazole PO - stop date 6/17   Will be discharged on doxycycline 100 mg BID that he was taking prior to admission  Per ID - 14 days of abx with stop date 6/17      Pharmacy will follow daily and adjust medications as appropriate for renal function and/or serum levels.     Pharmacist Al Payor, [facility]

## 2021-06-14 LAB
ANION GAP SERPL CALC-SCNC: 11 MMOL/L (ref 5–15)
BUN SERPL-MCNC: 5 MG/DL (ref 6–20)
BUN/CREAT SERPL: 9 (ref 12–20)
CALCIUM SERPL-MCNC: 8.9 MG/DL (ref 8.5–10.1)
CHLORIDE SERPL-SCNC: 102 MMOL/L (ref 97–108)
CO2 SERPL-SCNC: 27 MMOL/L (ref 21–32)
CREAT SERPL-MCNC: 0.55 MG/DL (ref 0.7–1.3)
DATE LAST DOSE: ABNORMAL
GLUCOSE BLD STRIP.AUTO-MCNC: 129 MG/DL (ref 65–117)
GLUCOSE SERPL-MCNC: 86 MG/DL (ref 65–100)
POTASSIUM SERPL-SCNC: 4 MMOL/L (ref 3.5–5.1)
REPORTED DOSE,DOSE: ABNORMAL UNITS
REPORTED DOSE/TIME,TMG: ABNORMAL
SERVICE CMNT-IMP: ABNORMAL
SODIUM SERPL-SCNC: 140 MMOL/L (ref 136–145)
VANCOMYCIN TROUGH SERPL-MCNC: 16 UG/ML (ref 5–10)

## 2021-06-14 PROCEDURE — 97165 OT EVAL LOW COMPLEX 30 MIN: CPT

## 2021-06-14 PROCEDURE — 97116 GAIT TRAINING THERAPY: CPT | Performed by: PHYSICAL THERAPIST

## 2021-06-14 PROCEDURE — 82962 GLUCOSE BLOOD TEST: CPT

## 2021-06-14 PROCEDURE — 97161 PT EVAL LOW COMPLEX 20 MIN: CPT | Performed by: PHYSICAL THERAPIST

## 2021-06-14 PROCEDURE — 97530 THERAPEUTIC ACTIVITIES: CPT

## 2021-06-14 PROCEDURE — 74011000250 HC RX REV CODE- 250: Performed by: INTERNAL MEDICINE

## 2021-06-14 PROCEDURE — 80202 ASSAY OF VANCOMYCIN: CPT

## 2021-06-14 PROCEDURE — 74011250636 HC RX REV CODE- 250/636: Performed by: INTERNAL MEDICINE

## 2021-06-14 PROCEDURE — 65270000032 HC RM SEMIPRIVATE

## 2021-06-14 PROCEDURE — 80048 BASIC METABOLIC PNL TOTAL CA: CPT

## 2021-06-14 PROCEDURE — 36415 COLL VENOUS BLD VENIPUNCTURE: CPT

## 2021-06-14 PROCEDURE — 74011250636 HC RX REV CODE- 250/636: Performed by: STUDENT IN AN ORGANIZED HEALTH CARE EDUCATION/TRAINING PROGRAM

## 2021-06-14 PROCEDURE — 74011250637 HC RX REV CODE- 250/637: Performed by: STUDENT IN AN ORGANIZED HEALTH CARE EDUCATION/TRAINING PROGRAM

## 2021-06-14 PROCEDURE — 97530 THERAPEUTIC ACTIVITIES: CPT | Performed by: PHYSICAL THERAPIST

## 2021-06-14 PROCEDURE — 74011250637 HC RX REV CODE- 250/637: Performed by: INTERNAL MEDICINE

## 2021-06-14 RX ORDER — NALOXONE HYDROCHLORIDE 0.4 MG/ML
0.4 INJECTION, SOLUTION INTRAMUSCULAR; INTRAVENOUS; SUBCUTANEOUS
Status: DISCONTINUED | OUTPATIENT
Start: 2021-06-14 | End: 2021-06-20 | Stop reason: SDUPTHER

## 2021-06-14 RX ADMIN — GABAPENTIN 100 MG: 100 CAPSULE ORAL at 10:52

## 2021-06-14 RX ADMIN — METRONIDAZOLE 500 MG: 500 TABLET ORAL at 03:12

## 2021-06-14 RX ADMIN — VANCOMYCIN HYDROCHLORIDE 1250 MG: 500 INJECTION, POWDER, LYOPHILIZED, FOR SOLUTION INTRAVENOUS at 05:21

## 2021-06-14 RX ADMIN — VANCOMYCIN HYDROCHLORIDE 1250 MG: 500 INJECTION, POWDER, LYOPHILIZED, FOR SOLUTION INTRAVENOUS at 21:05

## 2021-06-14 RX ADMIN — METRONIDAZOLE 500 MG: 500 TABLET ORAL at 16:00

## 2021-06-14 RX ADMIN — GABAPENTIN 100 MG: 100 CAPSULE ORAL at 16:41

## 2021-06-14 RX ADMIN — OXYCODONE HYDROCHLORIDE 10 MG: 5 TABLET ORAL at 13:51

## 2021-06-14 RX ADMIN — GABAPENTIN 100 MG: 100 CAPSULE ORAL at 21:05

## 2021-06-14 RX ADMIN — VANCOMYCIN HYDROCHLORIDE 1250 MG: 500 INJECTION, POWDER, LYOPHILIZED, FOR SOLUTION INTRAVENOUS at 13:51

## 2021-06-14 RX ADMIN — Medication 10 ML: at 13:51

## 2021-06-14 RX ADMIN — ACETAMINOPHEN 975 MG: 325 TABLET, FILM COATED ORAL at 17:58

## 2021-06-14 RX ADMIN — ACETAMINOPHEN 975 MG: 325 TABLET, FILM COATED ORAL at 05:21

## 2021-06-14 RX ADMIN — ENOXAPARIN SODIUM 40 MG: 40 INJECTION SUBCUTANEOUS at 10:52

## 2021-06-14 RX ADMIN — LEVOFLOXACIN 750 MG: 750 TABLET, FILM COATED ORAL at 03:12

## 2021-06-14 RX ADMIN — LIDOCAINE HYDROCHLORIDE: 20 JELLY TOPICAL at 09:40

## 2021-06-14 RX ADMIN — ACETAMINOPHEN 975 MG: 325 TABLET, FILM COATED ORAL at 13:51

## 2021-06-14 RX ADMIN — Medication 10 ML: at 05:23

## 2021-06-14 RX ADMIN — OXYCODONE HYDROCHLORIDE 10 MG: 5 TABLET ORAL at 20:00

## 2021-06-14 RX ADMIN — Medication 10 ML: at 21:05

## 2021-06-14 RX ADMIN — HYDROMORPHONE HYDROCHLORIDE 1 MG: 1 INJECTION, SOLUTION INTRAMUSCULAR; INTRAVENOUS; SUBCUTANEOUS at 09:40

## 2021-06-14 NOTE — PROGRESS NOTES
Post Fall Documentation      Jimmy Lewis witnessed/unwitnessed fall occurred on 06/14/2021 (Date) at 5 (Time). The answers to the following questions summarize the fall: In the patient's own words,:  · What were you attempting to do when you fell? Standing up flushing the toilet when the water overflowed. · Do you know why you fell? Slipped in the water. · Do you have any pain/discomfort or any other complaints? No.  · Which part of your body made contact with the floor or other object? Bottom. Nurse:  [facility] Was this an assisted fall? no   Was fall witnessed? Yes, by Tami Lange (PCT)   If witnessed, what part of the body made contact with the floor or other object? Bottom   Patients mental status after the fall/when found: AxO x4   Any apparent injury:  no   Immediate interventions for injury/suspected injury? N/A   Patient assisted back to bed? yes   Name of provider notified and time, any comments? Telemed physician, Abdiel Nam, at [phone]   Name of family member notified and time: N/A      Immediate VS and physical assessment documented in flow sheets. Neuro assessment every hour x 4 (for potential head injury or unwitnessed fall) documented in flow sheets.       Liane Matthews

## 2021-06-14 NOTE — PROGRESS NOTES
Problem: Mobility Impaired (Adult and Pediatric)  Goal: *Acute Goals and Plan of Care (Insert Text)  Description: FUNCTIONAL STATUS PRIOR TO ADMISSION: Patient was independent and active without use of DME prior to onset of painful wounds. However, over previous few weeks/months, patient reports being mostly sedentary, staying in bed to keep weight off wounds. HOME SUPPORT PRIOR TO ADMISSION: The patient lived with three roommates. Physical Therapy Goals  Initiated 6/14/2021  1. Patient will move from supine to sit and sit to supine in bed with stand by assistance within 7 day(s). 2.  Patient will transfer from bed to chair and chair to bed with contact guard assist using the least restrictive device within 7 day(s). 3.  Patient will perform sit to stand with stand by assistance within 7 day(s). 4.  Patient will ambulate with contact guard assist for 25 feet with the least restrictive device within 7 day(s). Outcome: Not Met    PHYSICAL THERAPY EVALUATION  Patient: Kassandra Ibrahim (89 y.o. male)  Date: 6/14/2021  Primary Diagnosis: Hidradenitis [L73.2]        Precautions: fall risk       ASSESSMENT  Based on the objective data described below, the patient presents with decreased balance, activity tolerance, and overall functional mobility. Patient most limited secondary to pain from wounds. Patient required increased time for bed mobility and transfers this date. He required CGA to min A for mobility today. Patient unable to attempt ambulation secondary to lightheadedness; vitals: seated 122/93 mmHg 126 bpm and standing 132/82 mmHg 166 bpm. Recommend assist of 1 with mobility while in hospital.    Current Level of Function Impacting Discharge (mobility/balance): CGA    Other factors to consider for discharge: Multiple wounds     Patient will benefit from skilled therapy intervention to address the above noted impairments.        PLAN :  Recommendations and Planned Interventions: bed mobility training, transfer training, gait training, therapeutic exercises, neuromuscular re-education, patient and family training/education, and therapeutic activities      Frequency/Duration: Patient will be followed by physical therapy:  4 times a week to address goals. Recommendation for discharge: (in order for the patient to meet his/her long term goals)  Physical therapy at least 2 days/week in the home AND ensure assist and/or supervision for safety with mobility    This discharge recommendation:  Has been made in collaboration with the attending provider and/or case management    IF patient discharges home will need the following DME: to be determined (TBD)         SUBJECTIVE:   Patient stated I guess I felt kind of lightheaded in the bathroom before the fell.     OBJECTIVE DATA SUMMARY:   HISTORY:    No past medical history on file. No past surgical history on file. Home Situation  Home Environment: Apartment  # Steps to Enter: 14 (7-10 up from street; 4 steps up to porch)  Rails to [employer]: No  One/Two Story Residence: One story  # of Interior Steps: 14  Interior Rails: Right  Lift Chair Available: No  Living Alone: Yes  Support Systems: Friends \ neighbors, Family member(s) (lives with 3 roommates; supportive grandmother in area)  Patient Expects to be Discharged to[de-identified] Apartment  Current DME Used/Available at Home: None    EXAMINATION/PRESENTATION/DECISION MAKING:   Critical Behavior:  Neurologic State: Alert  Orientation Level: Oriented X4  Cognition: Appropriate decision making, Appropriate for age attention/concentration, Appropriate safety awareness, Follows commands  Safety/Judgement: Awareness of environment, Fall prevention, Good awareness of safety precautions, Insight into deficits  Hearing:   Auditory  Auditory Impairment: None    Range Of Motion:  AROM: Generally decreased, functional    PROM: Generally decreased, functional       Strength:    Strength: Generally decreased, functional    Tone & Sensation:   Tone: Normal    Coordination:  Coordination: Generally decreased, functional  Vision:   Acuity: Within Defined Limits  Functional Mobility:  Bed Mobility:  Rolling: Supervision; Additional time  Supine to Sit: Contact guard assistance;Minimum assistance;Bed Modified; Additional time  Sit to Supine: Minimum assistance; Additional time  Scooting: Contact guard assistance; Additional time    Transfers:  Sit to Stand: Contact guard assistance; Additional time  Stand to Sit: Contact guard assistance    Balance:   Sitting: Intact  Standing: Impaired  Standing - Static: Good;Fair  Standing - Dynamic : Fair      Based on the above components, the patient evaluation is determined to be of the following complexity level: LOW     Pain Ratin/10 generalized; RN aware    Activity Tolerance:   Fair    After treatment patient left in no apparent distress:   Supine in bed, Call bell within reach, Bed / chair alarm activated, Caregiver / family present, and Side rails x 3    COMMUNICATION/EDUCATION:   The patients plan of care was discussed with: Occupational therapist and Registered nurse. Fall prevention education was provided and the patient/caregiver indicated understanding., Patient/family have participated as able in goal setting and plan of care. , and Patient/family agree to work toward stated goals and plan of care.     Thank you for this referral.  Kevin Bartlett, PT   Time Calculation: 24 mins

## 2021-06-14 NOTE — PROGRESS NOTES
[facility] Pharmacy Dosing Services: Antimicrobial Stewardship Daily Doc    Consult for antibiotic dosing of vancomycin by Dr. Cirilo Ruvalcaba   Indication: SSTI -- Hidradenitis suppurativa   Duration of Therapy: 6/17 per ID     Ht Readings from Last 1 Encounters:   06/12/21 190.5 cm (75\")        Wt Readings from Last 1 Encounters:   06/14/21 77.3 kg (170 lb 6.4 oz)          Vancomycin therapy:  Current maintenance dose: 1250 (mg) every 8 hours   Dose calculated to approximate a therapeutic trough of 10-15 mcg/mL. Last trough level 16 mcg/ml which extrapolates to 11.7 mcg/ml (drawn early)   Plan for level / Adjustment in Therapy: vancomycin trough therapeutic. Scr stable. Continue vancomycin at this time. Dose administration notes:   Doses given appropriately as scheduled    Date Dose & Interval Measured (mcg/mL) Extrapolated (mcg/mL)   See below ivent for full vanc history. LD 6/17 ? ? ?   ?6/14 ?1250 mg IV q 8 ?16 ?11.7   ? ? ? ? Other Antimicrobial   (not dosed by pharmacist) Levofloxacin 750 mg PO every 24 hours - stop date 6/17  Metronidazole 500 mg PO every 24 hours - stop date 6/17  (Meropenem 500 mg IV x 1 on 6/4/21)   Cultures 6/4 Blood: NGTD- final  6/5 Wound: BETA HEMOLYTIC GROUP C STREP  6/5 Wound: BETA HEMOLYTIC GROUP C STREP   Serum Creatinine Lab Results   Component Value Date/Time    Creatinine 0.55 (L) 06/14/2021 02:59 AM         Creatinine Clearance Estimated Creatinine Clearance: 176.4 mL/min (A) (by C-G formula based on SCr of 0.55 mg/dL (L)).      Temp Temp: 98.2 °F (36.8 °C)       WBC Lab Results   Component Value Date/Time    WBC 12.0 (H) 06/13/2021 02:56 AM        H/H Lab Results   Component Value Date/Time    HGB 9.1 (L) 06/13/2021 02:56 AM        Platelets    Lab Results   Component Value Date/Time    PLATELET 342 (H) 35/81/5043 02:56 AM              Pharmacist SUREKHA EstradaD Contact information: [phone]

## 2021-06-14 NOTE — PROGRESS NOTES
IDT Note  RUR 10%  LOS   GLOS  Barriers- No insurance at this time. Plan-  Patient here for extended care services. IV ABT till 6/17. After that will need time for wound healing.     ANAHI Byrnes/  [phone]

## 2021-06-14 NOTE — PROGRESS NOTES
Care plan reviewed. Problem: Pressure Injury - Risk of  Goal: *Prevention of pressure injury  Description: Document Dann Scale and appropriate interventions in the flowsheet. Outcome: Progressing Towards Goal  Note: Pressure Injury Interventions:  Sensory Interventions: Discuss PT/OT consult with provider, Keep linens dry and wrinkle-free, Maintain/enhance activity level, Minimize linen layers, Pressure redistribution bed/mattress (bed type)    Moisture Interventions: Absorbent underpads, Contain wound drainage, Maintain skin hydration (lotion/cream), Minimize layers    Activity Interventions: Pressure redistribution bed/mattress(bed type), PT/OT evaluation    Mobility Interventions: HOB 30 degrees or less, Pressure redistribution bed/mattress (bed type), PT/OT evaluation    Nutrition Interventions: Offer support with meals,snacks and hydration    Friction and Shear Interventions: Foam dressings/transparent film/skin sealants, HOB 30 degrees or less, Minimize layers                Problem: Patient Education: Go to Patient Education Activity  Goal: Patient/Family Education  Outcome: Progressing Towards Goal     Problem: Activity Intolerance  Goal: *Oxygen saturation during activity within specified parameters  Outcome: Progressing Towards Goal  Goal: *Able to remain out of bed as prescribed  Outcome: Progressing Towards Goal     Problem: Patient Education: Go to Patient Education Activity  Goal: Patient/Family Education  Outcome: Progressing Towards Goal     Problem: Falls - Risk of  Goal: *Absence of Falls  Description: Document Vernadege Frock Fall Risk and appropriate interventions in the flowsheet.   Outcome: Progressing Towards Goal  Note: Fall Risk Interventions:  Mobility Interventions: Assess mobility with egress test, Bed/chair exit alarm, Communicate number of staff needed for ambulation/transfer, OT consult for ADLs, Patient to call before getting OOB, PT Consult for mobility concerns, Utilize walker, cane, or other assistive device         Medication Interventions: Bed/chair exit alarm, Evaluate medications/consider consulting pharmacy, Patient to call before getting OOB, Teach patient to arise slowly         History of Falls Interventions: Bed/chair exit alarm, Door open when patient unattended, Evaluate medications/consider consulting pharmacy, Investigate reason for fall         Problem: Patient Education: Go to Patient Education Activity  Goal: Patient/Family Education  Outcome: Progressing Towards Goal     Problem: Pain  Goal: *Control of Pain  Outcome: Progressing Towards Goal  Goal: *PALLIATIVE CARE:  Alleviation of Pain  Outcome: Progressing Towards Goal     Problem: Patient Education: Go to Patient Education Activity  Goal: Patient/Family Education  Outcome: Progressing Towards Goal     Problem: General Infection Care Plan (Adult and Pediatric)  Goal: Improvement in signs and symptoms of infection  Outcome: Progressing Towards Goal  Goal: *Optimize nutritional status  Outcome: Progressing Towards Goal     Problem: Patient Education: Go to Patient Education Activity  Goal: Patient/Family Education  Outcome: Progressing Towards Goal     Problem: Patient Education: Go to Patient Education Activity  Goal: Patient/Family Education  Outcome: Progressing Towards Goal     Problem: Patient Education: Go to Patient Education Activity  Goal: Patient/Family Education  Outcome: Progressing Towards Goal

## 2021-06-14 NOTE — PROGRESS NOTES
Hospitalist Progress Note    NAME: Gian Moss   :  1995   MRN:  [id number]   Room Number:  762/51  @ [address] [place] [address] Summary: 22 y.o. male whom presented on 2021 with      Assessment / Plan:          # Hidradenitis suppurative stage III ( Hurely staging due to multiple abscesses/ sinus tracts)   - Failed outpatient Rx   - assessed by GS at [facility]- No need for debridement per surgery   - Wound Cx : LIGHT STREPTOCOCCI, BETA HEMOLYTIC GROUP C  - Assessed by ID recommending Vanc, levofloxacin/flagyl till    - aggressive wound care   -  - to resume Doxycycline 100 mg BID after completion of broad Abx course   - Outpatient Follow up w/ [facility]      # Pain management   - Initiate Gabapentin 100 mg TID ( up titrate as needed )   - schedule tylenol 975 mg TID   - Motrin 800 mg Q6 PRN for mild pain   - Tramadol 50 mg Q4 for moderate pain   - start oxycodon taper for severe pain   - IV dilaudid only for dressing change   - patient updated and agreed w/ pain mgmt plan         # Anemia of chronic diease  - Hgb 8.2 w/ MCV 71   - Iron 17 , TIBC 122, Ferritin 570 , Iron sat 14  - Hold Iron in the setting of active infection      There is no height or weight on file to calculate BMI. Code Status: Full   Surrogate Decision Maker:     DVT Prophylaxis: Lovenox  GI Prophylaxis: not indicated  Baseline:           Subjective:     Chief Complaint / Reason for Physician Visit  Fall last night   No active issue  this am  Discussed with RN events overnight. Review of Systems:  No fevers, chills, appetite change, cough, sputum production, shortness of breath, dyspnea on exertion, nausea, vomitting, diarrhea, constipation, chest pain, leg edema, abdominal pain, joint pain, rash, itching. Tolerating PT/OT. Tolerating diet. Objective:     VITALS:   Last 24hrs VS reviewed since prior progress note.  Most recent are:  Patient Vitals for the past 24 hrs:   Temp Pulse Resp BP SpO2   06/14/21 0510 98.2 °F (36.8 °C) (!) 124 22 114/80 100 %   06/14/21 0336 98.4 °F (36.9 °C) 88 18 134/67 100 %   06/13/21 1932 98.2 °F (36.8 °C) 87 17 131/72 99 %   06/13/21 1649 98.3 °F (36.8 °C) 96 18 139/76 100 %       Intake/Output Summary (Last 24 hours) at 6/14/2021 0849  Last data filed at 6/14/2021 6532  Gross per 24 hour   Intake --   Output 2890 ml   Net -2890 ml        PHYSICAL EXAM:  General: WD, WN. Alert, cooperative, no acute distress    EENT:  EOMI. Anicteric sclerae. MMM  Resp:  CTA bilaterally, no wheezing or rales. No accessory muscle use  CV:  Regular  rhythm,  normal S1/S2, no murmurs rubs gallops, No edema  GI:  Soft, Non distended, Non tender. +Bowel sounds  Neurologic:  Alert and oriented X 3, normal speech,   Psych:   Good insight. Not anxious nor agitated  Skin:  No rashes. No jaundice    Reviewed most current lab test results and cultures  YES  Reviewed most current radiology test results   YES  Review and summation of old records today    NO  Reviewed patient's current orders and MAR    YES  PMH/ reviewed - no change compared to H&P  ________________________________________________________________________  Care Plan discussed with:    Comments   Patient x    Family      RN x    Care Manager x    Consultant                       x Multidiciplinary team rounds were held today with , nursing, pharmacist and clinical coordinator. Patient's plan of care was discussed; medications were reviewed and discharge planning was addressed.      ________________________________________________________________________  Total NON critical care TIME:  25   Minutes    Total CRITICAL CARE TIME Spent:   Minutes non procedure based      Comments   >50% of visit spent in counseling and coordination of care x    ________________________________________________________________________  Marcelo Castro MD     Procedures: see electronic medical records for all procedures/Xrays and details which were not copied into this note but were reviewed prior to creation of Plan. LABS:  I reviewed today's most current labs and imaging studies.   Pertinent labs include:  Recent Labs     06/13/21 0256 06/12/21 0314   WBC 12.0* 11.7*   HGB 9.1* 8.6*   HCT 30.8* 28.8*   * 460*     Recent Labs     06/14/21 0259 06/13/21 0256 06/12/21 0314    142 139   K 4.0 3.9 3.9    103 100   CO2 27 32 29   GLU 86 93 88   BUN 5* 7 6   CREA 0.55* 0.66* 0.64*   CA 8.9 9.1 9.0       Signed: Alaina Owen MD

## 2021-06-14 NOTE — PROGRESS NOTES
Problem: Pressure Injury - Risk of  Goal: *Prevention of pressure injury  Description: Document Dann Scale and appropriate interventions in the flowsheet. 6/14/2021 0620 by Tia Ha  Outcome: Progressing Towards Goal  Note: Pressure Injury Interventions:  Sensory Interventions: Keep linens dry and wrinkle-free    Moisture Interventions: Apply protective barrier, creams and emollients    Activity Interventions: Assess need for specialty bed    Mobility Interventions: HOB 30 degrees or less    Nutrition Interventions: Document food/fluid/supplement intake, Offer support with meals,snacks and hydration    Friction and Shear Interventions: Minimize layers             6/13/2021 2030 by Tia Ha  Outcome: Progressing Towards Goal  Note: Pressure Injury Interventions:  Sensory Interventions: Keep linens dry and wrinkle-free    Moisture Interventions: Apply protective barrier, creams and emollients    Activity Interventions: Assess need for specialty bed    Mobility Interventions: HOB 30 degrees or less    Nutrition Interventions: Document food/fluid/supplement intake, Offer support with meals,snacks and hydration    Friction and Shear Interventions: Minimize layers                Problem: Falls - Risk of  Goal: *Absence of Falls  Description: Document Omid Fall Risk and appropriate interventions in the flowsheet.   6/14/2021 0620 by Tia Ha  Outcome: Progressing Towards Goal  Note: Fall Risk Interventions:  Mobility Interventions: Communicate number of staff needed for ambulation/transfer         Medication Interventions: Patient to call before getting OOB, Teach patient to arise slowly         History of Falls Interventions: Bed/chair exit alarm, Door open when patient unattended, Room close to nurse's station      6/13/2021 2030 by Tia Ha  Outcome: Progressing Towards Goal  Note: Fall Risk Interventions:  Mobility Interventions: Communicate number of staff needed for ambulation/transfer         Medication Interventions: Patient to call before getting OOB, Teach patient to arise slowly         History of Falls Interventions: Bed/chair exit alarm, Room close to nurse's station

## 2021-06-14 NOTE — PROGRESS NOTES
Problem: Pressure Injury - Risk of  Goal: *Prevention of pressure injury  Description: Document Dann Scale and appropriate interventions in the flowsheet. Outcome: Progressing Towards Goal  Note: Pressure Injury Interventions:  Sensory Interventions: Keep linens dry and wrinkle-free    Moisture Interventions: Apply protective barrier, creams and emollients    Activity Interventions: Assess need for specialty bed    Mobility Interventions: HOB 30 degrees or less    Nutrition Interventions: Document food/fluid/supplement intake, Offer support with meals,snacks and hydration    Friction and Shear Interventions: Minimize layers                Problem: Activity Intolerance  Goal: *Oxygen saturation during activity within specified parameters  Outcome: Progressing Towards Goal  Goal: *Able to remain out of bed as prescribed  Outcome: Progressing Towards Goal     Problem: Falls - Risk of  Goal: *Absence of Falls  Description: Document Jesus Manuel Cabrera Fall Risk and appropriate interventions in the flowsheet.   Outcome: Progressing Towards Goal  Note: Fall Risk Interventions:  Mobility Interventions: Communicate number of staff needed for ambulation/transfer         Medication Interventions: Patient to call before getting OOB, Teach patient to arise slowly         History of Falls Interventions: Bed/chair exit alarm, Room close to nurse's station

## 2021-06-14 NOTE — PROGRESS NOTES
Loud thud heard coming from patient's room. Yenni Lindsey and Danitza in room already. As I entered the room patient was observed on the bathroom floor. Assisted nurse and tech to get patient back to bed. No visible injuries noted and patient denies hitting head.

## 2021-06-14 NOTE — PROGRESS NOTES
Problem: Self Care Deficits Care Plan (Adult)  Goal: *Acute Goals and Plan of Care (Insert Text)  Description: FUNCTIONAL STATUS PRIOR TO ADMISSION: Patient was independent and active without use of DME prior to onset of painful wounds, was working in security. In last few weeks, patient reports being fairly sedentary, lying in bed to keep weight off buttocks/sacral wounds. Reports trying to ambulate in room intermittently during the day as tolerated. Patient not cooking recently 2/2 difficulty / pain in extended standing. Also reports feeling more weak / fatigued, with several episodes of lightheadedness. Does not drive - relies on family/friends or Lyft for transportation. HOME SUPPORT: The patient lived with roommates - per patient, extremely painful going up and down stairs recently so roommates have been bringing items to him as needed. Occupational Therapy Goals  Initiated 6/14/2021  1. Patient will perform grooming, standing at sink, with supervision/set-up within 7 day(s). 2.  Patient will perform upper body dressing and bathing with supervision/set-up within 7 day(s). 3.  Patient will perform lower body dressing with supervision/set-up using AE PRN within 7 day(s). 4.  Patient will perform toilet transfers with modified independence within 7 day(s). 5.  Patient will perform all aspects of toileting with supervision/set-up within 7 day(s). 6.  Patient will participate in upper extremity therapeutic exercise/activities with modified independence for 5 minutes within 7 day(s). 7.  Patient will utilize energy conservation techniques during functional activities with verbal cues within 7 day(s).        Outcome: Progressing Towards Goal     OCCUPATIONAL THERAPY EVALUATION  Patient: Khalida Cuellar (64 y.o. male)  Date: 6/14/2021  Primary Diagnosis: Hidradenitis [L73.2]        Precautions: Fall       ASSESSMENT  Based on the objective data described below, the patient presents with impaired balance, generally decreased strength, impaired ROM in bilateral shoulders and hips due to wounds, decreased activity tolerance, and elevated pain from multiple wounds impacting ADL performance and mobility following t/f from [facility] for IV abx and wound care. Pt is received in bed, is pleasant and agreeable to participate. He offers good efforts despite visible pain with activity and requires increased time to complete activity. His engagement in UB ADLs limited by pain and decreased ROM in shoulders due to presence of wounds at bilateral axillas and he demonstrates increased difficulty to access distal LB. He reports having a reacher at home and will benefit from continued training with AE. During standing, pt reporting increased lightheadedness; BP monitored (see below), noted rapt increase in HR to 166 bpm, therefore returned to supine. At this time, pt is functioning below his baseline and will require continued skilled therapy services in acute setting. Vitals:   Seated: 122/93 126 bpm  Standin/82 166 bpm     Current Level of Function Impacting Discharge (ADLs/self-care): up to max A for ADLs    Functional Outcome Measure: The patient scored Total: 55/100 on the Barthel Index outcome measure which is indicative of 45% impaired ability to care for basic self needs/dependency on others; inferred 50% dependency on others for instrumental ADLs. Other factors to consider for discharge: multiple wounds; fall risk; tachycardia with minimal activity; pain     Patient will benefit from skilled therapy intervention to address the above noted impairments.        PLAN :  Recommendations and Planned Interventions: self care training, functional mobility training, therapeutic exercise, balance training, therapeutic activities, endurance activities, patient education, home safety training and family training/education    Frequency/Duration: Patient will be followed by occupational therapy 2 times a week to address goals. Recommendation for discharge: (in order for the patient to meet his/her long term goals)  Occupational therapy at least 2 days/week in the home AND ensure assist and/or supervision for safety with all ADLs and mobility    This discharge recommendation:  Has been made in collaboration with the attending provider and/or case management    IF patient discharges home will need the following DME: TBD       SUBJECTIVE:   Patient stated I felt kind of lightheaded in the bathroom before I fell.     OBJECTIVE DATA SUMMARY:   HISTORY:   No past medical history on file. No past surgical history on file. Expanded or extensive additional review of patient history:     Home Situation  Home Environment: Apartment  # Steps to Enter: 14 (7-10 up from street; 4 steps up to porch)  Rails to [employer]: No  One/Two Story Residence: One story  # of Interior Steps: 14  Interior Rails: Right  Lift Chair Available: No  Living Alone: Yes  Support Systems: Friends \ neighbors, Family member(s) (lives with 3 roommates; supportive grandmother in area)  Patient Expects to be Discharged to[de-identified] Apartment  Current DME Used/Available at Home: None    Hand dominance: Right    EXAMINATION OF PERFORMANCE DEFICITS:  Cognitive/Behavioral Status:  Neurologic State: Alert  Orientation Level: Oriented X4  Cognition: Appropriate decision making; Appropriate for age attention/concentration; Appropriate safety awareness; Follows commands  Perception: Appears intact  Perseveration: No perseveration noted  Safety/Judgement: Awareness of environment; Fall prevention;Good awareness of safety precautions; Insight into deficits    Hearing:   Auditory  Auditory Impairment: None    Vision/Perceptual:    Acuity: Within Defined Limits      Range of Motion:  AROM: Generally decreased, functional (limited due to severe pain)  PROM: Generally decreased, functional    Strength:  Strength: Generally decreased, functional    Coordination:  Coordination: Generally decreased, functional  Fine Motor Skills-Upper: Left Intact; Right Intact    Gross Motor Skills-Upper:  (impaired due to bilateral bandages at axilla)    Tone:  Tone: Normal    Balance:  Sitting: Intact  Standing: Impaired  Standing - Static: Good;Fair  Standing - Dynamic : Fair    Functional Mobility and Transfers for ADLs:  Bed Mobility:  Rolling: Supervision  Supine to Sit: Contact guard assistance;Minimum assistance;Assist x1;Additional time;Bed Modified (HOB elevated; infer up to min/mod A if bed flat)  Sit to Supine: Minimum assistance  Scooting: Contact guard assistance    Transfers:  Sit to Stand: Contact guard assistance; Additional time  Stand to Sit: Contact guard assistance  Toilet Transfer : Contact guard assistance;Minimum assistance (infer based on observations)    ADL Assessment:  Feeding: Setup    Oral Facial Hygiene/Grooming: Setup (seated)    Bathing: Moderate assistance    Upper Body Dressing: Minimum assistance; Moderate assistance    Lower Body Dressing: Maximum assistance    Toileting: Moderate assistance (merlyn bowel hygiene and clothing mgmt)    ADL Intervention and task modifications:    Upper [address]: Minimum  assistance; Moderate assistance (A to pull up around shoulders)  Cues: Physical assistance    Cognitive Retraining  Safety/Judgement: Awareness of environment; Fall prevention;Good awareness of safety precautions; Insight into deficits     Functional Measure:  Barthel Index:    Bathin  Bladder: 10  Bowels: 10  Groomin  Dressin  Feeding: 10  Mobility: 0  Stairs: 0  Toilet Use: 5  Transfer (Bed to Chair and Back): 10  Total: 55/100        The Barthel ADL Index: Guidelines  1. The index should be used as a record of what a patient does, not as a record of what a patient could do. 2. The main aim is to establish degree of independence from any help, physical or verbal, however minor and for whatever reason.   3. The need for supervision renders the patient not independent. 4. A patient's performance should be established using the best available evidence. Asking the patient, friends/relatives and nurses are the usual sources, but direct observation and common sense are also important. However direct testing is not needed. 5. Usually the patient's performance over the preceding 24-48 hours is important, but occasionally longer periods will be relevant. 6. Middle categories imply that the patient supplies over 50 per cent of the effort. 7. Use of aids to be independent is allowed. Nadine Ramirez., Barthel, D.W. (5394). Functional evaluation: the Barthel Index. [address] [facility] (14)2. Gracy Marlena eros ISSA ReaganF, [place] Junior., [facility]., Leann, [address] [place] [address] Ave (). Measuring the change indisability after inpatient rehabilitation; comparison of the responsiveness of the Barthel Index and Functional [place] Measure. Journal of Neurology, Neurosurgery, and Psychiatry, 66(4), 895-913. JOSE Shields.CLAYTON, NOHEMY Wiggins, & Daisy Del Rio M.A. (2004.) Assessment of post-stroke quality of life in cost-effectiveness studies: The usefulness of the Barthel Index and the EuroQoL-5D. Quality of Life Research, 15, 879-47     Occupational Therapy Evaluation Charge Determination   History Examination Decision-Making   LOW Complexity : Brief history review  HIGH Complexity : 5 or more performance deficits relating to physical, cognitive , or psychosocial skils that result in activity limitations and / or participation restrictions MEDIUM Complexity : Patient may present with comorbidities that affect occupational performnce. Miniml to moderate modification of tasks or assistance (eg, physical or verbal ) with assesment(s) is necessary to enable patient to complete evaluation       Based on the above components, the patient evaluation is determined to be of the following complexity level: LOW   Pain Ratin/10 upon arrival; increases with activity.      Activity Tolerance:   Fair and tachycardia    After treatment patient left in no apparent distress:    Supine in bed, Call bell within reach, Bed / chair alarm activated, Caregiver / family present and Side rails x 3    COMMUNICATION/EDUCATION:   The patients plan of care was discussed with: Physical therapist and Registered nurse. Home safety education was provided and the patient/caregiver indicated understanding., Patient/family have participated as able in goal setting and plan of care. and Patient/family agree to work toward stated goals and plan of care. This patients plan of care is appropriate for delegation to [facility].     Thank you for this referral.  Judge Eliecer OT  Time Calculation: 29 mins

## 2021-06-15 LAB
BASOPHILS # BLD: 0.1 K/UL (ref 0–0.1)
BASOPHILS NFR BLD: 1 % (ref 0–1)
DIFFERENTIAL METHOD BLD: ABNORMAL
EOSINOPHIL # BLD: 0.3 K/UL (ref 0–0.4)
EOSINOPHIL NFR BLD: 3 % (ref 0–7)
ERYTHROCYTE [DISTWIDTH] IN BLOOD BY AUTOMATED COUNT: 17.3 % (ref 11.5–14.5)
HCT VFR BLD AUTO: 31.6 % (ref 36.6–50.3)
HGB BLD-MCNC: 9.3 G/DL (ref 12.1–17)
IMM GRANULOCYTES # BLD AUTO: 0.3 K/UL (ref 0–0.04)
IMM GRANULOCYTES NFR BLD AUTO: 3 % (ref 0–0.5)
LYMPHOCYTES # BLD: 1.1 K/UL (ref 0.8–3.5)
LYMPHOCYTES NFR BLD: 10 % (ref 12–49)
MCH RBC QN AUTO: 21.8 PG (ref 26–34)
MCHC RBC AUTO-ENTMCNC: 29.4 G/DL (ref 30–36.5)
MCV RBC AUTO: 74 FL (ref 80–99)
MONOCYTES # BLD: 0.7 K/UL (ref 0–1)
MONOCYTES NFR BLD: 7 % (ref 5–13)
NEUTS SEG # BLD: 8.1 K/UL (ref 1.8–8)
NEUTS SEG NFR BLD: 76 % (ref 32–75)
NRBC # BLD: 0 K/UL (ref 0–0.01)
NRBC BLD-RTO: 0 PER 100 WBC
PLATELET # BLD AUTO: 488 K/UL (ref 150–400)
PMV BLD AUTO: 8.3 FL (ref 8.9–12.9)
RBC # BLD AUTO: 4.27 M/UL (ref 4.1–5.7)
RBC MORPH BLD: ABNORMAL
RBC MORPH BLD: ABNORMAL
WBC # BLD AUTO: 10.6 K/UL (ref 4.1–11.1)

## 2021-06-15 PROCEDURE — 85025 COMPLETE CBC W/AUTO DIFF WBC: CPT

## 2021-06-15 PROCEDURE — 65270000032 HC RM SEMIPRIVATE

## 2021-06-15 PROCEDURE — 74011250637 HC RX REV CODE- 250/637: Performed by: INTERNAL MEDICINE

## 2021-06-15 PROCEDURE — 74011000250 HC RX REV CODE- 250: Performed by: INTERNAL MEDICINE

## 2021-06-15 PROCEDURE — 97116 GAIT TRAINING THERAPY: CPT | Performed by: PHYSICAL THERAPIST

## 2021-06-15 PROCEDURE — 36415 COLL VENOUS BLD VENIPUNCTURE: CPT

## 2021-06-15 PROCEDURE — 74011250636 HC RX REV CODE- 250/636: Performed by: STUDENT IN AN ORGANIZED HEALTH CARE EDUCATION/TRAINING PROGRAM

## 2021-06-15 PROCEDURE — 74011250637 HC RX REV CODE- 250/637: Performed by: STUDENT IN AN ORGANIZED HEALTH CARE EDUCATION/TRAINING PROGRAM

## 2021-06-15 RX ADMIN — VANCOMYCIN HYDROCHLORIDE 1250 MG: 500 INJECTION, POWDER, LYOPHILIZED, FOR SOLUTION INTRAVENOUS at 05:01

## 2021-06-15 RX ADMIN — VANCOMYCIN HYDROCHLORIDE 1250 MG: 500 INJECTION, POWDER, LYOPHILIZED, FOR SOLUTION INTRAVENOUS at 13:51

## 2021-06-15 RX ADMIN — VANCOMYCIN HYDROCHLORIDE 1250 MG: 500 INJECTION, POWDER, LYOPHILIZED, FOR SOLUTION INTRAVENOUS at 21:26

## 2021-06-15 RX ADMIN — POLYETHYLENE GLYCOL 3350 17 G: 17 POWDER, FOR SOLUTION ORAL at 08:57

## 2021-06-15 RX ADMIN — ACETAMINOPHEN 975 MG: 325 TABLET, FILM COATED ORAL at 18:43

## 2021-06-15 RX ADMIN — OXYCODONE HYDROCHLORIDE 10 MG: 5 TABLET ORAL at 14:16

## 2021-06-15 RX ADMIN — METRONIDAZOLE 500 MG: 500 TABLET ORAL at 04:09

## 2021-06-15 RX ADMIN — Medication 10 ML: at 13:51

## 2021-06-15 RX ADMIN — ACETAMINOPHEN 975 MG: 325 TABLET, FILM COATED ORAL at 02:05

## 2021-06-15 RX ADMIN — METRONIDAZOLE 500 MG: 500 TABLET ORAL at 16:24

## 2021-06-15 RX ADMIN — Medication 10 ML: at 21:26

## 2021-06-15 RX ADMIN — LIDOCAINE HYDROCHLORIDE: 20 JELLY TOPICAL at 16:24

## 2021-06-15 RX ADMIN — LEVOFLOXACIN 750 MG: 750 TABLET, FILM COATED ORAL at 02:05

## 2021-06-15 RX ADMIN — ACETAMINOPHEN 975 MG: 325 TABLET, FILM COATED ORAL at 13:50

## 2021-06-15 RX ADMIN — ACETAMINOPHEN 975 MG: 325 TABLET, FILM COATED ORAL at 06:35

## 2021-06-15 RX ADMIN — Medication 10 ML: at 05:01

## 2021-06-15 RX ADMIN — HYDROMORPHONE HYDROCHLORIDE 1 MG: 1 INJECTION, SOLUTION INTRAMUSCULAR; INTRAVENOUS; SUBCUTANEOUS at 16:24

## 2021-06-15 RX ADMIN — GABAPENTIN 100 MG: 100 CAPSULE ORAL at 08:56

## 2021-06-15 RX ADMIN — GABAPENTIN 100 MG: 100 CAPSULE ORAL at 16:24

## 2021-06-15 RX ADMIN — GABAPENTIN 100 MG: 100 CAPSULE ORAL at 21:25

## 2021-06-15 NOTE — PROGRESS NOTES
Spiritual Care Assessment/Progress Note  [facility]      NAME: Shama Montalvo      MRN: [id number]  AGE: 22 y.o. SEX: male  Synagogue Affiliation: Non Adventism   Language: English     6/15/2021     Total Time (in minutes): 5     Spiritual Assessment begun in [address] through conversation with:         []Patient        [] Family    [] Friend(s)        Reason for Consult: Initial visit     Spiritual beliefs: (Please include comment if needed)     [] Identifies with a chas tradition:         [] Supported by a chas community:            [] Claims no spiritual orientation:           [] Seeking spiritual identity:                [] Adheres to an individual form of spirituality:           [x] Not able to assess:                           Identified resources for coping:      [] Prayer                               [] Music                  [] Guided Imagery     [] Family/friends                 [] Pet visits     [] Devotional reading                         [x] Unknown     [] Other:                                              Interventions offered during this visit: (See comments for more details)                Plan of Care:     [] Support spiritual and/or cultural needs    [] Support AMD and/or advance care planning process      [] Support grieving process   [] Coordinate Rites and/or Rituals    [] Coordination with community clergy   [] No spiritual needs identified at this time   [] Detailed Plan of Care below (See Comments)  [] Make referral to Music Therapy  [] Make referral to Pet Therapy     [] Make referral to Addiction services  [] Make referral to [facility]  [] Make referral to Spiritual Care Partner  [] No future visits requested        [] Follow up upon further referrals     Comments: Attempted Initial Spiritual Assessment for this pt in [facility]. Pt was unable to be assessed at this time. No family/friends present at time of visit.   Contact Spiritual Care Services for any spiritual or emotional support needs. Kathleen Lyon MDiv.  Staff   Request  Support/Spiritual Care Services via [facility]

## 2021-06-15 NOTE — PROGRESS NOTES
Problem: Pressure Injury - Risk of  Goal: *Prevention of pressure injury  Description: Document Dann Scale and appropriate interventions in the flowsheet.   Outcome: Progressing Towards Goal  Note: Pressure Injury Interventions:  Sensory Interventions: Keep linens dry and wrinkle-free, Minimize linen layers    Moisture Interventions: Absorbent underpads    Activity Interventions: Increase time out of bed    Mobility Interventions: HOB 30 degrees or less, Pressure redistribution bed/mattress (bed type)    Nutrition Interventions: Offer support with meals,snacks and hydration    Friction and Shear Interventions: HOB 30 degrees or less                Problem: Patient Education: Go to Patient Education Activity  Goal: Patient/Family Education  Outcome: Progressing Towards Goal

## 2021-06-15 NOTE — PROGRESS NOTES
Pt requested to have blood drawn at 6am. Labs drawn. Was only ably to get Lav tube after multiple sticks.  Pt's very figidy and \"doesn't like needles\"

## 2021-06-15 NOTE — PROGRESS NOTES
TRANSFER - IN REPORT:    Verbal report received from Thanh Medrano, [facility] (name) on Tatiana Hernandez  being received from [address] [place] [address] (unit) for routine progression of care      Report consisted of patients Situation, Background, Assessment and   Recommendations(SBAR). Information from the following report(s) SBAR, Kardex, MAR, Accordion and Recent Results was reviewed with the receiving nurse. Opportunity for questions and clarification was provided. Awaiting pt arrival to unit/room. Estimated time of  from [address] [place] [address] by VICKY reported to be approx [phone].

## 2021-06-15 NOTE — PROGRESS NOTES
Claudia.[place]) Verbal shift change report given to Ximena Rust, RN (oncoming nurse) by Brayan Presley RN (offgoing nurse). Report included the following information SBAR, Kardex, Intake/Output, MAR and Recent Results. 6440) Pt assessed. HR taken again and better at 89 bpm. Pt states some pain but declines pain medication. Scheduled meds given, pt refused lovenox. Pt resting in bed with no needs at this time. 7891) Students assisted pt with bed bath. 1000) Interdisciplinary team rounds were held 6/15/2021 with the following team members:Care Management, Nursing, Pharmacy and Physician. Plan of care discussed. See clinical pathway and/or care plan for interventions and desired outcomes. 1030) Pt resting in bed at this time. Pt has no complaints of pain and states no needs at this time. 1215) Pt did not receive lunch tray. Dietary called and sending one up.     1300) This writer went to hung pt's vanc and his IV had infiltrated. New IV inserted by Evelina Snow, [address] [place]) Vianney given as wound care prophylaxis. Pt resting in bed. Ginger ale and ice given per request.    1600) Pt working with PT at this time. 1645) Wound care done. Dilaudid given for prophylaxis. Pt tolerated well. Documented in wound care flowsheet. 1845) Tylenol given as scheduled. Ginger ale provided per request. Pt left resting in bed with no complaints at this time. 1900) Verbal shift change report given to Mee Garcia RN (oncoming nurse) by Ximena Rust, ALESSANDRO (offgoing nurse). Report included the following information SBAR, Kardex, Intake/Output, MAR and Recent Results.

## 2021-06-15 NOTE — PROGRESS NOTES
Shift report given to Trini Miner RN from [facility], [address] [place] [address] and Saba Garcia RN. Report included the following: SBAR, MAR, Kardex, and Recent Results.

## 2021-06-15 NOTE — PROGRESS NOTES
1930 Shift report given to Lucius Bowman RN from [facility]. Report included the following: SBAR, MAR, Kardex, and Recent Results. Pt resting in bed.  Visitor present     8253 Labs drawn & sent down to lab

## 2021-06-15 NOTE — PROGRESS NOTES
Problem: Pressure Injury - Risk of  Goal: *Prevention of pressure injury  Description: Document Dann Scale and appropriate interventions in the flowsheet. Outcome: Progressing Towards Goal  Note: Pressure Injury Interventions:  Sensory Interventions: Keep linens dry and wrinkle-free    Moisture Interventions: Absorbent underpads    Activity Interventions: Increase time out of bed    Mobility Interventions: Turn and reposition approx. every two hours(pillow and wedges)    Nutrition Interventions: Offer support with meals,snacks and hydration    Friction and Shear Interventions: Foam dressings/transparent film/skin sealants, Transferring/repositioning devices                Problem: Activity Intolerance  Goal: *Oxygen saturation during activity within specified parameters  Outcome: Progressing Towards Goal     Problem: Falls - Risk of  Goal: *Absence of Falls  Description: Document Omid Fall Risk and appropriate interventions in the flowsheet.   Outcome: Progressing Towards Goal  Note: Fall Risk Interventions:  Mobility Interventions: Bed/chair exit alarm         Medication Interventions: Patient to call before getting OOB         History of Falls Interventions: Bed/chair exit alarm         Problem: Pain  Goal: *Control of Pain  Outcome: Progressing Towards Goal

## 2021-06-15 NOTE — PROGRESS NOTES
Problem: Mobility Impaired (Adult and Pediatric)  Goal: *Acute Goals and Plan of Care (Insert Text)  Description: FUNCTIONAL STATUS PRIOR TO ADMISSION: Patient was independent and active without use of DME prior to onset of painful wounds. However, over previous few weeks/months, patient reports being mostly sedentary, staying in bed to keep weight off wounds. HOME SUPPORT PRIOR TO ADMISSION: The patient lived with three roommates. Physical Therapy Goals  Initiated 6/14/2021  1. Patient will move from supine to sit and sit to supine in bed with stand by assistance within 7 day(s). 2.  Patient will transfer from bed to chair and chair to bed with contact guard assist using the least restrictive device within 7 day(s). 3.  Patient will perform sit to stand with stand by assistance within 7 day(s). 4.  Patient will ambulate with contact guard assist for 25 feet with the least restrictive device within 7 day(s). Outcome: Progressing Towards Goal    PHYSICAL THERAPY TREATMENT  Patient: Moy Fontaine (22 y.o. male)  Date: 6/15/2021  Diagnosis: Hidradenitis [L73.2] <principal problem not specified>       Precautions:    Chart, physical therapy assessment, plan of care and goals were reviewed. ASSESSMENT  Patient continues with skilled PT services and is progressing towards goals. Patient with improved activity tolerance today. He performed bed mobility with supervision, and stood with CGA. Patient ambulated 20 feet with CGA. Mild unsteadiness with ambulation. Patient required increased time to perform all mobility. Recommend gait trial with SPC next session. Current Level of Function Impacting Discharge (mobility/balance): Supervision-CGA    Other factors to consider for discharge: wounds         PLAN :  Patient continues to benefit from skilled intervention to address the above impairments. Continue treatment per established plan of care. to address goals.     Recommendation for discharge: (in order for the patient to meet his/her long term goals)  Physical therapy at least 2 days/week in the home AND ensure assist and/or supervision for safety with mobility    This discharge recommendation:  Has been made in collaboration with the attending provider and/or case management    IF patient discharges home will need the following DME: straight cane       SUBJECTIVE:   Patient stated I feel better than yesterday.     OBJECTIVE DATA SUMMARY:   Critical Behavior:  Neurologic State: Alert  Orientation Level: Oriented X4  Cognition: Follows commands, Appropriate safety awareness  Safety/Judgement: Awareness of environment, Fall prevention, Good awareness of safety precautions, Insight into deficits    Functional Mobility Training:  Bed Mobility:  Rolling: Supervision;Stand-by assistance;Bed Modified  Supine to Sit: Supervision; Additional time;Bed Modified  Sit to Supine: Supervision; Additional time;Bed Modified  Scooting: Supervision; Additional time        Transfers:  Sit to Stand: Contact guard assistance;Assist x1;Additional time  Stand to Sit: Stand-by assistance; Additional time     Balance:  Sitting: Intact  Standing: Impaired; Without support  Standing - Static: Good;Fair  Standing - Dynamic : Fair    Ambulation/Gait Training:  Distance (ft): 20 Feet (ft)  Assistive Device: Gait belt  Ambulation - Level of Assistance: Contact guard assistance    Gait Abnormalities: Decreased step clearance  Base of Support: Narrowed  Speed/Bonnie: Slow  Step Length: Right shortened;Left shortened    Pain Ratin/10 rest and 6/10 with mobility; RN aware; cold pack provided    Activity Tolerance:   Fair    After treatment patient left in no apparent distress:   Supine in bed, Call bell within reach, and Side rails x 3    COMMUNICATION/COLLABORATION:   The patients plan of care was discussed with: Registered nurse.      Paul Malloy PT   Time Calculation: 16 mins

## 2021-06-15 NOTE — PROGRESS NOTES
24 hour Post Fall Documentation    Jimmy Reyez witnessed/unwitnessed fall occurred on 6/14/21 (Date) at 0 (Time). The answers to the following questions summarize the fall: In the patient's own words:  · What were you attempting to do when you fell? Flush toilet  · Do you know why you fell? Slipped in water  · Do you have any pain/discomfort or any other complaints? Pt denied any residual pain or discomfort   · Which part of your body made contact with the floor or other object? Bottom   Nurse:  Dakotah Quintanilla Was this an assisted fall? no   Was fall witnessed? Yes     By Whom? Antonella, PCT   If witnessed, what part of the body made contact with the floor or other object? Bottom    Patients mental status after the fall/when found: Alert and oriented   Any apparent injury:  No apparent injury   Immediate interventions for injury/suspected injury? No interventions needed   Patient assisted back to bed? Assist X2   Name of provider notified and time, any comments? Telemed physician        Name of family member notified and time: N/A    Document Immediate VS and physical assessment in flowsheets. Document Neuro assessment every hour x 4 (for potential head injury or unwitnessed fall) in flowsheets.         HERACLIO Kelley

## 2021-06-15 NOTE — PROGRESS NOTES
Hospitalist Progress Note    NAME: Majo Peraza   :  1995   MRN:  [id number]   Room Number:  379/40  @ [address] [place] [address] Summary: 22 y.o. male whom presented on 2021 with      Assessment / Plan:    # Hidradenitis suppurative stage III ( Hurely stage III-  due to multiple abscesses/ sinus tracts)   - Failed outpatient Rx   - assessed by GS at [address]- No need for debridement per surgery   - Wound Cx : LIGHT STREPTOCOCCI, BETA HEMOLYTIC GROUP C  - Assessed by ID recommending Vanc, levofloxacin/flagyl till    - aggressive wound care   -  - to resume Doxycycline 100 mg BID after completion of broad Abx course   - Outpatient Follow up w/ [facility]      # Pain management   - Initiated Gabapentin 100 mg TID ( up titrate as needed )   - Scheduled tylenol 975 mg TID   - Motrin 800 mg Q6 PRN for mild pain   - Tramadol 50 mg Q4 for moderate pain   - oxycodon taper for severe pain   - IV dilaudid only for dressing change   - patient updated and agreed w/ pain mgmt plan         # Anemia of chronic diease  - Hgb 8.2 w/ MCV 71   - Iron 17 , TIBC 122, Ferritin 570 , Iron sat 14  - Hold Iron in the setting of active infection      There is no height or weight on file to calculate BMI. Code Status: Full   Surrogate Decision Maker:     DVT Prophylaxis: Lovenox  GI Prophylaxis: not indicated  Baseline:                 Subjective:     Chief Complaint / Reason for Physician Visit  Ernestina Gowers Discussed with RN events overnight. Review of Systems:  No fevers, chills, appetite change, cough, sputum production, shortness of breath, dyspnea on exertion, nausea, vomitting, diarrhea, constipation, chest pain, leg edema, abdominal pain, joint pain, rash, itching. Tolerating PT/OT. Tolerating diet. Objective:     VITALS:   Last 24hrs VS reviewed since prior progress note.  Most recent are:  Patient Vitals for the past 24 hrs:   Temp Pulse Resp BP SpO2   06/15/21 0406 97.1 °F (36.2 °C) 97 16 117/69 97 %   06/14/21 2000 98.6 °F (37 °C) 90 16 (!) 146/39 100 %   06/14/21 1635 98.7 °F (37.1 °C) 93 18 132/65 100 %       Intake/Output Summary (Last 24 hours) at 6/15/2021 0835  Last data filed at 6/15/2021 5912  Gross per 24 hour   Intake --   Output 800 ml   Net -800 ml        PHYSICAL EXAM:  General: WD, WN. Alert, cooperative, no acute distress    EENT:  EOMI. Anicteric sclerae. MMM  Resp:  CTA bilaterally, no wheezing or rales. No accessory muscle use  CV:  Regular  rhythm,  normal S1/S2, no murmurs rubs gallops, No edema  GI:  Soft, Non distended, Non tender. +Bowel sounds  Neurologic:  Alert and oriented X 3, normal speech,   Psych:   Good insight. Not anxious nor agitated  Skin:  No rashes. No jaundice    Reviewed most current lab test results and cultures  YES  Reviewed most current radiology test results   YES  Review and summation of old records today    NO  Reviewed patient's current orders and MAR    YES  PMH/ reviewed - no change compared to H&P  ________________________________________________________________________  Care Plan discussed with:    Comments   Patient x    Family      RN x    Care Manager x    Consultant                       x Multidiciplinary team rounds were held today with , nursing, pharmacist and clinical coordinator. Patient's plan of care was discussed; medications were reviewed and discharge planning was addressed. ________________________________________________________________________  Total NON critical care TIME:  25   Minutes    Total CRITICAL CARE TIME Spent:   Minutes non procedure based      Comments   >50% of visit spent in counseling and coordination of care x    ________________________________________________________________________  Neli Okeefe MD     Procedures: see electronic medical records for all procedures/Xrays and details which were not copied into this note but were reviewed prior to creation of Plan. LABS:  I reviewed today's most current labs and imaging studies.   Pertinent labs include:  Recent Labs     06/15/21  0606 06/13/21 0256   WBC 10.6 12.0*   HGB 9.3* 9.1*   HCT 31.6* 30.8*   * 488*     Recent Labs     06/14/21 0259 06/13/21 0256    142   K 4.0 3.9    103   CO2 27 32   GLU 86 93   BUN 5* 7   CREA 0.55* 0.66*   CA 8.9 9.1       Signed: Chandrakant Mckeon MD

## 2021-06-16 ENCOUNTER — TELEPHONE (OUTPATIENT)
Dept: SURGERY | Age: 26
End: 2021-06-16

## 2021-06-16 LAB — CREAT SERPL-MCNC: 0.87 MG/DL (ref 0.7–1.3)

## 2021-06-16 PROCEDURE — 74011250636 HC RX REV CODE- 250/636: Performed by: INTERNAL MEDICINE

## 2021-06-16 PROCEDURE — 82565 ASSAY OF CREATININE: CPT

## 2021-06-16 PROCEDURE — 74011250637 HC RX REV CODE- 250/637: Performed by: STUDENT IN AN ORGANIZED HEALTH CARE EDUCATION/TRAINING PROGRAM

## 2021-06-16 PROCEDURE — 77030038554 HC DRSG WND THERAHONEY MDII -Z

## 2021-06-16 PROCEDURE — 74011250636 HC RX REV CODE- 250/636: Performed by: STUDENT IN AN ORGANIZED HEALTH CARE EDUCATION/TRAINING PROGRAM

## 2021-06-16 PROCEDURE — 74011250637 HC RX REV CODE- 250/637: Performed by: INTERNAL MEDICINE

## 2021-06-16 PROCEDURE — 97530 THERAPEUTIC ACTIVITIES: CPT

## 2021-06-16 PROCEDURE — 65270000032 HC RM SEMIPRIVATE

## 2021-06-16 PROCEDURE — 74011000250 HC RX REV CODE- 250: Performed by: INTERNAL MEDICINE

## 2021-06-16 PROCEDURE — 36415 COLL VENOUS BLD VENIPUNCTURE: CPT

## 2021-06-16 PROCEDURE — 2709999900 HC NON-CHARGEABLE SUPPLY

## 2021-06-16 RX ADMIN — HYDROMORPHONE HYDROCHLORIDE 1 MG: 1 INJECTION, SOLUTION INTRAMUSCULAR; INTRAVENOUS; SUBCUTANEOUS at 11:02

## 2021-06-16 RX ADMIN — ACETAMINOPHEN 975 MG: 325 TABLET, FILM COATED ORAL at 18:37

## 2021-06-16 RX ADMIN — LIDOCAINE HYDROCHLORIDE: 20 JELLY TOPICAL at 09:00

## 2021-06-16 RX ADMIN — ACETAMINOPHEN 975 MG: 325 TABLET, FILM COATED ORAL at 05:28

## 2021-06-16 RX ADMIN — GABAPENTIN 100 MG: 100 CAPSULE ORAL at 08:35

## 2021-06-16 RX ADMIN — VANCOMYCIN HYDROCHLORIDE 1250 MG: 500 INJECTION, POWDER, LYOPHILIZED, FOR SOLUTION INTRAVENOUS at 21:24

## 2021-06-16 RX ADMIN — Medication 10 ML: at 21:24

## 2021-06-16 RX ADMIN — GABAPENTIN 100 MG: 100 CAPSULE ORAL at 21:24

## 2021-06-16 RX ADMIN — METRONIDAZOLE 500 MG: 500 TABLET ORAL at 04:24

## 2021-06-16 RX ADMIN — GABAPENTIN 100 MG: 100 CAPSULE ORAL at 16:44

## 2021-06-16 RX ADMIN — METRONIDAZOLE 500 MG: 500 TABLET ORAL at 16:44

## 2021-06-16 RX ADMIN — ENOXAPARIN SODIUM 40 MG: 40 INJECTION SUBCUTANEOUS at 08:35

## 2021-06-16 RX ADMIN — ACETAMINOPHEN 975 MG: 325 TABLET, FILM COATED ORAL at 13:38

## 2021-06-16 RX ADMIN — Medication 10 ML: at 14:00

## 2021-06-16 RX ADMIN — VANCOMYCIN HYDROCHLORIDE 1250 MG: 500 INJECTION, POWDER, LYOPHILIZED, FOR SOLUTION INTRAVENOUS at 13:39

## 2021-06-16 RX ADMIN — LEVOFLOXACIN 750 MG: 750 TABLET, FILM COATED ORAL at 02:00

## 2021-06-16 RX ADMIN — Medication 10 ML: at 05:28

## 2021-06-16 RX ADMIN — VANCOMYCIN HYDROCHLORIDE 1250 MG: 500 INJECTION, POWDER, LYOPHILIZED, FOR SOLUTION INTRAVENOUS at 05:28

## 2021-06-16 NOTE — PROGRESS NOTES
Physical therapy:     Patient unavailable secondary to wound care. Will attempt as able.      Paul Malloy, PT

## 2021-06-16 NOTE — PROGRESS NOTES
Bedside shift change report given to Tri Mcmahan (oncoming nurse) by Candance Center (offgoing nurse). Report included the following information SBAR, Kardex and MAR.

## 2021-06-16 NOTE — PROGRESS NOTES
Bedside shift change report given to Jm Jacobs RN (oncoming nurse) by Emilee Santos RN (offgoing nurse). Report included the following information SBAR, Kardex and MAR    Pt in bed playing video games on cell.  No complaints at this time

## 2021-06-16 NOTE — PROGRESS NOTES
Problem: Falls - Risk of  Goal: *Absence of Falls  Description: Document Florinda Hall Fall Risk and appropriate interventions in the flowsheet.   Outcome: Progressing Towards Goal  Note: Fall Risk Interventions:  Mobility Interventions: Bed/chair exit alarm, Patient to call before getting OOB         Medication Interventions: Patient to call before getting OOB         History of Falls Interventions: Bed/chair exit alarm, Consult care management for discharge planning         Problem: Patient Education: Go to Patient Education Activity  Goal: Patient/Family Education  Outcome: Progressing Towards Goal

## 2021-06-16 NOTE — PROGRESS NOTES
Pt having burning with IV. Educated that IV may be alittle cold. Warm pack placed w/o further issue. Pt's urine is also very dark. Educated on increased water intake and less soda. Displays understanding.

## 2021-06-16 NOTE — TELEPHONE ENCOUNTER
Called patient to cancel 1:30pm appointment at [facility] on 6/17 due to Verona Spatz having a surgery. Patient did not have a voicemail box.

## 2021-06-16 NOTE — PROGRESS NOTES
Bedside shift change report given to Alfredo Rose RN (oncoming nurse) by Brady Otoole RN (offgoing nurse). Report included the following information SBAR, Intake/Output and MAR.     0713) Pt asleep in bed.     0835) Pt resting in bed, eating breakfast.    Bedside shift change report given to Joaquina Reyez RN (oncoming nurse) by Alfredo Rose RN (offgoing nurse). Report included the following information SBAR, Intake/Output and MAR.

## 2021-06-16 NOTE — PROGRESS NOTES
Hospitalist Progress Note    NAME: Clara Corrales   :  1995   MRN:  [id number]   Room Number:  926/75  @ [address] [place] [address] Summary: 22 y.o. male whom presented on 2021 with      Assessment / Plan:    Hidradenitis suppurative stage III ( Hurely stage III-  due to multiple abscesses/ sinus tracts)   Failed outpatient Rx. assessed by GS at [facility]- No need for debridement per surgery. Wound Cx : LIGHT STREPTOCOCCI, BETA HEMOLYTIC GROUP C    - Vanc, levofloxacin/flagyl till    - aggressive wound care   - to resume Doxycycline 100 mg BID after completion of broad Abx course   - Outpatient Follow up w/ [facility]        Pain management   - Gabapentin 100 mg TID ( up titrate as needed )   - Scheduled tylenol 975 mg TID   - Motrin 800 mg Q6 PRN for mild pain   - Tramadol 50 mg Q4 for moderate pain   - oxycodone taper for severe pain   - IV dilaudid only for dressing change         Anemia of chronic diease  - Hgb 8.2 w/ MCV 71   - Iron 17 , TIBC 122, Ferritin 570 , Iron sat 14  - Hold Iron in the setting of active infection      BMI 27.67   Overweight POA  Counseled on Lifestyle modifications, AHA Diet ,weight loss strategies. Code Status: Full   Surrogate Decision Maker:     DVT Prophylaxis: Lovenox  GI Prophylaxis: not indicated  Baseline:          Subjective:     Chief Complaint / Reason for Physician Visit  \"it hurts a lot when they remove the packing\". Discussed with RN events overnight. Review of Systems:  No fevers, chills, appetite change, cough, sputum production, shortness of breath, dyspnea on exertion, nausea, vomitting, diarrhea, constipation, chest pain, leg edema, abdominal pain, joint pain, rash, itching. Tolerating diet. Objective:     VITALS:   Last 24hrs VS reviewed since prior progress note.  Most recent are:  Patient Vitals for the past 24 hrs:   Temp Pulse Resp BP SpO2   06/15/21 2000 98 °F (36.7 °C) 76 18 122/80 100 % 06/15/21 1538 97.9 °F (36.6 °C) 74 18 128/79 100 %       Intake/Output Summary (Last 24 hours) at 6/16/2021 0939  Last data filed at 6/16/2021 0800  Gross per 24 hour   Intake 440 ml   Output 2000 ml   Net -1560 ml        PHYSICAL EXAM:  General: WD, WN. Alert, cooperative, no acute distress    EENT:  EOMI. Anicteric sclerae. MMM  Resp:  CTA bilaterally, no wheezing or rales. No accessory muscle use  CV:  Regular  rhythm,  normal S1/S2, no murmurs rubs gallops, No edema  GI:  Soft, Non distended, Non tender. +Bowel sounds  Neurologic:  Alert and oriented X 3, normal speech,   Psych:   Good insight. Not anxious nor agitated  Skin:  Draining sinus noted in left groin, gluteal cleft, left inner thigh. No jaundice    Reviewed most current lab test results and cultures  YES  Reviewed most current radiology test results   YES  Review and summation of old records today    NO  Reviewed patient's current orders and MAR    YES  PMH/ reviewed - no change compared to H&P  ________________________________________________________________________  Care Plan discussed with:    Comments   Patient x    Family      RN x    Care Manager x    Consultant                       x Multidiciplinary team rounds were held today with , nursing, pharmacist and clinical coordinator. Patient's plan of care was discussed; medications were reviewed and discharge planning was addressed. ________________________________________________________________________  Total NON critical care TIME: 25  Minutes    Total CRITICAL CARE TIME Spent:   Minutes non procedure based      Comments   >50% of visit spent in counseling and coordination of care x    ________________________________________________________________________  Thuan Aguirre MD     Procedures: see electronic medical records for all procedures/Xrays and details which were not copied into this note but were reviewed prior to creation of Plan.       LABS:  I reviewed today's most current labs and imaging studies.   Pertinent labs include:  Recent Labs     06/15/21  0606   WBC 10.6   HGB 9.3*   HCT 31.6*   *     Recent Labs     06/14/21  0259      K 4.0      CO2 27   GLU 86   BUN 5*   CREA 0.55*   CA 8.9       Signed: Elliott Plasencia MD

## 2021-06-16 NOTE — ROUTINE PROCESS
Wound Care consult:for the Hidradenitis wounds in the Axilla bilaterally and the groins, posterior  scotum From floor. chart reviewed and assessed the wound. This is a transfer in from [facility] intial wound care given  there. for the hidradenitis Supertiva wounds that have drained over the  few days. The antibiotics were started it allowed healing from the picture and my assessment shows improvement. The wounds are now draining serosanguinous fluid without odor but the painful wounds are not allowing for optimum wound care / packing of the wounds. He will need topical Lidocaine jelly or 4% cream (LMX) to pack the wounds well and he will need absorptive dressings. Pt. Required IV pain medication today before the dressing change by Tiffany FRANCOIS. Treatment done today:    The skin in the Axilla and groins was cleansed with warm CHG wipes and wiped well to remove the old drainage. This skin needs to be cleansed several times each day (at least three). Most people with HS have to take 2-3 showers per day. It is always recommended that they use Hibiclens soap OTC or CHG Soap cleanser to clean the skin all over the body and on the wounds and rinse with warm tap water and dry the skin well. It is also advisable to keep hair short in all of these areas. Explained to patient about it and he understood  Today the skin was cleanse and then multiple abdominal pads    placed in the axilla and groin just to effectively absorb the drainage and moisture from the skin to protect the skin. Applying a thin layer of zinc oxide cream can also protect the skin. Discussed several options with Tiffany FRANCOIS.     See below the wounds         Gluteal Cleft open wound witn pink wound bed - needs   packing and absorptive dressing like foam   The gluteal cleft wound was packed with Opticel AG strips . Topped with an Absorptive abdominal pad dressing.      Left side Groin / posterior scrotum:    There is no pus serosanguinous drainage noted  The wounds are red, painful with granulation tissue: Used xylocaine gel over the wound    The groin wounds were cleansed with  Normal saline and wiped gently with 4x4's to remove the old drainage. Dabbed it dry and then dressed with the Opticel AG strip and place Vaseline guaze over it . Heri Fontaine Same dressings below:  Right Side Groin and scrotum: granulation  Tissue macerated skin  Right Axilla wound undermines widely. Needs Opticel packing. Used Xylocaine gel over it to for pain applied a small amount of [place] honey over it and placed a packing in one large area rest used Vaseline gauze and abdominal pads. Today patient cannot tolerate the pain of packing even after using xylocaine gel. Dressed with a Opticel AG silver dressing and  An absorptive pad. Plan: This wound care will have to be done by nursing staff for at least the next 7 days to monitor for re- infection. Still formulating a plan for what will work for him as the wounds are starting to improve with regard to the type and amount of drainage. These are painful and will require some topical lidocaine jelly just to clean and pack any of the wounds. He  Allowed me clean with N saline rinses and gently wiping.   Will reevaluate the wounds as needed     Rolly Reyes RN, BSN, [facility]

## 2021-06-16 NOTE — PROGRESS NOTES
Upon coming into pt's room there was a male sitting in the chair. The room was dark. I advised the visitor that visiting hours were over. He asked for confirmation of visiting hours. They were given. This RN also advised that a mask is to be wore by visitors as his was in his hand and showed him the sign. He stated I was being \"condesending\" and left. He then called the pt's cell phone apologizing to the son for \"his actions\". RN supervisor made aware.

## 2021-06-16 NOTE — PROGRESS NOTES
Problem: Self Care Deficits Care Plan (Adult)  Goal: *Acute Goals and Plan of Care (Insert Text)  Description: FUNCTIONAL STATUS PRIOR TO ADMISSION: Patient was independent and active without use of DME prior to onset of painful wounds, was working in security. In last few weeks, patient reports being fairly sedentary, lying in bed to keep weight off buttocks/sacral wounds. Reports trying to ambulate in room intermittently during the day as tolerated. Patient not cooking recently 2/2 difficulty / pain in extended standing. Also reports feeling more weak / fatigued, with several episodes of lightheadedness. Does not drive - relies on family/friends or Lyft for transportation. HOME SUPPORT: The patient lived with roommates - per patient, extremely painful going up and down stairs recently so roommates have been bringing items to him as needed. Occupational Therapy Goals  Initiated 6/14/2021  1. Patient will perform grooming, standing at sink, with supervision/set-up within 7 day(s). 2.  Patient will perform upper body dressing and bathing with supervision/set-up within 7 day(s). 3.  Patient will perform lower body dressing with supervision/set-up using AE PRN within 7 day(s). 4.  Patient will perform toilet transfers with modified independence within 7 day(s). 5.  Patient will perform all aspects of toileting with supervision/set-up within 7 day(s). 6.  Patient will participate in upper extremity therapeutic exercise/activities with modified independence for 5 minutes within 7 day(s). 7.  Patient will utilize energy conservation techniques during functional activities with verbal cues within 7 day(s).      Outcome: Progressing Towards Goal   OCCUPATIONAL THERAPY TREATMENT  Patient: Kathy Gay (22 y.o. male)  Date: 6/16/2021  Diagnosis: Hidradenitis [L73.2] <principal problem not specified>       Precautions:    Chart, occupational therapy assessment, plan of care, and goals were reviewed. ASSESSMENT  Patient continues with skilled OT services and is progressing towards goals. Patient received resting in bed, agreeable to session with pain 4-5/10. Patient tolerating 15 minutes seated activity EOB, demonstrating good understanding of lower body dressing with adaptive equipment following initial training. Note increased time and frequent rest breaks needed 2/2 decreased activity tolerance with deconditioning likely the result pain and a more sedentary lifestyle since wound onset. Patient tolerating 3 minutes standing at sink for grooming task with intermittent unilateral UE support via sink and compensatory strategy for task. Note BLEs tremulous after approx 2 minutes standing, improved with anterior lean against sink for additional support. Acknowledged pain 2/2 wounds but encouraged elevated HOB as tolerated to minimize symptoms of lightheadedness when upright. Patient continues to progress well with therapy, very receptive to all education with good carryover demonstrated. Recommend HH with increased supervision/assist initially. Supine: /56, HR 91   Sitting EOB: /101,   Sitting, post-activity: /93,   ^RN notified of the above, BP taking on L forearm 2/2 axillary wounds, IV sites, and pain    Current Level of Function Impacting Discharge (ADLs): CGA; additional time; AE for lower body    Other factors to consider for discharge: pain         PLAN :  Patient continues to benefit from skilled intervention to address the above impairments. Continue treatment per established plan of care to address goals.     Recommend with staff: Encourage HOB elevated as tolerated 2/2 pain with wounds; Set-up for self-care with assist for lower body as needed    Recommend next OT session: activity tolerance, standing balance    Recommendation for discharge: (in order for the patient to meet his/her long term goals)  Occupational therapy at least 2 days/week in the home AND ensure assist and/or supervision for safety with IADLs    This discharge recommendation:  Has been made in collaboration with the attending provider and/or case management    IF patient discharges home will need the following DME: AE: long handled bathing, AE: long handled dressing, and shower chair       SUBJECTIVE:   Patient stated I think I can do the other stuff now\" with regards to AE training / practice. OBJECTIVE DATA SUMMARY:   Cognitive/Behavioral Status:  Neurologic State: Alert; Appropriate for age  Orientation Level: Oriented X4  Cognition: Follows commands; Appropriate for age attention/concentration  Perception: Appears intact  Perseveration: No perseveration noted  Safety/Judgement: Awareness of environment; Fall prevention;Decreased awareness of need for safety    Functional Mobility and Transfers for ADLs:  Bed Mobility:  Supine to Sit: Stand-by assistance; Additional time  Sit to Supine: Stand-by assistance; Additional time  Scooting: Supervision; Additional time    Transfers:  Sit to Stand: Contact guard assistance; Additional time (bed elevated)   Stand to Sit: Standby assistance; Additional time    Balance:  Sitting: Intact  Standing: Impaired  Standing - Static: Good;Fair  Standing - Dynamic : Fair    ADL Intervention:  Grooming  Grooming Assistance: Stand-by assistance;Contact guard assistance  Position Performed: Standing (at sink)  Brushing Teeth: Stand-by assistance;Contact guard assistance; Compensatory technique training (using cup to rinse/spit to minimize bending / pain)  Cues: Verbal cues provided;Visual cues provided (leaning thighs against sink surface for energy conservation )    Lower Body Dressing Assistance  Socks: Stand-by assistance; Compensatory technique training (using sock aid and reacher)  Slip on Shoes with Back: Stand-by assistance; Compensatory technique training (using reacher and long-handled shoehorn)  Position Performed: Seated edge of bed  Cues: Verbal cues provided;Visual cues provided;Don;Doff  Adaptive Equipment Used: Reacher;Sock aid; Long handled shoe horn    Cognitive Retraining  Safety/Judgement: Awareness of environment; Fall prevention;Decreased awareness of need for safety    Pain:  Patient reporting 4-5 / 10 pain at wound sites, tolerated session well. Activity Tolerance:   Fair, requires frequent rest breaks, and observed SOB with activity    After treatment patient left in no apparent distress:   Supine in bed, Call bell within reach, and Side rails x 3    COMMUNICATION/COLLABORATION:   The patients plan of care was discussed with: Physical therapist and Registered nurse.      Kyle Noriega OT  Time Calculation: 44 mins

## 2021-06-16 NOTE — PROGRESS NOTES
IDR Note  RUR 10%  LOS  GLOS 2.9 Delay added 6/14   Barriers- No insurance at this time. Plan- IV ABT ends 6/17. Plan is to stay here about 2 more weeks to allow wound to heal some. See by therapy and they are recommending getting a straight care and PT HH follow-up 2x weekly.   ANAHI Villalobos/DEVIKA  [phone]

## 2021-06-17 LAB
BACTERIA SPEC CULT: NORMAL
CREAT SERPL-MCNC: 0.75 MG/DL (ref 0.7–1.3)
SERVICE CMNT-IMP: NORMAL

## 2021-06-17 PROCEDURE — 97530 THERAPEUTIC ACTIVITIES: CPT | Performed by: PHYSICAL THERAPIST

## 2021-06-17 PROCEDURE — 74011250637 HC RX REV CODE- 250/637: Performed by: STUDENT IN AN ORGANIZED HEALTH CARE EDUCATION/TRAINING PROGRAM

## 2021-06-17 PROCEDURE — 74011000250 HC RX REV CODE- 250: Performed by: INTERNAL MEDICINE

## 2021-06-17 PROCEDURE — 36415 COLL VENOUS BLD VENIPUNCTURE: CPT

## 2021-06-17 PROCEDURE — 65270000032 HC RM SEMIPRIVATE

## 2021-06-17 PROCEDURE — 74011250637 HC RX REV CODE- 250/637: Performed by: INTERNAL MEDICINE

## 2021-06-17 PROCEDURE — 94760 N-INVAS EAR/PLS OXIMETRY 1: CPT

## 2021-06-17 PROCEDURE — 74011250636 HC RX REV CODE- 250/636: Performed by: STUDENT IN AN ORGANIZED HEALTH CARE EDUCATION/TRAINING PROGRAM

## 2021-06-17 PROCEDURE — 82565 ASSAY OF CREATININE: CPT

## 2021-06-17 RX ORDER — DOXYCYCLINE 100 MG/1
100 TABLET ORAL 2 TIMES DAILY
COMMUNITY
End: 2021-09-24

## 2021-06-17 RX ADMIN — VANCOMYCIN HYDROCHLORIDE 1250 MG: 500 INJECTION, POWDER, LYOPHILIZED, FOR SOLUTION INTRAVENOUS at 05:36

## 2021-06-17 RX ADMIN — Medication 10 ML: at 21:00

## 2021-06-17 RX ADMIN — Medication 5 ML: at 13:11

## 2021-06-17 RX ADMIN — VANCOMYCIN HYDROCHLORIDE 1250 MG: 500 INJECTION, POWDER, LYOPHILIZED, FOR SOLUTION INTRAVENOUS at 20:59

## 2021-06-17 RX ADMIN — GABAPENTIN 100 MG: 100 CAPSULE ORAL at 08:49

## 2021-06-17 RX ADMIN — Medication 10 ML: at 06:18

## 2021-06-17 RX ADMIN — ACETAMINOPHEN 975 MG: 325 TABLET, FILM COATED ORAL at 05:36

## 2021-06-17 RX ADMIN — METRONIDAZOLE 500 MG: 500 TABLET ORAL at 16:10

## 2021-06-17 RX ADMIN — LIDOCAINE HYDROCHLORIDE 1 ML: 20 JELLY TOPICAL at 08:52

## 2021-06-17 RX ADMIN — GABAPENTIN 100 MG: 100 CAPSULE ORAL at 16:10

## 2021-06-17 RX ADMIN — METRONIDAZOLE 500 MG: 500 TABLET ORAL at 03:50

## 2021-06-17 RX ADMIN — ACETAMINOPHEN 975 MG: 325 TABLET, FILM COATED ORAL at 12:49

## 2021-06-17 RX ADMIN — HYDROMORPHONE HYDROCHLORIDE 1 MG: 1 INJECTION, SOLUTION INTRAMUSCULAR; INTRAVENOUS; SUBCUTANEOUS at 09:04

## 2021-06-17 RX ADMIN — ACETAMINOPHEN 975 MG: 325 TABLET, FILM COATED ORAL at 18:02

## 2021-06-17 RX ADMIN — LEVOFLOXACIN 750 MG: 750 TABLET, FILM COATED ORAL at 03:08

## 2021-06-17 RX ADMIN — GABAPENTIN 100 MG: 100 CAPSULE ORAL at 21:00

## 2021-06-17 RX ADMIN — VANCOMYCIN HYDROCHLORIDE 1250 MG: 500 INJECTION, POWDER, LYOPHILIZED, FOR SOLUTION INTRAVENOUS at 12:51

## 2021-06-17 RX ADMIN — ACETAMINOPHEN 975 MG: 325 TABLET, FILM COATED ORAL at 23:15

## 2021-06-17 NOTE — PROGRESS NOTES
Problem: Mobility Impaired (Adult and Pediatric)  Goal: *Acute Goals and Plan of Care (Insert Text)  Description: FUNCTIONAL STATUS PRIOR TO ADMISSION: Patient was independent and active without use of DME prior to onset of painful wounds. However, over previous few weeks/months, patient reports being mostly sedentary, staying in bed to keep weight off wounds. HOME SUPPORT PRIOR TO ADMISSION: The patient lived with three roommates. Physical Therapy Goals  Initiated 6/14/2021  1. Patient will move from supine to sit and sit to supine in bed with stand by assistance within 7 day(s). 2.  Patient will transfer from bed to chair and chair to bed with contact guard assist using the least restrictive device within 7 day(s). 3.  Patient will perform sit to stand with stand by assistance within 7 day(s). 4.  Patient will ambulate with contact guard assist for 25 feet with the least restrictive device within 7 day(s). Outcome: Progressing Towards Goal     PHYSICAL THERAPY TREATMENT  Patient: Deborah Donatoutant (22 y.o. male)  Date: 6/17/2021  Diagnosis: Hidradenitis [L73.2] <principal problem not specified>       Precautions:    Chart, physical therapy assessment, plan of care and goals were reviewed. ASSESSMENT  Patient continues with skilled PT services and is progressing towards goals. Patient requires SBA and increased time for bed mobility. Patient stood with CGA and bed elevated. Patient performed two sit to stand trials. Patient able to maintain standing for roughly 1-2 minutes with each attempt. Patient with more fatigue this date and deferred ambulation. Current Level of Function Impacting Discharge (mobility/balance): SBA to CGA    Other factors to consider for discharge: wounds         PLAN :  Patient continues to benefit from skilled intervention to address the above impairments. Continue treatment per established plan of care. to address goals.     Recommendation for discharge: (in order for the patient to meet his/her long term goals)  Physical therapy at least 2 days/week in the home     This discharge recommendation:  Has been made in collaboration with the attending provider and/or case management    IF patient discharges home will need the following DME: straight cane       SUBJECTIVE:   Patient stated I feel tired today.     OBJECTIVE DATA SUMMARY:   Critical Behavior:  Neurologic State: Alert, Appropriate for age  Orientation Level: Oriented X4  Cognition: Follows commands  Safety/Judgement: Awareness of environment, Fall prevention, Decreased awareness of need for safety    Functional Mobility Training:  Bed Mobility:  Rolling: Stand-by assistance; Additional time  Supine to Sit: Stand-by assistance; Additional time  Sit to Supine: Stand-by assistance; Additional time  Scooting: Supervision; Additional time    Transfers:  Sit to Stand: Contact guard assistance; Additional time (bed elevated)  Stand to Sit: Stand-by assistance; Additional time    Balance:  Sitting: Intact  Standing: Impaired; With support  Standing - Static: Good;Fair  Standing - Dynamic : Fair    Pain Ratin/10 generalized; RN aware    Activity Tolerance:   Fair    After treatment patient left in no apparent distress:   Supine in bed, Call bell within reach, and Side rails x 3    COMMUNICATION/COLLABORATION:   The patients plan of care was discussed with: Registered nurse.      Luis Cota, PT   Time Calculation: 15 mins

## 2021-06-17 NOTE — PROGRESS NOTES
Hospitalist Progress Note    NAME: Mya Kuo   :  1995   MRN:  [id number]   Room Number:  741/06  @ [address] [place] [address] Summary: 22 y.o. male whom presented on 2021 with      Assessment / Plan:    Hidradenitis suppurative stage III ( Hurely stage III-  due to multiple abscesses/ sinus tracts)   Failed outpatient Rx. assessed by GS at [address]- No need for debridement per surgery. Wound Cx : LIGHT STREPTOCOCCI, BETA HEMOLYTIC GROUP C     - Vanc, levofloxacin/flagyl till    - aggressive wound care   - to resume Doxycycline 100 mg BID after completion of broad Abx course   - Outpatient Follow up w/ [facility]         Pain management   - Gabapentin 100 mg TID ( up titrate as needed )   - Scheduled tylenol 975 mg TID   - Motrin 800 mg Q6 PRN for mild pain   - Tramadol 50 mg Q4 for moderate pain   - oxycodone taper for severe pain   - IV dilaudid only for dressing change         Anemia of chronic diease  - Hgb 8.2 w/ MCV 71   - Iron 17 , TIBC 122, Ferritin 570 , Iron sat 14  - Hold Iron in the setting of active infection      BMI 27.67   Overweight POA  Counseled on Lifestyle modifications, AHA Diet ,weight loss strategies.        Code Status: Full   Surrogate Decision Maker:     DVT Prophylaxis: Lovenox  GI Prophylaxis: not indicated  Baseline:             Subjective:     Chief Complaint / Reason for Physician Visit  \"they did wound care this morning\". Discussed with RN events overnight. Review of Systems:  No fevers, chills, appetite change, cough, sputum production, shortness of breath, dyspnea on exertion, nausea, vomitting, diarrhea, constipation, chest pain, leg edema, abdominal pain, joint pain, rash, itching. Tolerating PT/OT. Tolerating diet. Objective:     VITALS:   Last 24hrs VS reviewed since prior progress note.  Most recent are:  Patient Vitals for the past 24 hrs:   Temp Pulse Resp BP SpO2   21 0749 98.8 °F (37.1 °C) 91 16 111/68 100 %   06/17/21 0355 -- -- 14 -- --   06/17/21 0300 -- -- 14 -- --   06/16/21 2020 99.1 °F (37.3 °C) 83 16 (!) 120/57 100 %   06/16/21 1500 97.8 °F (36.6 °C) 90 18 122/68 99 %       Intake/Output Summary (Last 24 hours) at 6/17/2021 1114  Last data filed at 6/17/2021 0536  Gross per 24 hour   Intake 800 ml   Output 1370 ml   Net -570 ml        PHYSICAL EXAM:  General:  Alert, cooperative, no acute distress    EENT:  EOMI. Anicteric sclerae. MMM  Resp:  CTA bilaterally, no wheezing or rales. No accessory muscle use  CV:  Regular  rhythm,  normal S1/S2, no murmurs rubs gallops, No edema  GI:  Soft, Non distended, Non tender. +Bowel sounds  Neurologic:  Alert and oriented X 3, normal speech,   Psych:   Good insight. Not anxious nor agitated  Skin:  No rashes. No jaundice    Reviewed most current lab test results and cultures  YES  Reviewed most current radiology test results   YES  Review and summation of old records today    NO  Reviewed patient's current orders and MAR    YES  PMH/SH reviewed - no change compared to H&P  ________________________________________________________________________  Care Plan discussed with:    Comments   Patient x    Family      RN x    Care Manager x    Consultant                       x Multidiciplinary team rounds were held today with , nursing, pharmacist and clinical coordinator. Patient's plan of care was discussed; medications were reviewed and discharge planning was addressed.      ________________________________________________________________________  Total NON critical care TIME:  25  Minutes    Total CRITICAL CARE TIME Spent:   Minutes non procedure based      Comments   >50% of visit spent in counseling and coordination of care x    ________________________________________________________________________  Olivia Kruse MD     Procedures: see electronic medical records for all procedures/Xrays and details which were not copied into this note but were reviewed prior to creation of Plan. LABS:  I reviewed today's most current labs and imaging studies.   Pertinent labs include:  Recent Labs     06/15/21  0606   WBC 10.6   HGB 9.3*   HCT 31.6*   *     Recent Labs     06/17/21  0530 06/16/21  1345   CREA 0.75 0.87       Signed: Gildardo Lowry MD

## 2021-06-17 NOTE — PROGRESS NOTES
Problem: Pressure Injury - Risk of  Goal: *Prevention of pressure injury  Description: Document Dann Scale and appropriate interventions in the flowsheet. Outcome: Progressing Towards Goal  Note: Pressure Injury Interventions:  Sensory Interventions: Avoid rigorous massage over bony prominences, Check visual cues for pain    Moisture Interventions: Absorbent underpads    Activity Interventions: Increase time out of bed    Mobility Interventions: PT/OT evaluation    Nutrition Interventions: Offer support with meals,snacks and hydration    Friction and Shear Interventions: Lift sheet                Problem:  Activity Intolerance  Goal: *Oxygen saturation during activity within specified parameters  Outcome: Progressing Towards Goal  Goal: *Able to remain out of bed as prescribed  Outcome: Progressing Towards Goal     Problem: Patient Education: Go to Patient Education Activity  Goal: Patient/Family Education  Outcome: Progressing Towards Goal

## 2021-06-17 NOTE — PROGRESS NOTES
Shift report given to Josemanuel Barros RN and Nan Altman LPN from Jm Jacobs RN. Report included the following: SBAR, MAR, Kardex, and Recent Results. 0749 VS stable. Pt stated pain 3/10.      0848 Morning assessment complete. Pt refused Lovenox and Miralax. Iv infusing at this time. 1821 IV Dilaudid admin for dressing change    0930 Wound care completed. Pt tolerated well     1210 Pt sitting up eating lunch. Denies any requests or discomforts    1238 Pt resting quietly in bed, visitor present. Denied any requests or discomforts     1405 Pt resting quietly in bed on phone, denied any requests or discomforts     1606 VS stable, reassessment complete. Pt accepted meds. Denied any pain meds for pain of 4/10.     1804 Pt resting quietly in bed on phone. Cup of ice brought per pt request, denies any discomfort     Shift report given to Tyrese Jackson from Josemanuel Barros, [address] [place] [address]. Report included the following: SBAR, MAR, Kardex, and Recent Results.

## 2021-06-17 NOTE — PROGRESS NOTES
Bedside shift change report given to Samra Vernon RN (oncoming nurse) by Alexis Huang (offgoing nurse). Report included the following information SBAR, Kardex and MAR. Pt in room in the dark talking on phone.  No complaints

## 2021-06-18 PROCEDURE — 74011250637 HC RX REV CODE- 250/637: Performed by: STUDENT IN AN ORGANIZED HEALTH CARE EDUCATION/TRAINING PROGRAM

## 2021-06-18 PROCEDURE — 97110 THERAPEUTIC EXERCISES: CPT | Performed by: OCCUPATIONAL THERAPIST

## 2021-06-18 PROCEDURE — 74011000250 HC RX REV CODE- 250: Performed by: INTERNAL MEDICINE

## 2021-06-18 PROCEDURE — 97116 GAIT TRAINING THERAPY: CPT | Performed by: PHYSICAL THERAPIST

## 2021-06-18 PROCEDURE — 94760 N-INVAS EAR/PLS OXIMETRY 1: CPT

## 2021-06-18 PROCEDURE — 65270000032 HC RM SEMIPRIVATE

## 2021-06-18 RX ORDER — OXYCODONE HYDROCHLORIDE 5 MG/1
10 TABLET ORAL
Status: DISPENSED | OUTPATIENT
Start: 2021-06-18 | End: 2021-06-22

## 2021-06-18 RX ORDER — DOXYCYCLINE HYCLATE 100 MG
100 TABLET ORAL EVERY 12 HOURS
Status: DISCONTINUED | OUTPATIENT
Start: 2021-06-18 | End: 2021-06-29 | Stop reason: HOSPADM

## 2021-06-18 RX ORDER — OXYCODONE HYDROCHLORIDE 5 MG/1
5 TABLET ORAL
Status: DISCONTINUED | OUTPATIENT
Start: 2021-07-02 | End: 2021-06-25

## 2021-06-18 RX ORDER — OXYCODONE HYDROCHLORIDE 5 MG/1
5 TABLET ORAL
Status: DISCONTINUED | OUTPATIENT
Start: 2021-06-27 | End: 2021-06-25

## 2021-06-18 RX ORDER — OXYCODONE HYDROCHLORIDE 5 MG/1
5 TABLET ORAL
Status: DISCONTINUED | OUTPATIENT
Start: 2021-06-22 | End: 2021-06-25

## 2021-06-18 RX ADMIN — Medication 10 ML: at 06:12

## 2021-06-18 RX ADMIN — GABAPENTIN 100 MG: 100 CAPSULE ORAL at 16:28

## 2021-06-18 RX ADMIN — LIDOCAINE HYDROCHLORIDE: 20 JELLY TOPICAL at 12:26

## 2021-06-18 RX ADMIN — GABAPENTIN 100 MG: 100 CAPSULE ORAL at 21:17

## 2021-06-18 RX ADMIN — GABAPENTIN 100 MG: 100 CAPSULE ORAL at 09:21

## 2021-06-18 RX ADMIN — ACETAMINOPHEN 975 MG: 325 TABLET, FILM COATED ORAL at 06:12

## 2021-06-18 RX ADMIN — ACETAMINOPHEN 975 MG: 325 TABLET, FILM COATED ORAL at 12:18

## 2021-06-18 RX ADMIN — DOXYCYCLINE HYCLATE 100 MG: 100 TABLET, COATED ORAL at 11:11

## 2021-06-18 RX ADMIN — OXYCODONE HYDROCHLORIDE 10 MG: 5 TABLET ORAL at 11:11

## 2021-06-18 RX ADMIN — DOXYCYCLINE HYCLATE 100 MG: 100 TABLET, COATED ORAL at 21:17

## 2021-06-18 RX ADMIN — ACETAMINOPHEN 975 MG: 325 TABLET, FILM COATED ORAL at 18:12

## 2021-06-18 NOTE — PROGRESS NOTES
Problem: Self Care Deficits Care Plan (Adult)  Goal: *Acute Goals and Plan of Care (Insert Text)  Description: FUNCTIONAL STATUS PRIOR TO ADMISSION: Patient was independent and active without use of DME prior to onset of painful wounds, was working in security. In last few weeks, patient reports being fairly sedentary, lying in bed to keep weight off buttocks/sacral wounds. Reports trying to ambulate in room intermittently during the day as tolerated. Patient not cooking recently 2/2 difficulty / pain in extended standing. Also reports feeling more weak / fatigued, with several episodes of lightheadedness. Does not drive - relies on family/friends or Lyft for transportation. HOME SUPPORT: The patient lived with roommates - per patient, extremely painful going up and down stairs recently so roommates have been bringing items to him as needed. Occupational Therapy Goals  Initiated 6/14/2021  1. Patient will perform grooming, standing at sink, with supervision/set-up within 7 day(s). 2.  Patient will perform upper body dressing and bathing with supervision/set-up within 7 day(s). 3.  Patient will perform lower body dressing with supervision/set-up using AE PRN within 7 day(s). 4.  Patient will perform toilet transfers with modified independence within 7 day(s). 5.  Patient will perform all aspects of toileting with supervision/set-up within 7 day(s). 6.  Patient will participate in upper extremity therapeutic exercise/activities with modified independence for 5 minutes within 7 day(s). 7.  Patient will utilize energy conservation techniques during functional activities with verbal cues within 7 day(s).      Outcome: Progressing Towards Goal     OCCUPATIONAL THERAPY TREATMENT  Patient: Brinda Alanis (22 y.o. male)  Date: 6/18/2021  Diagnosis: Hidradenitis [L73.2] <principal problem not specified>       Precautions:  fall, skin  Chart, occupational therapy assessment, plan of care, and goals were reviewed. ASSESSMENT  Patient continues with skilled OT services and is progressing towards goals. Pt seen an additional session to re-educate pt on BUE AROM HEP to improve BUE function. He demonstrated good understanding, but poor tolerance due to pain. He is only able to achieve approx 80 degrees B shoulder flexion with soft tissue shortening noted. Encouraged pt to perform HEP including stretching daily and he verbally agreed. [facility] and assist for ADLs at discharge. Current Level of Function Impacting Discharge (ADLs): min A    Other factors to consider for discharge: see above         PLAN :  Patient continues to benefit from skilled intervention to address the above impairments. Continue treatment per established plan of care to address goals. Recommend with staff: up to chair as able for meals    Recommend next OT session: review BUE AROM HEP    Recommendation for discharge: (in order for the patient to meet his/her long term goals)  Occupational therapy at least 2 days/week in the home AND ensure assist and/or supervision for safety     This discharge recommendation:  Has been made in collaboration with the attending provider and/or case management    IF patient discharges home will need the following DME: recommend hip kit       SUBJECTIVE:   Patient stated This hurts so bad.     OBJECTIVE DATA SUMMARY:   Cognitive/Behavioral Status:  Neurologic State: Alert; Appropriate for age  Orientation Level: Oriented X4  Cognition: Appropriate decision making; Appropriate for age attention/concentration; Follows commands  Perception: Appears intact  Perseveration: No perseveration noted  Safety/Judgement: Awareness of environment; Fall prevention    Functional Mobility and Transfers for ADLs:  Bed Mobility:  Rolling: Supervision  Supine to Sit: Supervision; Additional time (HOB elevated)  Sit to Supine: Supervision; Additional time  Scooting: Stand-by assistance    Balance:  Sitting: Intact    ADL Intervention:  Lower Body Dressing Assistance  Slip on Shoes with Back: Minimum assistance (to adjust heal on L)  Position Performed: Seated edge of bed  Cues: Don;Doff;Physical assistance;Verbal cues provided    Cognitive Retraining  Safety/Judgement: Awareness of environment; Fall prevention    Therapeutic Exercises:   t seen an additional session to re-educate pt on BUE AROM HEP to improve BUE function. He demonstrated good understanding, but poor tolerance due to pain. He is only able to achieve approx 80 degrees B shoulder flexion with soft tissue shortening noted. Encouraged pt to perform HEP including stretching daily and he verbally agreed. Pain:  8/10    Activity Tolerance:   Fair    After treatment patient left in no apparent distress:   Supine in bed and Call bell within reach    COMMUNICATION/COLLABORATION:   The patients plan of care was discussed with: Physical therapist and Registered nurse.      Tania Rodriguez OT  Time Calculation: 23 mins

## 2021-06-18 NOTE — PROGRESS NOTES
[address]) Verbal shift change report given to Jairo Gan RN (oncoming nurse) by Jessica Velez RN (offgoing nurse). Report included the following information SBAR, Kardex, Intake/Output, MAR and Recent Results. 5259) Pt working with OT at this time. Will assess later. 0915) Pt assessed and vital signs stable. Pt has no c/o pain at this time. Pt refused miralax and lovenox. Pt stated he would like to d/c the IV and will try wound care with PO kenzie today instead. 56) Interdisciplinary team rounds were held 6/18/2021 with the following team members:Care Management, Nursing, Pharmacy and Physician. Plan of care discussed. See clinical pathway and/or care plan for interventions and desired outcomes. 1100) Pt resting in bed at this time. Snacks provided per request. PRN kenzie given as wound care prophylaxis. IV d/c per pt request & d/c of IV dilaudid. Pt left working with physical therapy at this time. 1220) Tylenol given. Wound care completed. See flow sheet. 1430) Pt resting in bed at this time, no complaints. 1540) Pt reassessed and no changes to note. 1700) Pt resting in bed. States no needs at this time. Grandma is present at the bedside    1815) Scheduled tylenol given. Pt resting in bed at this time. 1900) Bedside shift change report given to Ghulam Nieto RN and Saad Barbour LPN (oncoming nurse) by Jairo Gan RN (offgoing nurse). Report included the following information SBAR, Kardex, Intake/Output and MAR.

## 2021-06-18 NOTE — PROGRESS NOTES
JM  RAAT 9%  CM called Hidradenitis Clinic at [facility] (part of their Dermatology Clinic) to schedule patient an appointment. They took information and will be calling this CM back with appointment day/time. If patient does not have Medicaid at the time of appointment he will need to be screened by [facility] by calling [phone]. If he is still self pay there will be a $60 co-pay for the appointment.   ANAHI Tate/DEVIKA  [phone]

## 2021-06-18 NOTE — PROGRESS NOTES
Late Noted for Thursday 6/18/21  IDR Noted  RUR 10%  Barriers- No insurance at this time. Plan- IV ABT therapy ends today. Will being staying here about two more weeks for continued wound healing.     Brooke Cobian, BSW/  [phone]

## 2021-06-18 NOTE — PROGRESS NOTES
Hospitalist Progress Note    NAME: Gian Moss   :  1995   MRN:  [id number]   Room Number:  167/58  @ [address] [place] [address] Summary: 22 y.o. male whom presented on 2021 with      Assessment / Plan:    Hidradenitis suppurative stage III ( Hurely stage III-  due to multiple abscesses/ sinus tracts)   Failed outpatient Rx. assessed by GS at [facility]- No need for debridement per surgery. Wound Cx : LIGHT STREPTOCOCCI, BETA HEMOLYTIC GROUP C  Completed Levaquin/metronidazole      - aggressive wound care   - resume Doxycycline 100 mg BID  - Outpatient Follow up w/ [facility]         Pain management   - Gabapentin 100 mg TID ( up titrate as needed )   - Scheduled tylenol 975 mg TID   - Motrin 800 mg Q6 PRN for mild pain   - Tramadol 50 mg Q4 for moderate pain   - oxycodone taper for severe pain   - IV dilaudid only for dressing change         Anemia of chronic diease  - Hgb 8.2 w/ MCV 71   - Iron 17 , TIBC 122, Ferritin 570 , Iron sat 14  - Hold Iron in the setting of active infection      BMI 27.67   Overweight POA  Counseled on Lifestyle modifications, AHA Diet ,weight loss strategies.        Code Status: Full   Surrogate Decision Maker:     DVT Prophylaxis: Lovenox  GI Prophylaxis: not indicated  Baseline:             Subjective:     Chief Complaint / Reason for Physician Visit  \"i'll try to do not use IV pain meds\". Discussed with RN events overnight. Review of Systems:  No fevers, chills, appetite change, cough, sputum production, shortness of breath, dyspnea on exertion, nausea, vomitting, diarrhea, constipation, chest pain, leg edema, abdominal pain, joint pain, rash, itching. Tolerating diet. Objective:     VITALS:   Last 24hrs VS reviewed since prior progress note.  Most recent are:  Patient Vitals for the past 24 hrs:   Temp Pulse Resp BP SpO2   21 0855 98 °F (36.7 °C) 88 18 133/75 100 %   21 98.5 °F (36.9 °C) (!) 102 17 (!) 106/59 100 %   06/17/21 1606 98.4 °F (36.9 °C) 98 16 114/61 100 %       Intake/Output Summary (Last 24 hours) at 6/18/2021 1405  Last data filed at 6/18/2021 0527  Gross per 24 hour   Intake 250 ml   Output 1250 ml   Net -1000 ml        PHYSICAL EXAM:  General: Alert, cooperative, no acute distress    EENT:  EOMI. Anicteric sclerae. MMM  Resp:  CTA bilaterally, no wheezing or rales. No accessory muscle use  CV:  Regular  rhythm,  normal S1/S2, no murmurs rubs gallops, No edema  GI:  Soft, Non distended, Non tender. +Bowel sounds  Neurologic:  Alert and oriented X 3, normal speech,   Psych:   Good insight. Not anxious nor agitated  Skin:  No rashes. No jaundice    Reviewed most current lab test results and cultures  YES  Reviewed most current radiology test results   YES  Review and summation of old records today    NO  Reviewed patient's current orders and MAR    YES  PMH/ reviewed - no change compared to H&P  ________________________________________________________________________  Care Plan discussed with:    Comments   Patient x    Family      RN x    Care Manager x    Consultant                       x Multidiciplinary team rounds were held today with , nursing, pharmacist and clinical coordinator. Patient's plan of care was discussed; medications were reviewed and discharge planning was addressed. ________________________________________________________________________  Total NON critical care TIME:  25   Minutes    Total CRITICAL CARE TIME Spent:   Minutes non procedure based      Comments   >50% of visit spent in counseling and coordination of care     ________________________________________________________________________  Alfredo Ocampo MD     Procedures: see electronic medical records for all procedures/Xrays and details which were not copied into this note but were reviewed prior to creation of Plan. LABS:  I reviewed today's most current labs and imaging studies.   Pertinent labs include:  No results for input(s): WBC, HGB, HCT, PLT, HGBEXT, HCTEXT, PLTEXT in the last 72 hours.   Recent Labs     06/17/21  0530 06/16/21  1345   CREA 0.75 0.87       Signed: Douglas Little MD

## 2021-06-18 NOTE — PROGRESS NOTES
IDT Note  RUR 11%  LOS 7 days  GLOS 2.9 Delay put in chart on 6/14. Another delay put in today. Patient has ended IV ABT therapy but needs time for wound healing. Barriers-  No insurance at this time. Medicaid application is pending. Plan-  Patient continues to work with PT. Patient will need a straight cane. Wound care continuing. No debridement needed per surgery. Monitor for pain.     ANAHI Byrnes/  [phone] Pt returned call rescheduling 9/13 with Dr. Ruvalcaba to 10/25 at [facility]. Pt has packet already. Message left for [facility] schedulers. Message sent to auth pool.

## 2021-06-18 NOTE — PROGRESS NOTES
Problem: Pressure Injury - Risk of  Goal: *Prevention of pressure injury  Description: Document Dann Scale and appropriate interventions in the flowsheet. Outcome: Progressing Towards Goal  Note: Pressure Injury Interventions:  Sensory Interventions: Avoid rigorous massage over bony prominences, Check visual cues for pain    Moisture Interventions: Absorbent underpads    Activity Interventions: Increase time out of bed    Mobility Interventions: PT/OT evaluation    Nutrition Interventions: Offer support with meals,snacks and hydration    Friction and Shear Interventions: Lift sheet, Foam dressings/transparent film/skin sealants                Problem: Activity Intolerance  Goal: *Oxygen saturation during activity within specified parameters  Outcome: Progressing Towards Goal     Problem: Falls - Risk of  Goal: *Absence of Falls  Description: Document Omid Fall Risk and appropriate interventions in the flowsheet.   Outcome: Progressing Towards Goal  Note: Fall Risk Interventions:  Mobility Interventions: Communicate number of staff needed for ambulation/transfer         Medication Interventions: Patient to call before getting OOB    Elimination Interventions: Call light in reach, Urinal in reach    History of Falls Interventions: Assess for delayed presentation/identification of injury for 48 hrs (comment for end date)         Problem: Pain  Goal: *Control of Pain  Outcome: Progressing Towards Goal     Problem: General Infection Care Plan (Adult and Pediatric)  Goal: Improvement in signs and symptoms of infection  Outcome: Progressing Towards Goal

## 2021-06-18 NOTE — PROGRESS NOTES
Problem: Mobility Impaired (Adult and Pediatric)  Goal: *Acute Goals and Plan of Care (Insert Text)  Description: FUNCTIONAL STATUS PRIOR TO ADMISSION: Patient was independent and active without use of DME prior to onset of painful wounds. However, over previous few weeks/months, patient reports being mostly sedentary, staying in bed to keep weight off wounds. HOME SUPPORT PRIOR TO ADMISSION: The patient lived with three roommates. Physical Therapy Goals  Initiated 6/14/2021  1. Patient will move from supine to sit and sit to supine in bed with stand by assistance within 7 day(s). 2.  Patient will transfer from bed to chair and chair to bed with contact guard assist using the least restrictive device within 7 day(s). 3.  Patient will perform sit to stand with stand by assistance within 7 day(s). 4.  Patient will ambulate with contact guard assist for 25 feet with the least restrictive device within 7 day(s). Outcome: Progressing Towards Goal    PHYSICAL THERAPY TREATMENT  Patient: Digna Raymond (22 y.o. male)  Date: 6/18/2021  Diagnosis: Hidradenitis [L73.2] <principal problem not specified>       Precautions:    Chart, physical therapy assessment, plan of care and goals were reviewed. ASSESSMENT  Patient continues with skilled PT services and is progressing towards goals. Patient continues to perform bed mobility with supervision and additional time. Patient stood with CGA, and ambulated 40 feet with straight and CGA. Mild unsteadiness noted with ambulation, and continue to recommend assist of one for mobility. Patient able to work on standing tolerance while PCT changed bed linens; able to stand for roughly 5 minutes. Improved activity tolerance this date. Current Level of Function Impacting Discharge (mobility/balance):  CGA for ambulation    Other factors to consider for discharge: wounds         PLAN :  Patient continues to benefit from skilled intervention to address the above impairments. Continue treatment per established plan of care. to address goals. Recommendation for discharge: (in order for the patient to meet his/her long term goals)  Physical therapy at least 2 days/week in the home     This discharge recommendation:  Has been made in collaboration with the attending provider and/or case management    IF patient discharges home will need the following DME: straight cane       SUBJECTIVE:   Patient stated I feel a little better today.     OBJECTIVE DATA SUMMARY:   Critical Behavior:  Neurologic State: Alert, Appropriate for age  Orientation Level: Oriented X4  Cognition: Appropriate decision making, Appropriate for age attention/concentration, Follows commands  Safety/Judgement: Awareness of environment, Fall prevention    Functional Mobility Training:  Bed Mobility:  Rolling: Supervision  Supine to Sit: Supervision; Additional time (HOB elevated)  Sit to Supine: Supervision; Additional time  Scooting: Supervision    Transfers:  Sit to Stand: Contact guard assistance; Additional time; Other (comment) (bed elevated)  Stand to Sit: Stand-by assistance; Additional time       Balance:  Sitting: Intact  Standing: Impaired; With support  Standing - Static: Good;Fair  Standing - Dynamic : Fair    Ambulation/Gait Training:  Distance (ft): 40 Feet (ft)  Assistive Device: Gait belt;Cane, straight  Ambulation - Level of Assistance: Contact guard assistance  Gait Abnormalities: Decreased step clearance  Base of Support: Narrowed  Speed/Bonnie: Slow  Step Length: Right shortened;Left shortened    Pain Ratin/10; RN aware    Activity Tolerance:   Good    After treatment patient left in no apparent distress:   Supine in bed, Call bell within reach, and Side rails x 3    COMMUNICATION/COLLABORATION:   The patients plan of care was discussed with: Registered nurse.      Raymon Porter, PT   Time Calculation: 15 mins

## 2021-06-19 PROCEDURE — 74011000250 HC RX REV CODE- 250: Performed by: INTERNAL MEDICINE

## 2021-06-19 PROCEDURE — 65270000032 HC RM SEMIPRIVATE

## 2021-06-19 PROCEDURE — 74011250637 HC RX REV CODE- 250/637: Performed by: STUDENT IN AN ORGANIZED HEALTH CARE EDUCATION/TRAINING PROGRAM

## 2021-06-19 RX ADMIN — DOXYCYCLINE HYCLATE 100 MG: 100 TABLET, COATED ORAL at 21:58

## 2021-06-19 RX ADMIN — GABAPENTIN 100 MG: 100 CAPSULE ORAL at 21:58

## 2021-06-19 RX ADMIN — GABAPENTIN 100 MG: 100 CAPSULE ORAL at 15:38

## 2021-06-19 RX ADMIN — OXYCODONE HYDROCHLORIDE 10 MG: 5 TABLET ORAL at 11:26

## 2021-06-19 RX ADMIN — ACETAMINOPHEN 975 MG: 325 TABLET, FILM COATED ORAL at 17:56

## 2021-06-19 RX ADMIN — ACETAMINOPHEN 975 MG: 325 TABLET, FILM COATED ORAL at 11:49

## 2021-06-19 RX ADMIN — LIDOCAINE HYDROCHLORIDE: 20 JELLY TOPICAL at 11:52

## 2021-06-19 RX ADMIN — DOXYCYCLINE HYCLATE 100 MG: 100 TABLET, COATED ORAL at 09:33

## 2021-06-19 RX ADMIN — GABAPENTIN 100 MG: 100 CAPSULE ORAL at 09:33

## 2021-06-19 RX ADMIN — ACETAMINOPHEN 975 MG: 325 TABLET, FILM COATED ORAL at 05:22

## 2021-06-19 NOTE — PROGRESS NOTES
Problem: Pressure Injury - Risk of  Goal: *Prevention of pressure injury  Description: Document Dann Scale and appropriate interventions in the flowsheet. Outcome: Progressing Towards Goal  Note: Pressure Injury Interventions:  Sensory Interventions: Avoid rigorous massage over bony prominences, Check visual cues for pain    Moisture Interventions: Absorbent underpads    Activity Interventions: Increase time out of bed    Mobility Interventions: PT/OT evaluation    Nutrition Interventions: Offer support with meals,snacks and hydration    Friction and Shear Interventions: Foam dressings/transparent film/skin sealants                Problem:  Activity Intolerance  Goal: *Oxygen saturation during activity within specified parameters  Outcome: Progressing Towards Goal     Problem: Pain  Goal: *Control of Pain  Outcome: Progressing Towards Goal     Problem: General Infection Care Plan (Adult and Pediatric)  Goal: Improvement in signs and symptoms of infection  Outcome: Progressing Towards Goal     Problem: General Infection Care Plan (Adult and Pediatric)  Goal: *Optimize nutritional status  Outcome: Not Progressing Towards Goal

## 2021-06-19 NOTE — PROGRESS NOTES
Hospitalist Progress Note    NAME: Duran Ash   :  1995   MRN:  [id number]   Room Number:  427/56  @ [address] [place] [address] Summary: 22 y.o. male whom presented on 2021 with      Assessment / Plan:      Hidradenitis suppurative stage III ( Hurely stage III-  due to multiple abscesses/ sinus tracts)   Failed outpatient Rx. assessed by GS at [facility]- No need for debridement per surgery. Wound Cx : LIGHT STREPTOCOCCI, BETA HEMOLYTIC GROUP C  Completed Levaquin/metronidazole        - aggressive wound care   - resume Doxycycline 100 mg BID  - Outpatient Follow up w/ [facility]         Pain management   - Gabapentin 100 mg TID ( up titrate as needed )   - Scheduled tylenol 975 mg TID   - Motrin 800 mg Q6 PRN for mild pain   - Tramadol 50 mg Q4 for moderate pain   - oxycodone taper for severe pain   - IV dilaudid only for dressing change         Anemia of chronic diease  - Hgb 8.2 w/ MCV 71   - Iron 17 , TIBC 122, Ferritin 570 , Iron sat 14  - Hold Iron in the setting of active infection      BMI 27.67   Overweight POA  Counseled on Lifestyle modifications, AHA Diet ,weight loss strategies.        Code Status: Full   Surrogate Decision Maker:     DVT Prophylaxis: Lovenox  GI Prophylaxis: not indicated  Baseline:           Subjective:     Chief Complaint / Reason for Physician Visit  Anoop López. Discussed with RN events overnight. Review of Systems:  No fevers, chills, appetite change, cough, sputum production, shortness of breath, dyspnea on exertion, nausea, vomitting, diarrhea, constipation, chest pain, leg edema, abdominal pain, joint pain, rash, itching. Tolerating PT/OT. Tolerating diet. Objective:     VITALS:   Last 24hrs VS reviewed since prior progress note.  Most recent are:  Patient Vitals for the past 24 hrs:   Temp Pulse Resp BP SpO2   21 0850 98.2 °F (36.8 °C) -- 16 117/68 100 %   21 1953 98 °F (36.7 °C) 96 18 (!) 104/57 100 % 06/18/21 1537 97.5 °F (36.4 °C) 79 18 128/86 100 %       Intake/Output Summary (Last 24 hours) at 6/19/2021 1052  Last data filed at 6/19/2021 0900  Gross per 24 hour   Intake --   Output 1025 ml   Net -1025 ml        PHYSICAL EXAM:  General: WD, WN. Alert, cooperative, no acute distress    EENT:  EOMI. Anicteric sclerae. MMM  Resp:  CTA bilaterally, no wheezing or rales. No accessory muscle use  CV:  Regular  rhythm,  normal S1/S2, no murmurs rubs gallops, No edema  GI:  Soft, Non distended, Non tender. +Bowel sounds  Neurologic:  Alert and oriented X 3, normal speech,   Psych:   Good insight. Not anxious nor agitated  Skin:  No rashes. No jaundice    Reviewed most current lab test results and cultures  YES  Reviewed most current radiology test results   YES  Review and summation of old records today    NO  Reviewed patient's current orders and MAR    YES  PMH/ reviewed - no change compared to H&P  ________________________________________________________________________  Care Plan discussed with:    Comments   Patient x    Family      RN x    Care Manager     Consultant                        Multidiciplinary team rounds were held today with , nursing, pharmacist and clinical coordinator. Patient's plan of care was discussed; medications were reviewed and discharge planning was addressed. ________________________________________________________________________  Total NON critical care TIME:  20   Minutes    Total CRITICAL CARE TIME Spent:   Minutes non procedure based      Comments   >50% of visit spent in counseling and coordination of care x    ________________________________________________________________________  Cain Angulo MD     Procedures: see electronic medical records for all procedures/Xrays and details which were not copied into this note but were reviewed prior to creation of Plan. LABS:  I reviewed today's most current labs and imaging studies.   Pertinent labs include:  No results for input(s): WBC, HGB, HCT, PLT, HGBEXT, HCTEXT, PLTEXT in the last 72 hours.   Recent Labs     06/17/21  0530 06/16/21  1345   CREA 0.75 0.87       Signed: Cherie Fontaine MD

## 2021-06-19 NOTE — PROGRESS NOTES
Comprehensive Nutrition Assessment     Type and Reason for Visit: Initial, RD nutrition re-screen/LOS, Wound     Nutrition Recommendations/Plan:  Continue Regular diet, double portions ordered  Add Ensure Enlive BID      Nutrition Assessment:     Chart reviewed. Pt tx'd from [facility]. Patient medically noted for hydradenitis suppurativa. Patient reports eating well; appetite much better currently. Eating % of meals per flowsheets. Menu provided to help with meal selections. He reports a weight of ~230# in February with a progressive downtrend over the last few months down to current weight. He attributes a lot of it to fluid loss in his legs but intake was fluctuating some at home. He hasn't tried supplements/protein shakes before but is open to them and has thought about incorporating them into his diet at home. Will send ensure enlive for patient to try. Wound care . Encourage intake of meals and supplements.      Patient Vitals for the past 168 hrs:    % Diet Eaten   06/18/21 1231 76 - 100%   06/17/21 1755 76 - 100%   06/16/21 1518 51 - 75%   06/15/21 0846 51 - 75%   06/14/21 0850 76 - 100%      Estimated Daily Nutrient Needs:  Energy (kcal): 2289 kcal (BMR 1761 x 1. 3AF); Weight Used for Energy Requirements: Current  Protein (g): 83g (1.2 g/kg bw); Weight Used for Protein Requirements: Current  Fluid (ml/day): 2200 mL; Method Used for Fluid Requirements: 1 ml/kcal     Nutrition Related Findings:       BG 03--99  BM 6/4  Ferrous sulfate, Miralax, PRN dulcolax      Wounds:    Open wounds (abscesses)     Stage III documented by MD- patient with documented abscesses, wound care consulted      Current Nutrition Therapies:  ADULT DIET Regular     Anthropometric Measures:  · Height:  6' 3\" (190.5 cm)  · Current Body Wt:  68.9 kg (152 lb)   · BMI Category:  Normal weight (BMI 18.5-24. 9)        Nutrition Diagnosis:   · Unintended weight loss related to  (fluid loss per patient, slight decrease in PO) as evidenced by  (reported 68-78# weight loss x 4 months?)     Nutrition Interventions:   Food and/or Nutrient Delivery: Continue current diet, Start oral nutrition supplement  Nutrition Education and Counseling: No recommendations at this time  Coordination of Nutrition Care: No recommendation at this time     Goals:  PO intake >70% of meals/supplements next 3-5 days        Nutrition Monitoring and Evaluation:   Behavioral-Environmental Outcomes: None identified  Food/Nutrient Intake Outcomes: Food and nutrient intake, Supplement intake  Physical Signs/Symptoms Outcomes: Biochemical data, Weight     Discharge Planning:    Continue current diet      Electronically signed by Milena Graf, PhD, RD, [address]  June 19th, 2021 1:27 PM

## 2021-06-19 NOTE — PROGRESS NOTES
0) Verbal shift change report given to Pooja Perez, RN (oncoming nurse) by Severino Henriquez, RN (offgoing nurse). Report included the following information SBAR, Kardex, Intake/Output, MAR and Recent Results. 7582) Pt stated he wants to sleep at this time. Will assess later    0930) Pt assessed and morning medications given. Pt refused lovenox and miralax. Vibra tab and gabapentin given as ordered. Pt stated no pain at this time. 1125) Vianney 10 mg given prior to wound care. 1150) Tylenol given. Wound care performed on groin and sacrum at this time. PT tolerated well. See flowsheet. 1245) Wound care completed on bilateral axilla. Pt tolerated well and was left resting in bed. Goldfish and ginger ale provided per request.     9919) Pt reassessed and no changes to note. Vital signs stable. Gabapentin given. Pt left resting in bed watching TV.     1700) Pt resting in bed at this time. No needs stated. 1800) Scheduled tylenol given. Rafa Fear filled with ice and ginger ale provided per request.      1900) Verbal shift change report given to Severino Henriquez, RN (oncoming nurse) by Pooja Perez, ALESSANDRO (offgoing nurse). Report included the following information SBAR, Kardex, Intake/Output and MAR.

## 2021-06-20 PROCEDURE — 74011000250 HC RX REV CODE- 250: Performed by: INTERNAL MEDICINE

## 2021-06-20 PROCEDURE — 74011250637 HC RX REV CODE- 250/637: Performed by: INTERNAL MEDICINE

## 2021-06-20 PROCEDURE — 65270000032 HC RM SEMIPRIVATE

## 2021-06-20 PROCEDURE — 74011250637 HC RX REV CODE- 250/637: Performed by: STUDENT IN AN ORGANIZED HEALTH CARE EDUCATION/TRAINING PROGRAM

## 2021-06-20 PROCEDURE — 74011250636 HC RX REV CODE- 250/636: Performed by: STUDENT IN AN ORGANIZED HEALTH CARE EDUCATION/TRAINING PROGRAM

## 2021-06-20 RX ORDER — NALOXONE HYDROCHLORIDE 0.4 MG/ML
0.4 INJECTION, SOLUTION INTRAMUSCULAR; INTRAVENOUS; SUBCUTANEOUS
Status: DISCONTINUED | OUTPATIENT
Start: 2021-06-20 | End: 2021-06-29 | Stop reason: HOSPADM

## 2021-06-20 RX ORDER — HYDROMORPHONE HYDROCHLORIDE 1 MG/ML
0.2 INJECTION, SOLUTION INTRAMUSCULAR; INTRAVENOUS; SUBCUTANEOUS DAILY PRN
Status: DISCONTINUED | OUTPATIENT
Start: 2021-06-20 | End: 2021-06-24

## 2021-06-20 RX ADMIN — DOXYCYCLINE HYCLATE 100 MG: 100 TABLET, COATED ORAL at 21:30

## 2021-06-20 RX ADMIN — GABAPENTIN 100 MG: 100 CAPSULE ORAL at 21:30

## 2021-06-20 RX ADMIN — Medication 10 ML: at 15:45

## 2021-06-20 RX ADMIN — ACETAMINOPHEN 975 MG: 325 TABLET, FILM COATED ORAL at 00:45

## 2021-06-20 RX ADMIN — OXYCODONE HYDROCHLORIDE 10 MG: 5 TABLET ORAL at 18:50

## 2021-06-20 RX ADMIN — DOXYCYCLINE HYCLATE 100 MG: 100 TABLET, COATED ORAL at 09:40

## 2021-06-20 RX ADMIN — ACETAMINOPHEN 975 MG: 325 TABLET, FILM COATED ORAL at 12:04

## 2021-06-20 RX ADMIN — Medication 5 ML: at 22:00

## 2021-06-20 RX ADMIN — OXYCODONE HYDROCHLORIDE 10 MG: 5 TABLET ORAL at 01:49

## 2021-06-20 RX ADMIN — POLYETHYLENE GLYCOL 3350 17 G: 17 POWDER, FOR SOLUTION ORAL at 09:38

## 2021-06-20 RX ADMIN — GABAPENTIN 100 MG: 100 CAPSULE ORAL at 16:30

## 2021-06-20 RX ADMIN — HYDROMORPHONE HYDROCHLORIDE 0.2 MG: 1 INJECTION, SOLUTION INTRAMUSCULAR; INTRAVENOUS; SUBCUTANEOUS at 16:30

## 2021-06-20 RX ADMIN — ACETAMINOPHEN 975 MG: 325 TABLET, FILM COATED ORAL at 07:42

## 2021-06-20 RX ADMIN — GABAPENTIN 100 MG: 100 CAPSULE ORAL at 09:40

## 2021-06-20 RX ADMIN — LIDOCAINE HYDROCHLORIDE: 20 JELLY TOPICAL at 13:32

## 2021-06-20 RX ADMIN — OXYCODONE HYDROCHLORIDE 10 MG: 5 TABLET ORAL at 11:30

## 2021-06-20 RX ADMIN — ACETAMINOPHEN 975 MG: 325 TABLET, FILM COATED ORAL at 18:50

## 2021-06-20 RX ADMIN — TRAMADOL HYDROCHLORIDE 50 MG: 50 TABLET, FILM COATED ORAL at 10:08

## 2021-06-20 NOTE — PROGRESS NOTES
Bedside shift change report given to Rafa Villafuerte, RN and Melissa Quiroga RN (oncoming nurse) by Marisol Shah RN (offgoing nurse). Report included the following information SBAR, Intake/Output and MAR.     1593) Pt asleep in bed.    0958) Pt resting in bed, watching TV.    1130) Pt resting in bed, visitor present. 1204) Pt resting in bed, visitor present. Bedside shift change report given to Juan Carlos Lundy RN and [address] Liv Nurse (oncoming nurse) by Rafa Villafuerte RN (offgoing nurse). Report included the following information SBAR, Intake/Output and MAR.

## 2021-06-20 NOTE — PROGRESS NOTES
Hospitalist Progress Note    NAME: Mya Kuo   :  1995   MRN:  [id number]   Room Number:  262/94  @ [address] [place] [address] Summary: 22 y.o. male whom presented on 2021 with      Assessment / Plan:      Hidradenitis suppurative stage III ( Hurely stage III-  due to multiple abscesses/ sinus tracts)   Failed outpatient Rx. assessed by GS at [facility]- No need for debridement per surgery. Wound Cx : LIGHT STREPTOCOCCI, BETA HEMOLYTIC GROUP C  Completed Levaquin/metronidazole        - aggressive wound care   - resume Doxycycline 100 mg BID  - Outpatient Follow up w/ [facility]         Pain management   - Gabapentin 100 mg TID ( up titrate as needed )   - Scheduled tylenol 975 mg TID   - Motrin 800 mg Q6 PRN for mild pain   - Tramadol 50 mg Q4 for moderate pain   - oxycodone taper for severe pain   - IV dilaudid only for dressing change         Anemia of chronic diease  - Hgb 8.2 w/ MCV 71   - Iron 17 , TIBC 122, Ferritin 570 , Iron sat 14  - Hold Iron in the setting of active infection      BMI 27.67   Overweight POA  Counseled on Lifestyle modifications, AHA Diet ,weight loss strategies.        Code Status: Full   Surrogate Decision Maker:     DVT Prophylaxis: Lovenox  GI Prophylaxis: not indicated  Baseline:          Subjective:     Chief Complaint / Reason for Physician Visit  Pain at wound area, waiting for RN to bring pain medicine   Discussed with RN events overnight. Review of Systems:  No fevers, chills, appetite change, cough, sputum production, shortness of breath, dyspnea on exertion, nausea, vomitting, diarrhea, constipation, chest pain, leg edema, abdominal pain, joint pain, rash, itching. Tolerating PT/OT. Tolerating diet. Objective:     VITALS:   Last 24hrs VS reviewed since prior progress note.  Most recent are:  Patient Vitals for the past 24 hrs:   Temp Pulse Resp BP SpO2   21 0320 98.4 °F (36.9 °C) 87 16 114/71 99 %   21 1922 98 °F (36.7 °C) 93 16 111/60 97 %   06/19/21 1536 98.2 °F (36.8 °C) 85 18 116/72 100 %       Intake/Output Summary (Last 24 hours) at 6/20/2021 0912  Last data filed at 6/20/2021 0558  Gross per 24 hour   Intake 600 ml   Output 850 ml   Net -250 ml        PHYSICAL EXAM:  General: WD, WN. Alert, cooperative, no acute distress    EENT:  EOMI. Anicteric sclerae. MMM  Resp:  CTA bilaterally, no wheezing or rales. No accessory muscle use  CV:  Regular  rhythm,  normal S1/S2, no murmurs rubs gallops, No edema  GI:  Soft, Non distended, Non tender. +Bowel sounds  Neurologic:  Alert and oriented X 3, normal speech,   Psych:   Good insight. Not anxious nor agitated  Skin:  No rashes. No jaundice    Reviewed most current lab test results and cultures  YES  Reviewed most current radiology test results   YES  Review and summation of old records today    NO  Reviewed patient's current orders and MAR    YES  PMH/SH reviewed - no change compared to H&P  ________________________________________________________________________  Care Plan discussed with:    Comments   Patient x    Family      RN x    Care Manager     Consultant                        Multidiciplinary team rounds were held today with , nursing, pharmacist and clinical coordinator. Patient's plan of care was discussed; medications were reviewed and discharge planning was addressed. ________________________________________________________________________  Total NON critical care TIME:  20  Minutes    Total CRITICAL CARE TIME Spent:   Minutes non procedure based      Comments   >50% of visit spent in counseling and coordination of care x    ________________________________________________________________________  Diandra Cunningham MD     Procedures: see electronic medical records for all procedures/Xrays and details which were not copied into this note but were reviewed prior to creation of Plan.       LABS:  I reviewed today's most current labs and imaging studies. Pertinent labs include:  No results for input(s): WBC, HGB, HCT, PLT, HGBEXT, HCTEXT, PLTEXT in the last 72 hours. No results for input(s): NA, K, CL, CO2, GLU, BUN, CREA, CA, MG, PHOS, ALB, TBIL, TBILI, ALT, INR, INREXT in the last 72 hours.     No lab exists for component: SGOT    Signed: Abhishek Amezcua MD

## 2021-06-20 NOTE — PROGRESS NOTES
2330 - received verbal shift change report from [place], [place] to include SBAR, Kardex, [facility] and recent results. Pt currently asleep w/o distress and resp WDL. Pt requested earlier to be awaken for scheduled Tylenol. 0045 - Awoke pt to administer Tylenol - gave 975mg PO - pt requested drink and snack which was given. Assessment completed. A&Ox4. C/o 6/10 pain to coccyx area wound. Kerlex dressings clean, dry and intact to bilateral upper arms. Axillary areas w/ small amt drainage. Coccyx wound w/ large foam dressing, intact w/ small amount drainage at base of dressing. ABD pads clean, dry and intact to bilateral groins w/ no visible drainage. Lungs clear bilaterally. Pt fell back to sleep after eating. 0149 - pt awoke c/o coccyx pain worsening rating it 9/10. Believes he slept on L side of his back/wound wrong. Pain is specifically to L lower side of wound. Site assessed, dressing remains intact w/ small amt drainage noted at site of pain. Offered Vianney and pt initially declined, but then stated he would go ahead and take pain med to help him try to go back to sleep. Administered Vianney 10mg PO.     0245 - Pt sleeping soundly w/o distress. Pain reassessment completed visually and doesn't appear to demonstrate any s/s continuing pain at this time.

## 2021-06-20 NOTE — PROGRESS NOTES
SBAR report received from [facility]. Per report the pt is A&Ox4but remains a fall risk r/t wounds and wound care. Per report MD has approved pt to have no IV. Per report wound care has already been competed. On initial assessment the pt is A&Ox4, reports pain but has been refusing PRN medication except for w/ wound care.

## 2021-06-21 PROCEDURE — 2709999900 HC NON-CHARGEABLE SUPPLY

## 2021-06-21 PROCEDURE — 74011250637 HC RX REV CODE- 250/637: Performed by: STUDENT IN AN ORGANIZED HEALTH CARE EDUCATION/TRAINING PROGRAM

## 2021-06-21 PROCEDURE — 97110 THERAPEUTIC EXERCISES: CPT

## 2021-06-21 PROCEDURE — 65270000032 HC RM SEMIPRIVATE

## 2021-06-21 PROCEDURE — 94760 N-INVAS EAR/PLS OXIMETRY 1: CPT

## 2021-06-21 PROCEDURE — 97116 GAIT TRAINING THERAPY: CPT | Performed by: PHYSICAL THERAPIST

## 2021-06-21 PROCEDURE — 74011000250 HC RX REV CODE- 250: Performed by: STUDENT IN AN ORGANIZED HEALTH CARE EDUCATION/TRAINING PROGRAM

## 2021-06-21 PROCEDURE — 74011250636 HC RX REV CODE- 250/636: Performed by: STUDENT IN AN ORGANIZED HEALTH CARE EDUCATION/TRAINING PROGRAM

## 2021-06-21 RX ORDER — LIDOCAINE HYDROCHLORIDE 20 MG/ML
JELLY TOPICAL DAILY
Status: DISCONTINUED | OUTPATIENT
Start: 2021-06-22 | End: 2021-06-21

## 2021-06-21 RX ORDER — GABAPENTIN 100 MG/1
200 CAPSULE ORAL 3 TIMES DAILY
Status: DISCONTINUED | OUTPATIENT
Start: 2021-06-21 | End: 2021-06-29 | Stop reason: HOSPADM

## 2021-06-21 RX ORDER — LIDOCAINE HYDROCHLORIDE 20 MG/ML
JELLY TOPICAL DAILY
Status: DISCONTINUED | OUTPATIENT
Start: 2021-06-21 | End: 2021-06-23

## 2021-06-21 RX ADMIN — GABAPENTIN 100 MG: 100 CAPSULE ORAL at 08:31

## 2021-06-21 RX ADMIN — LIDOCAINE HYDROCHLORIDE: 20 JELLY TOPICAL at 14:54

## 2021-06-21 RX ADMIN — Medication 10 ML: at 14:02

## 2021-06-21 RX ADMIN — OXYCODONE HYDROCHLORIDE 10 MG: 5 TABLET ORAL at 16:46

## 2021-06-21 RX ADMIN — GABAPENTIN 200 MG: 100 CAPSULE ORAL at 16:46

## 2021-06-21 RX ADMIN — ACETAMINOPHEN 975 MG: 325 TABLET, FILM COATED ORAL at 18:18

## 2021-06-21 RX ADMIN — GABAPENTIN 200 MG: 100 CAPSULE ORAL at 21:24

## 2021-06-21 RX ADMIN — TRAMADOL HYDROCHLORIDE 50 MG: 50 TABLET, FILM COATED ORAL at 09:25

## 2021-06-21 RX ADMIN — HYDROMORPHONE HYDROCHLORIDE 0.2 MG: 1 INJECTION, SOLUTION INTRAMUSCULAR; INTRAVENOUS; SUBCUTANEOUS at 14:02

## 2021-06-21 RX ADMIN — DOXYCYCLINE HYCLATE 100 MG: 100 TABLET, COATED ORAL at 08:31

## 2021-06-21 RX ADMIN — Medication 10 ML: at 21:51

## 2021-06-21 RX ADMIN — Medication 10 ML: at 05:14

## 2021-06-21 RX ADMIN — DOXYCYCLINE HYCLATE 100 MG: 100 TABLET, COATED ORAL at 21:25

## 2021-06-21 RX ADMIN — ACETAMINOPHEN 975 MG: 325 TABLET, FILM COATED ORAL at 11:39

## 2021-06-21 NOTE — PROGRESS NOTES
Problem: Mobility Impaired (Adult and Pediatric)  Goal: *Acute Goals and Plan of Care (Insert Text)  Description: FUNCTIONAL STATUS PRIOR TO ADMISSION: Patient was independent and active without use of DME prior to onset of painful wounds. However, over previous few weeks/months, patient reports being mostly sedentary, staying in bed to keep weight off wounds. HOME SUPPORT PRIOR TO ADMISSION: The patient lived with three roommates. Physical Therapy Goals  Initiated 6/14/2021  1. Patient will move from supine to sit and sit to supine in bed with stand by assistance within 7 day(s). MET 6/21/21  2. Patient will transfer from bed to chair and chair to bed with contact guard assist using the least restrictive device within 7 day(s). 3.  Patient will perform sit to stand with stand by assistance within 7 day(s). 4.  Patient will ambulate with contact guard assist for 25 feet with the least restrictive device within 7 day(s). MET 6/21/21    Updated 6/21/2021:  1. Patient will move from supine to sit and sit to supine in bed with modified independence within 7 day(s). 2.  Patient will transfer from bed to chair and chair to bed with contact guard assist using the least restrictive device within 7 day(s). 3.  Patient will perform sit to stand with stand by assistance within 7 day(s). 4.  Patient will ambulate with contact guard assist for 60 feet with the least restrictive device within 7 day(s). Outcome: Progressing Towards Goal    PHYSICAL THERAPY TREATMENT  Weekly Reassessment  Patient: Digna Raymond (89 y.o. male)  Date: 6/21/2021  Diagnosis: Hidradenitis [L73.2] <principal problem not specified>       Precautions:    Chart, physical therapy assessment, plan of care and goals were reviewed. ASSESSMENT  Patient continues with skilled PT services and is progressing towards goals. Patient continues to require increased time with all mobility secondary to pain from wounds.  Patient performed bed mobility with supervision and bed modified. He stood and ambulated 15 feet with CGA and straight cane. Slight unsteadiness remains. Current Level of Function Impacting Discharge (mobility/balance): CGA for ambulation    Other factors to consider for discharge: wounds         PLAN :  Patient continues to benefit from skilled intervention to address the above impairments. Continue treatment per established plan of care. to address goals. Recommendation for discharge: (in order for the patient to meet his/her long term goals)  Physical therapy at least 2 days/week in the home     This discharge recommendation:  Has been made in collaboration with the attending provider and/or case management    IF patient discharges home will need the following DME: straight cane       SUBJECTIVE:   Patient stated The wounds on my back side are just hurting more today.     OBJECTIVE DATA SUMMARY:   Critical Behavior:  Neurologic State: Alert, Appropriate for age  Orientation Level: Oriented X4  Cognition: Appropriate decision making, Appropriate for age attention/concentration, Follows commands  Safety/Judgement: Awareness of environment, Fall prevention    Functional Mobility Training:  Bed Mobility:  Rolling: Supervision  Supine to Sit: Supervision; Additional time (HOB elevated)  Sit to Supine: Supervision; Additional time  Scooting: Supervision        Transfers:  Sit to Stand: Contact guard assistance; Additional time (bed elevated)  Stand to Sit: Stand-by assistance; Additional time    Balance:  Sitting: Intact  Standing: Impaired; With support  Standing - Static: Good;Fair  Standing - Dynamic : Fair    Ambulation/Gait Training:  Distance (ft): 15 Feet (ft)  Assistive Device: Gait belt;Cane, straight  Ambulation - Level of Assistance: Contact guard assistance  Gait Abnormalities: Decreased step clearance  Base of Support: Narrowed  Speed/Bonnie: Slow  Step Length: Right shortened;Left shortened    Pain Ratin-6/10 ; RN aware    Activity Tolerance:   Fair    After treatment patient left in no apparent distress:   Supine in bed, Call bell within reach, and Side rails x 3    COMMUNICATION/COLLABORATION:   The patients plan of care was discussed with: Occupational therapist and Registered nurse.      Poncho James, PT   Time Calculation: 15 mins

## 2021-06-21 NOTE — PROGRESS NOTES
Problem: Self Care Deficits Care Plan (Adult)  Goal: *Acute Goals and Plan of Care (Insert Text)  Description: FUNCTIONAL STATUS PRIOR TO ADMISSION: Patient was independent and active without use of DME prior to onset of painful wounds, was working in security. In last few weeks, patient reports being fairly sedentary, lying in bed to keep weight off buttocks/sacral wounds. Reports trying to ambulate in room intermittently during the day as tolerated. Patient not cooking recently 2/2 difficulty / pain in extended standing. Also reports feeling more weak / fatigued, with several episodes of lightheadedness. Does not drive - relies on family/friends or Lyft for transportation. HOME SUPPORT: The patient lived with roommates - per patient, extremely painful going up and down stairs recently so roommates have been bringing items to him as needed. Occupational Therapy Goals  Initiated 6/14/2021; Goals reviewed 6/21/2021, all goals remain appropriate. 1.  Patient will perform grooming, standing at sink, with supervision/set-up within 7 day(s). 2.  Patient will perform upper body dressing and bathing with supervision/set-up within 7 day(s). 3.  Patient will perform lower body dressing with supervision/set-up using AE PRN within 7 day(s). 4.  Patient will perform toilet transfers with modified independence within 7 day(s). 5.  Patient will perform all aspects of toileting with supervision/set-up within 7 day(s). 6.  Patient will participate in upper extremity therapeutic exercise/activities with modified independence for 5 minutes within 7 day(s). 7.  Patient will utilize energy conservation techniques during functional activities with verbal cues within 7 day(s).     Outcome: Progressing Towards Goal     OCCUPATIONAL THERAPY TREATMENT/WEEKLY RE-ASSESSMENT  Patient: Tae Bhatia (88 y.o. male)  Date: 6/21/2021  Diagnosis: Hidradenitis [L73.2] <principal problem not specified>       Precautions: Chart, occupational therapy assessment, plan of care, and goals were reviewed. ASSESSMENT  Patient continues with skilled OT services and is slowly progressing towards goals. Patient continues to present with increased pain due to multiple wounds, impaired balance, decreased strength and ROM, and decreased activity tolerance. Pt has progressed to performing standing hygiene at sink with SBA/CGA and has initiated ADL re-training with AE and continues to demonstrate good carry-over of techniques. This session, pt is received in bed following PT session, reporting increased pain from ambulation and agreeable to modified bed-level activity. Reviewed HEP for UEs and pt able to recall most of shoulder ROM stretches/movements from previous session. Educated on additional exercises and noted increased ROM in R shoulder to 90* flexion and L shoulder to ~80*. Pt continues to benefit from skilled OT services and offers good efforts. POC increased to 3x/week and all goals continued. Current Level of Function Impacting Discharge (ADLs): up to min A for ADLs; CGA to min A for ADL transfers/OOB mobility    Other factors to consider for discharge: multiple wounds; will have supports of roommates at discharge         PLAN :  Goals have been updated based on progression since last assessment. Patient continues to benefit from skilled intervention to address the above impairments. Continue to follow patient 3 times a week to address goals.     Recommend with staff: Up to chair as tolerated; mobility to bathroom for toileting with 1 assist; ADLs as able with assistance    Recommend next OT session: continue with HEP; ADLs as able    Recommendation for discharge: (in order for the patient to meet his/her long term goals)  Occupational therapy at least 2 days/week in the home AND ensure assist and/or supervision for safety with ADLs    This discharge recommendation:  Has been made in collaboration with the attending provider and/or case management    IF patient discharges home will need the following DME:  AE       SUBJECTIVE:   Patient stated It hurts a lot today.     OBJECTIVE DATA SUMMARY:   Cognitive/Behavioral Status:  Neurologic State: Alert; Appropriate for age  Orientation Level: Oriented X4  Cognition: Appropriate decision making; Appropriate for age attention/concentration; Appropriate safety awareness; Follows commands  Perception: Appears intact  Perseveration: No perseveration noted  Safety/Judgement: Awareness of environment; Fall prevention; Insight into deficits    Functional Mobility and Transfers for ADLs:  Bed Mobility:  Rolling: Supervision  Supine to Sit: Supervision; Additional time (HOB elevated)  Sit to Supine: Supervision; Additional time  Scooting: Supervision    Transfers:  Sit to Stand: Contact guard assistance; Additional time (bed elevated)    Balance:  Sitting: Intact  Standing: Impaired; With support  Standing - Static: Good;Fair  Standing - Dynamic : Fair    ADL Intervention:  Cognitive Retraining  Safety/Judgement: Awareness of environment; Fall prevention; Insight into deficits    Therapeutic Exercises:   Pt instructed on and performed B UE AROM to improve UE function in prep for ADLs and ADL transfers. Pt requires increased cueing and visual/verbal prompts for form and pacing for duration of exercises and demonstrates fair to poor tolerance due to pain. Instructed pt to perform exercises 2-3x/day with at least 5 reps of each, and instructed to hold for 5-10 seconds.        EXERCISE   Sets   Reps   Active Active Assist   Passive   Comments   Forward flexion overhead with elbow flexed to reach to bed   []  [x]  []     Diagonal reach to touch knee   []  [x]  []     Horizontal adduction across body at 90* (shoulder height)   []  [x]  []     Goal post stretch   []  [x]  []        []  [x]  []        []  []  []       Pain:  4/10    Activity Tolerance:   Fair and Poor    After treatment patient left in no apparent distress:   Supine in bed, Heels elevated for pressure relief, Call bell within reach and Side rails x 3    COMMUNICATION/COLLABORATION:   The patients plan of care was discussed with: Physical therapist and Registered nurse.      Essence Holder OT  Time Calculation: 18 mins

## 2021-06-21 NOTE — PROGRESS NOTES
Physician Progress Note      PATIENT:               Magdaline Boeck  CSN #:                  [id number]  :                       1995  ADMIT DATE:       2021 9:33 PM  [address] [place] DATE:  RESPONDING  PROVIDER #:        Adina Le MD [facility] MD          QUERY TEXT:    Patient admitted with Hidradenitis suppurative stage III. Noted documentation noting \"Patient initially presented to [facility] ED was found to be in sepsis secondary to HS flare\" in H&P by IM dated . In order to support the diagnosis of sepsis, please include additional clinical indicators in your documentation. Or please document if the diagnosis of sepsis has been ruled out after further study. The medical record reflects the following:  Risk Factors: Hx: Recent admission to [address] [place] [address] for Sepsis and Hidradenitis suppurativa  Clinical Indicators: tmax: 98.8; hr: ; rr: 16-22. ..wbc: 12.9. Princess Ness Princess Ness Princess Ness Na+: 134. Princess Ness Princess Ness Princess Ness Iron: 27. .... TIBC: 150. .. Princess Ness Princess Ness Iron % Sat: 18. Princess Ness Princess Ness BCx: NG after 2 days. Princess Ness Princess Ness Princess Ness AP noted: Draining wound gluteal, axillary region . ... Princess Ness Princess Ness Noted: Failed outpatient Rx  Treatment: Aggressive wound care; Resume Doxycycline 100 mg BID after completion of broad Abx course; Outpatient Follow up w/ [facility] clinic    Thank you,    Julieta Vasquez  [facility]  [phone]  Options provided:  -- Sepsis present as evidenced by, Please document evidence. -- Sepsis was ruled out after study  -- Other - I will add my own diagnosis  -- Disagree - Not applicable / Not valid  -- Disagree - Clinically unable to determine / Unknown  -- Refer to Clinical Documentation Reviewer    PROVIDER RESPONSE TEXT:    Sepsis was ruled out after study.     Query created by: Natanael Miguel on 2021 11:52 AM      Electronically signed by:  Adina Le MD [facility] MD 2021 12:56 PM

## 2021-06-21 NOTE — PROGRESS NOTES
Care plan reviewed. Problem: Pressure Injury - Risk of  Goal: *Prevention of pressure injury  Description: Document Dann Scale and appropriate interventions in the flowsheet. Outcome: Progressing Towards Goal  Note: Pressure Injury Interventions:  Sensory Interventions: Assess changes in LOC    Moisture Interventions: Absorbent underpads, Offer toileting Q_hr, Minimize layers    Activity Interventions: Increase time out of bed, Pressure redistribution bed/mattress(bed type), PT/OT evaluation    Mobility Interventions: Assess need for specialty bed, HOB 30 degrees or less, Pressure redistribution bed/mattress (bed type)    Nutrition Interventions: Document food/fluid/supplement intake, Discuss nutritional consult with provider, Offer support with meals,snacks and hydration    Friction and Shear Interventions: Apply protective barrier, creams and emollients, HOB 30 degrees or less, Minimize layers                Problem: Patient Education: Go to Patient Education Activity  Goal: Patient/Family Education  Outcome: Progressing Towards Goal     Problem: Activity Intolerance  Goal: *Oxygen saturation during activity within specified parameters  Outcome: Progressing Towards Goal  Goal: *Able to remain out of bed as prescribed  Outcome: Progressing Towards Goal     Problem: Patient Education: Go to Patient Education Activity  Goal: Patient/Family Education  Outcome: Progressing Towards Goal     Problem: Falls - Risk of  Goal: *Absence of Falls  Description: Document Vivianaami Young Fall Risk and appropriate interventions in the flowsheet.   Outcome: Progressing Towards Goal  Note: Fall Risk Interventions:  Mobility Interventions: OT consult for ADLs, Patient to call before getting OOB, PT Consult for mobility concerns         Medication Interventions: Patient to call before getting OOB, Teach patient to arise slowly    Elimination Interventions: Call light in reach, Patient to call for help with toileting needs    History of Falls Interventions: Door open when patient unattended, Bed/chair exit alarm         Problem: Patient Education: Go to Patient Education Activity  Goal: Patient/Family Education  Outcome: Progressing Towards Goal     Problem: Pain  Goal: *Control of Pain  Outcome: Progressing Towards Goal  Goal: *PALLIATIVE CARE:  Alleviation of Pain  Outcome: Progressing Towards Goal     Problem: Patient Education: Go to Patient Education Activity  Goal: Patient/Family Education  Outcome: Progressing Towards Goal     Problem: General Infection Care Plan (Adult and Pediatric)  Goal: Improvement in signs and symptoms of infection  Outcome: Progressing Towards Goal  Goal: *Optimize nutritional status  Outcome: Progressing Towards Goal     Problem: Patient Education: Go to Patient Education Activity  Goal: Patient/Family Education  Outcome: Progressing Towards Goal     Problem: Patient Education: Go to Patient Education Activity  Goal: Patient/Family Education  Outcome: Progressing Towards Goal     Problem: Patient Education: Go to Patient Education Activity  Goal: Patient/Family Education  Outcome: Progressing Towards Goal

## 2021-06-21 NOTE — PROGRESS NOTES
Bedside Nurse coverage change report given to Raphael Barron, RN and Juliana FRANCOIS (oncoming nurse). Report included the following information SBAR, Intake/Output and MAR. Pt in bed resting quietly. No complaints.

## 2021-06-21 NOTE — PROGRESS NOTES
Bedside Nurse coverage change report given to Braden Bull RN and Juliana FRANCOIS (oncoming nurse). Report included the following information SBAR, Intake/Output and MAR. Pt is sleeping soundly.

## 2021-06-21 NOTE — PROGRESS NOTES
IDT Note  LOS 9d 11h  GLOS 2.9  ACP- Full code. Healthcare decision maker- Grandmother  Barriers- No insurance. Medicaid is pending. Needs wound healing here due to severity of wound. Plan- Anticipate stay to be one more week. Wound Care consult. PT continuing to work with patient. Needs referral to [facility] for a cane.   [facility], BSW/  [phone]

## 2021-06-21 NOTE — PROGRESS NOTES
Hospitalist Progress Note    NAME: Mingo Shelton   :  1995   MRN:  [id number]   Room Number:  038/18  @ [address] [place] [address] Summary: 22 y.o. male whom presented on 2021 with      Assessment / Plan:    Hidradenitis suppurative stage III ( Hurely stage III-  due to multiple abscesses/ sinus tracts)   Failed outpatient Rx. assessed by GS at [facility]- No need for debridement per surgery. Wound Cx : LIGHT STREPTOCOCCI, BETA HEMOLYTIC GROUP C  Completed Levaquin/metronidazole        - aggressive wound care   - continue Doxycycline 100 mg BID  - Outpatient Follow up w/ [facility]         Pain management   - Gabapentin 200 mg TID   - Scheduled tylenol 975 mg TID   - Motrin 800 mg Q6 PRN for mild pain   - Tramadol 50 mg Q4 for moderate pain   - oxycodone taper for severe pain   - IV dilaudid only for dressing change         Anemia of chronic diease  - Hgb 8.2 w/ MCV 71   - Iron 17 , TIBC 122, Ferritin 570 , Iron sat 14  - Hold Iron in the setting of active infection      BMI 27.67   Overweight POA  Counseled on Lifestyle modifications, AHA Diet ,weight loss strategies.        Code Status: Full   Surrogate Decision Maker:     DVT Prophylaxis: Lovenox  GI Prophylaxis: not indicated  Baseline:        Subjective:     Chief Complaint / Reason for Physician Visit  \"I have pain in my buttock its sharp\". Discussed with RN events overnight. Review of Systems:  No fevers, chills, appetite change, cough, sputum production, shortness of breath, dyspnea on exertion, nausea, vomitting, diarrhea, constipation, chest pain, leg edema, abdominal pain, joint pain, rash, itching. Tolerating PT/OT. Tolerating diet. Objective:     VITALS:   Last 24hrs VS reviewed since prior progress note.  Most recent are:  Patient Vitals for the past 24 hrs:   Temp Pulse Resp BP SpO2   21 0830 98.4 °F (36.9 °C) 85 17 (!) 110/54 100 %   21 1950 98.3 °F (36.8 °C) 99 16 130/69 99 % 06/20/21 1630 97.4 °F (36.3 °C) 99 16 128/78 97 %       Intake/Output Summary (Last 24 hours) at 6/21/2021 1340  Last data filed at 6/21/2021 1218  Gross per 24 hour   Intake 1000 ml   Output 600 ml   Net 400 ml        PHYSICAL EXAM:  General: WD, WN. Alert, cooperative, no acute distress    EENT:  EOMI. Anicteric sclerae. MMM  Resp:  CTA bilaterally, no wheezing or rales. No accessory muscle use  CV:  Regular  rhythm,  normal S1/S2, no murmurs rubs gallops, No edema  GI:  Soft, Non distended, Non tender. +Bowel sounds  Neurologic:  Alert and oriented X 3, normal speech,   Psych:   Good insight. Not anxious nor agitated  Skin:  No rashes. No jaundice    Reviewed most current lab test results and cultures  YES  Reviewed most current radiology test results   YES  Review and summation of old records today    NO  Reviewed patient's current orders and MAR    YES  PMH/ reviewed - no change compared to H&P  ________________________________________________________________________  Care Plan discussed with:    Comments   Patient x    Family      RN x    Care Manager x    Consultant                       x Multidiciplinary team rounds were held today with , nursing, pharmacist and clinical coordinator. Patient's plan of care was discussed; medications were reviewed and discharge planning was addressed. ________________________________________________________________________  Total NON critical care TIME: 25   Minutes    Total CRITICAL CARE TIME Spent:   Minutes non procedure based      Comments   >50% of visit spent in counseling and coordination of care     ________________________________________________________________________  Divine Hodgson MD     Procedures: see electronic medical records for all procedures/Xrays and details which were not copied into this note but were reviewed prior to creation of Plan. LABS:  I reviewed today's most current labs and imaging studies.   Pertinent labs include:  No results for input(s): WBC, HGB, HCT, PLT, HGBEXT, HCTEXT, PLTEXT in the last 72 hours. No results for input(s): NA, K, CL, CO2, GLU, BUN, CREA, CA, MG, PHOS, ALB, TBIL, TBILI, ALT, INR, INREXT in the last 72 hours.     No lab exists for component: SGOT    Signed: Kian Moreno MD

## 2021-06-21 NOTE — PROGRESS NOTES
1230  Mid shift transfer of care report d/t pt/nurse re-assignment given to this RN and Vamsi, Nurse extern by Woody Ramirez RN. Report included review of SBAR, vs, labs/test results, meds, orders, ROS and POC. All questions answered, transfer of care complete. 1300  Pt assisted OOB to BR, BM passed causing dressing to to coccyx to fall off. Pt assisted back to bed and positioned laying on right side for wound dressing change. Safety rounds completed. [id number]  Dressing to coccyx and groin changed per wound care instructions. Pt reports significant discomfort during dressing changes done to these areas, d/w pt pain control options, pt reports appropriate relief with past dressing changes with administration of small dose of IV dilaudid. MD notified, orders entered for dressing change IV pain med. Pt informed of new orders and agreeable with insertion of new IV access. Pt tolerated dressing changes to coccyx and groin fairly. [id number]   Dressing changes to bilateral axilla completed. Pt hygiene care also completed including CHG bath pack, gown and linen change. Pt tolerated dressing changes to bilat axilla well. Safety rounds completed. 1545  #24g PIV inserted to posterior left distal forearm. Pts room straightened, all medical supplies for wound care consolidated and  personal items organized for family to take certain items home upon next visit. 1630  Afternoon VS and assessment completed. Pt repositioned and medicated for post dressing change pain. Pt requested door to be shut so he could try and nap prior to dinner. Safety rounds completed. [address] delivered, pt assisted into comfortable sitting position, water pitcher refreshed, call bell and phone in reach. Safety rounds completed. 1850  Scheduled and prn meds admin for pain. Pt informed of shift change routine [id number], denies any additional needs at this time. Safety rounds completed.     1915  Shift change report given to Marcella Tsai RN by this RN. Report included review of events in pt care during day shift, review of SBAR, vs, labs/test results, meds, orders, ROS and POC. All questions answered, transfer of care complete.

## 2021-06-22 PROCEDURE — 74011250637 HC RX REV CODE- 250/637: Performed by: STUDENT IN AN ORGANIZED HEALTH CARE EDUCATION/TRAINING PROGRAM

## 2021-06-22 PROCEDURE — 65270000032 HC RM SEMIPRIVATE

## 2021-06-22 PROCEDURE — 74011250636 HC RX REV CODE- 250/636: Performed by: INTERNAL MEDICINE

## 2021-06-22 PROCEDURE — 94760 N-INVAS EAR/PLS OXIMETRY 1: CPT

## 2021-06-22 RX ORDER — OXYCODONE HYDROCHLORIDE 5 MG/1
10 TABLET ORAL
Status: DISCONTINUED | OUTPATIENT
Start: 2021-06-22 | End: 2021-06-29 | Stop reason: HOSPADM

## 2021-06-22 RX ORDER — OXYCODONE HYDROCHLORIDE 5 MG/1
10 TABLET ORAL ONCE
Status: COMPLETED | OUTPATIENT
Start: 2021-06-22 | End: 2021-06-22

## 2021-06-22 RX ADMIN — ACETAMINOPHEN 975 MG: 325 TABLET, FILM COATED ORAL at 23:34

## 2021-06-22 RX ADMIN — ACETAMINOPHEN 975 MG: 325 TABLET, FILM COATED ORAL at 17:42

## 2021-06-22 RX ADMIN — ACETAMINOPHEN 975 MG: 325 TABLET, FILM COATED ORAL at 09:15

## 2021-06-22 RX ADMIN — OXYCODONE HYDROCHLORIDE 10 MG: 5 TABLET ORAL at 12:40

## 2021-06-22 RX ADMIN — DOXYCYCLINE HYCLATE 100 MG: 100 TABLET, COATED ORAL at 22:05

## 2021-06-22 RX ADMIN — OXYCODONE HYDROCHLORIDE 10 MG: 5 TABLET ORAL at 09:13

## 2021-06-22 RX ADMIN — ACETAMINOPHEN 975 MG: 325 TABLET, FILM COATED ORAL at 12:41

## 2021-06-22 RX ADMIN — OXYCODONE HYDROCHLORIDE 10 MG: 5 TABLET ORAL at 00:31

## 2021-06-22 RX ADMIN — GABAPENTIN 200 MG: 100 CAPSULE ORAL at 22:05

## 2021-06-22 RX ADMIN — Medication 10 ML: at 22:05

## 2021-06-22 RX ADMIN — DOXYCYCLINE HYCLATE 100 MG: 100 TABLET, COATED ORAL at 09:15

## 2021-06-22 RX ADMIN — Medication 10 ML: at 09:16

## 2021-06-22 RX ADMIN — ACETAMINOPHEN 975 MG: 325 TABLET, FILM COATED ORAL at 00:27

## 2021-06-22 RX ADMIN — GABAPENTIN 200 MG: 100 CAPSULE ORAL at 17:42

## 2021-06-22 RX ADMIN — GABAPENTIN 200 MG: 100 CAPSULE ORAL at 09:15

## 2021-06-22 NOTE — PROGRESS NOTES
Hospitalist Progress Note    NAME: Brock Nugent   :  1995   MRN:  [id number]   Room Number:  921/87  @ [address] [place] [address] Summary: 22 y.o. male whom presented on 2021 with      Assessment / Plan:    Hidradenitis suppurative stage III ( Hurely stage III-  due to multiple abscesses/ sinus tracts)   Failed outpatient Rx. assessed by GS at [facility]- No need for debridement per surgery. Wound Cx : LIGHT STREPTOCOCCI, BETA HEMOLYTIC GROUP C  Completed Levaquin/metronidazole        - aggressive wound care   - continue Doxycycline 100 mg BID  - Outpatient Follow up w/ [facility]         Pain management   - Gabapentin 200 mg TID   - Scheduled tylenol 975 mg TID   - Motrin 800 mg Q6 PRN for mild pain   - Tramadol 50 mg Q4 for moderate pain   - oxycodone taper for severe pain   - IV dilaudid only for dressing change         Anemia of chronic diease  - Hgb 8.2 w/ MCV 71   - Iron 17 , TIBC 122, Ferritin 570 , Iron sat 14  - Hold Iron in the setting of active infection      BMI 27.67   Overweight POA  Counseled on Lifestyle modifications, AHA Diet ,weight loss strategies.        Code Status: Full   Surrogate Decision Maker:     DVT Prophylaxis: Lovenox  GI Prophylaxis: not indicated  Baseline: ambulatory independently          Subjective:     Chief Complaint / Reason for Physician Visit  \"the gabapentin definitely helped\". Discussed with RN events overnight. Review of Systems:  No fevers, chills, appetite change, cough, sputum production, shortness of breath, dyspnea on exertion, nausea, vomitting, diarrhea, constipation, chest pain, leg edema, abdominal pain, joint pain, rash, itching. Tolerating diet. Objective:     VITALS:   Last 24hrs VS reviewed since prior progress note.  Most recent are:  Patient Vitals for the past 24 hrs:   Temp Pulse Resp BP SpO2   21 1233 97.4 °F (36.3 °C) 92 13 132/75 100 %   21 0820 97.5 °F (36.4 °C) 95 16 135/75 100 % 06/21/21 2020 97.9 °F (36.6 °C) 100 18 (!) 115/58 100 %       Intake/Output Summary (Last 24 hours) at 6/22/2021 1312  Last data filed at 6/21/2021 2200  Gross per 24 hour   Intake 480 ml   Output 750 ml   Net -270 ml        PHYSICAL EXAM:  General: WD, WN. Alert, cooperative, no acute distress    EENT:  EOMI. Anicteric sclerae. MMM  Resp:  CTA bilaterally, no wheezing or rales. No accessory muscle use  CV:  Regular  rhythm,  normal S1/S2, no murmurs rubs gallops, No edema  GI:  Soft, Non distended, Non tender. +Bowel sounds  Neurologic:  Alert and oriented X 3, normal speech,   Psych:   Good insight. Not anxious nor agitated  Skin:  No rashes. No jaundice    Reviewed most current lab test results and cultures  YES  Reviewed most current radiology test results   YES  Review and summation of old records today    NO  Reviewed patient's current orders and MAR    YES  PMH/ reviewed - no change compared to H&P  ________________________________________________________________________  Care Plan discussed with:    Comments   Patient x    Family      RN x    Care Manager x    Consultant                       x Multidiciplinary team rounds were held today with , nursing, pharmacist and clinical coordinator. Patient's plan of care was discussed; medications were reviewed and discharge planning was addressed. ________________________________________________________________________  Total NON critical care TIME:  25   Minutes    Total CRITICAL CARE TIME Spent:   Minutes non procedure based      Comments   >50% of visit spent in counseling and coordination of care     ________________________________________________________________________  Celi Lorenzo MD     Procedures: see electronic medical records for all procedures/Xrays and details which were not copied into this note but were reviewed prior to creation of Plan. LABS:  I reviewed today's most current labs and imaging studies.   Pertinent labs include:  No results for input(s): WBC, HGB, HCT, PLT, HGBEXT, HCTEXT, PLTEXT in the last 72 hours. No results for input(s): NA, K, CL, CO2, GLU, BUN, CREA, CA, MG, PHOS, ALB, TBIL, TBILI, ALT, INR, INREXT in the last 72 hours.     No lab exists for component: SGOT    Signed: Thuan Aguirre MD

## 2021-06-22 NOTE — PROGRESS NOTES
Bedside report received from [facility], pt resting in bed comfortable at this time. Pt denies pain, pt resting comfortable no distress noted    0833-Pt vitals stable with the exception of elevated blood pressure. Pt verbalize pain 7/10 in mid coccyx prn Yin 10 mg po given. Pt accepted scheduled medication Pt consumed 30% breakfast denies being hungry, and consumed 300 ml fluid    1020- Dilaudid IV d/c'ed per pt request.     8461- pt received Yin 10 mg po before wound care. Pt consumed lunch 50%    1400-pt resting comfortable    1830-Pt consumed dinner 50%, no pain,     Bedside and Verbal shift change report given to Dl Canales rn (oncoming nurse) by Kang Cummings (offgoing nurse). Report included the following information SBAR, Intake/Output, MAR, Recent Results and Med Rec Status.

## 2021-06-22 NOTE — PROGRESS NOTES
IDR Note  RUR-10%  LOS- 10 days  GLOS-  2days 21hours- Delay on chart  ACP- Full code. Grandmother, Keely Wynne is healthcare decision maker. Barriers-  Wound care needs are greater that can be provided at home. Medicaid is pending. Plan- Monitor for pain. Eating well. Wound care consult requested.   Grecia Duran, ANAHI/EDVIKA  [phone]

## 2021-06-22 NOTE — PROGRESS NOTES
Spiritual Care Assessment/Progress Note  [facility]      NAME: Digna Raymond      MRN: [id number]  AGE: 22 y.o.  SEX: male  Holiness Affiliation: Non Taoism   Language: English     6/22/2021     Total Time (in minutes): 23     Spiritual Assessment begun in [address] through conversation with:         [x]Patient        [] Family    [] Friend(s)        Reason for Consult: Initial/Spiritual assessment, patient floor     Spiritual beliefs: (Please include comment if needed)     [x] Identifies with a chas tradition:         [] Supported by a chas community:            [] Claims no spiritual orientation:           [] Seeking spiritual identity:                [] Adheres to an individual form of spirituality:           [] Not able to assess:                           Identified resources for coping:      [] Prayer                               [] Music                  [] Guided Imagery     [x] Family/friends                 [] Pet visits     [] Devotional reading                         [] Unknown     [] Other:                                               Interventions offered during this visit: (See comments for more details)    Patient Interventions: Affirmation of emotions/emotional suffering, Catharsis/review of pertinent events in supportive environment, Coping skills reviewed/reinforced, Initial/Spiritual assessment, patient floor, Life review/legacy, Prayer (assurance of)           Plan of Care:     [x] Support spiritual and/or cultural needs    [] Support AMD and/or advance care planning process      [] Support grieving process   [] Coordinate Rites and/or Rituals    [] Coordination with community clergy   [] No spiritual needs identified at this time   [] Detailed Plan of Care below (See Comments)  [] Make referral to Music Therapy  [] Make referral to Pet Therapy     [] Make referral to Addiction services  [] Make referral to [facility]  [] Make referral to Spiritual Care Partner  [] No future visits requested        [x] Follow up upon further referrals     Comments:Provided support for this pt in [facility]. Facilitated life review to assess potential support needs or coping strategies. Pt offered review of events leading to pt's current situation. Provided pastoral support as pt processed medical progression up to and including this hospitalization. Provided space for pt to process and wonder about the connection between emotional being and physical being. Pt believes his physical challenges may be fueled by emotional realities that pt has dealt with within the span of a year. Pt shared that his grandmother and mother have both  within the span of a years time. Pt shared he benefits from the support of a good network of friends who check on him and encourage him. Assured pt of prayers and affirmed ongoing availability of support. Hodan Toscano MDiv.  Staff   Request  Support/Spirit[facility] Services via [facility]

## 2021-06-23 PROCEDURE — 74011250637 HC RX REV CODE- 250/637: Performed by: STUDENT IN AN ORGANIZED HEALTH CARE EDUCATION/TRAINING PROGRAM

## 2021-06-23 PROCEDURE — 74011000250 HC RX REV CODE- 250: Performed by: STUDENT IN AN ORGANIZED HEALTH CARE EDUCATION/TRAINING PROGRAM

## 2021-06-23 PROCEDURE — 77030041076 HC DRSG AG OPTICELL MDII -A

## 2021-06-23 PROCEDURE — 65270000032 HC RM SEMIPRIVATE

## 2021-06-23 PROCEDURE — 97116 GAIT TRAINING THERAPY: CPT | Performed by: PHYSICAL THERAPIST

## 2021-06-23 PROCEDURE — 97530 THERAPEUTIC ACTIVITIES: CPT | Performed by: PHYSICAL THERAPIST

## 2021-06-23 PROCEDURE — 97110 THERAPEUTIC EXERCISES: CPT | Performed by: OCCUPATIONAL THERAPIST

## 2021-06-23 PROCEDURE — 97535 SELF CARE MNGMENT TRAINING: CPT | Performed by: OCCUPATIONAL THERAPIST

## 2021-06-23 PROCEDURE — 74011250636 HC RX REV CODE- 250/636: Performed by: STUDENT IN AN ORGANIZED HEALTH CARE EDUCATION/TRAINING PROGRAM

## 2021-06-23 PROCEDURE — 2709999900 HC NON-CHARGEABLE SUPPLY

## 2021-06-23 RX ORDER — POLYETHYLENE GLYCOL 3350 17 G/17G
17 POWDER, FOR SOLUTION ORAL DAILY PRN
Status: DISCONTINUED | OUTPATIENT
Start: 2021-06-23 | End: 2021-06-29 | Stop reason: HOSPADM

## 2021-06-23 RX ORDER — LIDOCAINE HYDROCHLORIDE 20 MG/ML
JELLY TOPICAL DAILY PRN
Status: DISCONTINUED | OUTPATIENT
Start: 2021-06-23 | End: 2021-06-29 | Stop reason: HOSPADM

## 2021-06-23 RX ADMIN — LIDOCAINE HYDROCHLORIDE: 20 JELLY TOPICAL at 18:24

## 2021-06-23 RX ADMIN — GABAPENTIN 200 MG: 100 CAPSULE ORAL at 08:45

## 2021-06-23 RX ADMIN — OXYCODONE HYDROCHLORIDE 5 MG: 5 TABLET ORAL at 12:45

## 2021-06-23 RX ADMIN — HYDROMORPHONE HYDROCHLORIDE 0.2 MG: 1 INJECTION, SOLUTION INTRAMUSCULAR; INTRAVENOUS; SUBCUTANEOUS at 18:22

## 2021-06-23 RX ADMIN — DOXYCYCLINE HYCLATE 100 MG: 100 TABLET, COATED ORAL at 08:45

## 2021-06-23 RX ADMIN — Medication 10 ML: at 08:45

## 2021-06-23 RX ADMIN — ACETAMINOPHEN 975 MG: 325 TABLET, FILM COATED ORAL at 12:45

## 2021-06-23 RX ADMIN — Medication 10 ML: at 18:24

## 2021-06-23 RX ADMIN — GABAPENTIN 200 MG: 100 CAPSULE ORAL at 21:40

## 2021-06-23 RX ADMIN — ACETAMINOPHEN 975 MG: 325 TABLET, FILM COATED ORAL at 05:17

## 2021-06-23 RX ADMIN — DOXYCYCLINE HYCLATE 100 MG: 100 TABLET, COATED ORAL at 21:40

## 2021-06-23 RX ADMIN — ACETAMINOPHEN 975 MG: 325 TABLET, FILM COATED ORAL at 18:24

## 2021-06-23 RX ADMIN — GABAPENTIN 200 MG: 100 CAPSULE ORAL at 15:44

## 2021-06-23 RX ADMIN — Medication 10 ML: at 22:00

## 2021-06-23 NOTE — PROGRESS NOTES
IDR Note  RUR-10%  LOS- 11d  GLOS- 2d (delay in place)  ACP- FC- Decision Maker- grandmother  Barriers- Needs wound care. Plan-  Continue with wound care and pain management.   ANAHI Dutta/DEVIKA  [phone]

## 2021-06-23 NOTE — PROGRESS NOTES
Problem: Mobility Impaired (Adult and Pediatric)  Goal: *Acute Goals and Plan of Care (Insert Text)  Description: FUNCTIONAL STATUS PRIOR TO ADMISSION: Patient was independent and active without use of DME prior to onset of painful wounds. However, over previous few weeks/months, patient reports being mostly sedentary, staying in bed to keep weight off wounds. HOME SUPPORT PRIOR TO ADMISSION: The patient lived with three roommates. Physical Therapy Goals  Initiated 6/14/2021  1. Patient will move from supine to sit and sit to supine in bed with stand by assistance within 7 day(s). MET 6/21/21  2. Patient will transfer from bed to chair and chair to bed with contact guard assist using the least restrictive device within 7 day(s). 3.  Patient will perform sit to stand with stand by assistance within 7 day(s). 4.  Patient will ambulate with contact guard assist for 25 feet with the least restrictive device within 7 day(s). MET 6/21/21    Updated 6/21/2021:  1. Patient will move from supine to sit and sit to supine in bed with modified independence within 7 day(s). 2.  Patient will transfer from bed to chair and chair to bed with contact guard assist using the least restrictive device within 7 day(s). 3.  Patient will perform sit to stand with stand by assistance within 7 day(s). 4.  Patient will ambulate with contact guard assist for 60 feet with the least restrictive device within 7 day(s). Outcome: Progressing Towards Goal    PHYSICAL THERAPY TREATMENT  Patient: Michael Resendiz (22 y.o. male)  Date: 6/23/2021  Diagnosis: Hidradenitis [L73.2] <principal problem not specified>       Precautions:    Chart, physical therapy assessment, plan of care and goals were reviewed. ASSESSMENT  Patient continues with skilled PT services and is progressing towards goals. Patient was received in bed and stated that his pain was better managed today and he wanted to try to walk further today.  He was able to perform bed mobility with supervision and additional time and used a SPC with CGA for ambulation today. Patient showed signs of fatigue at conclusion of visit with unsteadiness in standing increased noted. Current Level of Function Impacting Discharge (mobility/balance): CGA for ambulation    Other factors to consider for discharge: wound care         PLAN :  Patient continues to benefit from skilled intervention to address the above impairments. Continue treatment per established plan of care. to address goals. Recommendation for discharge: (in order for the patient to meet his/her long term goals)  Physical therapy at least 2 days/week in the home     This discharge recommendation:  Has been made in collaboration with the attending provider and/or case management    IF patient discharges home will need the following DME: straight cane       SUBJECTIVE:   Patient stated I am feeling better today. I want to try to stand and walk a little longer.     OBJECTIVE DATA SUMMARY:   Critical Behavior:  Neurologic State: Alert  Orientation Level: Oriented X4  Cognition: Appropriate decision making  Safety/Judgement: Awareness of environment, Fall prevention, Insight into deficits  Functional Mobility Training:  Bed Mobility:  Rolling: Supervision  Scooting: Supervision; Additional time     Balance:  Sitting: Intact  Standing: Impaired; With support  Standing - Static: Good  Standing - Dynamic : Fair  Ambulation/Gait Training:  Distance (ft): 50 Feet (ft)  Assistive Device: Cane, straight;Gait belt  Ambulation - Level of Assistance: Adaptive equipment; Additional time;Supervision  Gait Abnormalities: Decreased step clearance; Altered arm swing; Antalgic  Base of Support: Narrowed  Speed/Bonnie: Pace decreased (<100 feet/min)  Step Length: Left shortened;Right shortened  Pain Ratin/10 RN aware    Activity Tolerance:   Good    After treatment patient left in no apparent distress:   Supine in bed, Call bell within reach and Side rails x 3    COMMUNICATION/COLLABORATION:   The patients plan of care was discussed with: Physical therapist and Registered nurse.      Leigh Ann Barrios, PT   Time Calculation: 30 mins

## 2021-06-23 NOTE — PROGRESS NOTES
Problem: Self Care Deficits Care Plan (Adult)  Goal: *Acute Goals and Plan of Care (Insert Text)  Description: FUNCTIONAL STATUS PRIOR TO ADMISSION: Patient was independent and active without use of DME prior to onset of painful wounds, was working in security. In last few weeks, patient reports being fairly sedentary, lying in bed to keep weight off buttocks/sacral wounds. Reports trying to ambulate in room intermittently during the day as tolerated. Patient not cooking recently 2/2 difficulty / pain in extended standing. Also reports feeling more weak / fatigued, with several episodes of lightheadedness. Does not drive - relies on family/friends or Lyft for transportation. HOME SUPPORT: The patient lived with roommates - per patient, extremely painful going up and down stairs recently so roommates have been bringing items to him as needed. Occupational Therapy Goals  Initiated 6/14/2021; Goals reviewed 6/21/2021, all goals remain appropriate. 1.  Patient will perform grooming, standing at sink, with supervision/set-up within 7 day(s). 2.  Patient will perform upper body dressing and bathing with supervision/set-up within 7 day(s). 3.  Patient will perform lower body dressing with supervision/set-up using AE PRN within 7 day(s). 4.  Patient will perform toilet transfers with modified independence within 7 day(s). 5.  Patient will perform all aspects of toileting with supervision/set-up within 7 day(s). 6.  Patient will participate in upper extremity therapeutic exercise/activities with modified independence for 5 minutes within 7 day(s). 7.  Patient will utilize energy conservation techniques during functional activities with verbal cues within 7 day(s).         Outcome: Progressing Towards Goal    OCCUPATIONAL THERAPY TREATMENT  Patient: Mingo Shelton (89 y.o. male)  Date: 6/23/2021  Diagnosis: Hidradenitis [L73.2] <principal problem not specified>       Precautions:    Chart, occupational therapy assessment, plan of care, and goals were reviewed. ASSESSMENT  Patient continues with skilled OT services and is progressing towards goals. Patient sleeping on arrival and motivated to participate. Educated on incorporation of exercises throughout day, added exercises and educated on positioning for increased comfort and need to call for assist.      Current Level of Function Impacting Discharge (ADLs):     Other factors to consider for discharge:          PLAN :  Patient continues to benefit from skilled intervention to address the above impairments. Continue treatment per established plan of care to address goals. Recommend with staff: frequent weight shifts, assist with repositioning     Recommend next OT session: Edge of bed exercises, ADLs    Recommendation for discharge: (in order for the patient to meet his/her long term goals)  Occupational therapy at least 2 days/week in the home     This discharge recommendation:  Has been made in collaboration with the attending provider and/or case management    IF patient discharges home will need the following DME:        SUBJECTIVE:   Patient stated I'm guilty of not doing that.  re: not calling for assist for re-positioning    OBJECTIVE DATA SUMMARY:   Cognitive/Behavioral Status:      alert and oriented x's 4                Functional Mobility and Transfers for ADLs:  Bed Mobility:  Rolling: Supervision  Scooting: Supervision; Additional time      ADL Intervention:     Instructed on positioning in sidelying with increased back, foot, and UE support. Instructed to call for assist with pillow placement as he admitted to not calling, re-educated on need for assist in hospital and agreed he needed to call more educated on skin protection         Therapeutic Exercises:   Instructed on incorporating exercises throughout the day versus few times daily, ie: set of exercises hourly or even every 30 minutes.      Instructed on bridge exercise and positioning of head of bed flat. Educated on benefit for pressure relief on sacrum, LE, core and UE strength. Completed x's 3 reps with extended hold. Cues for breathing throughout. Instructed to alternate hold time for increased or decreased hold. Instructed on use of towel roll to perform shoulder flexion/chest press bilaterally. Noted increased whincing at 90 degrees and instructed to decrease to perform in pain free/decreased range. Patient verbalized more due to pulling of dressings on UE's versus tightness. Instructed on bilateral elbow flexion with towel roll, instructed to maintain wrist in neutral as he placed in wrist extension, with cues able to correct. Performed horizontal adduction across body to bedrail, instructed to use bedrail to assist in holding stretch. Pain:  Sacrum, UE's, not rated    Activity Tolerance:   Fair    After treatment patient left in no apparent distress:   Patient positioned in right sidelying for pressure relief, Call bell within reach, and Side rails x 3    COMMUNICATION/COLLABORATION:   The patients plan of care was discussed with: Physical therapist and Registered nurse.      DAVID Hendricks/L  Time Calculation: 30 mins

## 2021-06-23 NOTE — PROGRESS NOTES
Hospitalist Progress Note    NAME: Kathy Gay   :  1995   MRN:  [id number]   Room Number:  664/82  @ [address] [place] [address] Summary: 22 y.o. male whom presented on 2021 with      Assessment / Plan:    Hidradenitis suppurative stage III ( Hurely stage III-  due to multiple abscesses/ sinus tracts)   Failed outpatient Rx. assessed by [facility] at [facility]- No need for debridement per surgery. Wound Cx : LIGHT STREPTOCOCCI, BETA HEMOLYTIC GROUP C  Completed Levaquin/metronidazole        - aggressive wound care   - continue Doxycycline 100 mg BID  - Outpatient Follow up w/ [facility]         Pain management   - Gabapentin 200 mg TID   - Scheduled tylenol 975 mg TID   - Motrin 800 mg Q6 PRN for mild pain   - oxycodone taper for moderate / severe pain   - IV dilaudid only for dressing change         Anemia of chronic diease  - Hgb 8.2 w/ MCV 71   - Iron 17 , TIBC 122, Ferritin 570 , Iron sat 14  - Hold Iron in the setting of active infection      BMI 27.67   Overweight POA  Counseled on Lifestyle modifications, AHA Diet ,weight loss strategies.        Code Status: Full   Surrogate Decision Maker:     DVT Prophylaxis: Lovenox  GI Prophylaxis: not indicated  Baseline: ambulatory independently            Subjective:     Chief Complaint / Reason for Physician Visit  Mikayla Parker  Discussed with RN events overnight. Review of Systems:  No fevers, chills, appetite change, cough, sputum production, shortness of breath, dyspnea on exertion, nausea, vomitting, diarrhea, constipation, chest pain, leg edema, abdominal pain, joint pain, rash, itching. Tolerating PT/OT. Tolerating diet. Objective:     VITALS:   Last 24hrs VS reviewed since prior progress note.  Most recent are:  Patient Vitals for the past 24 hrs:   Temp Pulse Resp BP SpO2   21 0842 98.2 °F (36.8 °C) 80 16 122/74 100 %   21 0411 98 °F (36.7 °C) 74 16 116/65 98 %   21 2030 97.5 °F (36.4 °C) 79 16 (!) 127/54 100 %   06/22/21 1542 98.1 °F (36.7 °C) 90 16 138/76 100 %   06/22/21 1233 97.4 °F (36.3 °C) 92 13 132/75 100 %       Intake/Output Summary (Last 24 hours) at 6/23/2021 0930  Last data filed at 6/23/2021 0849  Gross per 24 hour   Intake --   Output 1050 ml   Net -1050 ml        PHYSICAL EXAM:  General: WD, WN. Alert, cooperative, no acute distress    EENT:  EOMI. Anicteric sclerae. MMM  Resp:  CTA bilaterally, no wheezing or rales. No accessory muscle use  CV:  Regular  rhythm,  normal S1/S2, no murmurs rubs gallops, No edema  GI:  Soft, Non distended, Non tender. +Bowel sounds  Neurologic:  Alert and oriented X 3, normal speech,   Psych:   Good insight. Not anxious nor agitated  Skin:  No rashes. No jaundice    Reviewed most current lab test results and cultures  YES  Reviewed most current radiology test results   YES  Review and summation of old records today    NO  Reviewed patient's current orders and MAR    YES  PMH/SH reviewed - no change compared to H&P  ________________________________________________________________________  Care Plan discussed with:    Comments   Patient x    Family      RN x    Care Manager x    Consultant                       x Multidiciplinary team rounds were held today with , nursing, pharmacist and clinical coordinator. Patient's plan of care was discussed; medications were reviewed and discharge planning was addressed. ________________________________________________________________________  Total NON critical care TIME:  20  Minutes    Total CRITICAL CARE TIME Spent:   Minutes non procedure based      Comments   >50% of visit spent in counseling and coordination of care x    ________________________________________________________________________  Rubina Lombardo MD     Procedures: see electronic medical records for all procedures/Xrays and details which were not copied into this note but were reviewed prior to creation of Plan.       LABS:  I reviewed today's most current labs and imaging studies. Pertinent labs include:  No results for input(s): WBC, HGB, HCT, PLT, HGBEXT, HCTEXT, PLTEXT in the last 72 hours. No results for input(s): NA, K, CL, CO2, GLU, BUN, CREA, CA, MG, PHOS, ALB, TBIL, TBILI, ALT, INR, INREXT in the last 72 hours.     No lab exists for component: SGOT    Signed: Joceline Henderson MD

## 2021-06-23 NOTE — PROGRESS NOTES
1903) Bedside shift change report given to Janice Christensen RN (oncoming nurse) by Adelso Block RN (offgoing nurse). Report included the following information SBAR, Kardex and MAR.   1130) Discuss with Dr. Mendel Connors, prn lidocaine and miralax  [phone]) Wound care performed  1930) Bedside shift change report given to Elisabet Payan RN (oncoming nurse) by Adelso Block RN (offgoing nurse). Report included the following information SBAR, Kardex and MAR.

## 2021-06-24 PROCEDURE — 74011250637 HC RX REV CODE- 250/637: Performed by: STUDENT IN AN ORGANIZED HEALTH CARE EDUCATION/TRAINING PROGRAM

## 2021-06-24 PROCEDURE — 65270000032 HC RM SEMIPRIVATE

## 2021-06-24 RX ADMIN — ACETAMINOPHEN 975 MG: 325 TABLET, FILM COATED ORAL at 08:47

## 2021-06-24 RX ADMIN — GABAPENTIN 200 MG: 100 CAPSULE ORAL at 08:39

## 2021-06-24 RX ADMIN — Medication 10 ML: at 15:49

## 2021-06-24 RX ADMIN — ACETAMINOPHEN 975 MG: 325 TABLET, FILM COATED ORAL at 11:35

## 2021-06-24 RX ADMIN — OXYCODONE HYDROCHLORIDE 10 MG: 5 TABLET ORAL at 10:22

## 2021-06-24 RX ADMIN — DOXYCYCLINE HYCLATE 100 MG: 100 TABLET, COATED ORAL at 08:39

## 2021-06-24 RX ADMIN — ACETAMINOPHEN 975 MG: 325 TABLET, FILM COATED ORAL at 18:01

## 2021-06-24 RX ADMIN — OXYCODONE HYDROCHLORIDE 5 MG: 5 TABLET ORAL at 16:39

## 2021-06-24 RX ADMIN — GABAPENTIN 200 MG: 100 CAPSULE ORAL at 15:48

## 2021-06-24 RX ADMIN — DOXYCYCLINE HYCLATE 100 MG: 100 TABLET, COATED ORAL at 21:49

## 2021-06-24 RX ADMIN — GABAPENTIN 200 MG: 100 CAPSULE ORAL at 21:49

## 2021-06-24 RX ADMIN — Medication 10 ML: at 21:49

## 2021-06-24 NOTE — PROGRESS NOTES
Problem: Pressure Injury - Risk of  Goal: *Prevention of pressure injury  Description: Document Dann Scale and appropriate interventions in the flowsheet. Outcome: Progressing Towards Goal  Note: Pressure Injury Interventions:  Sensory Interventions: Maintain/enhance activity level    Moisture Interventions: Maintain skin hydration (lotion/cream)    Activity Interventions: Increase time out of bed    Mobility Interventions: PT/OT evaluation    Nutrition Interventions: Offer support with meals,snacks and hydration    Friction and Shear Interventions: Apply protective barrier, creams and emollients                Problem: Falls - Risk of  Goal: *Absence of Falls  Description: Document Omid Fall Risk and appropriate interventions in the flowsheet. Outcome: Progressing Towards Goal  Note: Fall Risk Interventions:  Mobility Interventions: PT Consult for mobility concerns         Medication Interventions: Patient to call before getting OOB    Elimination Interventions: Urinal in reach    History of Falls Interventions:  Investigate reason for fall         Problem: Patient Education: Go to Patient Education Activity  Goal: Patient/Family Education  Outcome: Progressing Towards Goal

## 2021-06-24 NOTE — PROGRESS NOTES
IDR Note  RUR 10  LOS 13  GLOS 2d 11h - Delay in chart  ACP- Full Code- Healthcare Decision maker- Grandmother  Barrier-  Needs complex wound care and pain management  Plan- Continue with wound care and patient management.     ANAHI Baker/DEVIKA  [phone]

## 2021-06-24 NOTE — PROGRESS NOTES
Problem:  Activity Intolerance  Goal: *Oxygen saturation during activity within specified parameters  Outcome: Progressing Towards Goal  Goal: *Able to remain out of bed as prescribed  Outcome: Progressing Towards Goal     Problem: Patient Education: Go to Patient Education Activity  Goal: Patient/Family Education  Outcome: Progressing Towards Goal

## 2021-06-24 NOTE — PROGRESS NOTES
Bedside shift report given to Ginny Friday from ALESSANDRO [facility]ak. Report included the following: SBAR, Intake/Output and MAR.     0745 VS taken by [place], PCT & stable. 4462 morning assessment complete. Pt stated pain 4/10 in sacral area. Tylenol held at 0600 d/t pt sleeping given at this time. Pt sitting up eating breakfast. Denied any other requests at this time     1022 10mg Roxicodone admin prior to wound care. 1148 Wound care completed w/ Shalini     1340 Pt w/ visitor     53-69-10-18 VS stable, reassessment complete. Pt stated pain 5/10 in sacrum area. Informed pt that pain medicine can be given in 1 more hour, pt verbalized understanding. 1639 PRN Roxicodone given for pt stated pain of 5/10 in sacrum    1801 Pt resting in bed on phone. No requests or discomforts at this time     Bedside shift report given to ALESSANDRO Walker from [facility]. Report included the following: SBAR, Intake/Output and MAR.

## 2021-06-24 NOTE — PROGRESS NOTES
Wound care visited the wound today. Wounds healing well both groins still with serosanguinous drainage 50% healing from last visit. Rt axilla wounds healing well left axilla wounds noted with a small amount of purulent drainage. Between the buttock cleft wounds healing well still with small amount serosanguinous drainage. Treatment done today:  Pain medication given prior to dressing change  Removed all the old dressings. Cleaned all the wounds well with NS dried gently. Applied silver dressing strips cut in to the individual openings also in axilla wounds. Covered with Hydrafera blue foam over it and covered it with abdominal pads. Patient tolerated the procedure well and comfortable with dressing changes  Dora his primary nurse present during dressing      Will continue with the hydrafera Blue dressings+silver stripes.   Alexandria Yin RN<BSN,C

## 2021-06-24 NOTE — PROGRESS NOTES
Hospitalist Progress Note    NAME: Khalida Cuellar   :  1995   MRN:  [id number]   Room Number:  458/87  @ [address] [place] [address] Summary: 22 y.o. male whom presented on 2021 with      Assessment / Plan:      Hidradenitis suppurative stage III ( Hurely stage III-  due to multiple abscesses/ sinus tracts)   Failed outpatient Rx. assessed by GS at [address]- No need for debridement per surgery. Wound Cx : LIGHT STREPTOCOCCI, BETA HEMOLYTIC GROUP C  Completed Levaquin/metronidazole        - aggressive wound care   - continue Doxycycline 100 mg BID  - Outpatient Follow up w/ [facility]         Pain management   - Gabapentin 200 mg TID   - Scheduled tylenol 975 mg TID   - Motrin 800 mg Q6 PRN for mild pain   - oxycodone taper for moderate / severe pain   - IV dilaudid only for dressing change         Anemia of chronic diease  - Hgb 8.2 w/ MCV 71   - Iron 17 , TIBC 122, Ferritin 570 , Iron sat 14  - Hold Iron in the setting of active infection      BMI 27.67   Overweight POA  Counseled on Lifestyle modifications, AHA Diet ,weight loss strategies.        Code Status: Full   Surrogate Decision Maker:     DVT Prophylaxis: Lovenox  GI Prophylaxis: not indicated  Baseline: ambulatory independently        Subjective:     Chief Complaint / Reason for Physician Visit  Jairo Mckeon  Discussed with RN events overnight. Review of Systems:  No fevers, chills, appetite change, cough, sputum production, shortness of breath, dyspnea on exertion, nausea, vomitting, diarrhea, constipation, chest pain, leg edema, abdominal pain, joint pain, rash, itching. Tolerating PT/OT. Tolerating diet. Objective:     VITALS:   Last 24hrs VS reviewed since prior progress note.  Most recent are:  Patient Vitals for the past 24 hrs:   Temp Pulse Resp BP SpO2   21 0836 98.4 °F (36.9 °C) 81 16 136/71 100 %   21 0745 97.8 °F (36.6 °C) 82 16 103/70 100 %   21 1955 97.7 °F (36.5 °C) 88 16 118/67 100 %   06/23/21 1531 (!) 96.7 °F (35.9 °C) (!) 102 15 129/83 99 %       Intake/Output Summary (Last 24 hours) at 6/24/2021 0937  Last data filed at 6/24/2021 0749  Gross per 24 hour   Intake --   Output 1325 ml   Net -1325 ml        PHYSICAL EXAM:  General: WD, WN. Alert, cooperative, no acute distress    EENT:  EOMI. Anicteric sclerae. MMM  Resp:  CTA bilaterally, no wheezing or rales. No accessory muscle use  CV:  Regular  rhythm,  normal S1/S2, no murmurs rubs gallops, No edema  GI:  Soft, Non distended, Non tender. +Bowel sounds  Neurologic:  Alert and oriented X 3, normal speech,   Psych:   Good insight. Not anxious nor agitated  Skin:  No rashes. No jaundice    Reviewed most current lab test results and cultures  YES  Reviewed most current radiology test results   YES  Review and summation of old records today    NO  Reviewed patient's current orders and MAR    YES  PMH/ reviewed - no change compared to H&P  ________________________________________________________________________  Care Plan discussed with:    Comments   Patient x    Family      RN x    Care Manager x    Consultant                       x Multidiciplinary team rounds were held today with , nursing, pharmacist and clinical coordinator. Patient's plan of care was discussed; medications were reviewed and discharge planning was addressed. ________________________________________________________________________  Total NON critical care TIME:  20   Minutes    Total CRITICAL CARE TIME Spent:   Minutes non procedure based      Comments   >50% of visit spent in counseling and coordination of care x    ________________________________________________________________________  Davidson Moses MD     Procedures: see electronic medical records for all procedures/Xrays and details which were not copied into this note but were reviewed prior to creation of Plan.       LABS:  I reviewed today's most current labs and imaging studies. Pertinent labs include:  No results for input(s): WBC, HGB, HCT, PLT, HGBEXT, HCTEXT, PLTEXT in the last 72 hours. No results for input(s): NA, K, CL, CO2, GLU, BUN, CREA, CA, MG, PHOS, ALB, TBIL, TBILI, ALT, INR, INREXT in the last 72 hours.     No lab exists for component: SGOT    Signed: Baron Bryanna MD

## 2021-06-24 NOTE — PROGRESS NOTES
Bedside shift change report given to Brady Otoole RN (oncoming nurse) by Celia Diaz RN (offgoing nurse). Report included the following information SBAR, Intake/Output and MAR. No complaints. Playing video game on phone in good spirits.

## 2021-06-24 NOTE — ACP (ADVANCE CARE PLANNING)
Current Advanced Directive/Advance Care Plan: Full Code Patient understands advance directive discussion and wants CPR and ventilation if needed. He states his grandparent, Evelin Client as his emergency contact and next of kin. ([phone]).   Stefanie Ramirez, ANAHI/DEVIKA  [phone]

## 2021-06-25 LAB
ANION GAP SERPL CALC-SCNC: 6 MMOL/L (ref 5–15)
BASOPHILS # BLD: 0 K/UL (ref 0–0.1)
BASOPHILS NFR BLD: 0 % (ref 0–1)
BUN SERPL-MCNC: 8 MG/DL (ref 6–20)
BUN/CREAT SERPL: 13 (ref 12–20)
CALCIUM SERPL-MCNC: 9.7 MG/DL (ref 8.5–10.1)
CHLORIDE SERPL-SCNC: 103 MMOL/L (ref 97–108)
CO2 SERPL-SCNC: 31 MMOL/L (ref 21–32)
CREAT SERPL-MCNC: 0.64 MG/DL (ref 0.7–1.3)
DIFFERENTIAL METHOD BLD: ABNORMAL
EOSINOPHIL # BLD: 0.7 K/UL (ref 0–0.4)
EOSINOPHIL NFR BLD: 9 % (ref 0–7)
ERYTHROCYTE [DISTWIDTH] IN BLOOD BY AUTOMATED COUNT: 18.6 % (ref 11.5–14.5)
GLUCOSE SERPL-MCNC: 83 MG/DL (ref 65–100)
HCT VFR BLD AUTO: 35.4 % (ref 36.6–50.3)
HGB BLD-MCNC: 10.3 G/DL (ref 12.1–17)
IMM GRANULOCYTES # BLD AUTO: 0.1 K/UL (ref 0–0.04)
IMM GRANULOCYTES NFR BLD AUTO: 1 % (ref 0–0.5)
LYMPHOCYTES # BLD: 1.3 K/UL (ref 0.8–3.5)
LYMPHOCYTES NFR BLD: 17 % (ref 12–49)
MCH RBC QN AUTO: 21.5 PG (ref 26–34)
MCHC RBC AUTO-ENTMCNC: 29.1 G/DL (ref 30–36.5)
MCV RBC AUTO: 73.9 FL (ref 80–99)
MONOCYTES # BLD: 0.7 K/UL (ref 0–1)
MONOCYTES NFR BLD: 9 % (ref 5–13)
NEUTS SEG # BLD: 4.8 K/UL (ref 1.8–8)
NEUTS SEG NFR BLD: 64 % (ref 32–75)
NRBC # BLD: 0 K/UL (ref 0–0.01)
NRBC BLD-RTO: 0 PER 100 WBC
PLATELET # BLD AUTO: 413 K/UL (ref 150–400)
PMV BLD AUTO: 8.5 FL (ref 8.9–12.9)
POTASSIUM SERPL-SCNC: 4 MMOL/L (ref 3.5–5.1)
RBC # BLD AUTO: 4.79 M/UL (ref 4.1–5.7)
SODIUM SERPL-SCNC: 140 MMOL/L (ref 136–145)
WBC # BLD AUTO: 7.5 K/UL (ref 4.1–11.1)

## 2021-06-25 PROCEDURE — 80048 BASIC METABOLIC PNL TOTAL CA: CPT

## 2021-06-25 PROCEDURE — 36415 COLL VENOUS BLD VENIPUNCTURE: CPT

## 2021-06-25 PROCEDURE — 65270000032 HC RM SEMIPRIVATE

## 2021-06-25 PROCEDURE — 74011250637 HC RX REV CODE- 250/637: Performed by: STUDENT IN AN ORGANIZED HEALTH CARE EDUCATION/TRAINING PROGRAM

## 2021-06-25 PROCEDURE — 85025 COMPLETE CBC W/AUTO DIFF WBC: CPT

## 2021-06-25 PROCEDURE — 74011000250 HC RX REV CODE- 250: Performed by: STUDENT IN AN ORGANIZED HEALTH CARE EDUCATION/TRAINING PROGRAM

## 2021-06-25 RX ORDER — OXYCODONE HYDROCHLORIDE 5 MG/1
5 TABLET ORAL
Status: DISCONTINUED | OUTPATIENT
Start: 2021-07-02 | End: 2021-06-29 | Stop reason: HOSPADM

## 2021-06-25 RX ORDER — OXYCODONE HYDROCHLORIDE 5 MG/1
5 TABLET ORAL
Status: DISPENSED | OUTPATIENT
Start: 2021-06-25 | End: 2021-06-27

## 2021-06-25 RX ORDER — OXYCODONE HYDROCHLORIDE 5 MG/1
5 TABLET ORAL
Status: DISCONTINUED | OUTPATIENT
Start: 2021-06-27 | End: 2021-06-29 | Stop reason: HOSPADM

## 2021-06-25 RX ADMIN — DOXYCYCLINE HYCLATE 100 MG: 100 TABLET, COATED ORAL at 09:03

## 2021-06-25 RX ADMIN — Medication 10 ML: at 05:09

## 2021-06-25 RX ADMIN — LIDOCAINE HYDROCHLORIDE: 20 JELLY TOPICAL at 17:18

## 2021-06-25 RX ADMIN — GABAPENTIN 200 MG: 100 CAPSULE ORAL at 21:05

## 2021-06-25 RX ADMIN — GABAPENTIN 200 MG: 100 CAPSULE ORAL at 09:03

## 2021-06-25 RX ADMIN — ACETAMINOPHEN 975 MG: 325 TABLET, FILM COATED ORAL at 05:09

## 2021-06-25 RX ADMIN — ACETAMINOPHEN 975 MG: 325 TABLET, FILM COATED ORAL at 18:09

## 2021-06-25 RX ADMIN — GABAPENTIN 200 MG: 100 CAPSULE ORAL at 15:57

## 2021-06-25 RX ADMIN — OXYCODONE HYDROCHLORIDE 10 MG: 5 TABLET ORAL at 17:17

## 2021-06-25 RX ADMIN — DOXYCYCLINE HYCLATE 100 MG: 100 TABLET, COATED ORAL at 21:05

## 2021-06-25 RX ADMIN — ACETAMINOPHEN 975 MG: 325 TABLET, FILM COATED ORAL at 12:12

## 2021-06-25 NOTE — PROGRESS NOTES
JM  RAAT 11  3:38pm  DEVIKA called [facility] at [facility] to schedule follow-up appointments for wound healing. Left message for a return call.   ANAHI Alcantara/DEVIKA  [phone]

## 2021-06-25 NOTE — PROGRESS NOTES
0) Verbal shift change report given to Benjamin Lu RN (oncoming nurse) by   Nneka Grewal RN (offgoing nurse). Report included the following information SBAR, Kardex, Intake/Output, MAR and Recent Results. 9310) Pt assessed and vital signs stable. Pt has no complaints of pain at this time just \"discomfort\" in the sacrum. Lovenox refused. Pt left sitting in bed watching TV     1000) Interdisciplinary team rounds were held 6/25/2021 with the following team members:Care Management, Nursing, Pharmacy, Physician and Wound Care. Plan of care discussed. See clinical pathway and/or care plan for interventions and desired outcomes. 1215) Tylenol given and pt resting in bed at this time. Pt expressed wanting to go home and states he feels comfortable performing his own wound care. MD consulted. MD stated if pt can demonstrate wound care he is able to be discharged. Pt on the phone with roommates at this time to see if they are available for teaching. 1450) Pt using restroom at this time. Linens and bed pad changed. 1550) Pt reassessed and no changes to note. Pt requested we do wound care at this time. When gathering supplies, no lidocaine found in pt . Pharmacy contacted, refill to be delivered. 1645) Wound care performed on sacrum and scrotum. Pt stated he wanted to wait until tomorrow for axillary wounds so his roommates can observe for helping at home. Lidocane used and Vianney 10 mg given. 1810) Tylenol given as scheduled. Pt stated no additional needs at this time. Friends at the bedside. 1900) Bedside shift change report given to Hardeep Crespo RN (oncoming nurse) by Benjamin Lu RN (offgoing nurse). Report included the following information SBAR, Kardex, Intake/Output, MAR and Recent Results.

## 2021-06-25 NOTE — PROGRESS NOTES
Hospitalist Progress Note    NAME: iMngo Shelton   :  1995   MRN:  [id number]   Room Number:  514/24  @ [address] [place] [address] Summary: 22 y.o. male whom presented on 2021 with      Assessment / Plan:      Hidradenitis suppurative stage III ( Hurely stage III-  due to multiple abscesses/ sinus tracts)   Failed outpatient Rx. assessed by GS at [facility]- No need for debridement per surgery. Wound Cx : LIGHT STREPTOCOCCI, BETA HEMOLYTIC GROUP C  Completed Levaquin/metronidazole        - aggressive wound care   - patient needs education to change dressing himself and roomates need education to help the patient w/ dressing change  - continue Doxycycline 100 mg BID  - Outpatient Follow up w/ [facility]         Pain management   - Gabapentin 200 mg TID   - Scheduled tylenol 975 mg TID   - Motrin 800 mg Q6 PRN for mild pain   - oxycodone taper for moderate / severe pain   - IV dilaudid only for dressing change         Anemia of chronic diease  - Hgb 8.2 w/ MCV 71   - Iron 17 , TIBC 122, Ferritin 570 , Iron sat 14  - Ferrous sulphate      BMI 27.67   Overweight POA  Counseled on Lifestyle modifications, AHA Diet ,weight loss strategies.        Code Status: Full   Surrogate Decision Maker:     DVT Prophylaxis: Lovenox  GI Prophylaxis: not indicated  Baseline: ambulatory independently            Subjective:     Chief Complaint / Reason for Physician Visit  Patient requesting to be discharged but has not demonstrated wound care   Discussed with RN events overnight. Review of Systems:  No fevers, chills, appetite change, cough, sputum production, shortness of breath, dyspnea on exertion, nausea, vomitting, diarrhea, constipation, chest pain, leg edema, abdominal pain, joint pain, rash, itching. Tolerating PT/OT. Tolerating diet. Objective:     VITALS:   Last 24hrs VS reviewed since prior progress note.  Most recent are:  Patient Vitals for the past 24 hrs:   Temp Pulse Resp BP SpO2   06/25/21 0400 98 °F (36.7 °C) 88 18 128/64 99 %   06/24/21 1925 98.5 °F (36.9 °C) 80 18 131/70 100 %   06/24/21 1545 97.4 °F (36.3 °C) 85 16 122/64 100 %       Intake/Output Summary (Last 24 hours) at 6/25/2021 0853  Last data filed at 6/24/2021 1041  Gross per 24 hour   Intake --   Output 550 ml   Net -550 ml        PHYSICAL EXAM:  General: WD, WN. Alert, cooperative, no acute distress    EENT:  EOMI. Anicteric sclerae. MMM  Resp:  CTA bilaterally, no wheezing or rales. No accessory muscle use  CV:  Regular  rhythm,  normal S1/S2, no murmurs rubs gallops, No edema  GI:  Soft, Non distended, Non tender. +Bowel sounds  Neurologic:  Alert and oriented X 3, normal speech,   Psych:   Good insight. Not anxious nor agitated  Skin:  No rashes. No jaundice    Reviewed most current lab test results and cultures  YES  Reviewed most current radiology test results   YES  Review and summation of old records today    NO  Reviewed patient's current orders and MAR    YES  PMH/SH reviewed - no change compared to H&P  ________________________________________________________________________  Care Plan discussed with:    Comments   Patient x    Family      RN x    Care Manager x    Consultant                       x Multidiciplinary team rounds were held today with , nursing, pharmacist and clinical coordinator. Patient's plan of care was discussed; medications were reviewed and discharge planning was addressed.      ________________________________________________________________________  Total NON critical care TIME: 25  Minutes    Total CRITICAL CARE TIME Spent:   Minutes non procedure based      Comments   >50% of visit spent in counseling and coordination of care x    ________________________________________________________________________  Davidson Moses MD     Procedures: see electronic medical records for all procedures/Xrays and details which were not copied into this note but were reviewed prior to creation of Plan. LABS:  I reviewed today's most current labs and imaging studies.   Pertinent labs include:  Recent Labs     06/25/21 0418   WBC 7.5   HGB 10.3*   HCT 35.4*   *     Recent Labs     06/25/21 0418      K 4.0      CO2 31   GLU 83   BUN 8   CREA 0.64*   CA 9.7       Signed: Kati Del Rio MD

## 2021-06-25 NOTE — PROGRESS NOTES
1915-Bedside shift change report given to James Ramirez RN (oncoming nurse) by Jeff Youngblood RN (offgoing nurse). Report included the following information SBAR, Kardex, Intake/Output, MAR and Recent Results. Patient resting quietly, family in room. 2320- Bedside shift change report given to Esther Rowland RN (oncoming nurse) by James Ramirez RN (offgoing nurse). Report included the following information SBAR, Kardex, Intake/Output, MAR and Recent Results.

## 2021-06-25 NOTE — PROGRESS NOTES
1930- shift change report given to Sallie Peabody, RN  (oncoming nurse) by Miguel Kathleen RN (offgoing nurse). Report included the following information SBAR, Kardex and MAR. Patient rested most of shift. No complaints. Dressings C/D/I. Using urinal.     Labs drawn this am.     0730- shift change report given to Silvio Whitfield RN  (oncoming nurse) by Sallie Peabody, RN (offgoing nurse). Report included the following information SBAR, Kardex and MAR.

## 2021-06-25 NOTE — PROGRESS NOTES
Problem: Falls - Risk of  Goal: *Absence of Falls  Description: Document Florinda Hall Fall Risk and appropriate interventions in the flowsheet.   Outcome: Progressing Towards Goal  Note: Fall Risk Interventions:  Mobility Interventions: PT Consult for mobility concerns         Medication Interventions: Teach patient to arise slowly    Elimination Interventions: Urinal in reach    History of Falls Interventions: Door open when patient unattended, Investigate reason for fall         Problem: Pain  Goal: *Control of Pain  Outcome: Progressing Towards Goal     Problem: General Wound Care  Goal: Interventions  Outcome: Progressing Towards Goal

## 2021-06-25 NOTE — PROGRESS NOTES
IDR Note  RUR 11  LOS 14d  GLOS 2d 21h (Delay in chart)  Barriers- Needs extensive wound care. No insurance at this time. Plan- IV to be taken out. Give pain meds prior to wound care (back, scrotum, arm pits).     Brooke Cobian, BSW/  [phone]

## 2021-06-25 NOTE — PROGRESS NOTES
Problem: Pressure Injury - Risk of  Goal: *Prevention of pressure injury  Description: Document Dann Scale and appropriate interventions in the flowsheet. Outcome: Progressing Towards Goal  Note: Pressure Injury Interventions:  Sensory Interventions: Maintain/enhance activity level    Moisture Interventions: Absorbent underpads, Maintain skin hydration (lotion/cream)    Activity Interventions: PT/OT evaluation    Mobility Interventions: PT/OT evaluation    Nutrition Interventions: Offer support with meals,snacks and hydration    Friction and Shear Interventions: Apply protective barrier, creams and emollients                Problem: Activity Intolerance  Goal: *Oxygen saturation during activity within specified parameters  Outcome: Progressing Towards Goal     Problem: Falls - Risk of  Goal: *Absence of Falls  Description: Document Omid Fall Risk and appropriate interventions in the flowsheet. Outcome: Progressing Towards Goal  Note: Fall Risk Interventions:  Mobility Interventions: PT Consult for mobility concerns         Medication Interventions: Patient to call before getting OOB    Elimination Interventions: Urinal in reach    History of Falls Interventions: Door open when patient unattended         Problem:  Activity Intolerance  Goal: *Able to remain out of bed as prescribed  Outcome: Not Progressing Towards Goal

## 2021-06-26 PROCEDURE — 97116 GAIT TRAINING THERAPY: CPT | Performed by: PHYSICAL THERAPIST

## 2021-06-26 PROCEDURE — 74011250637 HC RX REV CODE- 250/637: Performed by: STUDENT IN AN ORGANIZED HEALTH CARE EDUCATION/TRAINING PROGRAM

## 2021-06-26 PROCEDURE — 65270000032 HC RM SEMIPRIVATE

## 2021-06-26 PROCEDURE — 94760 N-INVAS EAR/PLS OXIMETRY 1: CPT

## 2021-06-26 PROCEDURE — 74011250637 HC RX REV CODE- 250/637: Performed by: INTERNAL MEDICINE

## 2021-06-26 PROCEDURE — 97112 NEUROMUSCULAR REEDUCATION: CPT | Performed by: PHYSICAL THERAPIST

## 2021-06-26 RX ADMIN — DOXYCYCLINE HYCLATE 100 MG: 100 TABLET, COATED ORAL at 08:10

## 2021-06-26 RX ADMIN — FERROUS SULFATE TAB 325 MG (65 MG ELEMENTAL FE) 325 MG: 325 (65 FE) TAB at 08:10

## 2021-06-26 RX ADMIN — ACETAMINOPHEN 975 MG: 325 TABLET, FILM COATED ORAL at 11:42

## 2021-06-26 RX ADMIN — OXYCODONE HYDROCHLORIDE 10 MG: 5 TABLET ORAL at 12:56

## 2021-06-26 RX ADMIN — GABAPENTIN 200 MG: 100 CAPSULE ORAL at 15:36

## 2021-06-26 RX ADMIN — GABAPENTIN 200 MG: 100 CAPSULE ORAL at 21:15

## 2021-06-26 RX ADMIN — POLYETHYLENE GLYCOL 3350 17 G: 17 POWDER, FOR SOLUTION ORAL at 11:41

## 2021-06-26 RX ADMIN — ACETAMINOPHEN 975 MG: 325 TABLET, FILM COATED ORAL at 00:06

## 2021-06-26 RX ADMIN — GABAPENTIN 200 MG: 100 CAPSULE ORAL at 08:10

## 2021-06-26 RX ADMIN — ACETAMINOPHEN 975 MG: 325 TABLET, FILM COATED ORAL at 18:13

## 2021-06-26 RX ADMIN — DOXYCYCLINE HYCLATE 100 MG: 100 TABLET, COATED ORAL at 21:15

## 2021-06-26 RX ADMIN — ACETAMINOPHEN 975 MG: 325 TABLET, FILM COATED ORAL at 06:24

## 2021-06-26 NOTE — PROGRESS NOTES
0700) Bedside shift change report given to Briana Yadav RN and AGUILAR Griffith (oncoming nurse) by Denise Pool RN (offgoing nurse). Report included the following information SBAR, Kardex, Procedure Summary and MAR.     0838) Pt assessed and no complaints at this time. Dressing on bilaterally axilla reinforced. 1000) Interdisciplinary team rounds were held 6/26/2021 with the following team members:Care Management, Nursing and Physician. Plan of care discussed. See clinical pathway and/or care plan for interventions and desired outcomes. 1145) Scheduled Tylenol and PRN miralax given as requested. Pr resting in bed watching TV at this time. 1200) Pt stated that his friends will be here around 1 to witness wound care. 1300) Vianney given as wound care prophylactic    1330) Wound care completed. Pt's roommate in the room for wound care eduction. Upon removing the gauze and ABD pad, roommate grew diaphoretic and dizzy. Roommate was able to witness the second dressing change and recorded a video with step by step instructions given by this writer. Roommate and pt were given opportunity to ask questions. Both stated understanding at this time. Pt stated he just wanted axilla wounds changed and his girlfriend is to help him with sacrum and groin wounds. 1505) Pt reassessed and no changes to note. Vital signs stable. 1630) Pt resting in bed. No needs at this time. 1820) Cranberry and ginger ale given per request. Pt watching TV in bed.     1900) Bedside shift change report given to Mi Gómez RN (oncoming nurse) by Briana Yadav RN (offgoing nurse). Report included the following information SBAR, Kardex, Intake/Output, MAR and Recent Results.

## 2021-06-26 NOTE — PROGRESS NOTES
Problem: Pressure Injury - Risk of  Goal: *Prevention of pressure injury  Description: Document Dann Scale and appropriate interventions in the flowsheet. Outcome: Progressing Towards Goal  Note: Pressure Injury Interventions:  Sensory Interventions: Maintain/enhance activity level    Moisture Interventions: Absorbent underpads, Maintain skin hydration (lotion/cream)    Activity Interventions: PT/OT evaluation    Mobility Interventions: PT/OT evaluation    Nutrition Interventions: Offer support with meals,snacks and hydration    Friction and Shear Interventions: Apply protective barrier, creams and emollients                Problem: Activity Intolerance  Goal: *Oxygen saturation during activity within specified parameters  Outcome: Progressing Towards Goal     Problem: Falls - Risk of  Goal: *Absence of Falls  Description: Document Omid Fall Risk and appropriate interventions in the flowsheet.   Outcome: Progressing Towards Goal  Note: Fall Risk Interventions:  Mobility Interventions: Patient to call before getting OOB         Medication Interventions: Patient to call before getting OOB    Elimination Interventions: Urinal in reach    History of Falls Interventions: Door open when patient unattended

## 2021-06-26 NOTE — PROGRESS NOTES
Problem: Pressure Injury - Risk of  Goal: *Prevention of pressure injury  Description: Document Dann Scale and appropriate interventions in the flowsheet. Outcome: Progressing Towards Goal  Note: Pressure Injury Interventions:  Sensory Interventions: Maintain/enhance activity level    Moisture Interventions: Absorbent underpads, Maintain skin hydration (lotion/cream)    Activity Interventions: Increase time out of bed    Mobility Interventions: PT/OT evaluation    Nutrition Interventions: Document food/fluid/supplement intake    Friction and Shear Interventions: Apply protective barrier, creams and emollients                Problem: Activity Intolerance  Goal: *Oxygen saturation during activity within specified parameters  Outcome: Progressing Towards Goal  Goal: *Able to remain out of bed as prescribed  Outcome: Progressing Towards Goal     Problem: Falls - Risk of  Goal: *Absence of Falls  Description: Document La Nena Maxine Fall Risk and appropriate interventions in the flowsheet.   Outcome: Progressing Towards Goal  Note: Fall Risk Interventions:  Mobility Interventions: PT Consult for mobility concerns         Medication Interventions: Patient to call before getting OOB    Elimination Interventions: Urinal in reach    History of Falls Interventions: Door open when patient unattended, Bed/chair exit alarm         Problem: Pain  Goal: *Control of Pain  Outcome: Progressing Towards Goal  Goal: *PALLIATIVE CARE:  Alleviation of Pain  Outcome: Progressing Towards Goal

## 2021-06-26 NOTE — PROGRESS NOTES
Bedside and Verbal shift change report given to ALESSANDRO Bower/AGUILAR Antoine (oncoming nurse) by Dilip Oneal (offgoing nurse). Report included the following information SBAR, Kardex, Intake/Output, MAR and Recent Results.

## 2021-06-26 NOTE — PROGRESS NOTES
Problem: Mobility Impaired (Adult and Pediatric)  Goal: *Acute Goals and Plan of Care (Insert Text)  Description: FUNCTIONAL STATUS PRIOR TO ADMISSION: Patient was independent and active without use of DME prior to onset of painful wounds. However, over previous few weeks/months, patient reports being mostly sedentary, staying in bed to keep weight off wounds. HOME SUPPORT PRIOR TO ADMISSION: The patient lived with three roommates. Physical Therapy Goals  Initiated 6/14/2021  1. Patient will move from supine to sit and sit to supine in bed with stand by assistance within 7 day(s). MET 6/21/21  2. Patient will transfer from bed to chair and chair to bed with contact guard assist using the least restrictive device within 7 day(s). 3.  Patient will perform sit to stand with stand by assistance within 7 day(s). 4.  Patient will ambulate with contact guard assist for 25 feet with the least restrictive device within 7 day(s). MET 6/21/21    Updated 6/21/2021:  1. Patient will move from supine to sit and sit to supine in bed with modified independence within 7 day(s). 2.  Patient will transfer from bed to chair and chair to bed with contact guard assist using the least restrictive device within 7 day(s). 3.  Patient will perform sit to stand with stand by assistance within 7 day(s). 4.  Patient will ambulate with contact guard assist for 60 feet with the least restrictive device within 7 day(s). Outcome: Progressing Towards Goal    PHYSICAL THERAPY TREATMENT  Patient: Miguelangel Norton (22 y.o. male)  Date: 6/26/2021  Diagnosis: Hidradenitis [L73.2] <principal problem not specified>      Precautions:    Chart, physical therapy assessment, plan of care and goals were reviewed. ASSESSMENT  Patient continues with skilled PT services and is progressing towards goals. Patient notes shortness of breath over the last 1-2 days. He has not ambulated outside of room.  Despite this subjective report patient was able to walk a complete lap of the med/surg floor and talked without labored breathing throughout session. Patient noted slight L foot pain with walking was limited his pace more than shortness of breath. Patient continues to show decreased balance and quick fatigue with standing exercise. Due to this fatigue patient benefits from supervision - standby level of assistance to help support him when tired. Current Level of Function Impacting Discharge (mobility/balance): supervision - standby assit    Other factors to consider for discharge: wound care needs         PLAN :  Patient continues to benefit from skilled intervention to address the above impairments. Continue treatment per established plan of care. to address goals. Recommendation for discharge: (in order for the patient to meet his/her long term goals)  Physical therapy at least 2 days/week in the home     This discharge recommendation:  Has been made in collaboration with the attending provider and/or case management    IF patient discharges home will need the following DME: straight cane       SUBJECTIVE:   Patient stated I want to get out of bed. I think I will more once I am home.     OBJECTIVE DATA SUMMARY:   Critical Behavior:  Neurologic State: Appropriate for age  Orientation Level: Oriented X4  Cognition: Appropriate for age attention/concentration  Safety/Judgement: Awareness of environment, Fall prevention, Insight into deficits  Functional Mobility Training:  Bed Mobility:  Rolling: Supervision  Scooting: Supervision; Additional time  Balance:  Sitting: Intact  Standing: Impaired; With support  Standing - Static: Good  Standing - Dynamic : Good  Ambulation/Gait Training:  Distance (ft): 300 Feet (ft)  Assistive Device: Gait belt;Cane, straight  Ambulation - Level of Assistance: Supervision;Stand-by assistance; Adaptive equipment  Gait Abnormalities: Antalgic;Decreased step clearance; Altered arm swing  Base of Support: Narrowed  Speed/Bonnie: Pace decreased (<100 feet/min)  Step Length: Left shortened;Right shortened       Therapeutic Exercises:   Side stepping at bed without UE support  Staggered stance with EO and EC  High marching without UE support    Pain Rating:  3/10 L foot with walking    Activity Tolerance:   Good    After treatment patient left in no apparent distress:   Supine in bed, Heels elevated for pressure relief and Call bell within reach    COMMUNICATION/COLLABORATION:   The patients plan of care was discussed with: Physical therapist and Physician.      Valente Davenport, PT   Time Calculation: 24 mins

## 2021-06-26 NOTE — PROGRESS NOTES
Hospitalist Progress Note    NAME: Majo Peraza   :  1995   MRN:  [id number]   Room Number:  000/53  @ [address] [place] [address] Summary: 22 y.o. male whom presented on 2021 with      Assessment / Plan:  Anticipated discharge date : 2021  Anticipated disposition : Home  Barriers to discharge : Wound care teaching to patient and caregivers, home meds arrangement    Hidradenitis suppurative stage III ( Hurely stage III-  due to multiple abscesses/ sinus tracts)   Failed outpatient Rx. assessed by GS at [facility]- No need for debridement per surgery. Wound Cx : LIGHT STREPTOCOCCI, BETA HEMOLYTIC GROUP C  Completed Levaquin/metronidazole        - aggressive wound care   - continue Doxycycline 100 mg BID  - Outpatient Follow up w/ [facility]         Pain management   - Gabapentin 200 mg TID   - Scheduled tylenol 975 mg TID   - Motrin 800 mg Q6 PRN for mild pain   - Tramadol 50 mg Q4 for moderate pain   - oxycodone taper for severe pain   - IV dilaudid only for dressing change         Anemia of chronic diease  - Hgb 8.2 w/ MCV 71   - Iron 17 , TIBC 122, Ferritin 570 , Iron sat 14  - Hold Iron in the setting of active infection      BMI 27.67   Overweight POA  Counseled on Lifestyle modifications, AHA Diet ,weight loss strategies.        Code Status: Full   Surrogate Decision Maker:     DVT Prophylaxis: Lovenox  GI Prophylaxis: not indicated  Baseline: ambulatory independently          Subjective:     Chief Complaint / Reason for Physician Visit  \"I walked with PT today\". Patient's roommate at bedside for wound care teaching. Discussed with RN events overnight. Review of Systems:  No fevers, chills, appetite change, cough, sputum production, shortness of breath, dyspnea on exertion, nausea, vomitting, diarrhea, constipation, chest pain, leg edema, abdominal pain, joint pain, rash, itching. Tolerating PT/OT. Tolerating diet.        Objective:     VITALS:   Last 24hrs VS reviewed since prior progress note. Most recent are:  Patient Vitals for the past 24 hrs:   Temp Pulse Resp BP SpO2   06/26/21 0830 (!) 96.6 °F (35.9 °C) 82 14 116/69 100 %   06/26/21 0000 98 °F (36.7 °C) 96 18 122/62 100 %   06/25/21 1934 98.9 °F (37.2 °C) (!) 101 18 117/69 96 %   06/25/21 1557 97.3 °F (36.3 °C) (!) 102 18 105/75 100 %       Intake/Output Summary (Last 24 hours) at 6/26/2021 1421  Last data filed at 6/26/2021 1115  Gross per 24 hour   Intake 180 ml   Output 825 ml   Net -645 ml        PHYSICAL EXAM:  General: Alert, cooperative, no acute distress    EENT:  EOMI. Anicteric sclerae. MMM  Resp:  CTA bilaterally, no wheezing or rales. No accessory muscle use  CV:  Regular  rhythm,  normal S1/S2, no murmurs rubs gallops, No edema  GI:  Soft, Non distended, Non tender. +Bowel sounds  Neurologic:  Alert and oriented X 3, normal speech,   Psych:   Good insight. Not anxious nor agitated  Skin:  No rashes. No jaundice    Reviewed most current lab test results and cultures  YES  Reviewed most current radiology test results   YES  Review and summation of old records today    NO  Reviewed patient's current orders and MAR    YES  PMH/ reviewed - no change compared to H&P  ________________________________________________________________________  Care Plan discussed with:    Comments   Patient x    Family  x    RN x    Care Manager x    Consultant                       x Multidiciplinary team rounds were held today with , nursing, pharmacist and clinical coordinator. Patient's plan of care was discussed; medications were reviewed and discharge planning was addressed.      ________________________________________________________________________  Total NON critical care TIME:  15  Minutes    Total CRITICAL CARE TIME Spent:   Minutes non procedure based      Comments   >50% of visit spent in counseling and coordination of care ________________________________________________________________________  Rosalinda Woody MD     Procedures: see electronic medical records for all procedures/Xrays and details which were not copied into this note but were reviewed prior to creation of Plan. LABS:  I reviewed today's most current labs and imaging studies.   Pertinent labs include:  Recent Labs     06/25/21 0418   WBC 7.5   HGB 10.3*   HCT 35.4*   *     Recent Labs     06/25/21 0418      K 4.0      CO2 31   GLU 83   BUN 8   CREA 0.64*   CA 9.7       Signed: Rosalinda Woody MD

## 2021-06-26 NOTE — PROGRESS NOTES
Bedside shift change report given to Nohelia Gibbs RN (oncoming nurse) by Lucas Tobin RN (offgoing nurse). Report included the following information SBAR, Kardex, Intake/Output, MAR and Recent Results. Patient resting quietly at this time.

## 2021-06-26 NOTE — PROGRESS NOTES
6534: Administered AM medication. Pt resting quietly. Denies any additional needs at this time. 1100: In bed, resting quietly. Watching TV. Denies any additional needs at this time. 1400: In bed, resting quietly. Visitor present at bedside. Denies additional needs at this time. 1500: In bed, resting quietly. Talking to visitor. No signs of distress. 1600: In bed, resting quietly. Visitor present at bedside. 1800: In bed, resting quietly. Denies any additional needs.

## 2021-06-27 PROCEDURE — 74011000250 HC RX REV CODE- 250: Performed by: STUDENT IN AN ORGANIZED HEALTH CARE EDUCATION/TRAINING PROGRAM

## 2021-06-27 PROCEDURE — 74011250637 HC RX REV CODE- 250/637: Performed by: STUDENT IN AN ORGANIZED HEALTH CARE EDUCATION/TRAINING PROGRAM

## 2021-06-27 PROCEDURE — 65270000032 HC RM SEMIPRIVATE

## 2021-06-27 PROCEDURE — 74011250637 HC RX REV CODE- 250/637: Performed by: INTERNAL MEDICINE

## 2021-06-27 PROCEDURE — 94760 N-INVAS EAR/PLS OXIMETRY 1: CPT

## 2021-06-27 RX ADMIN — ACETAMINOPHEN 975 MG: 325 TABLET, FILM COATED ORAL at 18:06

## 2021-06-27 RX ADMIN — GABAPENTIN 200 MG: 100 CAPSULE ORAL at 09:08

## 2021-06-27 RX ADMIN — FERROUS SULFATE TAB 325 MG (65 MG ELEMENTAL FE) 325 MG: 325 (65 FE) TAB at 09:08

## 2021-06-27 RX ADMIN — DOXYCYCLINE HYCLATE 100 MG: 100 TABLET, COATED ORAL at 09:08

## 2021-06-27 RX ADMIN — LIDOCAINE HYDROCHLORIDE: 20 JELLY TOPICAL at 18:05

## 2021-06-27 RX ADMIN — GABAPENTIN 200 MG: 100 CAPSULE ORAL at 21:31

## 2021-06-27 RX ADMIN — GABAPENTIN 200 MG: 100 CAPSULE ORAL at 15:36

## 2021-06-27 RX ADMIN — OXYCODONE HYDROCHLORIDE 10 MG: 5 TABLET ORAL at 15:36

## 2021-06-27 RX ADMIN — ACETAMINOPHEN 975 MG: 325 TABLET, FILM COATED ORAL at 23:58

## 2021-06-27 RX ADMIN — ACETAMINOPHEN 975 MG: 325 TABLET, FILM COATED ORAL at 12:07

## 2021-06-27 RX ADMIN — DOXYCYCLINE HYCLATE 100 MG: 100 TABLET, COATED ORAL at 21:31

## 2021-06-27 RX ADMIN — OXYCODONE HYDROCHLORIDE 5 MG: 5 TABLET ORAL at 10:45

## 2021-06-27 RX ADMIN — ACETAMINOPHEN 975 MG: 325 TABLET, FILM COATED ORAL at 09:08

## 2021-06-27 NOTE — PROGRESS NOTES
Problem: Pressure Injury - Risk of  Goal: *Prevention of pressure injury  Description: Document Dann Scale and appropriate interventions in the flowsheet. Outcome: Progressing Towards Goal  Note: Pressure Injury Interventions:  Sensory Interventions: Maintain/enhance activity level    Moisture Interventions: Absorbent underpads, Maintain skin hydration (lotion/cream)    Activity Interventions: Increase time out of bed    Mobility Interventions: PT/OT evaluation    Nutrition Interventions: Offer support with meals,snacks and hydration    Friction and Shear Interventions: Apply protective barrier, creams and emollients                Problem: Patient Education: Go to Patient Education Activity  Goal: Patient/Family Education  Outcome: Progressing Towards Goal     Problem: Activity Intolerance  Goal: *Oxygen saturation during activity within specified parameters  Outcome: Progressing Towards Goal     Problem: Falls - Risk of  Goal: *Absence of Falls  Description: Document Omid Fall Risk and appropriate interventions in the flowsheet.   Outcome: Progressing Towards Goal  Note: Fall Risk Interventions:  Mobility Interventions: Patient to call before getting OOB         Medication Interventions: Teach patient to arise slowly    Elimination Interventions: Urinal in reach    History of Falls Interventions: Door open when patient unattended         Problem: Pain  Goal: *Control of Pain  Outcome: Progressing Towards Goal

## 2021-06-27 NOTE — PROGRESS NOTES
0700) Verbal shift change report given to Rosa Albrecht RN (oncoming nurse) by Maren Olson RN (offgoing nurse). Report included the following information SBAR, Kardex, Intake/Output and MAR.     0855) Pt assessed and vital signs stable. Pt c/o pain 3/10, Tylenol not given at 0600 r/t pt sleeping was given at this time. Pt stated roommates should be here around 1500 to learn wound care. Water pitcher filled per request. Pt left resting in bed. 1000) Interdisciplinary team rounds were held 6/27/2021 with the following team members:Nursing and Physician. Plan of care discussed. See clinical pathway and/or care plan for interventions and desired outcomes. 1045) Pt c/o pain 7/10 to bilateral axilla and sacrum. Vianney 5 mg given. Pt resting in bed at this time. 1130) Pt stated pain resolved to 4/10.    1210) Scheduled Tylenol given. 1400) Pt resting in bed at this time. Ice cream given per requests. Wound care orders printed and this writer wrote out step by step instructions on how wound care should be performed. Notes to be used during wound care with his friends. 1540) Vianney 10 mg given for wound care. Pt's roommates are in the room at this time to learn wound care. 1620) Wound care performed by pt's roommates to bilateral axilla. while this writer supervised. Roommates were able to perform wound care with clean technique. Writter step-by-step instructions given to friends and opportunity for questions. Friends stated understanding. 1800) Wound care performed on sacrum and groin. Pt tolerated well. Lidocaine used and scheduled tylenol given. 3    1900) Verbal shift change report given to Sudha Babin,, RN (oncoming nurse) by Rosa Albrecht RN (offgoing nurse). Report included the following information SBAR, Kardex, Intake/Output and MAR.

## 2021-06-27 NOTE — PROGRESS NOTES
Problem: Pressure Injury - Risk of  Goal: *Prevention of pressure injury  Description: Document Dann Scale and appropriate interventions in the flowsheet. Outcome: Progressing Towards Goal  Note: Pressure Injury Interventions:  Sensory Interventions: Maintain/enhance activity level    Moisture Interventions: Absorbent underpads, Maintain skin hydration (lotion/cream)    Activity Interventions: Increase time out of bed    Mobility Interventions: PT/OT evaluation    Nutrition Interventions: Offer support with meals,snacks and hydration    Friction and Shear Interventions: Apply protective barrier, creams and emollients                Problem: Activity Intolerance  Goal: *Oxygen saturation during activity within specified parameters  Outcome: Progressing Towards Goal  Goal: *Able to remain out of bed as prescribed  Outcome: Progressing Towards Goal     Problem: Falls - Risk of  Goal: *Absence of Falls  Description: Document Yusuf Sites Fall Risk and appropriate interventions in the flowsheet.   Outcome: Progressing Towards Goal  Note: Fall Risk Interventions:  Mobility Interventions: Patient to call before getting OOB         Medication Interventions: Patient to call before getting OOB    Elimination Interventions: Call light in reach    History of Falls Interventions: Door open when patient unattended, Room close to nurse's station         Problem: Pain  Goal: *Control of Pain  Outcome: Progressing Towards Goal  Goal: *PALLIATIVE CARE:  Alleviation of Pain  Outcome: Progressing Towards Goal

## 2021-06-27 NOTE — PROGRESS NOTES
Hospitalist Progress Note    NAME: Brinda Alanis   :  1995   MRN:  [id number]   Room Number:  148/30  @ [address] [place] [address] Summary: 22 y.o. male whom presented on 2021 with      Assessment / Plan:    Anticipated discharge date : 2021  Anticipated disposition : Home  Barriers to discharge : Wound care teaching to patient and caregivers, home meds arrangement     Hidradenitis suppurative stage III ( Hurely stage III-  due to multiple abscesses/ sinus tracts)   Failed outpatient Rx. assessed by GS at [address]- No need for debridement per surgery. Wound Cx : LIGHT STREPTOCOCCI, BETA HEMOLYTIC GROUP C  Completed Levaquin/metronidazole        - aggressive wound care   - continue Doxycycline 100 mg BID  - Outpatient Follow up w/ [facility]         Pain management   - Gabapentin 200 mg TID   - Scheduled tylenol 975 mg TID   - Motrin 800 mg Q6 PRN for mild pain   - Tramadol 50 mg Q4 for moderate pain   - oxycodone taper for severe pain   - IV dilaudid only for dressing change         Anemia of chronic diease  - Hgb 8.2 w/ MCV 71   - Iron 17 , TIBC 122, Ferritin 570 , Iron sat 14  - Hold Iron in the setting of active infection      BMI 27.67   Overweight POA  Counseled on Lifestyle modifications, AHA Diet ,weight loss strategies.        Code Status: Full   Surrogate Decision Maker:     DVT Prophylaxis: Lovenox  GI Prophylaxis: not indicated  Baseline: ambulatory independently            Subjective:     Chief Complaint / Reason for Physician Visit  \"my friends will be here later this afternoon\". Discussed with RN events overnight. Review of Systems:  No fevers, chills, appetite change, cough, sputum production, shortness of breath, dyspnea on exertion, nausea, vomitting, diarrhea, constipation, chest pain, leg edema, abdominal pain, joint pain, rash, itching. Tolerating PT/OT. Tolerating diet.        Objective:     VITALS:   Last 24hrs VS reviewed since prior progress note. Most recent are:  Patient Vitals for the past 24 hrs:   Temp Pulse Resp BP SpO2   06/27/21 0859 98.1 °F (36.7 °C) 87 16 (!) 142/73 100 %   06/26/21 1924 98.8 °F (37.1 °C) 95 17 134/82 97 %   06/26/21 1503 97.6 °F (36.4 °C) 95 16 126/87 100 %       Intake/Output Summary (Last 24 hours) at 6/27/2021 1311  Last data filed at 6/26/2021 1930  Gross per 24 hour   Intake --   Output 450 ml   Net -450 ml        PHYSICAL EXAM:  General: Alert, cooperative, no acute distress    EENT:  EOMI. Anicteric sclerae. MMM  Resp:  CTA bilaterally, no wheezing or rales. No accessory muscle use  CV:  Regular  rhythm,  normal S1/S2, no murmurs rubs gallops, No edema  GI:  Soft, Non distended, Non tender. +Bowel sounds  Neurologic:  Alert and oriented X 3, normal speech,   Psych:   Good insight. Not anxious nor agitated  Skin:  No rashes. No jaundice    Reviewed most current lab test results and cultures  YES  Reviewed most current radiology test results   YES  Review and summation of old records today    NO  Reviewed patient's current orders and MAR    YES  PMH/ reviewed - no change compared to H&P  ________________________________________________________________________  Care Plan discussed with:    Comments   Patient x    Family      RN x    Care Manager x    Consultant                        Multidiciplinary team rounds were held today with , nursing, pharmacist and clinical coordinator. Patient's plan of care was discussed; medications were reviewed and discharge planning was addressed.      ________________________________________________________________________  Total NON critical care TIME: 10   Minutes    Total CRITICAL CARE TIME Spent:   Minutes non procedure based      Comments   >50% of visit spent in counseling and coordination of care     ________________________________________________________________________  Rolando Wiley MD     Procedures: see electronic medical records for all procedures/Xrays and details which were not copied into this note but were reviewed prior to creation of Plan. LABS:  I reviewed today's most current labs and imaging studies.   Pertinent labs include:  Recent Labs     06/25/21 0418   WBC 7.5   HGB 10.3*   HCT 35.4*   *     Recent Labs     06/25/21 0418      K 4.0      CO2 31   GLU 83   BUN 8   CREA 0.64*   CA 9.7       Signed: Celi Lorenzo MD

## 2021-06-28 PROCEDURE — 97116 GAIT TRAINING THERAPY: CPT

## 2021-06-28 PROCEDURE — 74011250637 HC RX REV CODE- 250/637: Performed by: STUDENT IN AN ORGANIZED HEALTH CARE EDUCATION/TRAINING PROGRAM

## 2021-06-28 PROCEDURE — 97530 THERAPEUTIC ACTIVITIES: CPT

## 2021-06-28 PROCEDURE — 94760 N-INVAS EAR/PLS OXIMETRY 1: CPT

## 2021-06-28 PROCEDURE — 74011250637 HC RX REV CODE- 250/637: Performed by: INTERNAL MEDICINE

## 2021-06-28 PROCEDURE — 65270000032 HC RM SEMIPRIVATE

## 2021-06-28 RX ADMIN — BISACODYL 5 MG: 5 TABLET, COATED ORAL at 09:49

## 2021-06-28 RX ADMIN — ACETAMINOPHEN 975 MG: 325 TABLET, FILM COATED ORAL at 12:43

## 2021-06-28 RX ADMIN — GABAPENTIN 200 MG: 100 CAPSULE ORAL at 21:45

## 2021-06-28 RX ADMIN — DOXYCYCLINE HYCLATE 100 MG: 100 TABLET, COATED ORAL at 09:35

## 2021-06-28 RX ADMIN — DOXYCYCLINE HYCLATE 100 MG: 100 TABLET, COATED ORAL at 21:45

## 2021-06-28 RX ADMIN — FERROUS SULFATE TAB 325 MG (65 MG ELEMENTAL FE) 325 MG: 325 (65 FE) TAB at 09:35

## 2021-06-28 RX ADMIN — IBUPROFEN 800 MG: 400 TABLET ORAL at 16:27

## 2021-06-28 RX ADMIN — GABAPENTIN 200 MG: 100 CAPSULE ORAL at 09:35

## 2021-06-28 RX ADMIN — GABAPENTIN 200 MG: 100 CAPSULE ORAL at 16:08

## 2021-06-28 RX ADMIN — ACETAMINOPHEN 975 MG: 325 TABLET, FILM COATED ORAL at 19:04

## 2021-06-28 RX ADMIN — ACETAMINOPHEN 975 MG: 325 TABLET, FILM COATED ORAL at 06:34

## 2021-06-28 NOTE — PROGRESS NOTES
Problem: Mobility Impaired (Adult and Pediatric)  Goal: *Acute Goals and Plan of Care (Insert Text)  Description: FUNCTIONAL STATUS PRIOR TO ADMISSION: Patient was independent and active without use of DME prior to onset of painful wounds. However, over previous few weeks/months, patient reports being mostly sedentary, staying in bed to keep weight off wounds. HOME SUPPORT PRIOR TO ADMISSION: The patient lived with three roommates. Physical Therapy Goals  Initiated 6/14/2021  1. Patient will move from supine to sit and sit to supine in bed with stand by assistance within 7 day(s). MET 6/21/21  2. Patient will transfer from bed to chair and chair to bed with contact guard assist using the least restrictive device within 7 day(s). 3.  Patient will perform sit to stand with stand by assistance within 7 day(s). 4.  Patient will ambulate with contact guard assist for 25 feet with the least restrictive device within 7 day(s). MET 6/21/21    Updated 6/21/2021:  1. Patient will move from supine to sit and sit to supine in bed with modified independence within 7 day(s). 2.  Patient will transfer from bed to chair and chair to bed with contact guard assist using the least restrictive device within 7 day(s). 3.  Patient will perform sit to stand with stand by assistance within 7 day(s). 4.  Patient will ambulate with contact guard assist for 60 feet with the least restrictive device within 7 day(s). Updated 6/28/2021:  1. Patient will move from supine to sit and sit to supine in bed with modified independence within 7 day(s). 2.  Patient will transfer from bed to chair and chair to bed with supervision using the least restrictive device within 7 day(s). 3.  Patient will perform sit to stand with stand by assistance within 7 day(s). 4.  Patient will ambulate with contact guard assist for 300 feet with the least restrictive device within 7 day(s).      Outcome: Progressing Towards Goal   PHYSICAL THERAPY TREATMENT: WEEKLY REASSESSMENT  Patient: Tatiana Hernandez (38 y.o. male)  Date: 6/28/2021  Primary Diagnosis: Hidradenitis [L73.2]        Precautions:          ASSESSMENT  Patient continues with skilled PT services and is progressing towards goals but appears to vary in performance from day to day. HHPT has been recommended so far but patient reported deferring HHPT services and preferring to attempt on his own. Mobilized today in preparation for discharge home and did not utilize a [facility] for mobility. Gait x150' requiring SBA with increased trunk sway, narrow base of support, and path deviations. Ascended/descended 5 stairs with 1 rail requiring SBA and additional time. Patient challenged with eccentric control on the stairs. Patient increasingly tremulous with prolonged standing and following gait. Mobility slowed as he returned to his room with visible fatigue.  BPM and /86. Continue to strongly recommend HHPT follow up after discharge d/t low endurance, strength, and concern for falls. He would benefit from a [facility] at discharge if he will utilize one. Patient's progression toward goals since last assessment: Met gait distance goal    Current Level of Function Impacting Discharge (mobility/balance): SBA with gait, balance impairments             PLAN :  Goals have been updated based on progression since last assessment. Patient continues to benefit from skilled intervention to address the above impairments. Recommendations and Planned Interventions: bed mobility training, transfer training, gait training, and therapeutic activities      Frequency/Duration: Patient will be followed by physical therapy:  4 times a week to address goals.     Recommendation for discharge: (in order for the patient to meet his/her long term goals)  Physical therapy at least 2 days/week in the home         IF patient discharges home will need the following DME: straight cane         SUBJECTIVE: Patient stated Junior Rojo would like to try stretching on my own at home first and see how it goes.     OBJECTIVE DATA SUMMARY:   HISTORY:    No past medical history on file. No past surgical history on file. Personal factors and/or comorbidities impacting plan of care:     Home Situation  Home Environment: Apartment  # Steps to Enter: 14 (7-10 up from street; 4 steps up to porch)  Rails to [employer]: No  One/Two Story Residence: One story  # of Interior Steps: 14  Interior Rails: Right  Lift Chair Available: No  Living Alone: Yes  Support Systems: Friends \ neighbors, Family member(s) (lives with 3 roommates; supportive grandmother in area)  Patient Expects to be Discharged to[de-identified] Apartment  Current DME Used/Available at Home: None    EXAMINATION/PRESENTATION/DECISION MAKING:   Critical Behavior:  Neurologic State: Alert, Appropriate for age  Orientation Level: Oriented X4  Cognition: Appropriate for age attention/concentration  Safety/Judgement: Awareness of environment, Fall prevention, Insight into deficits  Hearing: Auditory  Auditory Impairment: None  Skin:  patient c/o blood running down his leg prior to activity. Seen with nursing who reinforced dressing on buttocks with gauze     Functional Mobility:  Bed Mobility:  Rolling: Supervision  Supine to Sit: Supervision     Scooting: Supervision; Additional time  Transfers:  Sit to Stand: Stand-by assistance                          Balance:   Sitting: Intact  Standing: Impaired; With support  Standing - Static: Good  Standing - Dynamic : Fair;Good  Ambulation/Gait Training:  Distance (ft): 150 Feet (ft)  Assistive Device: Gait belt  Ambulation - Level of Assistance: Stand-by assistance        Gait Abnormalities: Decreased step clearance; Antalgic; Altered arm swing        Base of Support: Narrowed     Speed/Bonnie: Pace decreased (<100 feet/min)                        Stairs:  Number of Stairs Trained: 5  Stairs - Level of Assistance: Stand-by assistance   Rail Use: Left Activity Tolerance:   Good and tachycardia with activity to 140s     After treatment patient left in no apparent distress:   Patient positioned in left sidelying for pressure relief    COMMUNICATION/EDUCATION:   The patients plan of care was discussed with: Registered nurse. Fall prevention education was provided and the patient/caregiver indicated understanding., Patient/family have participated as able in goal setting and plan of care. , and Patient/family agree to work toward stated goals and plan of care.     Thank you for this referral.  Stann Alpers, PT, DPT   Time Calculation: 25 mins

## 2021-06-28 NOTE — PROGRESS NOTES
Occupational Therapy    Chart reviewed in prep for skilled OT treatment; however, per conversations with RN and CM, pt discharging later this date. OT spoke with pt regarding upcoming discharge, current level of ADL independence, recommendation for \"hip kit\" and importance of continuing BUE ROM HEP, with good understanding verbalized and pt verbalizing he feels ready for discharge home, with good in-home assist available from roommates (\"they participated in wound care training yesterday\") and pt stating he would independently acquire recommended long-handled AE. Pt declined therapeutic participation at this time. Will defer treatment given pt on track for discharge, with reporting therapist continuing to recommend [address] [place] [address] services to address [facility] functionality.     Thank you,  Gisselle Panda, OT

## 2021-06-28 NOTE — PROGRESS NOTES
Hospitalist Progress Note    NAME: Christelle Thornton   :  1995   MRN:  [id number]   Room Number:  087/43  @ [address] [place] [address] Summary: 22 y.o. male whom presented on 2021 with      Assessment / Plan:        Hidradenitis suppurative stage III ( Hurely stage III-  due to multiple abscesses/ sinus tracts)   Failed outpatient Rx. assessed by GS at [facility]- No need for debridement per surgery. Wound Cx : LIGHT STREPTOCOCCI, BETA HEMOLYTIC GROUP C  Completed Levaquin/metronidazole        - aggressive wound care   - continue Doxycycline 100 mg BID  - Outpatient Follow up w/ [facility] clinic   - barriers to discharge include wound care resources at home         Pain management   - Gabapentin 200 mg TID   - Scheduled tylenol 975 mg TID   - Motrin 800 mg Q6 PRN for mild pain   - Tramadol 50 mg Q4 for moderate pain   - oxycodone taper for severe pain   - IV dilaudid only for dressing change         Anemia of chronic diease  - Hgb 8.2 w/ MCV 71   - Iron 17 , TIBC 122, Ferritin 570 , Iron sat 14  - Hold Iron in the setting of active infection      BMI 27.67   Overweight POA  Counseled on Lifestyle modifications, AHA Diet ,weight loss strategies        Code status: Full  Prophylaxis: Lovenox  Recommended Disposition: Home w/Family     Subjective:     Chief Complaint / Reason for Physician Visit  Ilan Rojas Discussed with RN events overnight. Review of Systems:  No fevers, chills, appetite change, cough, sputum production, shortness of breath, dyspnea on exertion, nausea, vomitting, diarrhea, constipation, chest pain, leg edema, abdominal pain, joint pain, rash, itching. Tolerating PT/OT. Tolerating diet. Objective:     VITALS:   Last 24hrs VS reviewed since prior progress note.  Most recent are:  Patient Vitals for the past 24 hrs:   Temp Pulse Resp BP SpO2   21 0929 98.4 °F (36.9 °C) (!) 103 18 114/75 100 %   21 98 °F (36.7 °C) 86 18 134/81 100 % Intake/Output Summary (Last 24 hours) at 6/28/2021 1304  Last data filed at 6/28/2021 4284  Gross per 24 hour   Intake 300 ml   Output 800 ml   Net -500 ml        PHYSICAL EXAM:  General: WD, WN. Alert, cooperative, no acute distress    EENT:  EOMI. Anicteric sclerae. MMM  Resp:  CTA bilaterally, no wheezing or rales. No accessory muscle use  CV:  Regular  rhythm,  normal S1/S2, no murmurs rubs gallops, No edema  GI:  Soft, Non distended, Non tender. +Bowel sounds  Neurologic:  Alert and oriented X 3, normal speech,   Psych:   Good insight. Not anxious nor agitated  Skin:  No rashes. No jaundice    Reviewed most current lab test results and cultures  YES  Reviewed most current radiology test results   YES  Review and summation of old records today    NO  Reviewed patient's current orders and MAR    YES  PMH/ reviewed - no change compared to H&P  ________________________________________________________________________  Care Plan discussed with:    Comments   Patient x    Family      RN x    Care Manager x    Consultant                       x Multidiciplinary team rounds were held today with , nursing, pharmacist and clinical coordinator. Patient's plan of care was discussed; medications were reviewed and discharge planning was addressed. ________________________________________________________________________  Total NON critical care TIME:  25   Minutes    Total CRITICAL CARE TIME Spent:   Minutes non procedure based      Comments   >50% of visit spent in counseling and coordination of care x    ________________________________________________________________________  Chandrakant Mckeon MD     Procedures: see electronic medical records for all procedures/Xrays and details which were not copied into this note but were reviewed prior to creation of Plan. LABS:  I reviewed today's most current labs and imaging studies.   Pertinent labs include:  No results for input(s): WBC, HGB, HCT, PLT, HGBEXT, HCTEXT, PLTEXT in the last 72 hours. No results for input(s): NA, K, CL, CO2, GLU, BUN, CREA, CA, MG, PHOS, ALB, TBIL, TBILI, ALT, INR, INREXT in the last 72 hours.     No lab exists for component: SGOT    Signed: Tiffanie Gusman MD

## 2021-06-28 NOTE — PROGRESS NOTES
IDT Note  RUR 10  LOS 7d  GLOS  Barriers- No insurance at this time. Needs extensive wound care. Plan- Continue with current wound care orders. Girlfriend to come in and be trained in wound care.     ANAHI Baker/DEVIKA  [phone]

## 2021-06-29 VITALS
DIASTOLIC BLOOD PRESSURE: 72 MMHG | TEMPERATURE: 98.8 F | RESPIRATION RATE: 12 BRPM | OXYGEN SATURATION: 99 % | BODY MASS INDEX: 27.53 KG/M2 | HEART RATE: 83 BPM | SYSTOLIC BLOOD PRESSURE: 134 MMHG | HEIGHT: 75 IN | WEIGHT: 221.4 LBS

## 2021-06-29 PROCEDURE — 74011250637 HC RX REV CODE- 250/637: Performed by: STUDENT IN AN ORGANIZED HEALTH CARE EDUCATION/TRAINING PROGRAM

## 2021-06-29 PROCEDURE — 77030041076 HC DRSG AG OPTICELL MDII -A

## 2021-06-29 PROCEDURE — 74011000250 HC RX REV CODE- 250: Performed by: STUDENT IN AN ORGANIZED HEALTH CARE EDUCATION/TRAINING PROGRAM

## 2021-06-29 PROCEDURE — 74011250637 HC RX REV CODE- 250/637: Performed by: INTERNAL MEDICINE

## 2021-06-29 PROCEDURE — 2709999900 HC NON-CHARGEABLE SUPPLY

## 2021-06-29 RX ORDER — OXYCODONE HYDROCHLORIDE 10 MG/1
10 TABLET ORAL EVERY OTHER DAY
Qty: 7 TABLET | Refills: 0 | Status: SHIPPED | OUTPATIENT
Start: 2021-06-29 | End: 2021-07-13

## 2021-06-29 RX ORDER — ACETAMINOPHEN 325 MG/1
975 TABLET ORAL EVERY 6 HOURS
Qty: 30 TABLET | Refills: 0 | Status: SHIPPED | OUTPATIENT
Start: 2021-06-29 | End: 2021-09-20

## 2021-06-29 RX ORDER — LIDOCAINE HYDROCHLORIDE 20 MG/ML
5 JELLY TOPICAL AS NEEDED
Qty: 1 TUBE | Refills: 1 | Status: SHIPPED | OUTPATIENT
Start: 2021-06-29 | End: 2021-09-20

## 2021-06-29 RX ORDER — LANOLIN ALCOHOL/MO/W.PET/CERES
325 CREAM (GRAM) TOPICAL
Qty: 30 TABLET | Refills: 1 | Status: SHIPPED | OUTPATIENT
Start: 2021-06-30 | End: 2021-09-20

## 2021-06-29 RX ORDER — NALOXONE HYDROCHLORIDE 4 MG/.1ML
SPRAY NASAL
Qty: 1 EACH | Refills: 0 | Status: SHIPPED | OUTPATIENT
Start: 2021-06-29 | End: 2021-09-20

## 2021-06-29 RX ORDER — GABAPENTIN 100 MG/1
200 CAPSULE ORAL 3 TIMES DAILY
Qty: 90 CAPSULE | Refills: 1 | Status: SHIPPED | OUTPATIENT
Start: 2021-06-29 | End: 2021-09-20

## 2021-06-29 RX ORDER — DOXYCYCLINE HYCLATE 100 MG
100 TABLET ORAL EVERY 12 HOURS
Qty: 60 TABLET | Refills: 1 | Status: SHIPPED | OUTPATIENT
Start: 2021-06-29 | End: 2021-09-20

## 2021-06-29 RX ORDER — IBUPROFEN 800 MG/1
800 TABLET ORAL
Qty: 20 TABLET | Refills: 0 | Status: SHIPPED | OUTPATIENT
Start: 2021-06-29 | End: 2021-07-13

## 2021-06-29 RX ADMIN — GABAPENTIN 200 MG: 100 CAPSULE ORAL at 09:31

## 2021-06-29 RX ADMIN — FERROUS SULFATE TAB 325 MG (65 MG ELEMENTAL FE) 325 MG: 325 (65 FE) TAB at 09:30

## 2021-06-29 RX ADMIN — OXYCODONE HYDROCHLORIDE 10 MG: 5 TABLET ORAL at 11:26

## 2021-06-29 RX ADMIN — ACETAMINOPHEN 975 MG: 325 TABLET, FILM COATED ORAL at 14:52

## 2021-06-29 RX ADMIN — LIDOCAINE HYDROCHLORIDE: 20 JELLY TOPICAL at 10:44

## 2021-06-29 RX ADMIN — BISACODYL 5 MG: 5 TABLET, COATED ORAL at 07:45

## 2021-06-29 RX ADMIN — ACETAMINOPHEN 975 MG: 325 TABLET, FILM COATED ORAL at 09:31

## 2021-06-29 RX ADMIN — DOXYCYCLINE HYCLATE 100 MG: 100 TABLET, COATED ORAL at 09:30

## 2021-06-29 NOTE — PROGRESS NOTES
Problem: Activity Intolerance  Goal: *Oxygen saturation during activity within specified parameters  Outcome: Progressing Towards Goal  Goal: *Able to remain out of bed as prescribed  Outcome: Progressing Towards Goal     Problem: Falls - Risk of  Goal: *Absence of Falls  Description: Document Nikita Toribio Fall Risk and appropriate interventions in the flowsheet.   Outcome: Progressing Towards Goal  Note: Fall Risk Interventions:  Mobility Interventions: Patient to call before getting OOB         Medication Interventions: Teach patient to arise slowly    Elimination Interventions: Call light in reach    History of Falls Interventions: Door open when patient unattended         Problem: Patient Education: Go to Patient Education Activity  Goal: Patient/Family Education  Outcome: Progressing Towards Goal

## 2021-06-29 NOTE — PROGRESS NOTES
Physical therapy:     Chart reviewed and discharge noted for today. Spoke with patient who plans to purchase a straight cane, shoe horn, and sock aid. Patient states he would like to try stretches/exercises on his own, and plans to pace himself with activity. Patient not interested in home health PT at this time. Patient educated on outpatient PT when he is ready for additional therapy. Patient verbalized good understanding.     Rufina Peñaloza, PT

## 2021-06-29 NOTE — PROGRESS NOTES
0710) Bedside shift change report given to Meek Thibodeaux, RN (oncoming nurse) by Braden Bull RN (offgoing nurse). Report included the following information SBAR, Kardex and MAR   1030) . Micaela Petited IDR with Dr. Cirilo Ruvalcaba (MD), Joaquin Chavez (pharmacist), Home López (),Archana (nurse supervisor), and Meek Thibodeaux (RN) to discuss plan of care including possible discharge if wound care instructions  [phone]) Jax García, girlfriend, [phone] at bedside with [facility], wound care. Axilla wounds cleaned with normal saline, packed with silver gauze, covered with ABD and gauze. Lidocaine used on L axilla. Groin wound cleaned with normal saline and covered with silver gauze, ABD, and disposable underwear. Gluteal wound cleaned with normal saline, packed with silver and covered with heart foam.   1100) Dr. Cirilo Ruvalcaba at bedside to discuss discharge. 1500) . Micaela Rued Reviewed discharge instructions with pt including follow-up appointments, new medications and side effects, medications to continue, wound care education, signs/symptoms of stroke and heart attack, and MyChart information. Pt expressed understanding. Belongings sent home with pt.   [phone]) pt wheeled downstairs for discharge, taken to outpatient pharmacy, ride with girlfriend

## 2021-06-29 NOTE — DISCHARGE SUMMARY
Hospitalist Discharge Summary     Patient ID:  Mya Kuo  [id number]  65 y.o.  1995    PCP on record: Natalia Wooten NP    Admit date: 6/11/2021  Discharge date and time: 6/29/2021      Admission Diagnoses: Hidradenitis [L73.2]    Discharge Diagnoses: Active Problems:    Hidradenitis (6/12/2021)           Hospital Course:       Hidradenitis suppurative stage III ( Hurely stage III-  due to multiple abscesses/ sinus tracts)   Failed outpatient Rx. assessed by GS at [address]- No need for debridement per surgery. Wound Cx 6/5: LIGHT STREPTOCOCCI, BETA HEMOLYTIC GROUP C  Completed Levaquin/metronidazole        - aggressive wound care (patient and his girlfriend were both educated by wound care specialist RN,  me and patient's regular RN about dressing change and wound care)   -Patient to follow-up with wound care clinic in 2 weeks  - continue Doxycycline 100 mg BID  - Outpatient Follow up w/ [facility]   -Patient / girlfriend agreed and verbalized understanding        Pain management   -Patient was discharged on Motrin 800 mg every 6 hours as needed for moderate pain,  Tylenol 975 mg every 8 hours,  gabapentin 20 mg 3 times a day and 10 mg oxycodone every other day as needed before dressing change          Anemia of chronic diease  - Hgb 8.2 w/ MCV 71   - Iron 17 , TIBC 122, Ferritin 570 , Iron sat 14  -On iron supplementation     BMI 27.67   Overweight POA  Counseled on Lifestyle modifications, AHA Diet ,weight loss strategies        CONSULTATIONS:  None    Excerpted HPI from H&P of Joceline Henderson MD:  Mya Kuo is a 22 y. o.  male with PMH of above-mentioned problems who presents medical floor at [facility] as a direct transfer from [facility] for IV antibiotics and wound care. Patient initially presented to [facility] ED was found to be in sepsis secondary to HS flare.   Transfer to [facility] where he was started on broad-spectrum antibiotics. Patient was asked by ID and general surgery. Patient is transferred to us  as he does not have insurance and cannot afford expensive wound care.     We were asked to admit for work up and evaluation of the above problems.            Wound care at [facility]  Offer pain medications to the patient as allowed for this patient / discuss with attending. Gather the supplies and the Lidocaine Jelly (Urojets) for ALL of the wounds. Remove the old dressings and rinse with warmed Normal saline to loosen the old drainage. Irrigate the openings with the warmed NS using a 20 ml syringe. Dress the wounds individually as follows: The axilla:  apply an Opticell Ag dressing and then a ABD (or Maxipad). Wrap the Axilla with Kerlix and anchor around the back of the neck to the other axilla. For the Groins:  Apply the NS moistened Hydrofera Blue strips and cover with ABD dressings (or folded Maxipads) and secure the dressings with post op pants. For the Gluteal Cleft wound:  apply the Lidocaine jelly and let it dwell for 5 minutes (wait). Pack the wounds with Hydrofera Blue. Cover with a small bulky amount of Kerlix and then top with a large Sacrum foam dressing formed into the gluteal cleft    ______________________________________________________________________  DISCHARGE SUMMARY/HOSPITAL COURSE:  for full details see H&P, daily progress notes, labs, consult notes. _______________________________________________________________________  Patient seen and examined by me on discharge day. Pertinent Findings:  Gen:    Not in distress  Chest: Clear lungs  CVS:   Regular rhythm. No edema  Abd:  Soft, not distended, not tender  Neuro:  Alert with good insight.   Oriented to person, place, and time   _______________________________________________________________________  DISCHARGE MEDICATIONS:   Current Discharge Medication List      START taking these medications    Details acetaminophen (TYLENOL) 325 mg tablet Take 3 Tablets by mouth every six (6) hours. Qty: 30 Tablet, Refills: 0  Start date: 6/29/2021      doxycycline (VIBRA-TABS) 100 mg tablet Take 1 Tablet by mouth every twelve (12) hours. Qty: 60 Tablet, Refills: 1  Start date: 6/29/2021      ferrous sulfate 325 mg (65 mg iron) tablet Take 1 Tablet by mouth daily (with breakfast). Qty: 30 Tablet, Refills: 1  Start date: 6/30/2021      gabapentin (NEURONTIN) 100 mg capsule Take 2 Capsules by mouth three (3) times daily. Max Daily Amount: 600 mg. Qty: 90 Capsule, Refills: 1  Start date: 6/29/2021    Associated Diagnoses: Wound of groin; Hidradenitis suppurativa; Hidradenitis      lidocaine (XYLOCAINE) 2 % jelly Apply 5 mL to affected area as needed for Other (used during wound care and dressing change). Qty: 1 Tube, Refills: 1  Start date: 6/29/2021      oxyCODONE IR (ROXICODONE) 10 mg tab immediate release tablet Take 1 Tablet by mouth every other day for 14 days. Max Daily Amount: 10 mg. 10 -20 minutes before dressing change  Qty: 7 Tablet, Refills: 0  Start date: 6/29/2021, End date: 7/13/2021    Associated Diagnoses: Wound of groin; Hidradenitis suppurativa; Hidradenitis      naloxone (Narcan) 4 mg/actuation nasal spray Use 1 spray intranasally, then discard. Repeat with new spray every 2 min as needed for opioid overdose symptoms, alternating nostrils. Qty: 1 Each, Refills: 0  Start date: 6/29/2021         CONTINUE these medications which have CHANGED    Details   ibuprofen (MOTRIN) 800 mg tablet Take 1 Tablet by mouth every six (6) hours as needed for Pain for up to 14 days. Qty: 20 Tablet, Refills: 0  Start date: 6/29/2021, End date: 7/13/2021             My Recommended Diet, Activity, Wound Care, and follow-up labs are listed in the patient's Discharge Insturctions which I have personally completed and reviewed.     _______________________________________________________________________  DISPOSITION:     Home with Family:    Home with HH/PT/OT/RN: x   SNF/LTC:    SABRINA:    OTHER:        Condition at Discharge:  Stable  _______________________________________________________________________  Follow up with:   PCP : Meagan Kellogg NP  Follow-up Information     Follow up With Specialties Details Why Contact Info    Meagan Kellogg NP Nurse Practitioner On 7/6/2021 Your appointment time is 11am.  , Bring ins card, picture id, and discharge papers, Please keep this appointment, Please arrive 15 minutes early. [address] [facility]  [address] [facility]  On 7/13/2021 Your appointment time is 8am.  , Bring ins card, picture id, and discharge papers, Please arrive 15 minutes early. , Please keep this appointment [address] [facility]  [address] [facility]  [phone]    Nursing   Please send patient home with wound care supplies.      [facility] will call you with a time/day to begin services [address] [facility]  [phone]              Total time in minutes spent coordinating this discharge (includes going over instructions, follow-up, prescriptions, and preparing report for sign off to her PCP) :  35 minutes    Signed:  Anca Negron MD

## 2021-06-29 NOTE — PROGRESS NOTES
Verbal shift change report given to Guerrero RN (oncoming nurse) by Arden Ocampo RN (offgoing nurse). Report included the following information SBAR, Kardex, Intake/Output and MAR    In room with male visitor. Reeducated on call for help when getting up.

## 2021-06-29 NOTE — PROGRESS NOTES
JM  RAAT 11  IDT met to discuss plan of care. Patient is ready for d/c today. Girlfriend is here to learn wound care and assist with transportation home. CM called [facility] about providing services at home. They will be able to provide visits. Ask that specific order be placed in Epic by Dr. Cyndi Means with specific orders for wound care-  \"  Offer pain medications to the patient as allowed for this patient / discuss with attending. Melissa Fend the supplies and the Lidocaine Jelly (Urojets) for ALL of the wounds.  Remove the old dressings and rinse with warmed Normal saline to loosen the old drainage. Irrigate the openings with the warmed NS using a 20 ml syringe.  Dress the wounds individually as follows:  The axilla:  apply an Opticell Ag dressing and then a ABD (or Maxipad). Wrap the Axilla with Kerlix and anchor around the back of the neck to the other axilla.    For the Groins:  Apply the NS moistened Hydrofera Blue strips and cover with ABD dressings (or folded Maxipads) and secure the dressings with post op pants.      For the Gluteal Cleft wound:  apply the Lidocaine jelly and let it dwell for 5 minutes (wait). Pack the wounds with Hydrofera Blue. Cover with a small bulky amount of Kerlix and then top with a large Sacrum foam dressing formed into the gluteal cleft.            The wound care is to be done every other day. Girlfriend met with wound care nurse and demonstrated that she can perform wound care. Nursing to provide patient will wound care supplies for use at home. PT saw patient today and patient no longer needs PT HH or outpatient services at this time. Patient will be getting a cane from [employer]. DEVIKA discussed Freedom of Choice with patient. DEVKIA explained that [facility] will be able to provide [facility] services to check on his wounds.   He also has an appointment with Deloris Espinoza on 7/13 at 8am. Patient signed Freedom of Choice letter which was put in patient's chart along with copy of providers. Patient also asked about resources in order to apply for Disability. CM provided written information about the process and the number to call. CM completed voucher form medications and sent to [facility] out patient pharmacy along with face sheet. Nursing   Next steps: Follow up   Please send patient home with wound care supplies. Icon Checkbox    Follow up with [address] [place] [address]   Specialty: [address] will call you with a time/day to begin services   Icon Checkbox    Follow up with Iris Love. Ronen Carrera NP on 7/6/2021   Specialty: Nurse Practitioner   Sallie Harris   [address] [address]   [phone]   Your appointment time is 11am.  , Bring ins card, picture id, and discharge papers, Please keep this appointment, Please arrive 15 minutes early. Icon Checkbox    Follow up with [address] [place] [address] Dr Gan on 7/13/2021   [address]   [phone]   Your appointment time is 8am.  , Bring ins card, picture id, and discharge papers, Please arrive 15 minutes early. , Please keep this appointment  Care Management Interventions  PCP Verified by CM: Yes  Last Visit to PCP: 06/04/21  Mode of Transport at Discharge: Self  Transition of Care Consult (CM Consult): Discharge Planning,  Hospital Drive: Yes  Health Maintenance Reviewed: Yes  Physical Therapy Consult: Yes  Current Support Network: Lives Alone  Confirm Follow Up Transport: Friends (Girlfriend providing transportation home.)  The Plan for Transition of Care is Related to the Following Treatment Goals :  Follow-up PCP appointment, Deloris Christopher Appointment, Resources to apply for Diablility  The Patient and/or Patient Representative was Provided with a Choice of Provider and Agrees with the Discharge Plan?: Yes  Name of the Patient Representative Who was Provided with a Choice of Provider and Agrees with the Discharge Plan: Patient  Freedom of Choice List was Provided with Basic Dialogue that Supports the Patient's Individualized Plan of Care/Goals, Treatment Preferences and Shares the Quality Data Associated with the Providers?: Yes  Discharge Location  Discharge Placement: Home with home health     ANAHI Kimball/DEVIKA  [phone]

## 2021-06-29 NOTE — DISCHARGE INSTRUCTIONS
Patient Education        Hidradenitis Suppurativa: Care Instructions  Your Care Instructions     Hidradenitis suppurativa (say \"azp-psqm-ns-NY-tus sup-hadley-uh-TY-vuh\") is a skin condition that causes lumps on the skin that look like pimples or boils. The lumps are usually painful and can break open and drain blood and bad-smelling pus. The condition can come and go for many years. Treatment for this condition may include antibiotics and other medicines. You may need surgery to remove the lumps. Home care includes wearing loose-fitting clothes and washing the area gently. You can help prevent lumps from coming back by staying at a healthy weight and not smoking. Doctors don't know exactly how this condition starts. But they do know that something irritates and inflames the hair follicles, causing them to swell and form lumps. This skin condition can't be spread from person to person (isn't contagious). Follow-up care is a key part of your treatment and safety. Be sure to make and go to all appointments, and call your doctor if you are having problems. It's also a good idea to know your test results and keep a list of the medicines you take. How can you care for yourself at home? Skin care    · Wash the area every day with mild soap. Use your hands rather than a washcloth or sponge when you wash that part of your body.     · Leave the affected areas uncovered when you can. If you have lumps that are draining, you can cover them with a bandage or other dressing. Put petroleum jelly (such as Vaseline) on the dressing to help keep it from sticking.     · Wear-loose fitting clothes that don't rub against the area. Avoid activities that cause skin to rub together.     · If you have pain, try a warm compress. Soak a towel or washcloth in warm water, wring it out, and place it on the affected skin for about 10 minutes. Medicines    · Be safe with medicines. Take your medicines exactly as prescribed.  Call your doctor if you think you are having a problem with your medicine. You will get more details on the specific medicines your doctor prescribes.     · If your doctor prescribed antibiotics, take them as directed. Do not stop taking them just because you feel better. You need to take the full course of antibiotics. Lifestyle choices    · If you smoke, think about quitting. Smoking can make the condition worse. If you need help quitting, talk to your doctor about stop-smoking programs and medicines. These can increase your chances of quitting for good.     · Stay at a healthy weight, or lose weight, by eating healthy foods and being physically active. Being overweight could make this condition worse. When should you call for help? Call your doctor now or seek immediate medical care if:    · You have symptoms of infection, such as:  ? Increased pain, swelling, warmth, or redness. ? Red streaks leading from the area. ? Pus draining from the area. ? A fever. Watch closely for changes in your health, and be sure to contact your doctor if:    · You do not get better as expected. Where can you learn more? Go to http://www.gray.com/  Enter X694 in the search box to learn more about \"Hidradenitis Suppurativa: Care Instructions. \"  Current as of: July 2, 2020               Content Version: 12.8  © 2006-2021 [employer], [employer]. Care instructions adapted under license by [employer] (which disclaims liability or warranty for this information).  If you have questions about a medical condition or this instruction, always ask your healthcare professional. [place] [address] any warranty or liability for your use of this information.       - Please follow up wound care clinic in two 2 weeks   - Please take doxycycline 100 mg twice a day until you see [facility]   - Please use oxycodon 10-20 minutes before dressing change

## 2021-06-29 NOTE — PROGRESS NOTES
Problem: Activity Intolerance  Goal: *Able to remain out of bed as prescribed  Outcome: Progressing Towards Goal     Problem: Falls - Risk of  Goal: *Absence of Falls  Description: Document Pebbles Stuart Fall Risk and appropriate interventions in the flowsheet.   Outcome: Progressing Towards Goal  Note: Fall Risk Interventions:  Mobility Interventions: Patient to call before getting OOB         Medication Interventions: Teach patient to arise slowly    Elimination Interventions: Call light in reach    History of Falls Interventions: Door open when patient unattended

## 2021-06-29 NOTE — PROGRESS NOTES
Bedside shift change report given to Ana Maria Trejo (oncoming nurse) by Rand Soulier, RN (offgoing nurse). Report included the following information SBAR, Intake/Output and MAR.     0802) Pt resting in bed. Does not need anything. 0935) Pt resting in bed, watching TV. Interdisciplinary team rounds were held 6/29/2021 with the following team members:Care Management, Nursing, Pharmacy and Physician. Plan of care discussed. See clinical pathway and/or care plan for interventions and desired outcomes. 1101) Pt working with PT.    1120) Pt notified this RN of blood trickling down left leg (from gluteal cleft) after walking. No active bleeding upon assessment, dressing reinforced with sterile MD dru notified. [phone] Pt in bed, talking with visitor. 1402) Pt asleep in bed. 1510) Pt on phone. Pt upset that he doesn't get to go home today. 1608) Pt asleep in bed, easy to arouse. 1630) Pt assisted with underwear and scrub bottoms. Pt informed this RN that his girlfriend can come in tomorrow to participate in wound care. 1753) Pt resting in bed, watching TV. Bedside shift change report given to Rosales Natarajan RN (oncoming nurse) by Albino Rosario RN (offgoing nurse). Report included the following information SBAR, Intake/Output and MAR.

## 2021-07-01 ENCOUNTER — TELEPHONE (OUTPATIENT)
Dept: CASE MANAGEMENT | Age: 26
End: 2021-07-01

## 2021-07-01 NOTE — TELEPHONE ENCOUNTER
Hospital follow up  Case Management follow up call    CM called patient to follow up after in patient discharge. Patient did not answer. Left VM with call back number. Will re-attempt.      DAVIS MonroyN, [place], [place]  [phone]

## 2021-07-02 ENCOUNTER — TELEPHONE (OUTPATIENT)
Dept: CASE MANAGEMENT | Age: 26
End: 2021-07-02

## 2021-07-02 NOTE — TELEPHONE ENCOUNTER
Hospital follow up  Case Management follow up call    CM second attempt to call patient after inpatient discharge. Patient did not answer. Left VM x2.      DAVIS CheneyN, [address] [place] [address]  [phone]

## 2021-07-06 ENCOUNTER — OFFICE VISIT (OUTPATIENT)
Dept: INTERNAL MEDICINE CLINIC | Age: 26
End: 2021-07-06
Payer: MEDICAID

## 2021-07-06 VITALS
HEIGHT: 75 IN | WEIGHT: 165 LBS | DIASTOLIC BLOOD PRESSURE: 93 MMHG | TEMPERATURE: 98.5 F | BODY MASS INDEX: 20.51 KG/M2 | SYSTOLIC BLOOD PRESSURE: 118 MMHG | RESPIRATION RATE: 16 BRPM | HEART RATE: 126 BPM | OXYGEN SATURATION: 98 %

## 2021-07-06 DIAGNOSIS — Z09 HOSPITAL DISCHARGE FOLLOW-UP: ICD-10-CM

## 2021-07-06 DIAGNOSIS — L73.2 HIDRADENITIS: ICD-10-CM

## 2021-07-06 DIAGNOSIS — Z76.89 ENCOUNTER FOR SUPPORT AND COORDINATION OF TRANSITION OF CARE: Primary | ICD-10-CM

## 2021-07-06 PROCEDURE — 99496 TRANSJ CARE MGMT HIGH F2F 7D: CPT | Performed by: NURSE PRACTITIONER

## 2021-07-06 PROCEDURE — 1111F DSCHRG MED/CURRENT MED MERGE: CPT | Performed by: NURSE PRACTITIONER

## 2021-07-06 NOTE — PROGRESS NOTES
Chief Complaint   Patient presents with   [facility] Follow Up     RC/MRM       1. Have you been to the ER, urgent care clinic since your last visit? Hospitalized since your last visit? Yes When: 06/04/2021-06/29/2021 Where: [facility] Reason for visit: hidradenitis    2. Have you seen or consulted any other health care providers outside of the [address] [place] [address] since your last visit? Include any pap smears or colon screening.  No

## 2021-07-06 NOTE — PROGRESS NOTES
Subjective: (As above and below)     Chief Complaint   Patient presents with   [facility] Follow Up     [address] is a 22y.o. year old male who presents for transition of care    He was admitted to [facility] from 6/4/21 to 6/11/21 for hidradenitis and sepsis- failed outpatient abx  Treated w/ IV abx,     He is doing wound care at home with the help of friends every 2 days  He has fu w/ wound center on 7/13  He notes bloody NOT purulent drainage  No fevers  He has pain w/ dressing changes, is using oxycodone only for dressing changes  Trying to use ibup during other times    He has not returned to work, he works as a . Unfortunately he only started this job shortly before getting sick and is not eligible for short term disability  His job involves sitting and standing    He questions long term disability for this or a diagnosis of autism  He is here w/ his ?aunt today, his mother recently passed,  Per patient and aunt he was dx w/ this as a child but has not been on disability and has been able to work      Reviewed [employer], RxHx, FmHx, SocHx, AllgHx and updated in chart. History reviewed. No pertinent family history. History reviewed. No pertinent past medical history.    Social History     Socioeconomic History    Marital status: SINGLE     Spouse name: Not on file    Number of children: Not on file    Years of education: Not on file    Highest education level: Not on file   Tobacco Use    Smoking status: Never Smoker    Smokeless tobacco: Never Used   Vaping Use    Vaping Use: Never used   Substance and Sexual Activity    Alcohol use: Yes     Comment: Socially     Drug use: Never   Social History Narrative    ** Merged History Encounter **          Social Determinants of Health     Financial Resource Strain:     Difficulty of Paying Living Expenses:    Food Insecurity:     Worried About Running Out of Food in the Last Year:     [address] in the Last Year: Transportation Needs:     Lack of Transportation (Medical):  Lack of Transportation (Non-Medical):    Physical Activity:     Days of Exercise per Week:     Minutes of Exercise per Session:    Stress:     Feeling of Stress :    Social Connections:     Frequency of Communication with Friends and Family:     Frequency of Social Gatherings with Friends and Family:     Attends Religion Services:     Active Member of Clubs or Organizations:     Attends Club or Organization Meetings:     Marital Status:           Current Outpatient Medications   Medication Sig    ibuprofen (MOTRIN) 800 mg tablet Take 1 Tablet by mouth every six (6) hours as needed for Pain for up to 14 days.  acetaminophen (TYLENOL) 325 mg tablet Take 3 Tablets by mouth every six (6) hours.  doxycycline (VIBRA-TABS) 100 mg tablet Take 1 Tablet by mouth every twelve (12) hours.  ferrous sulfate 325 mg (65 mg iron) tablet Take 1 Tablet by mouth daily (with breakfast).  gabapentin (NEURONTIN) 100 mg capsule Take 2 Capsules by mouth three (3) times daily. Max Daily Amount: 600 mg.    lidocaine (XYLOCAINE) 2 % jelly Apply 5 mL to affected area as needed for Other (used during wound care and dressing change).  oxyCODONE IR (ROXICODONE) 10 mg tab immediate release tablet Take 1 Tablet by mouth every other day for 14 days. Max Daily Amount: 10 mg. 10 -20 minutes before dressing change    naloxone (Narcan) 4 mg/actuation nasal spray Use 1 spray intranasally, then discard. Repeat with new spray every 2 min as needed for opioid overdose symptoms, alternating nostrils.  doxycycline (ADOXA) 100 mg tablet Take 100 mg by mouth two (2) times a day. No current facility-administered medications for this visit. Review of Systems:   Constitutional:    Negative for fever and chills, negative diaphoresis. HEENT:              Negative for neck pain and stiffness.   Eyes:                  Negative for visual disturbance, itching, redness or discharge. Respiratory:        Negative for cough and shortness of breath. Cardiovascular:  Negative for chest pain and palpitations. Gastrointestinal: Negative for nausea, vomiting, abdominal pain, diarrhea or constipation. Genitourinary:     Negative for dysuria and frequency. Musculoskeletal: Negative for falls, tenderness and swelling. Skin:                   wounds  Neurological:       Negative for dizzyness, seizure, loss of consciousness, weakness and numbness. Objective:     Vitals:    07/06/21 1051 07/06/21 1255   BP: (!) 118/93    Pulse: (!) 143 (!) 126   Resp: 16    Temp: 98.5 °F (36.9 °C)    TempSrc: Temporal    SpO2: 98%    Weight: 165 lb (74.8 kg)    Height: 6' 3\" (1.905 m)        Results for orders placed or performed during the hospital encounter of 06/11/21   CULTURE, BLOOD, PAIRED    Specimen: Blood   Result Value Ref Range    Special Requests: NO SPECIAL REQUESTS      Culture result: NO GROWTH 5 DAYS     CBC WITH AUTOMATED DIFF   Result Value Ref Range    WBC 11.7 (H) 4.1 - 11.1 K/uL    RBC 4.01 (L) 4.10 - 5.70 M/uL    HGB 8.6 (L) 12.1 - 17.0 g/dL    HCT 28.8 (L) 36.6 - 50.3 %    MCV 71.8 (L) 80.0 - 99.0 FL    MCH 21.4 (L) 26.0 - 34.0 PG    MCHC 29.9 (L) 30.0 - 36.5 g/dL    RDW 16.3 (H) 11.5 - 14.5 %    PLATELET 661 (H) 607 - 400 K/uL    MPV 8.3 (L) 8.9 - 12.9 FL    NRBC 0.0 0  WBC    ABSOLUTE NRBC 0.00 0.00 - 0.01 K/uL    NEUTROPHILS 78 (H) 32 - 75 %    BAND NEUTROPHILS 4 0 - 6 %    LYMPHOCYTES 9 (L) 12 - 49 %    MONOCYTES 5 5 - 13 %    EOSINOPHILS 2 0 - 7 %    BASOPHILS 0 0 - 1 %    MYELOCYTES 2 (H) 0 %    IMMATURE GRANULOCYTES 0 %    ABS. NEUTROPHILS 9.6 (H) 1.8 - 8.0 K/UL    ABS. LYMPHOCYTES 1.1 0.8 - 3.5 K/UL    ABS. MONOCYTES 0.6 0.0 - 1.0 K/UL    ABS. EOSINOPHILS 0.2 0.0 - 0.4 K/UL    ABS. BASOPHILS 0.0 0.0 - 0.1 K/UL    ABS. IMM.  GRANS. 0.0 K/UL    DF MANUAL      RBC COMMENTS ANISOCYTOSIS  1+       METABOLIC PANEL, BASIC   Result Value Ref Range    Sodium 139 136 - 145 mmol/L    Potassium 3.9 3.5 - 5.1 mmol/L    Chloride 100 97 - 108 mmol/L    CO2 29 21 - 32 mmol/L    Anion gap 10 5 - 15 mmol/L    Glucose 88 65 - 100 mg/dL    BUN 6 6 - 20 MG/DL    Creatinine 0.64 (L) 0.70 - 1.30 MG/DL    BUN/Creatinine ratio 9 (L) 12 - 20      GFR est AA >60 >60 ml/min/1.73m2    GFR est non-AA >60 >60 ml/min/1.73m2    Calcium 9.0 8.5 - 10.1 MG/DL   IRON PROFILE   Result Value Ref Range    Iron 27 (L) 35 - 150 ug/dL    TIBC 150 (L) 250 - 450 ug/dL    Iron % saturation 18 (L) 20 - 50 %   URINALYSIS W/ REFLEX CULTURE    Specimen: Urine   Result Value Ref Range    Color YELLOW/STRAW      Appearance CLEAR CLEAR      Specific gravity <1.005 1.003 - 1.030    pH (UA) 7.0 5.0 - 8.0      Protein Negative NEG mg/dL    Glucose Negative NEG mg/dL    Ketone Negative NEG mg/dL    Bilirubin Negative NEG      Blood Negative NEG      Urobilinogen 1.0 0.2 - 1.0 EU/dL    Nitrites Negative NEG      Leukocyte Esterase Negative NEG      WBC 0-4 0 - 4 /hpf    RBC 0-5 0 - 5 /hpf    Epithelial cells FEW FEW /lpf    Bacteria Negative NEG /hpf    UA:UC IF INDICATED CULTURE NOT INDICATED BY UA RESULT CNI     VANCOMYCIN, TROUGH   Result Value Ref Range    Vancomycin,trough 17.5 (H) 5.0 - 10.0 ug/mL    Reported dose date Not detected      Reported dose time: Not detected      Reported dose: Not detected UNITS   CBC WITH AUTOMATED DIFF   Result Value Ref Range    WBC 12.0 (H) 4.1 - 11.1 K/uL    RBC 4.21 4.10 - 5.70 M/uL    HGB 9.1 (L) 12.1 - 17.0 g/dL    HCT 30.8 (L) 36.6 - 50.3 %    MCV 73.2 (L) 80.0 - 99.0 FL    MCH 21.6 (L) 26.0 - 34.0 PG    MCHC 29.5 (L) 30.0 - 36.5 g/dL    RDW 16.6 (H) 11.5 - 14.5 %    PLATELET 343 (H) 481 - 400 K/uL    MPV 8.3 (L) 8.9 - 12.9 FL    NRBC 0.0 0  WBC    ABSOLUTE NRBC 0.00 0.00 - 0.01 K/uL    NEUTROPHILS 75 32 - 75 %    LYMPHOCYTES 10 (L) 12 - 49 %    MONOCYTES 8 5 - 13 %    EOSINOPHILS 4 0 - 7 %    BASOPHILS 1 0 - 1 %    IMMATURE GRANULOCYTES 2 (H) 0.0 - 0.5 %    ABS. NEUTROPHILS 9.1 (H) 1.8 - 8.0 K/UL    ABS. LYMPHOCYTES 1.2 0.8 - 3.5 K/UL    ABS. MONOCYTES 0.9 0.0 - 1.0 K/UL    ABS. EOSINOPHILS 0.5 (H) 0.0 - 0.4 K/UL    ABS. BASOPHILS 0.1 0.0 - 0.1 K/UL    ABS. IMM. GRANS. 0.2 (H) 0.00 - 0.04 K/UL    DF AUTOMATED     METABOLIC PANEL, BASIC   Result Value Ref Range    Sodium 142 136 - 145 mmol/L    Potassium 3.9 3.5 - 5.1 mmol/L    Chloride 103 97 - 108 mmol/L    CO2 32 21 - 32 mmol/L    Anion gap 7 5 - 15 mmol/L    Glucose 93 65 - 100 mg/dL    BUN 7 6 - 20 MG/DL    Creatinine 0.66 (L) 0.70 - 1.30 MG/DL    BUN/Creatinine ratio 11 (L) 12 - 20      GFR est AA >60 >60 ml/min/1.73m2    GFR est non-AA >60 >60 ml/min/1.73m2    Calcium 9.1 8.5 - 03.1 MG/DL   METABOLIC PANEL, BASIC   Result Value Ref Range    Sodium 140 136 - 145 mmol/L    Potassium 4.0 3.5 - 5.1 mmol/L    Chloride 102 97 - 108 mmol/L    CO2 27 21 - 32 mmol/L    Anion gap 11 5 - 15 mmol/L    Glucose 86 65 - 100 mg/dL    BUN 5 (L) 6 - 20 MG/DL    Creatinine 0.55 (L) 0.70 - 1.30 MG/DL    BUN/Creatinine ratio 9 (L) 12 - 20      GFR est AA >60 >60 ml/min/1.73m2    GFR est non-AA >60 >60 ml/min/1.73m2    Calcium 8.9 8.5 - 10.1 MG/DL   VANCOMYCIN, TROUGH   Result Value Ref Range    Vancomycin,trough 16.0 (H) 5.0 - 10.0 ug/mL    Reported dose date NOT PROVIDED      Reported dose time: NOT PROVIDED      Reported dose: NOT PROVIDED UNITS   CBC WITH AUTOMATED DIFF   Result Value Ref Range    WBC 10.6 4.1 - 11.1 K/uL    RBC 4.27 4. 10 - 5.70 M/uL    HGB 9.3 (L) 12.1 - 17.0 g/dL    HCT 31.6 (L) 36.6 - 50.3 %    MCV 74.0 (L) 80.0 - 99.0 FL    MCH 21.8 (L) 26.0 - 34.0 PG    MCHC 29.4 (L) 30.0 - 36.5 g/dL    RDW 17.3 (H) 11.5 - 14.5 %    PLATELET 545 (H) 992 - 400 K/uL    MPV 8.3 (L) 8.9 - 12.9 FL    NRBC 0.0 0  WBC    ABSOLUTE NRBC 0.00 0.00 - 0.01 K/uL    NEUTROPHILS 76 (H) 32 - 75 %    LYMPHOCYTES 10 (L) 12 - 49 %    MONOCYTES 7 5 - 13 %    EOSINOPHILS 3 0 - 7 %    BASOPHILS 1 0 - 1 %    IMMATURE GRANULOCYTES 3 (H) 0.0 - 0.5 %    ABS. NEUTROPHILS 8.1 (H) 1.8 - 8.0 K/UL    ABS. LYMPHOCYTES 1.1 0.8 - 3.5 K/UL    ABS. MONOCYTES 0.7 0.0 - 1.0 K/UL    ABS. EOSINOPHILS 0.3 0.0 - 0.4 K/UL    ABS. BASOPHILS 0.1 0.0 - 0.1 K/UL    ABS. IMM. GRANS. 0.3 (H) 0.00 - 0.04 K/UL    DF SMEAR SCANNED      RBC COMMENTS ANISOCYTOSIS  1+        RBC COMMENTS HYPOCHROMIA  1+       CREATININE   Result Value Ref Range    Creatinine 0.87 0.70 - 1.30 MG/DL    GFR est AA >60 >60 ml/min/1.73m2    GFR est non-AA >60 >60 ml/min/1.73m2   CREATININE   Result Value Ref Range    Creatinine 0.75 0.70 - 1.30 MG/DL    GFR est AA >60 >60 ml/min/1.73m2    GFR est non-AA >60 >60 ml/min/1.73m2   CBC WITH AUTOMATED DIFF   Result Value Ref Range    WBC 7.5 4.1 - 11.1 K/uL    RBC 4.79 4.10 - 5.70 M/uL    HGB 10.3 (L) 12.1 - 17.0 g/dL    HCT 35.4 (L) 36.6 - 50.3 %    MCV 73.9 (L) 80.0 - 99.0 FL    MCH 21.5 (L) 26.0 - 34.0 PG    MCHC 29.1 (L) 30.0 - 36.5 g/dL    RDW 18.6 (H) 11.5 - 14.5 %    PLATELET 880 (H) 157 - 400 K/uL    MPV 8.5 (L) 8.9 - 12.9 FL    NRBC 0.0 0  WBC    ABSOLUTE NRBC 0.00 0.00 - 0.01 K/uL    NEUTROPHILS 64 32 - 75 %    LYMPHOCYTES 17 12 - 49 %    MONOCYTES 9 5 - 13 %    EOSINOPHILS 9 (H) 0 - 7 %    BASOPHILS 0 0 - 1 %    IMMATURE GRANULOCYTES 1 (H) 0.0 - 0.5 %    ABS. NEUTROPHILS 4.8 1.8 - 8.0 K/UL    ABS. LYMPHOCYTES 1.3 0.8 - 3.5 K/UL    ABS. MONOCYTES 0.7 0.0 - 1.0 K/UL    ABS. EOSINOPHILS 0.7 (H) 0.0 - 0.4 K/UL    ABS. BASOPHILS 0.0 0.0 - 0.1 K/UL    ABS. IMM.  GRANS. 0.1 (H) 0.00 - 0.04 K/UL    DF AUTOMATED     METABOLIC PANEL, BASIC   Result Value Ref Range    Sodium 140 136 - 145 mmol/L    Potassium 4.0 3.5 - 5.1 mmol/L    Chloride 103 97 - 108 mmol/L    CO2 31 21 - 32 mmol/L    Anion gap 6 5 - 15 mmol/L    Glucose 83 65 - 100 mg/dL    BUN 8 6 - 20 MG/DL    Creatinine 0.64 (L) 0.70 - 1.30 MG/DL    BUN/Creatinine ratio 13 12 - 20      GFR est AA >60 >60 ml/min/1.73m2    GFR est non-AA >60 >60 ml/min/1.73m2    Calcium 9.7 8.5 - 10.1 MG/DL   GLUCOSE, POC Result Value Ref Range    Glucose (POC) 129 (H) 65 - 117 mg/dL    Performed by Paola Rogers          Physical Examination: General appearance - alert, well appearing, and in no distress  Mental status - alert, oriented to person, place, and time  Chest - clear to auscultation, no wheezes, rales or rhonchi, symmetric air entry  Heart -tachycardia, has seen cards for this  Extremities - no pedal edema noted  Skin -dressings are clean/dry/intact, due for change tomorrow  He shows photo of wounds from last change and wound bed is pink and moist, no purlent drainage noted    Assessment/ Plan:       1. Encounter for support and coordination of transition of care      2. [facility]  Defer rtw until he sees wound specialist        I have discussed the diagnosis with the patient and the intended plan as seen in the above orders. The patient has received an after-visit summary and questions were answered concerning future plans. Pt conveyed understanding of plan. Medication Side Effects and Warnings were discussed with patient: yes  Patient Labs were reviewed: yes  Patient Past Records were reviewed:  yes    Edith Pandya.  Malik Oliveros NP

## 2021-09-20 ENCOUNTER — APPOINTMENT (OUTPATIENT)
Dept: GENERAL RADIOLOGY | Age: 26
DRG: 710 | End: 2021-09-20
Attending: EMERGENCY MEDICINE
Payer: MEDICAID

## 2021-09-20 ENCOUNTER — HOSPITAL ENCOUNTER (INPATIENT)
Age: 26
LOS: 4 days | Discharge: HOME HEALTH | DRG: 710 | End: 2021-09-24
Attending: EMERGENCY MEDICINE | Admitting: HOSPITALIST
Payer: MEDICAID

## 2021-09-20 DIAGNOSIS — R65.20 SEVERE SEPSIS (HCC): Primary | ICD-10-CM

## 2021-09-20 DIAGNOSIS — T14.8XXA WOUND INFECTION: ICD-10-CM

## 2021-09-20 DIAGNOSIS — A41.9 SEVERE SEPSIS (HCC): Primary | ICD-10-CM

## 2021-09-20 DIAGNOSIS — L73.2 HIDRADENITIS: ICD-10-CM

## 2021-09-20 DIAGNOSIS — L08.9 SOFT TISSUE INFECTION: ICD-10-CM

## 2021-09-20 DIAGNOSIS — L08.9 WOUND INFECTION: ICD-10-CM

## 2021-09-20 DIAGNOSIS — L73.2 HIDRADENITIS SUPPURATIVA: ICD-10-CM

## 2021-09-20 PROBLEM — Z51.89 ENCOUNTER FOR WOUND CARE: Status: ACTIVE | Noted: 2021-09-20

## 2021-09-20 LAB
ALBUMIN SERPL-MCNC: 2.2 G/DL (ref 3.5–5)
ALBUMIN/GLOB SERPL: 0.4 {RATIO} (ref 1.1–2.2)
ALP SERPL-CCNC: 108 U/L (ref 45–117)
ALT SERPL-CCNC: 10 U/L (ref 12–78)
ANION GAP BLD CALC-SCNC: 6 MMOL/L (ref 10–20)
ANION GAP SERPL CALC-SCNC: 9 MMOL/L (ref 5–15)
APPEARANCE UR: CLEAR
AST SERPL-CCNC: 12 U/L (ref 15–37)
BACTERIA URNS QL MICRO: NEGATIVE /HPF
BASOPHILS # BLD: 0 K/UL (ref 0–0.1)
BASOPHILS NFR BLD: 0 % (ref 0–1)
BILIRUB SERPL-MCNC: 0.2 MG/DL (ref 0.2–1)
BILIRUB UR QL: NEGATIVE
BUN SERPL-MCNC: 5 MG/DL (ref 6–20)
BUN/CREAT SERPL: 5 (ref 12–20)
CA-I BLD-MCNC: 1.12 MMOL/L (ref 1.12–1.32)
CALCIUM SERPL-MCNC: 8.6 MG/DL (ref 8.5–10.1)
CHLORIDE BLD-SCNC: 109 MMOL/L (ref 100–108)
CHLORIDE SERPL-SCNC: 101 MMOL/L (ref 97–108)
CO2 BLD-SCNC: 27 MMOL/L (ref 19–24)
CO2 SERPL-SCNC: 29 MMOL/L (ref 21–32)
COLOR UR: ABNORMAL
CREAT SERPL-MCNC: 0.98 MG/DL (ref 0.7–1.3)
CREAT UR-MCNC: 0.8 MG/DL (ref 0.6–1.3)
DIFFERENTIAL METHOD BLD: ABNORMAL
EOSINOPHIL # BLD: 0.5 K/UL (ref 0–0.4)
EOSINOPHIL NFR BLD: 3 % (ref 0–7)
EPITH CASTS URNS QL MICRO: ABNORMAL /LPF
ERYTHROCYTE [DISTWIDTH] IN BLOOD BY AUTOMATED COUNT: 15.9 % (ref 11.5–14.5)
GLOBULIN SER CALC-MCNC: 5.9 G/DL (ref 2–4)
GLUCOSE BLD STRIP.AUTO-MCNC: 119 MG/DL (ref 74–106)
GLUCOSE SERPL-MCNC: 116 MG/DL (ref 65–100)
GLUCOSE UR STRIP.AUTO-MCNC: NEGATIVE MG/DL
HCT VFR BLD AUTO: 28.1 % (ref 36.6–50.3)
HGB BLD-MCNC: 8.4 G/DL (ref 12.1–17)
HGB UR QL STRIP: NEGATIVE
IMM GRANULOCYTES # BLD AUTO: 0.3 K/UL (ref 0–0.04)
IMM GRANULOCYTES NFR BLD AUTO: 2 % (ref 0–0.5)
KETONES UR QL STRIP.AUTO: NEGATIVE MG/DL
LACTATE BLD-SCNC: 0.88 MMOL/L (ref 0.4–2)
LACTATE BLD-SCNC: 3.37 MMOL/L (ref 0.4–2)
LEUKOCYTE ESTERASE UR QL STRIP.AUTO: NEGATIVE
LYMPHOCYTES # BLD: 1.4 K/UL (ref 0.8–3.5)
LYMPHOCYTES NFR BLD: 8 % (ref 12–49)
MAGNESIUM SERPL-MCNC: 2.1 MG/DL (ref 1.6–2.4)
MCH RBC QN AUTO: 20.1 PG (ref 26–34)
MCHC RBC AUTO-ENTMCNC: 29.9 G/DL (ref 30–36.5)
MCV RBC AUTO: 67.2 FL (ref 80–99)
MONOCYTES # BLD: 1 K/UL (ref 0–1)
MONOCYTES NFR BLD: 6 % (ref 5–13)
NEUTS SEG # BLD: 13.8 K/UL (ref 1.8–8)
NEUTS SEG NFR BLD: 81 % (ref 32–75)
NITRITE UR QL STRIP.AUTO: NEGATIVE
NRBC # BLD: 0 K/UL (ref 0–0.01)
NRBC BLD-RTO: 0 PER 100 WBC
PH UR STRIP: 7.5 [PH] (ref 5–8)
PLATELET # BLD AUTO: 618 K/UL (ref 150–400)
PMV BLD AUTO: 8.4 FL (ref 8.9–12.9)
POTASSIUM BLD-SCNC: 4.1 MMOL/L (ref 3.5–5.5)
POTASSIUM SERPL-SCNC: 4.1 MMOL/L (ref 3.5–5.1)
PROT SERPL-MCNC: 8.1 G/DL (ref 6.4–8.2)
PROT UR STRIP-MCNC: NEGATIVE MG/DL
RBC # BLD AUTO: 4.18 M/UL (ref 4.1–5.7)
RBC #/AREA URNS HPF: ABNORMAL /HPF (ref 0–5)
RBC MORPH BLD: ABNORMAL
RBC MORPH BLD: ABNORMAL
SODIUM BLD-SCNC: 140 MMOL/L (ref 136–145)
SODIUM SERPL-SCNC: 139 MMOL/L (ref 136–145)
SP GR UR REFRACTOMETRY: 1.01 (ref 1–1.03)
TROPONIN I SERPL-MCNC: <0.05 NG/ML
UA: UC IF INDICATED,UAUC: ABNORMAL
UROBILINOGEN UR QL STRIP.AUTO: 2 EU/DL (ref 0.2–1)
WBC # BLD AUTO: 17 K/UL (ref 4.1–11.1)
WBC URNS QL MICRO: ABNORMAL /HPF (ref 0–4)

## 2021-09-20 PROCEDURE — 87077 CULTURE AEROBIC IDENTIFY: CPT

## 2021-09-20 PROCEDURE — 74011250636 HC RX REV CODE- 250/636: Performed by: HOSPITALIST

## 2021-09-20 PROCEDURE — 99285 EMERGENCY DEPT VISIT HI MDM: CPT

## 2021-09-20 PROCEDURE — 87186 SC STD MICRODIL/AGAR DIL: CPT

## 2021-09-20 PROCEDURE — 80047 BASIC METABLC PNL IONIZED CA: CPT

## 2021-09-20 PROCEDURE — 93005 ELECTROCARDIOGRAM TRACING: CPT

## 2021-09-20 PROCEDURE — 36415 COLL VENOUS BLD VENIPUNCTURE: CPT

## 2021-09-20 PROCEDURE — 65270000032 HC RM SEMIPRIVATE

## 2021-09-20 PROCEDURE — 96365 THER/PROPH/DIAG IV INF INIT: CPT

## 2021-09-20 PROCEDURE — 87205 SMEAR GRAM STAIN: CPT

## 2021-09-20 PROCEDURE — 96375 TX/PRO/DX INJ NEW DRUG ADDON: CPT

## 2021-09-20 PROCEDURE — 87040 BLOOD CULTURE FOR BACTERIA: CPT

## 2021-09-20 PROCEDURE — 80053 COMPREHEN METABOLIC PANEL: CPT

## 2021-09-20 PROCEDURE — 74011250637 HC RX REV CODE- 250/637: Performed by: EMERGENCY MEDICINE

## 2021-09-20 PROCEDURE — 83735 ASSAY OF MAGNESIUM: CPT

## 2021-09-20 PROCEDURE — 83605 ASSAY OF LACTIC ACID: CPT

## 2021-09-20 PROCEDURE — 74011250636 HC RX REV CODE- 250/636: Performed by: EMERGENCY MEDICINE

## 2021-09-20 PROCEDURE — 71045 X-RAY EXAM CHEST 1 VIEW: CPT

## 2021-09-20 PROCEDURE — 87147 CULTURE TYPE IMMUNOLOGIC: CPT

## 2021-09-20 PROCEDURE — 81001 URINALYSIS AUTO W/SCOPE: CPT

## 2021-09-20 PROCEDURE — 85025 COMPLETE CBC W/AUTO DIFF WBC: CPT

## 2021-09-20 PROCEDURE — 84484 ASSAY OF TROPONIN QUANT: CPT

## 2021-09-20 RX ORDER — POLYETHYLENE GLYCOL 3350 17 G/17G
17 POWDER, FOR SOLUTION ORAL DAILY PRN
Status: DISCONTINUED | OUTPATIENT
Start: 2021-09-20 | End: 2021-09-24 | Stop reason: HOSPADM

## 2021-09-20 RX ORDER — ACETAMINOPHEN 325 MG/1
650 TABLET ORAL
Status: DISCONTINUED | OUTPATIENT
Start: 2021-09-20 | End: 2021-09-21

## 2021-09-20 RX ORDER — ONDANSETRON 2 MG/ML
4 INJECTION INTRAMUSCULAR; INTRAVENOUS
Status: DISCONTINUED | OUTPATIENT
Start: 2021-09-20 | End: 2021-09-21

## 2021-09-20 RX ORDER — SODIUM CHLORIDE 0.9 % (FLUSH) 0.9 %
5-40 SYRINGE (ML) INJECTION AS NEEDED
Status: DISCONTINUED | OUTPATIENT
Start: 2021-09-20 | End: 2021-09-24 | Stop reason: HOSPADM

## 2021-09-20 RX ORDER — ACETAMINOPHEN 650 MG/1
650 SUPPOSITORY RECTAL
Status: DISCONTINUED | OUTPATIENT
Start: 2021-09-20 | End: 2021-09-21

## 2021-09-20 RX ORDER — ONDANSETRON 4 MG/1
4 TABLET, ORALLY DISINTEGRATING ORAL
Status: DISCONTINUED | OUTPATIENT
Start: 2021-09-20 | End: 2021-09-24 | Stop reason: HOSPADM

## 2021-09-20 RX ORDER — SODIUM CHLORIDE 9 MG/ML
125 INJECTION, SOLUTION INTRAVENOUS CONTINUOUS
Status: DISCONTINUED | OUTPATIENT
Start: 2021-09-20 | End: 2021-09-21

## 2021-09-20 RX ORDER — ACETAMINOPHEN 500 MG
1000 TABLET ORAL ONCE
Status: COMPLETED | OUTPATIENT
Start: 2021-09-20 | End: 2021-09-20

## 2021-09-20 RX ORDER — SODIUM CHLORIDE 0.9 % (FLUSH) 0.9 %
5-40 SYRINGE (ML) INJECTION EVERY 8 HOURS
Status: DISCONTINUED | OUTPATIENT
Start: 2021-09-20 | End: 2021-09-24 | Stop reason: HOSPADM

## 2021-09-20 RX ORDER — LEVOFLOXACIN 5 MG/ML
500 INJECTION, SOLUTION INTRAVENOUS EVERY 24 HOURS
Status: DISCONTINUED | OUTPATIENT
Start: 2021-09-21 | End: 2021-09-21

## 2021-09-20 RX ORDER — LEVOFLOXACIN 5 MG/ML
500 INJECTION, SOLUTION INTRAVENOUS
Status: COMPLETED | OUTPATIENT
Start: 2021-09-20 | End: 2021-09-20

## 2021-09-20 RX ORDER — SODIUM CHLORIDE 0.9 % (FLUSH) 0.9 %
5-10 SYRINGE (ML) INJECTION AS NEEDED
Status: DISCONTINUED | OUTPATIENT
Start: 2021-09-20 | End: 2021-09-22 | Stop reason: SDUPTHER

## 2021-09-20 RX ORDER — ENOXAPARIN SODIUM 100 MG/ML
40 INJECTION SUBCUTANEOUS EVERY 24 HOURS
Status: DISCONTINUED | OUTPATIENT
Start: 2021-09-21 | End: 2021-09-20

## 2021-09-20 RX ORDER — KETOROLAC TROMETHAMINE 30 MG/ML
15 INJECTION, SOLUTION INTRAMUSCULAR; INTRAVENOUS
Status: COMPLETED | OUTPATIENT
Start: 2021-09-20 | End: 2021-09-20

## 2021-09-20 RX ORDER — LEVOFLOXACIN 5 MG/ML
500 INJECTION, SOLUTION INTRAVENOUS EVERY 24 HOURS
Status: DISCONTINUED | OUTPATIENT
Start: 2021-09-21 | End: 2021-09-20

## 2021-09-20 RX ADMIN — KETOROLAC TROMETHAMINE 15 MG: 30 INJECTION, SOLUTION INTRAMUSCULAR; INTRAVENOUS at 17:54

## 2021-09-20 RX ADMIN — LEVOFLOXACIN 500 MG: 5 INJECTION, SOLUTION INTRAVENOUS at 20:13

## 2021-09-20 RX ADMIN — ACETAMINOPHEN 1000 MG: 500 TABLET ORAL at 18:41

## 2021-09-20 RX ADMIN — SODIUM CHLORIDE 1000 ML: 9 INJECTION, SOLUTION INTRAVENOUS at 17:53

## 2021-09-20 RX ADMIN — VANCOMYCIN HYDROCHLORIDE 1750 MG: 10 INJECTION, POWDER, LYOPHILIZED, FOR SOLUTION INTRAVENOUS at 18:19

## 2021-09-20 RX ADMIN — SODIUM CHLORIDE 100 ML/HR: 9 INJECTION, SOLUTION INTRAVENOUS at 20:13

## 2021-09-20 RX ADMIN — SODIUM CHLORIDE 1000 ML: 9 INJECTION, SOLUTION INTRAVENOUS at 18:41

## 2021-09-20 NOTE — ED PROVIDER NOTES
EMERGENCY DEPARTMENT HISTORY AND PHYSICAL EXAM      Date: 9/20/2021  Patient Name: Julius Trevino  Patient Age and Sex: 22 y.o. male     History of Presenting Illness     Chief Complaint   Patient presents with    Skin Problem    Shortness of Breath       History Provided By: Patient    HPI: Julius Trevino is a 27-year-old male with a history of hidradenitis suppurativa presenting with worsening wounds and low-grade fever. Patient states that he has been dealing with this since February and recently was admitted in June for wounds in his groin as well as axilla. Patient states that back in February all of his wounds were opened up and they still have not healed. Patient states that now having some low-grade fevers at home with sweats and spasms as well. States that he has spasms in different parts of his body as he is trying to avoid pressure on his wounds. After he was discharged from the hospital has been taking doxycycline twice a day at baseline. Has not developed any new abscesses but the wounds are just not healing. Denies any cough, cold, diarrhea, urinary symptoms. There are no other complaints, changes, or physical findings at this time. PCP: Alina Dixon., NP    No current facility-administered medications on file prior to encounter. Current Outpatient Medications on File Prior to Encounter   Medication Sig Dispense Refill    [DISCONTINUED] acetaminophen (TYLENOL) 325 mg tablet Take 3 Tablets by mouth every six (6) hours. 30 Tablet 0    [DISCONTINUED] doxycycline (VIBRA-TABS) 100 mg tablet Take 1 Tablet by mouth every twelve (12) hours. 60 Tablet 1    [DISCONTINUED] ferrous sulfate 325 mg (65 mg iron) tablet Take 1 Tablet by mouth daily (with breakfast). 30 Tablet 1    [DISCONTINUED] gabapentin (NEURONTIN) 100 mg capsule Take 2 Capsules by mouth three (3) times daily.  Max Daily Amount: 600 mg. 90 Capsule 1    [DISCONTINUED] lidocaine (XYLOCAINE) 2 % jelly Apply 5 mL to affected area as needed for Other (used during wound care and dressing change). 1 Tube 1    [DISCONTINUED] naloxone (Narcan) 4 mg/actuation nasal spray Use 1 spray intranasally, then discard. Repeat with new spray every 2 min as needed for opioid overdose symptoms, alternating nostrils. 1 Each 0    doxycycline (ADOXA) 100 mg tablet Take 100 mg by mouth two (2) times a day. Past History     Past Medical History:  History reviewed. No pertinent past medical history. Past Surgical History:  No past surgical history on file. Family History:  History reviewed. No pertinent family history. Social History:  Social History     Tobacco Use    Smoking status: Never Smoker    Smokeless tobacco: Never Used   Vaping Use    Vaping Use: Never used   Substance Use Topics    Alcohol use: Yes     Comment: Socially     Drug use: Never       Allergies: Allergies   Allergen Reactions    Penicillins Anaphylaxis    Pcn [Penicillins] Swelling         Review of Systems   Review of Systems   Constitutional: Positive for fatigue and fever. Negative for chills. Respiratory: Negative for cough and shortness of breath. Cardiovascular: Positive for chest pain (chest spasms). Gastrointestinal: Negative for abdominal pain, constipation, diarrhea, nausea and vomiting. Genitourinary: Negative for dysuria, frequency and hematuria. Musculoskeletal: Positive for myalgias. Skin: Positive for wound. Neurological: Negative for weakness and numbness. All other systems reviewed and are negative. Physical Exam   Physical Exam  Vitals and nursing note reviewed. Constitutional:       Appearance: He is well-developed. Comments: Thin male, in distress secondary to pain. HENT:      Head: Normocephalic and atraumatic. Nose: Nose normal.      Mouth/Throat:      Mouth: Mucous membranes are moist.   Eyes:      Extraocular Movements: Extraocular movements intact.       Conjunctiva/sclera: Conjunctivae normal.   Cardiovascular:      Rate and Rhythm: Normal rate and regular rhythm. Pulmonary:      Effort: Pulmonary effort is normal. No respiratory distress. Breath sounds: Normal breath sounds. Abdominal:      General: There is no distension. Palpations: Abdomen is soft. Tenderness: There is no abdominal tenderness. Musculoskeletal:         General: Normal range of motion. Cervical back: Normal range of motion and neck supple. Skin:     General: Skin is warm and dry. Comments: Groin exam reveals that he has some open wounds in the groin area as well as the perineal area. No fluctuant masses palpated, all open wounds. In his axilla bilaterally also open wounds. Neurological:      General: No focal deficit present. Mental Status: He is alert and oriented to person, place, and time. Mental status is at baseline.    Psychiatric:         Mood and Affect: Mood normal.          Diagnostic Study Results     Labs -     Recent Results (from the past 12 hour(s))   EKG, 12 LEAD, INITIAL    Collection Time: 09/20/21  5:36 PM   Result Value Ref Range    Ventricular Rate 122 BPM    Atrial Rate 122 BPM    P-R Interval 138 ms    QRS Duration 74 ms    Q-T Interval 298 ms    QTC Calculation (Bezet) 424 ms    Calculated P Axis 75 degrees    Calculated R Axis 72 degrees    Calculated T Axis 59 degrees    Diagnosis       Sinus tachycardia  Early repolarization  Otherwise normal ECG  No previous ECGs available     METABOLIC PANEL, COMPREHENSIVE    Collection Time: 09/20/21  5:38 PM   Result Value Ref Range    Sodium 139 136 - 145 mmol/L    Potassium 4.1 3.5 - 5.1 mmol/L    Chloride 101 97 - 108 mmol/L    CO2 29 21 - 32 mmol/L    Anion gap 9 5 - 15 mmol/L    Glucose 116 (H) 65 - 100 mg/dL    BUN 5 (L) 6 - 20 MG/DL    Creatinine 0.98 0.70 - 1.30 MG/DL    BUN/Creatinine ratio 5 (L) 12 - 20      GFR est AA >60 >60 ml/min/1.73m2    GFR est non-AA >60 >60 ml/min/1.73m2    Calcium 8.6 8.5 - 10.1 MG/DL    Bilirubin, total 0.2 0.2 - 1.0 MG/DL    ALT (SGPT) 10 (L) 12 - 78 U/L    AST (SGOT) 12 (L) 15 - 37 U/L    Alk. phosphatase 108 45 - 117 U/L    Protein, total 8.1 6.4 - 8.2 g/dL    Albumin 2.2 (L) 3.5 - 5.0 g/dL    Globulin 5.9 (H) 2.0 - 4.0 g/dL    A-G Ratio 0.4 (L) 1.1 - 2.2     CBC WITH AUTOMATED DIFF    Collection Time: 09/20/21  5:38 PM   Result Value Ref Range    WBC 17.0 (H) 4.1 - 11.1 K/uL    RBC 4.18 4.10 - 5.70 M/uL    HGB 8.4 (L) 12.1 - 17.0 g/dL    HCT 28.1 (L) 36.6 - 50.3 %    MCV 67.2 (L) 80.0 - 99.0 FL    MCH 20.1 (L) 26.0 - 34.0 PG    MCHC 29.9 (L) 30.0 - 36.5 g/dL    RDW 15.9 (H) 11.5 - 14.5 %    PLATELET 418 (H) 367 - 400 K/uL    MPV 8.4 (L) 8.9 - 12.9 FL    NRBC 0.0 0  WBC    ABSOLUTE NRBC 0.00 0.00 - 0.01 K/uL    NEUTROPHILS 81 (H) 32 - 75 %    LYMPHOCYTES 8 (L) 12 - 49 %    MONOCYTES 6 5 - 13 %    EOSINOPHILS 3 0 - 7 %    BASOPHILS 0 0 - 1 %    IMMATURE GRANULOCYTES 2 (H) 0.0 - 0.5 %    ABS. NEUTROPHILS 13.8 (H) 1.8 - 8.0 K/UL    ABS. LYMPHOCYTES 1.4 0.8 - 3.5 K/UL    ABS. MONOCYTES 1.0 0.0 - 1.0 K/UL    ABS. EOSINOPHILS 0.5 (H) 0.0 - 0.4 K/UL    ABS. BASOPHILS 0.0 0.0 - 0.1 K/UL    ABS. IMM.  GRANS. 0.3 (H) 0.00 - 0.04 K/UL    DF SMEAR SCANNED      RBC COMMENTS ANISOCYTOSIS  1+        RBC COMMENTS MICROCYTOSIS  1+       TROPONIN I    Collection Time: 09/20/21  5:38 PM   Result Value Ref Range    Troponin-I, Qt. <0.05 <0.05 ng/mL   POC LACTIC ACID    Collection Time: 09/20/21  5:45 PM   Result Value Ref Range    Lactic Acid (POC) 3.37 (HH) 0.40 - 2.00 mmol/L       Radiologic Studies -   XR CHEST PORT   Final Result   Normal chest.        CT Results  (Last 48 hours)    None        CXR Results  (Last 48 hours)               09/20/21 1753  XR CHEST PORT Final result    Impression:  Normal chest.       Narrative:  EXAM: XR CHEST PORT       INDICATION: Eval for Infiltrate       COMPARISON: March 17, 2021       FINDINGS: A portable AP radiograph of the chest was obtained at 1741 hours. The   patient is on a cardiac monitor. The lungs are clear. The cardiac and   mediastinal contours and pulmonary vascularity are normal.  The bones and soft   tissues are grossly within normal limits. Medical Decision Making   I am the first provider for this patient. I reviewed the vital signs, available nursing notes, past medical history, past surgical history, family history and social history. Vital Signs-Reviewed the patient's vital signs. Patient Vitals for the past 12 hrs:   Temp Pulse Resp BP SpO2   09/20/21 1930 -- (!) 102 18 107/63 100 %   09/20/21 1850 -- (!) 105 21 (!) 96/57 98 %   09/20/21 1803 -- (!) 114 14 126/77 97 %   09/20/21 1700 -- (!) 132 16 119/81 98 %   09/20/21 1641 100 °F (37.8 °C) (!) 155 18 (!) 129/90 98 %       Records Reviewed: Nursing Notes and Old Medical Records    Provider Notes (Medical Decision Making):   Patient presenting with low-grade temperature at 100 as well as tachycardia. Given his history of hidradenitis suppurativa and all these open wounds, high concern for sepsis due to them. Less likely pneumonia, UTI. We will give vancomycin antibiotic, labs. ED Course:   Initial assessment performed. The patients presenting problems have been discussed, and they are in agreement with the care plan formulated and outlined with them. I have encouraged them to ask questions as they arise throughout their visit. ED Course as of Sep 20 1947   Mon Sep 20, 2021   1747 [facility] ED SEPSIS NOTE:     5:47 PM The patient now meets criteria for: Severe Sepsis    Fluid resuscitation with: Patient does not require a 30cc/kg bolus because they do not meet criteria for septic shock (SBP<90 or decreased by >40 mmHG from baseline, MAP<65, Lactate 4 or greater). Due to concern for rapidly advancing infection and deterioration of patient's condition, antibiotics are started STAT and cultures ordered.       [JS]      ED Course User Index  [JS] Kyle Britt MD Critical Care Time:   CRITICAL CARE NOTE :    5:32 PM    IMPENDING DETERIORATION -Cardiovascular and Renal  ASSOCIATED RISK FACTORS - Hypotension and Shock  MANAGEMENT- Bedside Assessment and Supervision of Care  INTERPRETATION -  CT Scan, ECG, Blood Pressure and Cardiac Output Measures   INTERVENTIONS - hemodynamic mngmt  CASE REVIEW - Hospitalist/Intensivist, Nursing and Family  TREATMENT RESPONSE -Improved  PERFORMED BY - Self    NOTES   :    I have spent 30 minutes of critical care time involved in lab review, consultations with specialist, family decision- making, bedside attention and documentation. During this entire length of time I was immediately available to the patient . Disposition:    Admission Note:  Patient is being admitted to the hospital by Dr. Divina Lord, Service: Hospitalist.  The results of their tests and reasons for their admission have been discussed with them and available family. They convey agreement and understanding for the need to be admitted and for their admission diagnosis. Diagnosis     Clinical Impression:   1. Severe sepsis ([address]    2. Wound infection    3. Hidradenitis suppurativa        Attestations:  Rio Samuel M.D. Please note that this dictation was completed with [employer], the computer voice recognition software. Quite often unanticipated grammatical, syntax, homophones, and other interpretive errors are inadvertently transcribed by the computer software. Please disregard these errors. Please excuse any errors that have escaped final proofreading. Thank you.

## 2021-09-20 NOTE — PROGRESS NOTES
**Physician Signature**  This document was electronically signed by: Benita Deal MD  09/20/2021 07:45 PM    **Consult Information**  Member Facility: [address] [place] [address] Rd., [address] MRN: [id number]  Consult ID: [id number]  Facility Time Zone: ET  Date and Time of Request: 09/20/2021 07:35:45 PM  ET  Requesting Clinician: Clementina Jones MD  Patient Name: Tony Kelly  Date of Birth: [id number]  Gender: Male    **Reason for Consult**  Reason for Consult: Admit: non-ICU    **Clinical Note**  Clinical Note: 21 y/o male pmhx of hydradenitis suppurativa p/w worsening wounds in groin, perineal area and axilla. Patient has been started on IV antibiotics. Admit orders placed. Patient will be seen on camera when upstairs. **Attestation**  Interaction Mode: Phone Only  Phone Duration (mins): 4  Time of Call : 09/20/2021 07:38 PM  ET  Interaction Attestation: Interprofessional internet consultation was delivered through telephonic and/or electronic communication upon the request of the patients treating provider, while the requesting and the rendering provider were not in the same physical location. A written summary report was provided to the requesting provider at the originating site.   Evaluation Duration (mins): 5    **Physician Signature**  This document was electronically signed by: Benita Deal MD  09/20/2021 07:45 PM

## 2021-09-20 NOTE — ED NOTES
Emergency Department Nursing Plan of Care       The Nursing Plan of Care is developed from the Nursing assessment and Emergency Department Attending provider initial evaluation. The plan of care may be reviewed in the ED Provider note.     The Plan of Care was developed with the following considerations:   Patient / Family readiness to learn indicated by:verbalized understanding  Persons(s) to be included in education: patient  Barriers to Learning/Limitations:No    [address] Maddox RN    9/20/2021   5:50 PM

## 2021-09-20 NOTE — H&P
CC: Open wounds    HPI: 23 y/o male pmhx of hydradenitis suppuritiva p/w worsening open wounds and fever. Patient has had wounds in bilaterally in his groin and in his axilla which he has been dealing with since January 2021. He was hospitalized from ? June to July at [facility] and [facility] for the wounds. He states that his wounds have been open and draining and have not healed completely. He admits to having pus-like drainage from the wounds. He has been wrapping and cleaning the wounds himself every other day. He has also been taking Doxycycline intermittently over the past couple of months. He admits to increased pain at the wound sites in his axilla, groin and perineal areas. He has been taking Motrin at home for pain relief. He admits to bleeding from the wounds along his groin and perineal area. He admits to some shortness of breath but denies any cough or CP. He denies any abdominal pain but admits to nausea and vomiting. He denies any change his stools or urine. He denies any sick contacts. He has not been vaccinated for COVID. Here in the ED, he has been afebrile and initially significantly tachycardic with HR in the 150s. His lab work shows a WBC count of 17, lactic acid of 3.37 and H/H of 8.4/28. His CXR is unremarkable. Blood cultures have been drawn and the patient has been given 2 Liters of NS IVFs and administered IV Vancomycin. PMHx:  Hydradenitis suppuritiva    Surgical Hx:  Denies any surgeries    Family Hx:  Denies any family history    Social Hx:  Does not smoke cigarettes  Drinks ETOH socially    Allergies:  PCN    ROS: All systems reviewed and found to be negative except as per HPI.     Labs: WBC 17, H/H 8.4/28.1, lactic acid 3.37    Imaging: CXR: Normal chest    Vitals: T 99.2, HR 98, RR 15, /70    Exam:  Gen: NAD, thin and tired appearing  HEENT: PERRLA  CVS: S1 and S2 audible, no murmurs  Lungs: Clear to auscultation bilaterally, no wheezing  Abd: + bowel sounds, soft and non-tender  Extrem: No LE edema  Neuro: Alert to person, place and time, no CN deficits  Skin: Open wounds that are draining pus-like material in axilla bilaterally. Open wounds in groin and perineal area that are draining    A/P:  1) Open wounds 2ndary to Hidradenitis suppuritva :  Patient has a history of hydradenitis suppuritiva with recurrent wounds in his bilateral axilla, groin and perineal areas. He is presenting today with draining open wounds and fever. He has been dressing and cleaning the wounds himself at home. He has been febrile and tachycardic here in the ED with lab work showing a WBC count of 17 and lactic acid of 3.37. On evaluation, the patient has open draining wounds along his bilateral axilla and groin area. It is certainly possible that the wounds are infected. Will admit to medical floor with telemetry, c/w IVF hydration and broad spectrum IV antibiotics (IV Vancomycin/IV Levaquin), monitor hemodynamics closely, f/u blood cultures, check wound culture, provide with supportive care and consult wound care for am.  Specialist consultation such as surgery as needed. 2) Fever: As mentioned, the patient has been febrile and tachycardic with lab work showing WBC count of 17 and lactic acid of 3.37. There is concern regarding sepsis. CXR and UA are unremarkable. The only source of infection at this time are the patient's open wounds. Will f/u blood cultures and treat for now with IV antibiotics as discussed above. 2) Anemia: H/H is 8.4/28. Patient admits to bleeding from perineal and groin wounds. Will monitor closely and check labs daily.     3) DVT Prophylaxis:  w/SCDs

## 2021-09-21 ENCOUNTER — APPOINTMENT (OUTPATIENT)
Dept: CT IMAGING | Age: 26
DRG: 710 | End: 2021-09-21
Attending: STUDENT IN AN ORGANIZED HEALTH CARE EDUCATION/TRAINING PROGRAM
Payer: MEDICAID

## 2021-09-21 LAB
ANION GAP SERPL CALC-SCNC: 7 MMOL/L (ref 5–15)
ATRIAL RATE: 122 BPM
ATRIAL RATE: 90 BPM
BASOPHILS # BLD: 0 K/UL (ref 0–0.1)
BASOPHILS NFR BLD: 0 % (ref 0–1)
BUN SERPL-MCNC: 3 MG/DL (ref 6–20)
BUN/CREAT SERPL: 5 (ref 12–20)
CALCIUM SERPL-MCNC: 8.2 MG/DL (ref 8.5–10.1)
CALCULATED P AXIS, ECG09: 51 DEGREES
CALCULATED P AXIS, ECG09: 75 DEGREES
CALCULATED R AXIS, ECG10: 50 DEGREES
CALCULATED R AXIS, ECG10: 72 DEGREES
CALCULATED T AXIS, ECG11: 51 DEGREES
CALCULATED T AXIS, ECG11: 59 DEGREES
CHLORIDE SERPL-SCNC: 107 MMOL/L (ref 97–108)
CO2 SERPL-SCNC: 27 MMOL/L (ref 21–32)
CREAT SERPL-MCNC: 0.65 MG/DL (ref 0.7–1.3)
CRP SERPL-MCNC: 6.82 MG/DL (ref 0–0.6)
DIAGNOSIS, 93000: NORMAL
DIAGNOSIS, 93000: NORMAL
DIFFERENTIAL METHOD BLD: ABNORMAL
EOSINOPHIL # BLD: 1 K/UL (ref 0–0.4)
EOSINOPHIL NFR BLD: 9 % (ref 0–7)
ERYTHROCYTE [DISTWIDTH] IN BLOOD BY AUTOMATED COUNT: 15.9 % (ref 11.5–14.5)
ERYTHROCYTE [SEDIMENTATION RATE] IN BLOOD: 108 MM/HR (ref 0–15)
GLUCOSE SERPL-MCNC: 97 MG/DL (ref 65–100)
HCT VFR BLD AUTO: 24.5 % (ref 36.6–50.3)
HGB BLD-MCNC: 7.3 G/DL (ref 12.1–17)
IMM GRANULOCYTES # BLD AUTO: 0.1 K/UL (ref 0–0.04)
IMM GRANULOCYTES NFR BLD AUTO: 1 % (ref 0–0.5)
LACTATE SERPL-SCNC: 1.4 MMOL/L (ref 0.4–2)
LYMPHOCYTES # BLD: 1.3 K/UL (ref 0.8–3.5)
LYMPHOCYTES NFR BLD: 12 % (ref 12–49)
MAGNESIUM SERPL-MCNC: 2 MG/DL (ref 1.6–2.4)
MCH RBC QN AUTO: 20.1 PG (ref 26–34)
MCHC RBC AUTO-ENTMCNC: 29.8 G/DL (ref 30–36.5)
MCV RBC AUTO: 67.3 FL (ref 80–99)
MONOCYTES # BLD: 0.9 K/UL (ref 0–1)
MONOCYTES NFR BLD: 8 % (ref 5–13)
NEUTS SEG # BLD: 7.9 K/UL (ref 1.8–8)
NEUTS SEG NFR BLD: 70 % (ref 32–75)
NRBC # BLD: 0 K/UL (ref 0–0.01)
NRBC BLD-RTO: 0 PER 100 WBC
P-R INTERVAL, ECG05: 138 MS
P-R INTERVAL, ECG05: 146 MS
PLATELET # BLD AUTO: 493 K/UL (ref 150–400)
PMV BLD AUTO: 8.3 FL (ref 8.9–12.9)
POTASSIUM SERPL-SCNC: 3.6 MMOL/L (ref 3.5–5.1)
Q-T INTERVAL, ECG07: 298 MS
Q-T INTERVAL, ECG07: 346 MS
QRS DURATION, ECG06: 74 MS
QRS DURATION, ECG06: 90 MS
QTC CALCULATION (BEZET), ECG08: 423 MS
QTC CALCULATION (BEZET), ECG08: 424 MS
RBC # BLD AUTO: 3.64 M/UL (ref 4.1–5.7)
RBC MORPH BLD: ABNORMAL
SODIUM SERPL-SCNC: 141 MMOL/L (ref 136–145)
TSH SERPL DL<=0.05 MIU/L-ACNC: 2.81 UIU/ML (ref 0.36–3.74)
VENTRICULAR RATE, ECG03: 122 BPM
VENTRICULAR RATE, ECG03: 90 BPM
WBC # BLD AUTO: 11.2 K/UL (ref 4.1–11.1)

## 2021-09-21 PROCEDURE — 84443 ASSAY THYROID STIM HORMONE: CPT

## 2021-09-21 PROCEDURE — 65270000032 HC RM SEMIPRIVATE

## 2021-09-21 PROCEDURE — 80048 BASIC METABOLIC PNL TOTAL CA: CPT

## 2021-09-21 PROCEDURE — 99221 1ST HOSP IP/OBS SF/LOW 40: CPT | Performed by: SURGERY

## 2021-09-21 PROCEDURE — 86140 C-REACTIVE PROTEIN: CPT

## 2021-09-21 PROCEDURE — 74011250636 HC RX REV CODE- 250/636: Performed by: STUDENT IN AN ORGANIZED HEALTH CARE EDUCATION/TRAINING PROGRAM

## 2021-09-21 PROCEDURE — 72193 CT PELVIS W/DYE: CPT

## 2021-09-21 PROCEDURE — 74011250637 HC RX REV CODE- 250/637: Performed by: STUDENT IN AN ORGANIZED HEALTH CARE EDUCATION/TRAINING PROGRAM

## 2021-09-21 PROCEDURE — 74011250636 HC RX REV CODE- 250/636: Performed by: HOSPITALIST

## 2021-09-21 PROCEDURE — 85025 COMPLETE CBC W/AUTO DIFF WBC: CPT

## 2021-09-21 PROCEDURE — 83735 ASSAY OF MAGNESIUM: CPT

## 2021-09-21 PROCEDURE — 36415 COLL VENOUS BLD VENIPUNCTURE: CPT

## 2021-09-21 PROCEDURE — 85652 RBC SED RATE AUTOMATED: CPT

## 2021-09-21 PROCEDURE — 74011250637 HC RX REV CODE- 250/637: Performed by: HOSPITALIST

## 2021-09-21 PROCEDURE — 74011000636 HC RX REV CODE- 636: Performed by: STUDENT IN AN ORGANIZED HEALTH CARE EDUCATION/TRAINING PROGRAM

## 2021-09-21 PROCEDURE — 83605 ASSAY OF LACTIC ACID: CPT

## 2021-09-21 RX ORDER — SODIUM CHLORIDE 0.9 % (FLUSH) 0.9 %
5-10 SYRINGE (ML) INJECTION
Status: COMPLETED | OUTPATIENT
Start: 2021-09-21 | End: 2021-09-21

## 2021-09-21 RX ORDER — GABAPENTIN 100 MG/1
200 CAPSULE ORAL 3 TIMES DAILY
Status: DISCONTINUED | OUTPATIENT
Start: 2021-09-21 | End: 2021-09-24 | Stop reason: HOSPADM

## 2021-09-21 RX ORDER — METRONIDAZOLE 500 MG/1
500 TABLET ORAL EVERY 12 HOURS
Status: DISCONTINUED | OUTPATIENT
Start: 2021-09-21 | End: 2021-09-22

## 2021-09-21 RX ORDER — ACETAMINOPHEN 500 MG
1000 TABLET ORAL
Status: DISCONTINUED | OUTPATIENT
Start: 2021-09-21 | End: 2021-09-24 | Stop reason: HOSPADM

## 2021-09-21 RX ORDER — LEVOFLOXACIN 5 MG/ML
750 INJECTION, SOLUTION INTRAVENOUS EVERY 24 HOURS
Status: DISCONTINUED | OUTPATIENT
Start: 2021-09-21 | End: 2021-09-24 | Stop reason: HOSPADM

## 2021-09-21 RX ORDER — OXYCODONE HYDROCHLORIDE 5 MG/1
10 TABLET ORAL
Status: DISCONTINUED | OUTPATIENT
Start: 2021-09-21 | End: 2021-09-24 | Stop reason: HOSPADM

## 2021-09-21 RX ORDER — TRAMADOL HYDROCHLORIDE 50 MG/1
50 TABLET ORAL
Status: DISCONTINUED | OUTPATIENT
Start: 2021-09-21 | End: 2021-09-22

## 2021-09-21 RX ORDER — OXYCODONE HYDROCHLORIDE 5 MG/1
5 TABLET ORAL
Status: DISCONTINUED | OUTPATIENT
Start: 2021-09-21 | End: 2021-09-24 | Stop reason: HOSPADM

## 2021-09-21 RX ORDER — IBUPROFEN 400 MG/1
400 TABLET ORAL
Status: DISCONTINUED | OUTPATIENT
Start: 2021-09-21 | End: 2021-09-21

## 2021-09-21 RX ORDER — NALOXONE HYDROCHLORIDE 0.4 MG/ML
0.4 INJECTION, SOLUTION INTRAMUSCULAR; INTRAVENOUS; SUBCUTANEOUS
Status: DISCONTINUED | OUTPATIENT
Start: 2021-09-21 | End: 2021-09-24 | Stop reason: HOSPADM

## 2021-09-21 RX ADMIN — VANCOMYCIN HYDROCHLORIDE 1000 MG: 1 INJECTION, POWDER, FOR SOLUTION INTRAVENOUS at 14:40

## 2021-09-21 RX ADMIN — GABAPENTIN 200 MG: 100 CAPSULE ORAL at 22:21

## 2021-09-21 RX ADMIN — TRAMADOL HYDROCHLORIDE 50 MG: 50 TABLET, FILM COATED ORAL at 14:40

## 2021-09-21 RX ADMIN — GABAPENTIN 200 MG: 100 CAPSULE ORAL at 11:20

## 2021-09-21 RX ADMIN — IOPAMIDOL 100 ML: 755 INJECTION, SOLUTION INTRAVENOUS at 14:12

## 2021-09-21 RX ADMIN — Medication 10 ML: at 14:12

## 2021-09-21 RX ADMIN — SODIUM CHLORIDE 125 ML/HR: 9 INJECTION, SOLUTION INTRAVENOUS at 12:27

## 2021-09-21 RX ADMIN — VANCOMYCIN HYDROCHLORIDE 1000 MG: 1 INJECTION, POWDER, FOR SOLUTION INTRAVENOUS at 06:49

## 2021-09-21 RX ADMIN — Medication 10 ML: at 06:49

## 2021-09-21 RX ADMIN — Medication 10 ML: at 01:32

## 2021-09-21 RX ADMIN — LEVOFLOXACIN 750 MG: 5 INJECTION, SOLUTION INTRAVENOUS at 22:21

## 2021-09-21 RX ADMIN — Medication 10 ML: at 14:46

## 2021-09-21 RX ADMIN — VANCOMYCIN HYDROCHLORIDE 1000 MG: 1 INJECTION, POWDER, FOR SOLUTION INTRAVENOUS at 22:22

## 2021-09-21 RX ADMIN — ACETAMINOPHEN 650 MG: 325 TABLET ORAL at 11:20

## 2021-09-21 RX ADMIN — GABAPENTIN 200 MG: 100 CAPSULE ORAL at 16:07

## 2021-09-21 NOTE — PROGRESS NOTES
[facility] Dosing Services: Vancomycin Dosing Progress Note    Consult for antibiotic dosing of vancomycin by Dr. Priya Lawrence  Indication: SSTI  Day of Therapy: 1    Plan:  Vancomycin therapy:  1. Start with loading dose of vancomycin 1750 mg IV (25 mg/kg, max 2500 mg)  2. Follow with maintenance dose of vancomycin 1000 mg IV every 12 hours at 0600 on 09/21/21  3. Dose calculated to approximate a   a. Target AUC/HAKEEM of <500  b. Trough of 10-15 mcg/mL. 4.  Plan:  Scr stable. Loading dose of 1750 mg IV x 1 given. Continue with maintenance regimen for predicted AUC of 439 and trough of 13.0 per [employer]. Date Dose & Interval Measured (mcg/mL) Extrapolated (mcg/mL)   ? ? ? ?   ? ? ? ?   ? ? ? ? Other Antimicrobial  (not dosed by pharmacist)   Levofloxacin 500 mg IV every 24 hours   Cultures     9/20/21: Blood Cx: pending    Serum Creatinine     Lab Results   Component Value Date/Time    Creatinine 0.98 09/20/2021 05:38 PM       Creatinine Clearance CrCl cannot be calculated (Unknown ideal weight.). Temp   100 °F (37.8 °C)    WBC   Lab Results   Component Value Date/Time    WBC 17.0 (H) 09/20/2021 05:38 PM         Pharmacy to follow daily and will make changes to dose and/or frequency based on clinical status.     Thank you,     Kj Tom, PharmD   Contact: [phone]

## 2021-09-21 NOTE — PROGRESS NOTES
Hospitalist Progress Note    NAME: Radha Gatica   :  1995   MRN:  [id number]   Room Number:  717/28  @ [facility]       Interim Hospital Summary: 22 y.o. male whom presented on 2021 with      Assessment / Plan:    Anticipated discharge date :   Anticipated disposition : Home  Barriers to discharge : Sepsis, gen surg consult     Sepsis POA due to   Hidradenitis suppurative stage III POA  due to multiple abscesses/ sinus tracts) POA      - CT Abd/Pelvis  - Vanc, Levofloxacin  - Gen surg consult  - Fluid resuscitation administered, tachycardia and lactic acid have normalized.        Pain management   - Gabapentin 200 mg TID   - Tylenol for mild pain, motrin for mod pain, tramadol for severe pain           Microcytic hypochromic anemia POA  - Check anemia panel  - Type and screen. May need transfusion.        BMI 17.75  Malnutrition POA  Counseled on Lifestyle modifications, AHA Diet ,weight loss strategies.     Code status: Full  Prophylaxis: SCD  Recommended Disposition: Home w/Family     Subjective:     Chief Complaint / Reason for Physician Visit  \"it hurts a lot\". Discussed with RN events overnight. Review of Systems:  No fevers, chills, appetite change, cough, sputum production, shortness of breath, dyspnea on exertion, nausea, vomitting, diarrhea, constipation, chest pain, leg edema, abdominal pain, joint pain, rash, itching. Tolerating PT/OT. Tolerating diet. Objective:     VITALS:   Last 24hrs VS reviewed since prior progress note.  Most recent are:  Patient Vitals for the past 24 hrs:   Temp Pulse Resp BP SpO2   21 0400 -- 83 -- -- --   21 0320 98.2 °F (36.8 °C) 83 16 117/63 100 %   21 0000 -- 98 -- -- --   21 99.2 °F (37.3 °C) 98 16 138/70 100 %   21 98.3 °F (36.8 °C) -- -- -- --   21 -- 98 16 132/70 100 %   21 -- 96 19 -- 99 %   21 1930 -- (!) 102 18 107/63 100 %   21 1850 -- (!) 105 21 (!) 96/57 98 %   09/20/21 1803 -- (!) 114 14 126/77 97 %   09/20/21 1700 -- (!) 132 16 119/81 98 %   09/20/21 1641 100 °F (37.8 °C) (!) 155 18 (!) 129/90 98 %       Intake/Output Summary (Last 24 hours) at 9/21/2021 0848  Last data filed at 9/21/2021 0600  Gross per 24 hour   Intake 2000 ml   Output 2280 ml   Net -280 ml        PHYSICAL EXAM:  General: WD, WN. Alert, cooperative, no acute distress    EENT:  EOMI. Anicteric sclerae. MMM  Resp:  CTA bilaterally, no wheezing or rales. No accessory muscle use  CV:  Regular  rhythm,  normal S1/S2, no murmurs rubs gallops, No edema  GI:  Soft, Non distended, Non tender. +Bowel sounds  Neurologic:  Alert and oriented X 3, normal speech,   Psych:   Good insight. Not anxious nor agitated  Skin:  Erythema, draining sinuses, tenderness to palpation on axilla, groin. No jaundice    Reviewed most current lab test results and cultures  YES  Reviewed most current radiology test results   YES  Review and summation of old records today    NO  Reviewed patient's current orders and MAR    YES  PMH/SH reviewed - no change compared to H&P  ________________________________________________________________________  Care Plan discussed with:    Comments   Patient x    Family      RN x    Care Manager x    Consultant                       x Multidiciplinary team rounds were held today with , nursing, pharmacist and clinical coordinator. Patient's plan of care was discussed; medications were reviewed and discharge planning was addressed.      ________________________________________________________________________  Total NON critical care TIME: 25   Minutes    Total CRITICAL CARE TIME Spent:   Minutes non procedure based      Comments   >50% of visit spent in counseling and coordination of care     ________________________________________________________________________  Tony Tovar MD     Procedures: see electronic medical records for all procedures/Xrays and details which were not copied into this note but were reviewed prior to creation of Plan. LABS:  I reviewed today's most current labs and imaging studies.   Pertinent labs include:  Recent Labs     09/21/21 0313 09/20/21  1738   WBC 11.2* 17.0*   HGB 7.3* 8.4*   HCT 24.5* 28.1*   * 618*     Recent Labs     09/21/21 0313 09/20/21  1738    139   K 3.6 4.1    101   CO2 27 29   GLU 97 116*   BUN 3* 5*   CREA 0.65* 0.98   CA 8.2* 8.6   MG 2.0 2.1   ALB  --  2.2*   TBILI  --  0.2   ALT  --  10*       Signed: Mariluz Turner MD

## 2021-09-21 NOTE — PROGRESS NOTES
[facility] Dosing Services: Vancomycin Daily Dosing Note  Consult for dosing of vancomycin by Dr. Cavazos Sero  Indication: sepsis due to SSTI   Day of Therapy: 2    Ht Readings from Last 1 Encounters:   09/20/21 190.5 cm (75\")     Wt Readings from Last 1 Encounters:   09/20/21 64.4 kg (142 lb)       Vancomycin:   Current maintenance dose: vancomycin 1000 mg IV every 12 hours    Dose calculated to approximate a target AUC/HAKEEM of 400-600     Plan: Will increase vancomycin to 1000 mg IV q 8 hours for an estimated AUC of 555. Noted weight change -- Bayesian software updated to reflect. Random vancomycin level planned for AM.    Dose administration notes:  Doses given appropriately as scheduled    Date Dose & Interval Measured level AUC/HAKEEM and Extrapolated trough (mcg/mL)   9/22/21 1000 mg IV q 8                     Other Antimicrobial  Levaquin 500 mg IV q 24 hours - D2 (PCN allergy)   Cultures 9/20 BCX: NGTD @ 1 day  9/20 WCX: NGTD @ 1 day   Serum Creatinine Lab Results   Component Value Date/Time    Creatinine 0.65 (L) 09/21/2021 03:13 AM    Creatinine, POC 0.8 09/20/2021 08:02 PM       Creatinine Clearance Estimated Creatinine Clearance: 146.9 mL/min (A) (by C-G formula based on SCr of 0.65 mg/dL (L)). Temp Temp: 98.4 °F (36.9 °C)     WBC Lab Results   Component Value Date/Time    WBC 11.2 (H) 09/21/2021 03:13 AM      Procalcitonin  No results found for: PCT     Pharmacy will follow the patient on a daily basis and make adjustments based on patient's clinical status.     Thank you,     Lina Black, [address]

## 2021-09-21 NOTE — ED NOTES
TRANSFER - OUT REPORT:    Verbal report given to [id number] [employer] supervisor (name) on Arcelia Section  being transferred to med surg 227 (unit) for routine progression of care       Report consisted of patients Situation, Background, Assessment and   Recommendations(SBAR). Information from the following report(s) SBAR was reviewed with the receiving nurse. Lines:   Peripheral IV 09/20/21 Right Antecubital (Active)        Opportunity for questions and clarification was provided.       Patient transported with:   Monitor  Registered Nurse

## 2021-09-21 NOTE — PROGRESS NOTES
**Physician Signature**  This document was electronically signed by: Therese Jason MD  09/20/2021 09:52 PM    **Consult Information**  Member Facility: [address] [place] [address] Rd., [address] MRN: [id number]  Consult ID: [id number]  Facility Time Zone: ET  Date and Time of Request: 09/20/2021 09:48:00 PM  ET  Requesting Clinician: Malathi Sanchez  Patient Name: Linda Salinas  YOB: 1995  Gender: Male    **Reason for Consult**  Reason for Consult: Change in Clinical Condition    **Clinical Note**  Clinical Note: Notified about patient having 2 second pause at 9:35 pm.  Order placed to check Mg level and EKG. **Attestation**  Interaction Mode: Phone Only  Phone Duration (mins): 1  Time of Call : 09/20/2021 09:49 PM  ET  Interaction Attestation: Interprofessional internet consultation was delivered through telephonic and/or electronic communication upon the request of the patients treating provider, while the requesting and the rendering provider were not in the same physical location. A written summary report was provided to the requesting provider at the originating site.   Evaluation Duration (mins): 1    **Physician Signature**  This document was electronically signed by: Therese Jason MD  09/20/2021 09:52 PM

## 2021-09-21 NOTE — CONSULTS
Patient seen at request of Dr. Nae Reyes. Information obtained from patient and review of chart. Pastor Figueroa is an 22 y.o. male, with a h/o hidradenitis, who was recently admitted with worsening wounds and associated fever. Mr. Johnny Ahumada tells me that he was found to have hidradenitis in January, 2021. Hospitalized in June, 2021 with sepsis due to hidradenitis. Discharged to home with wound care and antibiotics. Despite this, Mr. Johnny Ahumada has developed progressive pain at areas of hidradenitis. Temp of 100 in ER. Associated nausea and vomitting. No shortness of breath or chest pain at this time. He has otherwise been in his usual state of health. CT scan pelvis with IV contrast - No intrapelvic wound or osteomyelitis. Multiple subcutaneous wounds. Started on Levaquin, Vancomycin and Flagyl. Allergies - Penicillins and Pcn [penicillins]    Meds - Reviewed. PMH - History reviewed. No pertinent past medical history. PSH - No past surgical history on file. Fam Hx - History reviewed. No pertinent family history. Soc Hx -   Social History     Tobacco Use    Smoking status: Never Smoker    Smokeless tobacco: Never Used   Substance Use Topics    Alcohol use: Yes     Comment: Socially      Patient is a well developed, well nourished man in no acute distress. Tm 100 Tc 98.5 HR: 88 BP: 115/77 Resp Rate: 16 100% sat on room air. HEENT: Anicteric. Neck: Supple without palpable lymphadenopathy. Cor: RRR. Lungs: Bilateral breath sounds. Clear to auscultation. Abd: Soft. Non distended. Non tender. No guarding or rebound. Skin: In the right and left groins, there are changes which are c/w hidradenitis. (Right > Left.) In the lower back, extending to the intergluteal cleft, there are skin changes which are c/w hidradenitis. Possible associated pilonidal disease. There is an open wound which is granulating. Ext: No edema.   Neuro: Grossly Non focal.     Labs -   Recent Results (from the past 24 hour(s))   POC CHEM8    Collection Time: 09/20/21  8:02 PM   Result Value Ref Range    CO2, POC 27 (H) 19 - 24 MMOL/L    Glucose,  (H) 74 - 106 MG/DL    Creatinine, POC 0.8 0.6 - 1.3 MG/DL    GFRAA, POC >60 >60 ml/min/1.73m2    GFRNA, POC >60 >60 ml/min/1.73m2    Sodium,  136 - 145 MMOL/L    Potassium, POC 4.1 3.5 - 5.5 MMOL/L    Calcium, ionized (POC) 1.12 1.12 - 1.32 mmol/L    Chloride,  (H) 100 - 108 MMOL/L    Anion gap, POC 6 (L) 10 - 20     POC LACTIC ACID    Collection Time: 09/20/21  8:02 PM   Result Value Ref Range    Lactic Acid (POC) 0.88 0.40 - 2.00 mmol/L   EKG, 12 LEAD, INITIAL    Collection Time: 09/20/21 10:23 PM   Result Value Ref Range    Ventricular Rate 90 BPM    Atrial Rate 90 BPM    P-R Interval 146 ms    QRS Duration 90 ms    Q-T Interval 346 ms    QTC Calculation (Bezet) 423 ms    Calculated P Axis 51 degrees    Calculated R Axis 50 degrees    Calculated T Axis 51 degrees    Diagnosis       Normal sinus rhythm  Voltage criteria for left ventricular hypertrophy  Abnormal ECG  When compared with ECG of 20-SEP-2021 22:23,  No significant change    Confirmed by Caroline Guzmán M.D., Cam Hatchet ([id number]) on 9/21/2021 8:08:14 AM     CULTURE, WOUND Lavada Gambles STAIN    Collection Time: 09/20/21 11:05 PM    Specimen: Axilla;  Wound   Result Value Ref Range    Special Requests: NO SPECIAL REQUESTS      GRAM STAIN NO WBC'S SEEN      GRAM STAIN 1+ GRAM NEGATIVE RODS      GRAM STAIN FEW GRAM POSITIVE COCCI IN PAIRS      Culture result: PENDING    CULTURE, WOUND Lavada Gambles STAIN    Collection Time: 09/20/21 11:05 PM    Specimen: Perineal; Wound   Result Value Ref Range    Special Requests: NO SPECIAL REQUESTS      GRAM STAIN NO WBC'S SEEN      GRAM STAIN NO DEFINITE ORGANISM SEEN      Culture result: PENDING    METABOLIC PANEL, BASIC    Collection Time: 09/21/21  3:13 AM   Result Value Ref Range    Sodium 141 136 - 145 mmol/L    Potassium 3.6 3.5 - 5.1 mmol/L    Chloride 107 97 - 108 mmol/L CO2 27 21 - 32 mmol/L    Anion gap 7 5 - 15 mmol/L    Glucose 97 65 - 100 mg/dL    BUN 3 (L) 6 - 20 MG/DL    Creatinine 0.65 (L) 0.70 - 1.30 MG/DL    BUN/Creatinine ratio 5 (L) 12 - 20      GFR est AA >60 >60 ml/min/1.73m2    GFR est non-AA >60 >60 ml/min/1.73m2    Calcium 8.2 (L) 8.5 - 10.1 MG/DL   MAGNESIUM    Collection Time: 09/21/21  3:13 AM   Result Value Ref Range    Magnesium 2.0 1.6 - 2.4 mg/dL   CBC WITH AUTOMATED DIFF    Collection Time: 09/21/21  3:13 AM   Result Value Ref Range    WBC 11.2 (H) 4.1 - 11.1 K/uL    RBC 3.64 (L) 4.10 - 5.70 M/uL    HGB 7.3 (L) 12.1 - 17.0 g/dL    HCT 24.5 (L) 36.6 - 50.3 %    MCV 67.3 (L) 80.0 - 99.0 FL    MCH 20.1 (L) 26.0 - 34.0 PG    MCHC 29.8 (L) 30.0 - 36.5 g/dL    RDW 15.9 (H) 11.5 - 14.5 %    PLATELET 587 (H) 708 - 400 K/uL    MPV 8.3 (L) 8.9 - 12.9 FL    NRBC 0.0 0  WBC    ABSOLUTE NRBC 0.00 0.00 - 0.01 K/uL    NEUTROPHILS 70 32 - 75 %    LYMPHOCYTES 12 12 - 49 %    MONOCYTES 8 5 - 13 %    EOSINOPHILS 9 (H) 0 - 7 %    BASOPHILS 0 0 - 1 %    IMMATURE GRANULOCYTES 1 (H) 0.0 - 0.5 %    ABS. NEUTROPHILS 7.9 1.8 - 8.0 K/UL    ABS. LYMPHOCYTES 1.3 0.8 - 3.5 K/UL    ABS. MONOCYTES 0.9 0.0 - 1.0 K/UL    ABS. EOSINOPHILS 1.0 (H) 0.0 - 0.4 K/UL    ABS. BASOPHILS 0.0 0.0 - 0.1 K/UL    ABS. IMM.  GRANS. 0.1 (H) 0.00 - 0.04 K/UL    DF SMEAR SCANNED      RBC COMMENTS HYPOCHROMIA  2+        RBC COMMENTS MICROCYTOSIS  1+        RBC COMMENTS OVALOCYTES  PRESENT       LACTIC ACID    Collection Time: 09/21/21  3:13 AM   Result Value Ref Range    Lactic acid 1.4 0.4 - 2.0 MMOL/L   SED RATE (ESR)    Collection Time: 09/21/21  3:13 AM   Result Value Ref Range    Sed rate, automated 108 (H) 0 - 15 mm/hr   TSH 3RD GENERATION    Collection Time: 09/21/21  3:13 AM   Result Value Ref Range    TSH 2.81 0.36 - 3.74 uIU/mL   C REACTIVE PROTEIN, QT    Collection Time: 09/21/21  3:13 AM   Result Value Ref Range    C-Reactive protein 6.82 (H) 0.00 - 0.60 mg/dL     CT Scan - Reviewed. Imp: Mr. Monique Zelaya is a 22 y.o. male with extensive hidradenitis. Plan: 1. Continue local wound care for now. 2. IV and po antibiotics as ordered - Levaquin, Vancomycin and po Flagyl. 3. Mr. Monique Zelaya should benefit from excision of the hidradenitis of the lower back and intergluteal cleft and application of acellular xenograft. Discussed procedure with him including risks of bleeding, infection, recurrent disease. Also discussed the role of the acellular xenograft with Mr. Monique Zelaya. Explained to him that if the acellular xenograft is not approved by his insurance carrier that the wound will be allowed to heal by secondary intention. He understands and wishes to proceed. 4. Needs consent. 5. Tentatively to OR 9/23/2021.   6. Diet as tolerated. 7. NPO after midnight on 9/23/2021.   8. Saline lock IVF. 9. Pain medication and anti-emetics as needed. 10. Plans per Dr. Leona Catalan. Following.

## 2021-09-21 NOTE — PROGRESS NOTES
JM  RUR: 11%    CM called [address] [phone] at [address] [facility] for follow up. Patient has a follow up appt on 10/5 at 10:15AM, updated on AVS.     CM called [facility] to get follow up with DAVION Ahumada.  Patient has follow up on 10/4/21 at 11AM. Updated on AVS.     FARHANA Hernandez, [address] [place] [address], [address] [place] [address] Rd  [phone]

## 2021-09-21 NOTE — PROGRESS NOTES
0) Verbal shift change report given to Joseph Wells RN (oncoming nurse) by Ansley Salcido RN (offgoing nurse). Report included the following information SBAR, Kardex, Intake/Output, MAR, Recent Results and Cardiac Rhythm NSR.     0855) Pt assessed and vital signs stable. Pt c/o pain 5/10 and stated he just wanted to rest at this time. Wounds are dressed, CDI. No scheduled meds to be given. Pt left resting in bed at this time. 9389) Pt stated pain still 4/10.     1020) Interdisciplinary team rounds were held 9/21/2021 with the following team members:Care Management, Nursing, Pharmacy and Physician. Plan of care discussed. See clinical pathway and/or care plan for interventions and desired outcomes. 1120) Pt still c/o pain 5/10. MD consulted about diet order and changed from NPO to Regular diet. Snacks provided. Tylenol given PRN along with scheduled gabapentin. 1230) Pt reassessed and no changes to note. Vital signs stable and pt stated pain resolved to 3/10. New bag of NS hung and infusing at 125ml/hr. PO flagyl given. Pt immediately spit the pill out and vommiting. HR shot up to 140's, vital signs rechecked and stable. Emesis bags provided and call bell in reach. 1345) Pt taken down to CT    1420) Pt back on the floor. 1445) IV vanc hung and infusing. Tramadol given for pain. IV flushed and patent. Pt's dressings on groin and coccyx fell off. New ABD pads applied and disposable briefs provided. 1605) Pt reassessed and vital signs stable. Pot c/o pain 5/10 and requested left axilla to be redressed. Wound care completed. [phone]) Surgeon in the room assessing patient at this time. 1815) Admission assessment completed. 1900) Bedside shift change report given to Narinder Ramos (oncoming nurse) by Joseph Wells RN (offgoing nurse). Report included the following information SBAR, Kardex, Intake/Output, MAR and Recent Results.

## 2021-09-21 NOTE — PROGRESS NOTES
Message sent to Tele Hospitalist:    Pt had 2 second pause at 2135. Baseline rhythm has been Sinus Arrythmia. EKG done in ED at 1737.

## 2021-09-21 NOTE — PROGRESS NOTES
Problem: Falls - Risk of  Goal: *Absence of Falls  Description: Document Jaime Baltazar Fall Risk and appropriate interventions in the flowsheet.   Outcome: Progressing Towards Goal  Note: Fall Risk Interventions:            Medication Interventions: Teach patient to arise slowly    Elimination Interventions: Urinal in reach

## 2021-09-21 NOTE — ED NOTES
Attempted to call report. Stated RN on the second floor would call back in a few minutes due to pulling medications.

## 2021-09-21 NOTE — PROGRESS NOTES
Cardiac Monitoring on 9.20.21  from [phone]    Tele Strip on 9.20.21 at 1900 - Sinus Arrhythmia              *Tele Event on 9.20.21 at 2135 - 2 Second pause            Tele Strip on 9.21.21 at 0300 - Sinus Arrhythmia                    *Tele Event on 9.21.21 at 0313 - 1.31 sec pause          *Tele Event on 9.21.21 at 0316 - 1.25 sec pause        *Tele Event on 9.21.21 at 0340 - 1.49 sec pause

## 2021-09-22 ENCOUNTER — ANESTHESIA EVENT (OUTPATIENT)
Dept: SURGERY | Age: 26
DRG: 710 | End: 2021-09-22
Payer: MEDICAID

## 2021-09-22 LAB
ABO + RH BLD: NORMAL
ALBUMIN SERPL-MCNC: 2.1 G/DL (ref 3.5–5)
ALBUMIN/GLOB SERPL: 0.4 {RATIO} (ref 1.1–2.2)
ALP SERPL-CCNC: 102 U/L (ref 45–117)
ALT SERPL-CCNC: 10 U/L (ref 12–78)
ANION GAP SERPL CALC-SCNC: 9 MMOL/L (ref 5–15)
AST SERPL-CCNC: 11 U/L (ref 15–37)
BASOPHILS # BLD: 0 K/UL (ref 0–0.1)
BASOPHILS NFR BLD: 0 % (ref 0–1)
BILIRUB DIRECT SERPL-MCNC: 0.1 MG/DL (ref 0–0.2)
BILIRUB SERPL-MCNC: 0.2 MG/DL (ref 0.2–1)
BLOOD GROUP ANTIBODIES SERPL: NORMAL
BUN SERPL-MCNC: 7 MG/DL (ref 6–20)
BUN/CREAT SERPL: 7 (ref 12–20)
CALCIUM SERPL-MCNC: 8.5 MG/DL (ref 8.5–10.1)
CHLORIDE SERPL-SCNC: 103 MMOL/L (ref 97–108)
CO2 SERPL-SCNC: 29 MMOL/L (ref 21–32)
CREAT SERPL-MCNC: 0.76 MG/DL (ref 0.7–1.3)
CREAT SERPL-MCNC: 0.94 MG/DL (ref 0.7–1.3)
DIFFERENTIAL METHOD BLD: ABNORMAL
EOSINOPHIL # BLD: 1.2 K/UL (ref 0–0.4)
EOSINOPHIL NFR BLD: 8 % (ref 0–7)
ERYTHROCYTE [DISTWIDTH] IN BLOOD BY AUTOMATED COUNT: 15.9 % (ref 11.5–14.5)
GLOBULIN SER CALC-MCNC: 5.6 G/DL (ref 2–4)
GLUCOSE SERPL-MCNC: 82 MG/DL (ref 65–100)
HCT VFR BLD AUTO: 28.2 % (ref 36.6–50.3)
HGB BLD-MCNC: 8.3 G/DL (ref 12.1–17)
HIV 1+2 AB+HIV1 P24 AG SERPL QL IA: NONREACTIVE
HIV12 RESULT COMMENT, HHIVC: NORMAL
IMM GRANULOCYTES # BLD AUTO: 0.1 K/UL (ref 0–0.04)
IMM GRANULOCYTES NFR BLD AUTO: 1 % (ref 0–0.5)
LYMPHOCYTES # BLD: 1.2 K/UL (ref 0.8–3.5)
LYMPHOCYTES NFR BLD: 8 % (ref 12–49)
MAGNESIUM SERPL-MCNC: 1.9 MG/DL (ref 1.6–2.4)
MCH RBC QN AUTO: 19.7 PG (ref 26–34)
MCHC RBC AUTO-ENTMCNC: 29.4 G/DL (ref 30–36.5)
MCV RBC AUTO: 67 FL (ref 80–99)
MONOCYTES # BLD: 0.9 K/UL (ref 0–1)
MONOCYTES NFR BLD: 6 % (ref 5–13)
NEUTS SEG # BLD: 11.3 K/UL (ref 1.8–8)
NEUTS SEG NFR BLD: 77 % (ref 32–75)
NRBC # BLD: 0 K/UL (ref 0–0.01)
NRBC BLD-RTO: 0 PER 100 WBC
PHOSPHATE SERPL-MCNC: 3.1 MG/DL (ref 2.6–4.7)
PLATELET # BLD AUTO: 549 K/UL (ref 150–400)
PMV BLD AUTO: 8.3 FL (ref 8.9–12.9)
POTASSIUM SERPL-SCNC: 3.5 MMOL/L (ref 3.5–5.1)
PROT SERPL-MCNC: 7.7 G/DL (ref 6.4–8.2)
RBC # BLD AUTO: 4.21 M/UL (ref 4.1–5.7)
RBC MORPH BLD: ABNORMAL
RBC MORPH BLD: ABNORMAL
SODIUM SERPL-SCNC: 141 MMOL/L (ref 136–145)
SPECIMEN EXP DATE BLD: NORMAL
VANCOMYCIN SERPL-MCNC: 15.7 UG/ML
WBC # BLD AUTO: 14.7 K/UL (ref 4.1–11.1)

## 2021-09-22 PROCEDURE — 74011250636 HC RX REV CODE- 250/636: Performed by: HOSPITALIST

## 2021-09-22 PROCEDURE — U0005 INFEC AGEN DETEC AMPLI PROBE: HCPCS

## 2021-09-22 PROCEDURE — 80202 ASSAY OF VANCOMYCIN: CPT

## 2021-09-22 PROCEDURE — 74011250637 HC RX REV CODE- 250/637: Performed by: SURGERY

## 2021-09-22 PROCEDURE — 74011250636 HC RX REV CODE- 250/636: Performed by: STUDENT IN AN ORGANIZED HEALTH CARE EDUCATION/TRAINING PROGRAM

## 2021-09-22 PROCEDURE — 65270000032 HC RM SEMIPRIVATE

## 2021-09-22 PROCEDURE — 80076 HEPATIC FUNCTION PANEL: CPT

## 2021-09-22 PROCEDURE — 85025 COMPLETE CBC W/AUTO DIFF WBC: CPT

## 2021-09-22 PROCEDURE — 36415 COLL VENOUS BLD VENIPUNCTURE: CPT

## 2021-09-22 PROCEDURE — 86901 BLOOD TYPING SEROLOGIC RH(D): CPT

## 2021-09-22 PROCEDURE — 87389 HIV-1 AG W/HIV-1&-2 AB AG IA: CPT

## 2021-09-22 PROCEDURE — 74011250637 HC RX REV CODE- 250/637: Performed by: STUDENT IN AN ORGANIZED HEALTH CARE EDUCATION/TRAINING PROGRAM

## 2021-09-22 PROCEDURE — 84100 ASSAY OF PHOSPHORUS: CPT

## 2021-09-22 PROCEDURE — 83735 ASSAY OF MAGNESIUM: CPT

## 2021-09-22 PROCEDURE — 80048 BASIC METABOLIC PNL TOTAL CA: CPT

## 2021-09-22 RX ORDER — HYDROMORPHONE HYDROCHLORIDE 1 MG/ML
0.5 INJECTION, SOLUTION INTRAMUSCULAR; INTRAVENOUS; SUBCUTANEOUS
Status: COMPLETED | OUTPATIENT
Start: 2021-09-22 | End: 2021-09-22

## 2021-09-22 RX ORDER — METRONIDAZOLE 500 MG/100ML
500 INJECTION, SOLUTION INTRAVENOUS EVERY 12 HOURS
Status: DISCONTINUED | OUTPATIENT
Start: 2021-09-22 | End: 2021-09-24 | Stop reason: HOSPADM

## 2021-09-22 RX ADMIN — LEVOFLOXACIN 750 MG: 5 INJECTION, SOLUTION INTRAVENOUS at 20:10

## 2021-09-22 RX ADMIN — METRONIDAZOLE 500 MG: 500 INJECTION, SOLUTION INTRAVENOUS at 23:46

## 2021-09-22 RX ADMIN — HYDROMORPHONE HYDROCHLORIDE 0.5 MG: 1 INJECTION, SOLUTION INTRAMUSCULAR; INTRAVENOUS; SUBCUTANEOUS at 13:05

## 2021-09-22 RX ADMIN — VANCOMYCIN HYDROCHLORIDE 1000 MG: 1 INJECTION, POWDER, FOR SOLUTION INTRAVENOUS at 08:23

## 2021-09-22 RX ADMIN — Medication 10 ML: at 03:41

## 2021-09-22 RX ADMIN — GABAPENTIN 200 MG: 100 CAPSULE ORAL at 22:15

## 2021-09-22 RX ADMIN — GABAPENTIN 200 MG: 100 CAPSULE ORAL at 17:10

## 2021-09-22 RX ADMIN — METRONIDAZOLE 500 MG: 500 INJECTION, SOLUTION INTRAVENOUS at 11:24

## 2021-09-22 RX ADMIN — Medication 10 ML: at 00:33

## 2021-09-22 RX ADMIN — VANCOMYCIN HYDROCHLORIDE 1250 MG: 10 INJECTION, POWDER, LYOPHILIZED, FOR SOLUTION INTRAVENOUS at 22:15

## 2021-09-22 RX ADMIN — OXYCODONE HYDROCHLORIDE 5 MG: 5 TABLET ORAL at 20:10

## 2021-09-22 RX ADMIN — GABAPENTIN 200 MG: 100 CAPSULE ORAL at 08:24

## 2021-09-22 RX ADMIN — OXYCODONE HYDROCHLORIDE 10 MG: 5 TABLET ORAL at 08:24

## 2021-09-22 RX ADMIN — Medication 10 ML: at 14:00

## 2021-09-22 RX ADMIN — Medication 10 ML: at 23:47

## 2021-09-22 NOTE — PROGRESS NOTES
[facility] Dosing Services: Vancomycin Daily Dosing Note  Consult for dosing of vancomycin by Dr. Shruthi Castro  Indication: sepsis due to SSTI   Day of Therapy: 3    Ht Readings from Last 1 Encounters:   09/20/21 190.5 cm (75\")     Wt Readings from Last 1 Encounters:   09/22/21 64.4 kg (142 lb)       Vancomycin:   Current maintenance dose: vancomycin 1000 mg IV every 8 hours    Dose calculated to approximate a target AUC/HAKEEM of 400-600   Plan: SCr stable. Level this morning correlates to an AUC above target. Will change regimen to vancomycin 1250 mg IV every 12 hours for a predicted . Dose administration notes:  Doses given appropriately as scheduled    Date Dose & Interval Measured level AUC/HAKEEM   9/22/21 1000 mg IV q 8 15.7 642                   Other Antimicrobial  levofloxacin 750 mg IV q 24 hours - D3 (PCN allergy)  Metronidazole 500 mg IV q12h - Day 1   Cultures 9/20 blood: NG @ 2 day  9/20 wound: Light Enteric Type E Lety (pending)  9/20 wound: Rare probable Proteus, checking for 2nd GNR (prelim)   Serum Creatinine Lab Results   Component Value Date/Time    Creatinine 0.94 09/22/2021 09:44 AM    Creatinine, POC 0.8 09/20/2021 08:02 PM       Creatinine Clearance Estimated Creatinine Clearance: 109.4 mL/min (based on SCr of 0.94 mg/dL). Temp Temp: 98.3 °F (36.8 °C)     WBC Lab Results   Component Value Date/Time    WBC 14.7 (H) 09/22/2021 09:44 AM      Procalcitonin  No results found for: PCT     Pharmacy will follow the patient on a daily basis and make adjustments based on patient's clinical status.     Thank you,  Priya Brown, PharmD, BCPS  [phone]

## 2021-09-22 NOTE — PROGRESS NOTES
Hospitalist Progress Note    NAME: Nicolasa Salazar   :  1995   MRN:  [id number]   Room Number:  662/21  @ [facility]       Interim Hospital Summary: 22 y.o. male whom presented on 2021 with      Assessment / Plan:    Anticipated discharge date :   Anticipated disposition : Home  Barriers to discharge : OR on      Sepsis POA due to   Hidradenitis suppurativa POA  CT Abd/Pelvis reviewed independently - does not show itnrapelvic abscess or osteomyelitis. - Vanc, Levofloxacin  - Gen surg consulted, OR for Excision and placement of graft on         Pain management   - Gabapentin 200 mg TID   - Tylenol for mild pain, motrin for mod pain, tramadol for severe pain            Microcytic hypochromic anemia POA  - Check anemia panel  - Type and screen. May need transfusion.         BMI 17.75  Malnutrition POA  Counseled on Lifestyle modifications, AHA Diet ,weight loss strategies.     Code status: Full  Prophylaxis: SCD  Recommended Disposition: Home w/Family     Subjective:     Chief Complaint / Reason for Physician Visit  \"I feel weak and still dont have full range of motion in my arm\". Discussed with RN events overnight. Review of Systems:  No fevers, chills, appetite change, cough, sputum production, shortness of breath, dyspnea on exertion, nausea, vomitting, diarrhea, constipation, chest pain, leg edema, abdominal pain, joint pain, rash, itching. Tolerating PT/OT. Tolerating diet. Objective:     VITALS:   Last 24hrs VS reviewed since prior progress note.  Most recent are:  Patient Vitals for the past 24 hrs:   Temp Pulse Resp BP SpO2   21 0822 98.9 °F (37.2 °C) 90 20 122/72 100 %   21 0340 98.4 °F (36.9 °C) 97 16 101/62 100 %   21 2253 97.8 °F (36.6 °C) 81 18 111/60 100 %   21 2007 97.8 °F (36.6 °C) 90 16 111/63 100 %   21 1605 98.5 °F (36.9 °C) 88 16 115/77 100 %   21 1600 -- 88 -- -- --   21 1236 -- (!) 115 -- 138/73 --   09/21/21 1225 97.7 °F (36.5 °C) (!) 104 14 (!) 118/57 100 %   09/21/21 1200 -- (!) 115 -- -- --   09/21/21 0858 98.4 °F (36.9 °C) 94 16 98/63 100 %       Intake/Output Summary (Last 24 hours) at 9/22/2021 0830  Last data filed at 9/21/2021 2221  Gross per 24 hour   Intake 250 ml   Output 2525 ml   Net -2275 ml        PHYSICAL EXAM:  General: WD, WN. Alert, cooperative, no acute distress    EENT:  EOMI. Anicteric sclerae. MMM  Resp:  CTA bilaterally, no wheezing or rales. No accessory muscle use  CV:  Regular  rhythm,  normal S1/S2, no murmurs rubs gallops, No edema  GI:  Soft, Non distended, Non tender. +Bowel sounds  Neurologic:  Alert and oriented X 3, normal speech,   Psych:   Good insight. Not anxious nor agitated  Skin:  No rashes. No jaundice    Reviewed most current lab test results and cultures  YES  Reviewed most current radiology test results   YES  Review and summation of old records today    NO  Reviewed patient's current orders and MAR    YES  PMH/SH reviewed - no change compared to H&P  ________________________________________________________________________  Care Plan discussed with:    Comments   Patient x    Family      RN x    Care Manager x    Consultant  x                     x Multidiciplinary team rounds were held today with , nursing, pharmacist and clinical coordinator. Patient's plan of care was discussed; medications were reviewed and discharge planning was addressed.      ________________________________________________________________________  Total NON critical care TIME:  25   Minutes    Total CRITICAL CARE TIME Spent:   Minutes non procedure based      Comments   >50% of visit spent in counseling and coordination of care     ________________________________________________________________________  Charissa Bruno MD     Procedures: see electronic medical records for all procedures/Xrays and details which were not copied into this note but were reviewed prior to creation of Plan. LABS:  I reviewed today's most current labs and imaging studies.   Pertinent labs include:  Recent Labs     09/21/21 0313 09/20/21 1738   WBC 11.2* 17.0*   HGB 7.3* 8.4*   HCT 24.5* 28.1*   * 618*     Recent Labs     09/22/21 0349 09/21/21 0313 09/20/21 1738   NA  --  141 139   K  --  3.6 4.1   CL  --  107 101   CO2  --  27 29   GLU  --  97 116*   BUN  --  3* 5*   CREA 0.76 0.65* 0.98   CA  --  8.2* 8.6   MG  --  2.0 2.1   ALB  --   --  2.2*   TBILI  --   --  0.2   ALT  --   --  10*       Signed: Efrem Montalvo MD

## 2021-09-22 NOTE — PROGRESS NOTES
Reason for Admission:    HPI: 21 y/o male pmhx of hydradenitis suppuritiva p/w worsening open wounds and fever. Patient has had wounds in bilaterally in his groin and in his axilla which he has been dealing with since January 2021. He was hospitalized from ? June to July at [facility] and [facility] for the wounds. RUR Score:  13                   Plan for utilizing home health:          PCP: First and Last name:  Maryse Gonzalez NP     Name of Practice:    Are you a current patient: Yes/No:    Approximate date of last visit:    Can you participate in a virtual visit with your PCP:                     Current Advanced Directive/Advance Care Plan: Full Code  Wants CPR and ventilation. Heathcare decision maker is his grandmother, Rickey Means [phone]. Healthcare Decision Maker:   Click here to complete [address] including selection of the Healthcare Decision Maker Relationship (ie \"Primary\")             Primary Decision Maker: Brunilda Sung - [phone]                  Transition of Care Plan:         Lives with girlfriend in community. BS LISA has been providing visits in the home to assist with wound care. Viry Sharp was also taught by nursing during last stay at [facility] in June 2021. Uses [facility] for medications. Assessment ongoing.   ANAHI Asif/DEVIKA  [phone]

## 2021-09-22 NOTE — PROGRESS NOTES
Problem: Falls - Risk of  Goal: *Absence of Falls  Description: Document Sally Christian Fall Risk and appropriate interventions in the flowsheet.   Outcome: Progressing Towards Goal  Note: Fall Risk Interventions:            Medication Interventions: Teach patient to arise slowly    Elimination Interventions: Urinal in reach              Problem: Patient Education: Go to Patient Education Activity  Goal: Patient/Family Education  Outcome: Progressing Towards Goal

## 2021-09-22 NOTE — PERIOP NOTES
Spoke with [facility], RN to advise patient scheduled for surgery 9/23/2021, NPO after midnight, will need to obtain Covid test.

## 2021-09-23 ENCOUNTER — HOME HEALTH ADMISSION (OUTPATIENT)
Dept: HOME HEALTH SERVICES | Facility: HOME HEALTH | Age: 26
End: 2021-09-23
Payer: MEDICAID

## 2021-09-23 ENCOUNTER — ANESTHESIA (OUTPATIENT)
Dept: SURGERY | Age: 26
DRG: 710 | End: 2021-09-23
Payer: MEDICAID

## 2021-09-23 LAB
ANION GAP SERPL CALC-SCNC: 7 MMOL/L (ref 5–15)
BACTERIA SPEC CULT: ABNORMAL
BACTERIA SPEC CULT: ABNORMAL
BASOPHILS # BLD: 0 K/UL (ref 0–0.1)
BASOPHILS NFR BLD: 0 % (ref 0–1)
BUN SERPL-MCNC: 10 MG/DL (ref 6–20)
BUN/CREAT SERPL: 10 (ref 12–20)
CALCIUM SERPL-MCNC: 8.5 MG/DL (ref 8.5–10.1)
CHLORIDE SERPL-SCNC: 104 MMOL/L (ref 97–108)
CO2 SERPL-SCNC: 27 MMOL/L (ref 21–32)
CREAT SERPL-MCNC: 0.94 MG/DL (ref 0.7–1.3)
CREAT SERPL-MCNC: 0.96 MG/DL (ref 0.7–1.3)
DIFFERENTIAL METHOD BLD: ABNORMAL
EOSINOPHIL # BLD: 1 K/UL (ref 0–0.4)
EOSINOPHIL NFR BLD: 7 % (ref 0–7)
ERYTHROCYTE [DISTWIDTH] IN BLOOD BY AUTOMATED COUNT: 15.9 % (ref 11.5–14.5)
FLUAV RNA SPEC QL NAA+PROBE: NOT DETECTED
FLUBV RNA SPEC QL NAA+PROBE: NOT DETECTED
GLUCOSE SERPL-MCNC: 100 MG/DL (ref 65–100)
GRAM STN SPEC: ABNORMAL
GRAM STN SPEC: ABNORMAL
HCT VFR BLD AUTO: 24.2 % (ref 36.6–50.3)
HGB BLD-MCNC: 7.2 G/DL (ref 12.1–17)
IMM GRANULOCYTES # BLD AUTO: 0.3 K/UL (ref 0–0.04)
IMM GRANULOCYTES NFR BLD AUTO: 2 % (ref 0–0.5)
LYMPHOCYTES # BLD: 1.2 K/UL (ref 0.8–3.5)
LYMPHOCYTES NFR BLD: 9 % (ref 12–49)
MAGNESIUM SERPL-MCNC: 1.8 MG/DL (ref 1.6–2.4)
MCH RBC QN AUTO: 19.6 PG (ref 26–34)
MCHC RBC AUTO-ENTMCNC: 29.8 G/DL (ref 30–36.5)
MCV RBC AUTO: 65.8 FL (ref 80–99)
MONOCYTES # BLD: 1 K/UL (ref 0–1)
MONOCYTES NFR BLD: 7 % (ref 5–13)
NEUTS SEG # BLD: 10.3 K/UL (ref 1.8–8)
NEUTS SEG NFR BLD: 75 % (ref 32–75)
NRBC # BLD: 0 K/UL (ref 0–0.01)
NRBC BLD-RTO: 0 PER 100 WBC
PHOSPHATE SERPL-MCNC: 4.2 MG/DL (ref 2.6–4.7)
PLATELET # BLD AUTO: 449 K/UL (ref 150–400)
PMV BLD AUTO: 8.8 FL (ref 8.9–12.9)
POTASSIUM SERPL-SCNC: 3.7 MMOL/L (ref 3.5–5.1)
RBC # BLD AUTO: 3.68 M/UL (ref 4.1–5.7)
RBC MORPH BLD: ABNORMAL
SARS-COV-2, COV2: NOT DETECTED
SARS-COV-2, XPLCVT: NOT DETECTED
SERVICE CMNT-IMP: ABNORMAL
SODIUM SERPL-SCNC: 138 MMOL/L (ref 136–145)
SOURCE, COVRS: NORMAL
WBC # BLD AUTO: 13.8 K/UL (ref 4.1–11.1)

## 2021-09-23 PROCEDURE — 77030026438 HC STYL ET INTUB CARD -A: Performed by: ANESTHESIOLOGY

## 2021-09-23 PROCEDURE — 85025 COMPLETE CBC W/AUTO DIFF WBC: CPT

## 2021-09-23 PROCEDURE — 74011000250 HC RX REV CODE- 250: Performed by: ANESTHESIOLOGY

## 2021-09-23 PROCEDURE — 83735 ASSAY OF MAGNESIUM: CPT

## 2021-09-23 PROCEDURE — 74011250637 HC RX REV CODE- 250/637: Performed by: STUDENT IN AN ORGANIZED HEALTH CARE EDUCATION/TRAINING PROGRAM

## 2021-09-23 PROCEDURE — 74011000250 HC RX REV CODE- 250: Performed by: SURGERY

## 2021-09-23 PROCEDURE — 0HR8XK3 REPLACEMENT OF BUTTOCK SKIN WITH NONAUTOLOGOUS TISSUE SUBSTITUTE, FULL THICKNESS, EXTERNAL APPROACH: ICD-10-PCS | Performed by: SURGERY

## 2021-09-23 PROCEDURE — 77030040361 HC SLV COMPR DVT MDII -B

## 2021-09-23 PROCEDURE — 36415 COLL VENOUS BLD VENIPUNCTURE: CPT

## 2021-09-23 PROCEDURE — 2709999900 HC NON-CHARGEABLE SUPPLY: Performed by: SURGERY

## 2021-09-23 PROCEDURE — 65660000000 HC RM CCU STEPDOWN

## 2021-09-23 PROCEDURE — 88304 TISSUE EXAM BY PATHOLOGIST: CPT

## 2021-09-23 PROCEDURE — 80048 BASIC METABOLIC PNL TOTAL CA: CPT

## 2021-09-23 PROCEDURE — 77030031139 HC SUT VCRL2 J&J -A: Performed by: SURGERY

## 2021-09-23 PROCEDURE — 76010000132 HC OR TIME 2.5 TO 3 HR: Performed by: SURGERY

## 2021-09-23 PROCEDURE — 77030013079 HC BLNKT BAIR HGGR 3M -A: Performed by: ANESTHESIOLOGY

## 2021-09-23 PROCEDURE — 74011250636 HC RX REV CODE- 250/636: Performed by: HOSPITALIST

## 2021-09-23 PROCEDURE — 11770 EXC PILONIDAL CYST SIMPLE: CPT | Performed by: SURGERY

## 2021-09-23 PROCEDURE — 0JB90ZZ EXCISION OF BUTTOCK SUBCUTANEOUS TISSUE AND FASCIA, OPEN APPROACH: ICD-10-PCS | Performed by: SURGERY

## 2021-09-23 PROCEDURE — 84100 ASSAY OF PHOSPHORUS: CPT

## 2021-09-23 PROCEDURE — 76060000036 HC ANESTHESIA 2.5 TO 3 HR: Performed by: SURGERY

## 2021-09-23 PROCEDURE — 76210000006 HC OR PH I REC 0.5 TO 1 HR: Performed by: SURGERY

## 2021-09-23 PROCEDURE — 74011250637 HC RX REV CODE- 250/637: Performed by: SURGERY

## 2021-09-23 PROCEDURE — 87636 SARSCOV2 & INF A&B AMP PRB: CPT

## 2021-09-23 PROCEDURE — 74011250636 HC RX REV CODE- 250/636: Performed by: STUDENT IN AN ORGANIZED HEALTH CARE EDUCATION/TRAINING PROGRAM

## 2021-09-23 PROCEDURE — 77030040718 HC DRSG HYDRGEL SKINTEGRITY MDII -A: Performed by: SURGERY

## 2021-09-23 PROCEDURE — 74011250636 HC RX REV CODE- 250/636: Performed by: ANESTHESIOLOGY

## 2021-09-23 PROCEDURE — 77030008684 HC TU ET CUF COVD -B: Performed by: ANESTHESIOLOGY

## 2021-09-23 DEVICE — GRAFT WND 6-LAYER 10X15CM -- CYTAL: Type: IMPLANTABLE DEVICE | Site: BUTTOCKS | Status: FUNCTIONAL

## 2021-09-23 DEVICE — DRESSING WND MICRONIZED PARTIC 500 MG MATRISTEM MICROMATRIX: Type: IMPLANTABLE DEVICE | Site: BUTTOCKS | Status: FUNCTIONAL

## 2021-09-23 DEVICE — GRAFT MICRO MATRX 1000MG EA --: Type: IMPLANTABLE DEVICE | Site: BUTTOCKS | Status: FUNCTIONAL

## 2021-09-23 RX ORDER — SUCCINYLCHOLINE CHLORIDE 20 MG/ML
INJECTION INTRAMUSCULAR; INTRAVENOUS AS NEEDED
Status: DISCONTINUED | OUTPATIENT
Start: 2021-09-23 | End: 2021-09-23 | Stop reason: HOSPADM

## 2021-09-23 RX ORDER — GLYCOPYRROLATE 0.2 MG/ML
INJECTION INTRAMUSCULAR; INTRAVENOUS AS NEEDED
Status: DISCONTINUED | OUTPATIENT
Start: 2021-09-23 | End: 2021-09-23 | Stop reason: HOSPADM

## 2021-09-23 RX ORDER — SODIUM CHLORIDE, SODIUM LACTATE, POTASSIUM CHLORIDE, CALCIUM CHLORIDE 600; 310; 30; 20 MG/100ML; MG/100ML; MG/100ML; MG/100ML
INJECTION, SOLUTION INTRAVENOUS
Status: DISCONTINUED | OUTPATIENT
Start: 2021-09-23 | End: 2021-09-23 | Stop reason: HOSPADM

## 2021-09-23 RX ORDER — SODIUM CHLORIDE 0.9 % (FLUSH) 0.9 %
5-40 SYRINGE (ML) INJECTION AS NEEDED
Status: DISCONTINUED | OUTPATIENT
Start: 2021-09-23 | End: 2021-09-23 | Stop reason: HOSPADM

## 2021-09-23 RX ORDER — FENTANYL CITRATE 50 UG/ML
INJECTION, SOLUTION INTRAMUSCULAR; INTRAVENOUS AS NEEDED
Status: DISCONTINUED | OUTPATIENT
Start: 2021-09-23 | End: 2021-09-23 | Stop reason: HOSPADM

## 2021-09-23 RX ORDER — GLYCOPYRROLATE 0.2 MG/ML
0.2 INJECTION INTRAMUSCULAR; INTRAVENOUS
Status: DISCONTINUED | OUTPATIENT
Start: 2021-09-23 | End: 2021-09-23 | Stop reason: HOSPADM

## 2021-09-23 RX ORDER — PROPOFOL 10 MG/ML
INJECTION, EMULSION INTRAVENOUS AS NEEDED
Status: DISCONTINUED | OUTPATIENT
Start: 2021-09-23 | End: 2021-09-23 | Stop reason: HOSPADM

## 2021-09-23 RX ORDER — HYDROMORPHONE HYDROCHLORIDE 1 MG/ML
0.5 INJECTION, SOLUTION INTRAMUSCULAR; INTRAVENOUS; SUBCUTANEOUS
Status: DISCONTINUED | OUTPATIENT
Start: 2021-09-23 | End: 2021-09-23 | Stop reason: HOSPADM

## 2021-09-23 RX ORDER — SODIUM CHLORIDE 0.9 % (FLUSH) 0.9 %
5-40 SYRINGE (ML) INJECTION EVERY 8 HOURS
Status: DISCONTINUED | OUTPATIENT
Start: 2021-09-23 | End: 2021-09-23 | Stop reason: HOSPADM

## 2021-09-23 RX ORDER — GLYCOPYRROLATE 0.2 MG/ML
INJECTION INTRAMUSCULAR; INTRAVENOUS
Status: DISPENSED
Start: 2021-09-23 | End: 2021-09-23

## 2021-09-23 RX ORDER — GLYCOPYRROLATE 0.2 MG/ML
0.1 INJECTION INTRAMUSCULAR; INTRAVENOUS
Status: DISCONTINUED | OUTPATIENT
Start: 2021-09-23 | End: 2021-09-23

## 2021-09-23 RX ORDER — LIDOCAINE HYDROCHLORIDE 10 MG/ML
0.1 INJECTION, SOLUTION EPIDURAL; INFILTRATION; INTRACAUDAL; PERINEURAL AS NEEDED
Status: DISCONTINUED | OUTPATIENT
Start: 2021-09-23 | End: 2021-09-23 | Stop reason: HOSPADM

## 2021-09-23 RX ORDER — MIDAZOLAM HYDROCHLORIDE 1 MG/ML
INJECTION, SOLUTION INTRAMUSCULAR; INTRAVENOUS AS NEEDED
Status: DISCONTINUED | OUTPATIENT
Start: 2021-09-23 | End: 2021-09-23 | Stop reason: HOSPADM

## 2021-09-23 RX ORDER — SODIUM CHLORIDE 9 MG/ML
50 INJECTION, SOLUTION INTRAVENOUS CONTINUOUS
Status: DISCONTINUED | OUTPATIENT
Start: 2021-09-23 | End: 2021-09-23 | Stop reason: HOSPADM

## 2021-09-23 RX ORDER — LANOLIN ALCOHOL/MO/W.PET/CERES
1 CREAM (GRAM) TOPICAL
Status: DISCONTINUED | OUTPATIENT
Start: 2021-09-23 | End: 2021-09-24 | Stop reason: HOSPADM

## 2021-09-23 RX ORDER — KETOROLAC TROMETHAMINE 30 MG/ML
INJECTION, SOLUTION INTRAMUSCULAR; INTRAVENOUS AS NEEDED
Status: DISCONTINUED | OUTPATIENT
Start: 2021-09-23 | End: 2021-09-23 | Stop reason: HOSPADM

## 2021-09-23 RX ORDER — DEXAMETHASONE SODIUM PHOSPHATE 4 MG/ML
INJECTION, SOLUTION INTRA-ARTICULAR; INTRALESIONAL; INTRAMUSCULAR; INTRAVENOUS; SOFT TISSUE AS NEEDED
Status: DISCONTINUED | OUTPATIENT
Start: 2021-09-23 | End: 2021-09-23 | Stop reason: HOSPADM

## 2021-09-23 RX ORDER — ONDANSETRON 2 MG/ML
INJECTION INTRAMUSCULAR; INTRAVENOUS AS NEEDED
Status: DISCONTINUED | OUTPATIENT
Start: 2021-09-23 | End: 2021-09-23 | Stop reason: HOSPADM

## 2021-09-23 RX ORDER — LIDOCAINE HYDROCHLORIDE 20 MG/ML
INJECTION, SOLUTION EPIDURAL; INFILTRATION; INTRACAUDAL; PERINEURAL AS NEEDED
Status: DISCONTINUED | OUTPATIENT
Start: 2021-09-23 | End: 2021-09-23 | Stop reason: HOSPADM

## 2021-09-23 RX ORDER — SODIUM CHLORIDE, SODIUM LACTATE, POTASSIUM CHLORIDE, CALCIUM CHLORIDE 600; 310; 30; 20 MG/100ML; MG/100ML; MG/100ML; MG/100ML
50 INJECTION, SOLUTION INTRAVENOUS CONTINUOUS
Status: DISCONTINUED | OUTPATIENT
Start: 2021-09-23 | End: 2021-09-23 | Stop reason: HOSPADM

## 2021-09-23 RX ORDER — FENTANYL CITRATE 50 UG/ML
25 INJECTION, SOLUTION INTRAMUSCULAR; INTRAVENOUS
Status: DISCONTINUED | OUTPATIENT
Start: 2021-09-23 | End: 2021-09-23 | Stop reason: HOSPADM

## 2021-09-23 RX ORDER — BUPIVACAINE HYDROCHLORIDE 2.5 MG/ML
INJECTION, SOLUTION EPIDURAL; INFILTRATION; INTRACAUDAL AS NEEDED
Status: DISCONTINUED | OUTPATIENT
Start: 2021-09-23 | End: 2021-09-23 | Stop reason: HOSPADM

## 2021-09-23 RX ADMIN — PROPOFOL 200 MG: 10 INJECTION, EMULSION INTRAVENOUS at 13:17

## 2021-09-23 RX ADMIN — VANCOMYCIN HYDROCHLORIDE 1250 MG: 10 INJECTION, POWDER, LYOPHILIZED, FOR SOLUTION INTRAVENOUS at 22:02

## 2021-09-23 RX ADMIN — ONDANSETRON 4 MG: 2 INJECTION INTRAMUSCULAR; INTRAVENOUS at 13:17

## 2021-09-23 RX ADMIN — FERROUS SULFATE TAB 325 MG (65 MG ELEMENTAL FE) 325 MG: 325 (65 FE) TAB at 17:11

## 2021-09-23 RX ADMIN — FENTANYL CITRATE 25 MCG: 50 INJECTION, SOLUTION INTRAMUSCULAR; INTRAVENOUS at 15:03

## 2021-09-23 RX ADMIN — Medication 10 ML: at 16:55

## 2021-09-23 RX ADMIN — OXYCODONE HYDROCHLORIDE 5 MG: 5 TABLET ORAL at 11:09

## 2021-09-23 RX ADMIN — SODIUM CHLORIDE, POTASSIUM CHLORIDE, SODIUM LACTATE AND CALCIUM CHLORIDE: 600; 310; 30; 20 INJECTION, SOLUTION INTRAVENOUS at 12:08

## 2021-09-23 RX ADMIN — GABAPENTIN 200 MG: 100 CAPSULE ORAL at 17:11

## 2021-09-23 RX ADMIN — VANCOMYCIN HYDROCHLORIDE 1250 MG: 10 INJECTION, POWDER, LYOPHILIZED, FOR SOLUTION INTRAVENOUS at 09:05

## 2021-09-23 RX ADMIN — GLYCOPYRROLATE 0.2 MG: 0.2 INJECTION INTRAMUSCULAR; INTRAVENOUS at 13:11

## 2021-09-23 RX ADMIN — OXYCODONE HYDROCHLORIDE 5 MG: 5 TABLET ORAL at 04:55

## 2021-09-23 RX ADMIN — Medication 10 ML: at 05:49

## 2021-09-23 RX ADMIN — FENTANYL CITRATE 25 MCG: 50 INJECTION, SOLUTION INTRAMUSCULAR; INTRAVENOUS at 14:23

## 2021-09-23 RX ADMIN — FENTANYL CITRATE 25 MCG: 50 INJECTION, SOLUTION INTRAMUSCULAR; INTRAVENOUS at 14:57

## 2021-09-23 RX ADMIN — KETOROLAC TROMETHAMINE 30 MG: 30 INJECTION INTRAMUSCULAR; INTRAVENOUS at 15:02

## 2021-09-23 RX ADMIN — FENTANYL CITRATE 25 MCG: 50 INJECTION, SOLUTION INTRAMUSCULAR; INTRAVENOUS at 13:55

## 2021-09-23 RX ADMIN — FENTANYL CITRATE 100 MCG: 50 INJECTION, SOLUTION INTRAMUSCULAR; INTRAVENOUS at 13:17

## 2021-09-23 RX ADMIN — GABAPENTIN 200 MG: 100 CAPSULE ORAL at 09:05

## 2021-09-23 RX ADMIN — METRONIDAZOLE 500 MG: 500 INJECTION, SOLUTION INTRAVENOUS at 16:55

## 2021-09-23 RX ADMIN — SODIUM CHLORIDE, POTASSIUM CHLORIDE, SODIUM LACTATE AND CALCIUM CHLORIDE: 600; 310; 30; 20 INJECTION, SOLUTION INTRAVENOUS at 14:54

## 2021-09-23 RX ADMIN — LEVOFLOXACIN 750 MG: 5 INJECTION, SOLUTION INTRAVENOUS at 20:10

## 2021-09-23 RX ADMIN — DEXAMETHASONE SODIUM PHOSPHATE 4 MG: 4 INJECTION, SOLUTION INTRAMUSCULAR; INTRAVENOUS at 13:17

## 2021-09-23 RX ADMIN — SUCCINYLCHOLINE CHLORIDE 100 MG: 20 INJECTION, SOLUTION INTRAMUSCULAR; INTRAVENOUS at 13:17

## 2021-09-23 RX ADMIN — MIDAZOLAM HYDROCHLORIDE 2 MG: 2 INJECTION, SOLUTION INTRAMUSCULAR; INTRAVENOUS at 13:12

## 2021-09-23 RX ADMIN — Medication 10 ML: at 22:03

## 2021-09-23 RX ADMIN — LIDOCAINE HYDROCHLORIDE 100 MG: 20 INJECTION, SOLUTION EPIDURAL; INFILTRATION; INTRACAUDAL; PERINEURAL at 13:17

## 2021-09-23 NOTE — ANESTHESIA PREPROCEDURE EVALUATION
Relevant Problems   No relevant active problems       Anesthetic History   No history of anesthetic complications            Review of Systems / Medical History  Patient summary reviewed and pertinent labs reviewed    Pulmonary  Within defined limits                 Neuro/Psych              Cardiovascular  Within defined limits                Exercise tolerance: >4 METS     GI/Hepatic/Renal  Within defined limits              Endo/Other             Other Findings              Physical Exam    Airway  Mallampati: II  TM Distance: 4 - 6 cm  Neck ROM: normal range of motion        Cardiovascular    Rhythm: regular  Rate: normal         Dental  No notable dental hx       Pulmonary  Breath sounds clear to auscultation               Abdominal  GI exam deferred       Other Findings            Anesthetic Plan    ASA: 1  Anesthesia type: general          Induction: Intravenous  Anesthetic plan and risks discussed with: Patient

## 2021-09-23 NOTE — H&P
Date of Surgery Update:  Kb Chirinos was seen and examined. History and physical has been reviewed. The patient has been examined. There have been no significant clinical changes since the completion of the originally dated History and Physical.    Signed By: Isa Lin MD     September 23, 2021 12:16 PM         Please note from the office and include the additional information below:    Past Medical History  History reviewed. No pertinent past medical history. Past Surgical History  No past surgical history on file. Social History  The patient Kb Chirinos  reports that he has never smoked. He has never used smokeless tobacco. He reports current alcohol use. He reports that he does not use drugs. Family History  History reviewed. No pertinent family history.

## 2021-09-23 NOTE — PERIOP NOTES
TRANSFER - IN REPORT:    Verbal report received from Dora(name) on aMbel Quijano  being received from 5 Med-Surg for ordered procedure      Report consisted of patients Situation, Background, Assessment and   Recommendations(SBAR). Information from the following report(s) SBAR was reviewed with the receiving nurse. Opportunity for questions and clarification was provided. Assessment completed upon patients arrival to unit and care assumed.

## 2021-09-23 NOTE — PROGRESS NOTES
Handoff Report from Operating Room to PACU    Report received from Dr Jane Benitez and Rafael Lyon regarding Wily Scott. Surgeon(s):  Ana Gay MD  And Procedure(s) (LRB):  EXCISION PILONIDAL CYST INTERGLUTEAL CLEFT, APPLY ACELLULAR XENOGRAFT (N/A)  confirmed   with dressings discussed. Anesthesia type, drugs, patient history, complications, estimated blood loss, vital signs, intake and output, and new IV site were reviewed.

## 2021-09-23 NOTE — BRIEF OP NOTE
Brief Postoperative Note    Patient: Loraine Villasenor  YOB: 1995  MRN: [id number]    Date of Procedure: 9/23/2021     Pre-Op Diagnosis:  Hidradenitis Intergluteal Cleft. Post-Op Diagnosis:  Pilonidal Cyst.      Procedure(s):   Pilonidal Cystectomy. Apply Acellular Xenograft. Surgeon(s):  Sujata Baez MD    Surgical Assistant: None    Anesthesia: General     Estimated Blood Loss (mL): Approximately 100 ml. Complications: None    Specimens:   ID Type Source Tests Collected by Time Destination   1 : Pilonidal Cyst Preservative Buttock  Sujata Baez MD 9/23/2021 1436 Pathology        Implants:   Implant Name Type Inv. Item Serial No.  Lot No. LRB No. Used Action   GRAFT WND 6-LAYER [id number] -- CYTAL - [id number]  GRAFT WND 6-LAYER [id number] -- CYTAL [id number] Terrajoule [id number] N/A 1 Implanted   DRESSING WND MICRONIZED PARTIC 1000 MG MATRISTEM MICROMATRIX - [id number]  DRESSING WND MICRONIZED PARTIC 1000 MG MATRISTEM MICROMATRIX [id number] Terrajoule [id number] N/A 1 Implanted   DRESSING WND MICRONIZED PARTIC 500 MG MATRISTEM MICROMATRIX - [id number]  DRESSING WND MICRONIZED PARTIC 500 MG MATRISTEM MICROMATRIX [id number] Terrajoule [id number] N/A 1 Implanted       Drains: * No LDAs found *    Findings: Pilonidal Cyst. Hair removed from wound. Wound approximately 13 cm x 7 cm.      Electronically Signed by Radha Mendoza MD on 9/23/2021 at 3:18 PM

## 2021-09-23 NOTE — PROGRESS NOTES
JM     RUR 25 %   IDR rounds today with MD and team  Patient to OR this afternoon   MD indicates possible discharge post this procedure   Discussed follow-up needed and plan of care for Wound care     We have been unable to reach his friend to discussed plans. Sent referral to [facility] [place] - INPATIENT for wound care and teaching - will need specific Home Care Orders. Appointments on AVS    Follow up with Amanda Vernon 227   Specialty: [facility] - [address]   [facility] [id number]   [facility] Nurse to call you for your first home visit       Follow up with Perry White MD on 9/30/2021   Specialty: General Surgery, Breast Surgery, Oncology   [address] [place] [address]   [phone]   your appointment with the Surgeon is at 3:15PM   please keep this appointment      Go to [facility]. Ephraim Pop NP on 10/4/2021   Specialty: Nurse Practitioner   Sallie Harris   [address] [facility]   [phone]   11AM, , Please take ID and insurance card, Please wear a mask, Please arrive 15 mins prior      Go to [address] [place] [address] Dr Gan on 10/5/2021   [address]   [phone]   10:15AM, , Please take ID and insurance card, Please arrive 15 mins prior, Please wear a mask            Addendum : 3:44PM     The patient has not return to floor yet   Review OR Brief Note Wound - Wound approximately 13 cm x 7 cm.    To follow-up for specific orders from surgery     [address]

## 2021-09-23 NOTE — PROGRESS NOTES
PT note: Orders received and acknowledged. Patient is planned for surgery today. Will follow up tomorrow if appropriate.  Kelli Emerson, PT

## 2021-09-23 NOTE — ANESTHESIA POSTPROCEDURE EVALUATION
Procedure(s):  EXCISION PILONIDAL CYST INTERGLUTEAL CLEFT, APPLY ACELLULAR XENOGRAFT. general    Anesthesia Post Evaluation      Multimodal analgesia: multimodal analgesia not used between 6 hours prior to anesthesia start to PACU discharge  Patient location during evaluation: PACU  Patient participation: waiting for patient participation  Level of consciousness: sleepy but conscious  Pain management: adequate  Airway patency: patent  Anesthetic complications: no  Cardiovascular status: acceptable  Respiratory status: acceptable  Hydration status: acceptable  Post anesthesia nausea and vomiting:  none  Final Post Anesthesia Temperature Assessment:  Normothermia (36.0-37.5 degrees C)      INITIAL Post-op Vital signs:   Vitals Value Taken Time   /84 09/23/21 1600   Temp 36.5 °C (97.7 °F) 09/23/21 1552   Pulse 97 09/23/21 1604   Resp 11 09/23/21 1604   SpO2 99 % 09/23/21 1604   Vitals shown include unvalidated device data.

## 2021-09-23 NOTE — PROGRESS NOTES
Problem: Falls - Risk of  Goal: *Absence of Falls  Description: Document Madelin Soriano Fall Risk and appropriate interventions in the flowsheet.   Outcome: Progressing Towards Goal  Note: Fall Risk Interventions:            Medication Interventions: Teach patient to arise slowly    Elimination Interventions: Call light in reach, Patient to call for help with toileting needs              Problem: Patient Education: Go to Patient Education Activity  Goal: Patient/Family Education  Outcome: Progressing Towards Goal

## 2021-09-23 NOTE — PERIOP NOTES
TRANSFER - OUT REPORT:    Verbal report given to Dora(name) on Alvaro Yang  being transferred to [facility]) for routine post - op       Report consisted of patients Situation, Background, Assessment and   Recommendations(SBAR). Information from the following report(s) MAR was reviewed with the receiving nurse. Opportunity for questions and clarification was provided.       Patient transported with:   Monitor and PACU staff x2

## 2021-09-23 NOTE — PROGRESS NOTES
1900 Report received, pt in bed, alert & oriented, denies pain at this time, dressing in place, pt denies pain, side rails up x 2, call light in reach    2010 PRN Roxicodone given for mild pain  2110 Pain reassessment complete    0000 Reassessment complete at this time, pt awake watching TV, using urinal at bedside, educated NPO status now, pt verbalized understanding    0400 No additional changes at this time, AM labs drawn and sent, CHG bath performed for surgery    0455 PRN roxicodone given for mild pain  0500 Pain reassessment complete

## 2021-09-23 NOTE — PROGRESS NOTES
0720 Bedside shift report given to Benito Winters RN (oncoming) from ALESSANDRO Nguyen (offgoing). Report included the following: SBAR, MAR, Kardex, Recent Results and Cardiac Rhythm of NSR/Sinus tach. 0745 VS stable, taken by students. 9586 Morning assessment complete     0905 IV abx hung. Pt resting in bed. Denies any needs     1020 Covid swab (rapid) completed & sent to lab. Denies any pain meds or further needs     1052 Pt COVID swab negative. PACU up to take pt downstairs via wheelchair for surgery     1111 Pt taken down in stretcher d/t to pt bleeding on floor when standing up    1631 Transfer report given to Lukas Drivers from [facility] in PACU. Pt to arrive back on floor shortly     1645 Pt brought back to floor via stretcher. VS stable, pt stated no pain. Pt appears to be very drowsy and stated he would not be able to swallow any meds at this time     1715 Scheduled meds admin. IV abx hung at 1655. Will ask pharmacy to reschedule remaining doses. Dinner tray ordered for pt. Left pt laying in bed, watching TV w/ no complaints or further needs     1920 Bedside shift report given to AL[facility]ANDR[facility] Nguyen (oncoming[facility] Report included the following: SBAR, MAR, Kardex, Recent Results and Cardiac Rhythm of NSR/Sinus tach.

## 2021-09-23 NOTE — PROGRESS NOTES
Comprehensive Nutrition Assessment    Type and Reason for Visit: Initial, Consult    Nutrition Recommendations/Plan:   · Resume Regular Diet post-op. · RD ordered Ensure Enlive TID with meals to begin as soon as diet resumes for added kcals/protein to support wound healing. · RD also placed a Nursing Misc as a reminder to document %PO & %Supplement Intake for optimal nutritional assessment and recs. Nutrition Assessment:     9/23: Chart reviewed; med noted for sepsis POA, planned excision of the hidradenitis of the lower back and intergluteal cleft and application of acellular xenograft today. Therefore, pt remains NPO for procedure. Prior to NPO, pt was placed on a Regular Diet. Noted to have a low BMI at 17.7 with unintentional weight loss. RD went ahead and ordered Ensure Enlive to begin immediately once diet resumes post-op. David Cyr trends per documentation are inconsistent. Last Weight Metric  Weight Loss Metrics 9/22/2021 9/20/2021 7/6/2021 6/27/2021 6/5/2021 6/4/2021 6/4/2021   Today's Wt 142 lb - 165 lb 221 lb 6.4 oz 151 lb 14.4 oz 152 lb -   BMI - 17.75 kg/m2 20.62 kg/m2 27.67 kg/m2 - 18.99 kg/m2 19 kg/m2     Estimated Daily Nutrient Needs:  Energy (kcal): 2230 (BMR 1715 x 1. 3AF); Weight Used for Energy Requirements: Current  Protein (g):  (1.2 - 1.5 g/kg bw); Weight Used for Protein Requirements: Current  Fluid (ml/day): 2200 ml/day; Method Used for Fluid Requirements: 1 ml/kcal    Nutrition Related Findings:  BM: 9/21; Labs: low H/H 7.2/24.2;  Meds: Levaquin, Dilaudid      Wounds:    Open wounds (plans for I&D today)       Current Nutrition Therapies:  DIET NPO  ADULT ORAL NUTRITION SUPPLEMENT Breakfast, Lunch, Dinner; Standard High Calorie/High Protein    Anthropometric Measures:  · Height:  6' 3\" (190.5 cm)  · Current Body Wt:  64.4 kg (141 lb 15.6 oz)   · Ideal Body Wt:  196 lbs:  72.4 %   · BMI Category:  Underweight (BMI less than 18.5)       Nutrition Diagnosis:   · Increased nutrient needs related to increased demand for energy/nutrients as evidenced by  (increased energy/protein to optimize wound healing)    Nutrition Interventions:   Food and/or Nutrient Delivery: Start oral nutrition supplement (Resume Regular Diet post-op; added Ensure Enlive TID to be added once diet resumed)  Nutrition Education and Counseling: No recommendations at this time  Coordination of Nutrition Care: Continue to monitor while inpatient    Goals:  Trend PO intake >75% of meals + consume 480 ml ONS next 5-7 days       Nutrition Monitoring and Evaluation:   Behavioral-Environmental Outcomes: None identified  Food/Nutrient Intake Outcomes: Diet advancement/tolerance, Food and nutrient intake, Supplement intake  Physical Signs/Symptoms Outcomes: Biochemical data, Weight, Skin    Discharge Planning:    Continue current diet, Continue oral nutrition supplement     Electronically signed by Danna Pozo RD on 9/23/2021 at 9:20 AM

## 2021-09-24 VITALS
SYSTOLIC BLOOD PRESSURE: 145 MMHG | WEIGHT: 142 LBS | BODY MASS INDEX: 17.66 KG/M2 | HEIGHT: 75 IN | OXYGEN SATURATION: 100 % | RESPIRATION RATE: 16 BRPM | DIASTOLIC BLOOD PRESSURE: 62 MMHG | TEMPERATURE: 98.3 F | HEART RATE: 85 BPM

## 2021-09-24 LAB
BACTERIA SPEC CULT: ABNORMAL
GRAM STN SPEC: ABNORMAL
SERVICE CMNT-IMP: ABNORMAL

## 2021-09-24 PROCEDURE — 74011250637 HC RX REV CODE- 250/637: Performed by: HOSPITALIST

## 2021-09-24 PROCEDURE — 97116 GAIT TRAINING THERAPY: CPT | Performed by: PHYSICAL THERAPIST

## 2021-09-24 PROCEDURE — 74011250636 HC RX REV CODE- 250/636: Performed by: STUDENT IN AN ORGANIZED HEALTH CARE EDUCATION/TRAINING PROGRAM

## 2021-09-24 PROCEDURE — 97161 PT EVAL LOW COMPLEX 20 MIN: CPT | Performed by: PHYSICAL THERAPIST

## 2021-09-24 PROCEDURE — 74011250637 HC RX REV CODE- 250/637: Performed by: SURGERY

## 2021-09-24 PROCEDURE — 74011250636 HC RX REV CODE- 250/636: Performed by: SURGERY

## 2021-09-24 PROCEDURE — 74011250637 HC RX REV CODE- 250/637: Performed by: STUDENT IN AN ORGANIZED HEALTH CARE EDUCATION/TRAINING PROGRAM

## 2021-09-24 RX ORDER — CLINDAMYCIN HYDROCHLORIDE 300 MG/1
300 CAPSULE ORAL 3 TIMES DAILY
Qty: 15 CAPSULE | Refills: 0 | Status: SHIPPED | OUTPATIENT
Start: 2021-09-24 | End: 2021-09-29

## 2021-09-24 RX ORDER — LANOLIN ALCOHOL/MO/W.PET/CERES
325 CREAM (GRAM) TOPICAL
Qty: 90 TABLET | Refills: 0 | Status: SHIPPED | OUTPATIENT
Start: 2021-09-24

## 2021-09-24 RX ORDER — GABAPENTIN 600 MG/1
300 TABLET ORAL 2 TIMES DAILY
Qty: 30 TABLET | Refills: 0 | Status: ON HOLD | OUTPATIENT
Start: 2021-09-24 | End: 2021-10-07

## 2021-09-24 RX ORDER — CIPROFLOXACIN 500 MG/1
500 TABLET ORAL 2 TIMES DAILY
Qty: 10 TABLET | Refills: 0 | Status: SHIPPED | OUTPATIENT
Start: 2021-09-24 | End: 2021-09-29

## 2021-09-24 RX ORDER — IBUPROFEN 200 MG
400 TABLET ORAL
Qty: 30 TABLET | Refills: 0 | Status: SHIPPED | OUTPATIENT
Start: 2021-09-24 | End: 2021-11-01

## 2021-09-24 RX ORDER — OXYCODONE HYDROCHLORIDE 5 MG/1
5 TABLET ORAL
Qty: 12 TABLET | Refills: 0 | Status: SHIPPED | OUTPATIENT
Start: 2021-09-24 | End: 2021-09-27

## 2021-09-24 RX ADMIN — OXYCODONE HYDROCHLORIDE 5 MG: 5 TABLET ORAL at 05:51

## 2021-09-24 RX ADMIN — Medication 1 SPRAY: at 12:20

## 2021-09-24 RX ADMIN — GABAPENTIN 200 MG: 100 CAPSULE ORAL at 08:07

## 2021-09-24 RX ADMIN — METRONIDAZOLE 500 MG: 500 INJECTION, SOLUTION INTRAVENOUS at 05:30

## 2021-09-24 RX ADMIN — FERROUS SULFATE TAB 325 MG (65 MG ELEMENTAL FE) 325 MG: 325 (65 FE) TAB at 08:07

## 2021-09-24 RX ADMIN — Medication 10 ML: at 05:30

## 2021-09-24 RX ADMIN — VANCOMYCIN HYDROCHLORIDE 1250 MG: 10 INJECTION, POWDER, LYOPHILIZED, FOR SOLUTION INTRAVENOUS at 09:45

## 2021-09-24 RX ADMIN — FERROUS SULFATE TAB 325 MG (65 MG ELEMENTAL FE) 325 MG: 325 (65 FE) TAB at 12:20

## 2021-09-24 RX ADMIN — OXYCODONE HYDROCHLORIDE 10 MG: 5 TABLET ORAL at 10:57

## 2021-09-24 RX ADMIN — Medication 1 SPRAY: at 12:19

## 2021-09-24 NOTE — OP NOTES
[facility] [place] [facility]    Name:  Angelita Triplett  MR#:  [id number]  :  1995  ACCOUNT #:  [de-identified]  DATE OF SERVICE:  2021    PREOPERATIVE DIAGNOSIS:  Hidradenitis, intergluteal cleft. POSTOPERATIVE DIAGNOSIS:  Pilonidal cyst.    PROCEDURE PERFORMED:  1. Pilonidal cystectomy. 2.  Apply acellular xenograft. SURGEON:  Rubina Cruz MD    ANESTHESIA:  General endotracheal.    COMPLICATIONS:  None. SPECIMENS REMOVED:  Pilonidal cyst, to Pathology. IMPLANTS:  Acell. ESTIMATED BLOOD LOSS:  Approximately 100 mL. FLUIDS:  Crystalloid 1200 mL. DRAINS:  None. INDICATION FOR SURGERY:  The patient is a 22-year-old man with hidradenitis of the intergluteal cleft. Mr. Mont Gilford is brought to the operating room at this time for excision of the hidradenitis and application of acellular xenograft. The risks of the procedure including but not limited to bleeding, infection and recurrent disease were discussed in detail with the patient. Furthermore, the role of the acellular xenograft was discussed with the patient as well. Mr. Mont Gilford understood and wished to proceed. PROCEDURE:  After consent was obtained, the patient was brought to the operating room where he was intubated while in the supine position on his bed. Following the induction of an adequate level of general anesthesia via the endotracheal tube, compression devices were placed on both lower extremities. The patient was then placed in the prone position on the operating room table. After ensuring that all pressure points were well padded, the lower back, buttocks and upper thighs were prepped with Betadine and draped as a sterile field. On examination, in the intergluteal cleft, two open wounds were noted. Clinically, this was felt to be consistent with a pilonidal cyst rather than hidradenitis. An intervening skin bridge was encountered and this was divided sharply.   At the base of the wound, a mass of hair was noted and this was removed. Again, this was felt to be consistent with a pilonidal cyst rather than hidradenitis. There was another open wound on the left buttock. This was carefully probed and was found to track superiorly and communicate with the larger open wound. The intervening skin bridge was divided sharply. The skin and subcutaneous tissues at the edge of the wound were sharply debrided back to healthy-appearing tissue. The skin edge at the inferiormost aspect of the wound was oversewn with a running 2-0 Vicryl suture. Other bleeders were carefully cauterized. Following this, the wound was irrigated with saline, inspected and found to be hemostatic. It should be noted that the open wound measured approximately 13 cm x 7 cm. Next, 1500 mg of acellular xenograft powder was brought on the field, mixed with gel and applied to the base of the wound. A hydrated acellular xenograft sheet was then brought on the field, trimmed appropriately and placed over the open wound. Using interrupted 2-0 Vicryl sutures, the acellular xenograft sheet was secured to the skin edge. The sheet was also secured to the base of the wound with interrupted 2-0 Vicryl suture. Another layer of the gel/powder mixture was applied over the acellular xenograft sheet. A piece of Adaptic was brought on the field, trimmed appropriately and placed over the open wound. Using interrupted 2-0 Vicryl suture, the Adaptic was secured to the skin edge with care taken to incorporate the Adaptic, acellular xenograft sheet and skin edge with each suture. Additional local anesthetic was infiltrated and a layer of gel was applied over the Adaptic. The wound was dressed with 4x4s, ABD pads and tape. The patient was returned to the supine position on his bed. He was awakened from his general anesthetic and extubated in the operating room.   The patient was brought to the recovery room in stable condition having tolerated the procedure well. At the conclusion of the procedure, all sponge counts, instrument counts and needle counts were reported as correct x2.       Blair Marin MD      [id number]  D:  09/23/2021 15:27  T:  09/24/2021 3:28  JOB #:  [id number]  CC:  DAVION Barros MD Stephani Pintos, MD

## 2021-09-24 NOTE — DISCHARGE SUMMARY
Hospitalist Discharge Summary     Patient ID:  Namrata Gonzalez  [id number]  34 y.o.  1995    PCP on record: Aleah Alonso NP    Admit date: 9/20/2021  Discharge date and time: 9/24/2021      Admission Diagnoses: Encounter for wound care [Z51.89]    Discharge Diagnoses: Active Problems:    Encounter for wound care (9/20/2021)           Hospital Course:     Sepsis POA due to   Pilonidal cyst POA  Hidradenitis suppurativa POA  CT Abd/Pelvis reviewed independently - does not show itnrapelvic abscess or osteomyelitis. Received Vanc, Levofloxacin and flagyl. Gen surg was consulted underwent pilonidal cystectomy and acellular graft placement on 9/23. Discharge on Clindamycin, Ciprofloxacin. Outpatient follow up with General Surgery in 1-2 weeks.        Pain management   Gabapentin 300 mg TID. Tylenol for mild pain, motrin for mod pain, tramadol for severe pain            Microcytic hypochromic anemia POA  Iron deficiency anemia POA   add ferrous sulfate        BMI 17.75  Malnutrition POA  Counseled on Lifestyle modifications, AHA Diet ,weight loss strategies.         CONSULTATIONS:  None    Excerpted HPI from H&P of Ronaldo Graham MD:    23 y/o male pmhx of hydradenitis suppuritiva p/w worsening open wounds and fever. Patient has had wounds in bilaterally in his groin and in his axilla which he has been dealing with since January 2021. He was hospitalized from ? June to July at [facility] and [facility] for the wounds. He states that his wounds have been open and draining and have not healed completely. He admits to having pus-like drainage from the wounds. He has been wrapping and cleaning the wounds himself every other day. He has also been taking Doxycycline intermittently over the past couple of months. He admits to increased pain at the wound sites in his axilla, groin and perineal areas. He has been taking Motrin at home for pain relief.   He admits to bleeding from the wounds along his groin and perineal area. He admits to some shortness of breath but denies any cough or CP. He denies any abdominal pain but admits to nausea and vomiting. He denies any change his stools or urine. He denies any sick contacts. He has not been vaccinated for COVID. Here in the ED, he has been afebrile and initially significantly tachycardic with HR in the 150s. His lab work shows a WBC count of 17, lactic acid of 3.37 and H/H of 8.4/28. His CXR is unremarkable. Blood cultures have been drawn and the patient has been given 2 Liters of NS IVFs and administered IV Vancomycin.         ______________________________________________________________________  DISCHARGE SUMMARY/HOSPITAL COURSE:  for full details see H&P, daily progress notes, labs, consult notes. Visit Vitals  BP (!) 145/62 (BP 1 Location: Right arm, BP Patient Position: At rest)   Pulse 85   Temp 98.3 °F (36.8 °C)   Resp 16   Ht 6' 3\" (1.905 m)   Wt 64.4 kg (142 lb)   SpO2 100%   BMI 17.75 kg/m²       _______________________________________________________________________  Patient seen and examined by me on discharge day. Pertinent Findings:  Gen:    Not in distress  Chest: Clear lungs  CVS:   Regular rhythm. No edema  Abd:  Soft, not distended, not tender  Neuro:  Alert with good insight. Oriented to person, place, and time   _______________________________________________________________________  DISCHARGE MEDICATIONS:   Current Discharge Medication List      START taking these medications    Details   ferrous sulfate 325 mg (65 mg iron) tablet Take 1 Tablet by mouth three (3) times daily (with meals). Qty: 90 Tablet, Refills: 0  Start date: 9/24/2021      gabapentin (NEURONTIN) 600 mg tablet Take 0.5 Tablets by mouth two (2) times a day for 30 days. Max Daily Amount: 600 mg. Qty: 30 Tablet, Refills: 0  Start date: 9/24/2021, End date: 10/24/2021    Associated Diagnoses: Wound infection; Hidradenitis;  Soft tissue infection clindamycin (CLEOCIN) 300 mg capsule Take 1 Capsule by mouth three (3) times daily for 5 days. Qty: 15 Capsule, Refills: 0  Start date: 9/24/2021, End date: 9/29/2021      ciprofloxacin HCl (CIPRO) 500 mg tablet Take 1 Tablet by mouth two (2) times a day for 5 days. Qty: 10 Tablet, Refills: 0  Start date: 9/24/2021, End date: 9/29/2021      oxyCODONE IR (Roxicodone) 5 mg immediate release tablet Take 1 Tablet by mouth every six (6) hours as needed for Pain for up to 3 days. Max Daily Amount: 20 mg.  Qty: 12 Tablet, Refills: 0  Start date: 9/24/2021, End date: 9/27/2021    Associated Diagnoses: Wound infection; Hidradenitis; Soft tissue infection      ibuprofen (MOTRIN) 200 mg tablet Take 2 Tablets by mouth every eight (8) hours as needed (mod pain). Qty: 30 Tablet, Refills: 0  Start date: 9/24/2021         STOP taking these medications       doxycycline (ADOXA) 100 mg tablet Comments:   Reason for Stopping:               My Recommended Diet, Activity, Wound Care, and follow-up labs are listed in the patient's Discharge Insturctions which I have personally completed and reviewed.     _______________________________________________________________________  DISPOSITION:     Home with Family: x   Home with HH/PT/OT/RN:    SNF/LTC:    SABRINA:    OTHER:        Condition at Discharge:  Stable  _______________________________________________________________________  Follow up with:   PCP : Vee Tracey NP  Follow-up Information     Follow up With Specialties Details Dylan Henao on 10/5/2021 10:15AM, , Please take ID and insurance card, Please arrive 15 mins prior, Please wear a mask [address] [address] Jose Jaime., NP Nurse Practitioner Go on 10/4/2021 11AM, , Please take ID and insurance card, Please wear a mask, Please arrive 15 mins prior Sallie Leon [address] [id number]  [phone]      Penelope Guzman MD General Surgery, Breast Surgery, Oncology On 9/30/2021 your appointment with the Surgeon is at 3:15PM   please keep this appointment  [address] [place] [address]  [phone]      [address] [address]  The Home Health Nurse to call you for your first home visit  [address] [facility]  [facility] [phone]              Total time in minutes spent coordinating this discharge (includes going over instructions, follow-up, prescriptions, and preparing report for sign off to her PCP) :35 minutes    Signed:  Mery Cruz MD

## 2021-09-24 NOTE — PROGRESS NOTES
Cardiac Monitoring on 9.24.21  from 0700 - 1100    Tele Strip on 9.24.21 at 0700 - Sinus Arrhythmia/Sinus Tach/ST elevation (noted on prior tele)

## 2021-09-24 NOTE — PROGRESS NOTES
PHYSICAL THERAPY EVALUATION/DISCHARGE  Patient: Bonita Berry (53 y.o. male)  Date: 9/24/2021  Primary Diagnosis: Encounter for wound care [Z51.89]  Procedure(s) (LRB):  EXCISION PILONIDAL CYST INTERGLUTEAL CLEFT, APPLY ACELLULAR XENOGRAFT (N/A) 1 Day Post-Op   Precautions:          ASSESSMENT  Based on the objective data described below, the patient presents with limited activity tolerance secondary to wounds. Patient modified independent with bed mobility and transfers. He ambulated 20 feet with modified independent. Patient will benefit from home health PT to improve endurance and ROM. Further skilled acute physical therapy is not indicated at this time. PLAN :  Recommendation for discharge: (in order for the patient to meet his/her long term goals)  Physical therapy at least 2 days/week in the home     This discharge recommendation:  Has been made in collaboration with the attending provider and/or case management    IF patient discharges home will need the following DME: none       SUBJECTIVE:   Patient stated I feel better than I did.     OBJECTIVE DATA SUMMARY:   HISTORY:    History reviewed. No pertinent past medical history. History reviewed. No pertinent surgical history. Home Situation  Home Environment: Private residence  One/Two Story Residence: Two story  Living Alone: No  Support Systems: Friend/Neighbor, Spouse/Significant Other  Patient Expects to be Discharged to[de-identified] [employer]  Current DME Used/Available at Home: None    EXAMINATION/PRESENTATION/DECISION MAKING:   Critical Behavior:  Neurologic State: Drowsy  Orientation Level: Oriented X4  Cognition: Decreased attention/concentration, Follows commands     Hearing:   Auditory  Auditory Impairment: None     Range Of Motion:  AROM: Generally decreased, functional   PROM: Generally decreased, functional     Strength:    Strength: Generally decreased, functional         Coordination:  Coordination: Within functional limits    Functional Mobility:  Bed Mobility:  Rolling: Modified independent  Supine to Sit: Modified independent  Sit to Supine: Modified independent  Scooting: Modified independent     Transfers:  Sit to Stand: Modified independent  Stand to Sit: Modified independent     Balance:   Sitting: Intact  Standing: Intact     Ambulation/Gait Training:  Distance (ft): 20 Feet (ft)  Assistive Device: Gait belt  Ambulation - Level of Assistance: Modified independent  Gait Description (WDL): Exceptions to WDL  Gait Abnormalities: Decreased step clearance  Base of Support: Narrowed  Speed/Bonnie: Slow  Step Length: Right shortened;Left shortened    Based on the above components, the patient evaluation is determined to be of the following complexity level: LOW     Pain Ratin/10; RN aware    Activity Tolerance:   Fair      After treatment patient left in no apparent distress:   Supine in bed and Call bell within reach    COMMUNICATION/EDUCATION:   The patients plan of care was discussed with: Registered nurse. Fall prevention education was provided and the patient/caregiver indicated understanding., Patient/family have participated as able in goal setting and plan of care. and Patient/family agree to work toward stated goals and plan of care.     Thank you for this referral.  Tricia Favre, PT   Time Calculation: 21 mins

## 2021-09-24 NOTE — DISCHARGE INSTRUCTIONS
Patient Education        Hidradenitis Suppurativa: Care Instructions  Your Care Instructions     Hidradenitis suppurativa (say \"kio-anzl-hm-NY-tus sup-hadley-uh-TY-vuh\") is a skin condition that causes lumps on the skin that look like pimples or boils. The lumps are usually painful and can break open and drain blood and bad-smelling pus. The condition can come and go for many years. Treatment for this condition may include antibiotics and other medicines. You may need surgery to remove the lumps. Home care includes wearing loose-fitting clothes and washing the area gently. You can help prevent lumps from coming back by staying at a healthy weight and not smoking. Doctors don't know exactly how this condition starts. But they do know that something irritates and inflames the hair follicles, causing them to swell and form lumps. This skin condition can't be spread from person to person (isn't contagious). Follow-up care is a key part of your treatment and safety. Be sure to make and go to all appointments, and call your doctor if you are having problems. It's also a good idea to know your test results and keep a list of the medicines you take. How can you care for yourself at home? Skin care    · Wash the area every day with mild soap. Use your hands rather than a washcloth or sponge when you wash that part of your body.     · Leave the affected areas uncovered when you can. If you have lumps that are draining, you can cover them with a bandage or other dressing. Put petroleum jelly (such as Vaseline) on the dressing to help keep it from sticking.     · Wear-loose fitting clothes that don't rub against the area. Avoid activities that cause skin to rub together.     · If you have pain, try a warm compress. Soak a towel or washcloth in warm water, wring it out, and place it on the affected skin for about 10 minutes. Medicines    · Be safe with medicines. Take your medicines exactly as prescribed.  Call your doctor if you think you are having a problem with your medicine. You will get more details on the specific medicines your doctor prescribes.     · If your doctor prescribed antibiotics, take them as directed. Do not stop taking them just because you feel better. You need to take the full course of antibiotics. Lifestyle choices    · If you smoke, think about quitting. Smoking can make the condition worse. If you need help quitting, talk to your doctor about stop-smoking programs and medicines. These can increase your chances of quitting for good.     · Stay at a healthy weight, or lose weight, by eating healthy foods and being physically active. Being overweight could make this condition worse. When should you call for help? Call your doctor now or seek immediate medical care if:    · You have symptoms of infection, such as:  ? Increased pain, swelling, warmth, or redness. ? Red streaks leading from the area. ? Pus draining from the area. ? A fever. Watch closely for changes in your health, and be sure to contact your doctor if:    · You do not get better as expected. Where can you learn more? Go to http://www.gray.com/  Enter Z486 in the search box to learn more about \"Hidradenitis Suppurativa: Care Instructions. \"  Current as of: March 3, 2021               Content Version: 13.0  © 2006-2021 Healthwise, Incorporated. Care instructions adapted under license by [employer] (which disclaims liability or warranty for this information). If you have questions about a medical condition or this instruction, always ask your healthcare professional. [place] [address] any warranty or liability for your use of this information.

## 2021-09-24 NOTE — PROGRESS NOTES
0710) Bedside shift change report given to Indira De Leon, RN (oncoming nurse) by   Jagruti Little RN (offgoing nurse). Report included the following information SBAR, ED Summary, MAR, Accordion and Recent Results. Unable to get vitals  0830) pt concern about applying for,  disability, wound care, and gaining healthy weight.   0905) Edy, dietician, spoke over phone for education. Flyers given about iron rich diet and protein rich diet. 56) Consult Dr. Shaw Davis and Deborah Cobos, wound care. Able to use non-adherent on fragile skin under arms--pt refuse wound gel  1230) . Matt Almaraz Reviewed discharge instructions with pt including follow-up appointments, new medications and side effects, medications to continue, medications to discontinue, hidradenitis  education, signs/symptoms of stroke and heart attack, and MyChart information. Pt expressed understanding. IV was removed. Belongings sent home with pt. Wound care supplies given to pt  [phone]) pt wheeled downstairs for discharge.   Pt plan to have visitor return before 5 pm to  prescriptions from [address] (not ready until 2 pm)

## 2021-09-24 NOTE — PROGRESS NOTES
Correction made to incomplete scanning of narcotic administered 9/22/21 at 1305. Note placed in comment section of med admin:    Hard chart correction for missed scanned med: This med was removed by Jackie Valerio with waste on site completed at removal.  Med then given to this RN to admin for dressing change. Med administered but scanning process not completed before machine timed out. Med admin witnessed by Rayray Kiser, RN at pt BS. Jerry Salas, [address] RN Director and Pharmacy notified.

## 2021-09-24 NOTE — PROGRESS NOTES
Transition of Care Plan:    RUR: 14% Low Risk of Readmission    * Disposition- Home with support of significant other  * [address] to open for skilled nursing services  * Appointment with Aditi Dashway 10/5 @ 10:15 AM  * Appointment with Vee Allison NP 10/4 @ 11 AM  * Appointment with Dr. Jessenia Snider 9/30 @ 3:15 PM  * [facility] Johanne provided  * Patient arranged transport    Patient confirmed he has 3 roomates who have been doing his wound care and will resume to help him post discharge. Patient wishes to apply for disabilty and information on Social Security provided. [address] to open for wound care post discharge. PT mobilized and recommended home PT 2x a week. [facility] sent to [facility]. Care Management Interventions  PCP Verified by CM:  Yes  Transition of Care Consult (CM Consult): [address]: Yes  Discharge Durable Medical Equipment: No  Health Maintenance Reviewed: Yes  Physical Therapy Consult: Yes  Occupational Therapy Consult: No  Speech Therapy Consult: No  Support Systems: Friend/Neighbor  The Patient and/or Patient Representative was Provided with a Choice of Provider and Agrees with the Discharge Plan?: Yes  Name of the Patient Representative Who was Provided with a Choice of Provider and Agrees with the Discharge Plan: Patient  Freedom of Choice List was Provided with Basic Dialogue that Supports the Patient's Individualized Plan of Care/Goals, Treatment Preferences and Shares the Quality Data Associated with the Providers?: Yes  [place] Resource Information Provided?: No  Discharge Location  Discharge Placement: Home with home health   Darrel Gaspar, DENISSEM-SW  Complex Care Coordinator/Michael  C: [phone]

## 2021-09-24 NOTE — PROGRESS NOTES
1900 Report received, pt in bed, drowsy, oriented x 4, denies pain, vitals taken and recorded    0000 Reassessment complete, pt still very drowsy but does answer questions appropriately    0400 Attempt AM lab draws x 2, pt unable to hold still, miss both attempts, pt refusing any more attempts at this time    0530 PRN pain medication given

## 2021-09-25 ENCOUNTER — HOME CARE VISIT (OUTPATIENT)
Dept: SCHEDULING | Facility: HOME HEALTH | Age: 26
End: 2021-09-25
Payer: MEDICAID

## 2021-09-25 VITALS
OXYGEN SATURATION: 97 % | RESPIRATION RATE: 18 BRPM | DIASTOLIC BLOOD PRESSURE: 64 MMHG | BODY MASS INDEX: 17.66 KG/M2 | HEIGHT: 75 IN | WEIGHT: 142 LBS | HEART RATE: 99 BPM | SYSTOLIC BLOOD PRESSURE: 118 MMHG | TEMPERATURE: 99.3 F

## 2021-09-25 LAB
BACTERIA SPEC CULT: NORMAL
SERVICE CMNT-IMP: NORMAL

## 2021-09-25 PROCEDURE — 400013 HH SOC

## 2021-09-25 PROCEDURE — G0299 HHS/HOSPICE OF RN EA 15 MIN: HCPCS

## 2021-09-27 ENCOUNTER — HOME CARE VISIT (OUTPATIENT)
Dept: SCHEDULING | Facility: HOME HEALTH | Age: 26
End: 2021-09-27
Payer: MEDICAID

## 2021-09-27 VITALS — SYSTOLIC BLOOD PRESSURE: 134 MMHG | HEART RATE: 104 BPM | DIASTOLIC BLOOD PRESSURE: 60 MMHG | OXYGEN SATURATION: 98 %

## 2021-09-27 VITALS
DIASTOLIC BLOOD PRESSURE: 62 MMHG | OXYGEN SATURATION: 99 % | TEMPERATURE: 98.7 F | HEART RATE: 92 BPM | SYSTOLIC BLOOD PRESSURE: 118 MMHG

## 2021-09-27 PROCEDURE — G0151 HHCP-SERV OF PT,EA 15 MIN: HCPCS

## 2021-09-27 PROCEDURE — G0300 HHS/HOSPICE OF LPN EA 15 MIN: HCPCS

## 2021-09-27 PROCEDURE — A6248 HYDROGEL DRSG GEL FILLER: HCPCS

## 2021-09-28 ENCOUNTER — HOME CARE VISIT (OUTPATIENT)
Dept: HOME HEALTH SERVICES | Facility: HOME HEALTH | Age: 26
End: 2021-09-28
Payer: MEDICAID

## 2021-09-28 NOTE — Clinical Note
Great. Thank you. When I saw him Saturday, there was a large amount of drainage (no odor) but it looked relatively well. At that point (around 11:30am) he had not started taking his antibiotics but I urged him to do so as soon as possible. I will see him again today and will follow up. Alonso Teresa   ----- Message -----  From: Enid Trejo MD  Sent: 9/28/2021   4:57 PM EDT  To: Richi Rosario RN      That's ok with me. How does the gluteal wound look? Thanks.   ----- Message -----  From: Julieta Ramirez RN  Sent: 9/28/2021   4:47 PM EDT  To: Araseli Alegria MD    Hi Dr. Sami Taylor,     I saw Boone Carrel Armen Skelton Saturday, 9/25 for his admission to home health. I noted other wounds aside from the gluteal wound. Patient mentioned he and roommates handle the wound care for those wounds, however, I want to make sure that you are ok with that or if nursing should have orders for all wounds?     Thank you,   Richi Rosario RN

## 2021-09-28 NOTE — CASE COMMUNICATION
Hi Dr. Guero Marte,     I saw Lenn Kehr Wilford Fenton Saturday, 9/25 for his admission to home health. I noted other wounds aside from the gluteal wound. Patient mentioned he and roommates handle the wound care for those wounds, however, I want to make sure that you are ok with that or if nursing should have orders for all wounds?     Thank you,   Josselin Milian RN

## 2021-09-29 ENCOUNTER — HOME CARE VISIT (OUTPATIENT)
Dept: SCHEDULING | Facility: HOME HEALTH | Age: 26
End: 2021-09-29
Payer: MEDICAID

## 2021-09-29 VITALS
TEMPERATURE: 97.7 F | RESPIRATION RATE: 18 BRPM | SYSTOLIC BLOOD PRESSURE: 106 MMHG | OXYGEN SATURATION: 99 % | DIASTOLIC BLOOD PRESSURE: 70 MMHG | HEART RATE: 105 BPM

## 2021-09-29 PROCEDURE — G0157 HHC PT ASSISTANT EA 15: HCPCS

## 2021-09-29 PROCEDURE — A4216 STERILE WATER/SALINE, 10 ML: HCPCS

## 2021-09-29 PROCEDURE — A6248 HYDROGEL DRSG GEL FILLER: HCPCS

## 2021-09-29 PROCEDURE — G0299 HHS/HOSPICE OF RN EA 15 MIN: HCPCS

## 2021-09-30 ENCOUNTER — OFFICE VISIT (OUTPATIENT)
Dept: SURGERY | Age: 26
End: 2021-09-30
Payer: MEDICAID

## 2021-09-30 VITALS
DIASTOLIC BLOOD PRESSURE: 78 MMHG | TEMPERATURE: 97.8 F | OXYGEN SATURATION: 99 % | SYSTOLIC BLOOD PRESSURE: 118 MMHG | HEART RATE: 147 BPM | RESPIRATION RATE: 18 BRPM

## 2021-09-30 DIAGNOSIS — L05.91 PILONIDAL CYST: Primary | ICD-10-CM

## 2021-09-30 PROCEDURE — 99024 POSTOP FOLLOW-UP VISIT: CPT | Performed by: SURGERY

## 2021-09-30 NOTE — PROGRESS NOTES
Ghanshyam Figueroa is a 22 y.o. male who returns for post-operative evaluation. Mr. Mavis Justice is s/p pilonidal cystectomy/application of acellular xenograft on September 23, 2021. Discharged to home the following day. Doing fairly well since then. Mr. Mavis Justice tells me that the pain in the intergluteal cleft has improved and that he is \"moving better. \" No fevers or chills. Visiting nurses have been doing wound care. Furthermore, Mr. Mavis Justice tells me that his heart \"races\" with exertion. He has otherwise been in his usual state of health. Past Medical History:   Diagnosis Date    Pilonidal cyst 9/30/2021     History reviewed. No pertinent surgical history. History reviewed. No pertinent family history. Social History     Socioeconomic History    Marital status: SINGLE     Spouse name: Not on file    Number of children: Not on file    Years of education: Not on file    Highest education level: Not on file   Tobacco Use    Smoking status: Never Smoker    Smokeless tobacco: Never Used   Vaping Use    Vaping Use: Never used   Substance and Sexual Activity    Alcohol use: Yes     Comment: Socially     Drug use: Never   Social History Narrative    ** Merged History Encounter **          Social Determinants of Health     Financial Resource Strain:     Difficulty of Paying Living Expenses:    Food Insecurity:     Worried About Running Out of Food in the Last Year:     [address] in the Last Year:    Transportation Needs:     Lack of Transportation (Medical):      Lack of Transportation (Non-Medical):    Physical Activity:     Days of Exercise per Week:     Minutes of Exercise per Session:    Stress:     Feeling of Stress :    Social Connections:     Frequency of Communication with Friends and Family:     Frequency of Social Gatherings with Friends and Family:     Attends Protestant Services:     Active Member of Clubs or Organizations:     Attends Club or Organization Meetings:    Mary Mckeon Marital Status:      Review of systems negative except as noted. Review of Systems   Constitutional: Negative for chills and fever. Gastrointestinal: Negative for nausea and vomiting. Musculoskeletal:        Pain at site of wound in intergluteal cleft improving. Physical Exam  Vitals reviewed. Constitutional:       General: He is not in acute distress. Appearance: Normal appearance. He is normal weight. Comments: Appears uncomfortable. HENT:      Head: Normocephalic and atraumatic. Cardiovascular:      Rate and Rhythm: Normal rate and regular rhythm. Pulmonary:      Effort: Pulmonary effort is normal.      Breath sounds: Normal breath sounds. Abdominal:      General: There is no distension. Palpations: Abdomen is soft. Tenderness: There is no abdominal tenderness. Musculoskeletal:         General: Normal range of motion. Skin:     Comments: The wound in the intergluteal cleft is clean and granulating. Neurological:      General: No focal deficit present. Mental Status: He is alert. ASSESSMENT and PLAN  The wound in the intergluteal cleft was dressed with gel, 4x4's, ABD pads and tape. Mr. Monique Zelaya is doing well post-operatively. Reassured him that the wound is healing nicely. Daily wound care with gel, 4x4's and tape. Will ask visiting nurses to evaluate for wound vac placement. Activity as tolerated. Follow up with Ms. Jasmyne Grimm as scheduled. Will see in two more weeks or earlier if need be.       CC: Anuj Aj., NP

## 2021-10-01 ENCOUNTER — HOME CARE VISIT (OUTPATIENT)
Dept: SCHEDULING | Facility: HOME HEALTH | Age: 26
End: 2021-10-01
Payer: MEDICAID

## 2021-10-01 VITALS
TEMPERATURE: 97.8 F | OXYGEN SATURATION: 95 % | RESPIRATION RATE: 16 BRPM | DIASTOLIC BLOOD PRESSURE: 75 MMHG | SYSTOLIC BLOOD PRESSURE: 108 MMHG | HEART RATE: 95 BPM

## 2021-10-01 PROCEDURE — G0299 HHS/HOSPICE OF RN EA 15 MIN: HCPCS

## 2021-10-01 NOTE — PROGRESS NOTES
Physician Progress Note      PATIENT:               Elizabeth Colunga  CSN #:                  [id number]  :                       1995  ADMIT DATE:       2021 5:10 PM  [address] DATE:        2021 12:46 PM  RESPONDING  PROVIDER #:        Huber Suarez MD          QUERY TEXT:    Dr. Pop Phillips:    Pt. admitted with  Worsening wounds/ Low-grade fever and has malnutrition documented. Please further specify type of malnutrition with documentation in the medical record. The medical record reflects the following:  Risk Factors: Hx:  hidradenitis suppurativa  Clinical Indicators: BMI: 17.75; Alb: 2.2 ^ 2.1; Noted: unintentional weight loss. [facility] Noted per RD: Nutrition Diagnosis:  Increased nutrient needs related to increased demand for energy/nutrients as evidenced by  (increased energy/protein to optimize wound healing). ..... Treatment: Counseled on Lifestyle modifications, AHA Diet ,weight loss strategies; RD consult; Nutrition Recommendations/Plan: Resume Regular Diet post-op. ; RD ordered Ensure Enlive TID with meals to begin as soon as diet resumes for added kcals/protein to support wound healing; RD also placed a Nursing Misc as a reminder to document %PO & %Supplement Intake for optimal nutritional assessment and recs  ? Thank you,    Shara Ricks  [facility]  [phone]    ASPEN Criteria:  https://aspenjournals. onlinelibrary. [place]. com/doi/full/10.1177/6488571374368131  Options provided:  -- Mild Malnutrition  -- Moderate Malnutrition  -- Severe Malnutrition  -- Mild Protein calorie malnutrition  -- Moderate Protein calorie malnutrition  -- Severe Protein calorie malnutrition  -- Other - I will add my own diagnosis  -- Disagree - Not applicable / Not valid  -- Disagree - Clinically unable to determine / Unknown  -- Refer to Clinical Documentation Reviewer    PROVIDER RESPONSE TEXT:    This patient has moderate malnutrition.     Query created by: Annie Henning on 10/1/2021 2:16 PM      Electronically signed by:  Melquiades Doyle MD [facility] MD 10/1/2021 2:25 PM

## 2021-10-04 ENCOUNTER — TELEPHONE (OUTPATIENT)
Dept: SURGERY | Age: 26
End: 2021-10-04

## 2021-10-04 DIAGNOSIS — L73.2 HIDRADENITIS SUPPURATIVA: Primary | ICD-10-CM

## 2021-10-04 DIAGNOSIS — Z51.89 ENCOUNTER FOR WOUND CARE: ICD-10-CM

## 2021-10-04 NOTE — TELEPHONE ENCOUNTER
Returned call to Merari Pineda with [facility] informed her I sent order over to [facility] with confirmed receipt from fax. Placed in scan diana.

## 2021-10-04 NOTE — TELEPHONE ENCOUNTER
Jorge Luis Sanchez from [facility] called stating that a order has to be put in for a wound vac (sent to patients home) in order for them to treat. Please advise.

## 2021-10-05 ENCOUNTER — HOME CARE VISIT (OUTPATIENT)
Dept: SCHEDULING | Facility: HOME HEALTH | Age: 26
End: 2021-10-05
Payer: MEDICAID

## 2021-10-05 VITALS
SYSTOLIC BLOOD PRESSURE: 148 MMHG | TEMPERATURE: 97.2 F | OXYGEN SATURATION: 98 % | DIASTOLIC BLOOD PRESSURE: 80 MMHG | HEART RATE: 99 BPM

## 2021-10-05 PROCEDURE — G0299 HHS/HOSPICE OF RN EA 15 MIN: HCPCS

## 2021-10-05 PROCEDURE — G0157 HHC PT ASSISTANT EA 15: HCPCS

## 2021-10-06 ENCOUNTER — TELEPHONE (OUTPATIENT)
Dept: SURGERY | Age: 26
End: 2021-10-06

## 2021-10-06 ENCOUNTER — HOSPITAL ENCOUNTER (INPATIENT)
Age: 26
LOS: 25 days | Discharge: HOME HEALTH | DRG: 721 | End: 2021-11-01
Attending: EMERGENCY MEDICINE | Admitting: HOSPITALIST
Payer: MEDICAID

## 2021-10-06 ENCOUNTER — HOME CARE VISIT (OUTPATIENT)
Dept: SCHEDULING | Facility: HOME HEALTH | Age: 26
End: 2021-10-06
Payer: MEDICAID

## 2021-10-06 ENCOUNTER — APPOINTMENT (OUTPATIENT)
Dept: GENERAL RADIOLOGY | Age: 26
DRG: 721 | End: 2021-10-06
Attending: EMERGENCY MEDICINE
Payer: MEDICAID

## 2021-10-06 ENCOUNTER — APPOINTMENT (OUTPATIENT)
Dept: CT IMAGING | Age: 26
DRG: 721 | End: 2021-10-06
Attending: EMERGENCY MEDICINE
Payer: MEDICAID

## 2021-10-06 VITALS
RESPIRATION RATE: 18 BRPM | DIASTOLIC BLOOD PRESSURE: 78 MMHG | SYSTOLIC BLOOD PRESSURE: 110 MMHG | HEART RATE: 109 BPM | OXYGEN SATURATION: 97 %

## 2021-10-06 VITALS
HEART RATE: 101 BPM | OXYGEN SATURATION: 99 % | SYSTOLIC BLOOD PRESSURE: 128 MMHG | DIASTOLIC BLOOD PRESSURE: 68 MMHG | TEMPERATURE: 98.2 F

## 2021-10-06 DIAGNOSIS — A41.9 SEPSIS WITH ACUTE ORGAN DYSFUNCTION AND SEPTIC SHOCK, DUE TO UNSPECIFIED ORGANISM, UNSPECIFIED TYPE (HCC): ICD-10-CM

## 2021-10-06 DIAGNOSIS — L73.2 HIDRADENITIS SUPPURATIVA: ICD-10-CM

## 2021-10-06 DIAGNOSIS — A41.9 SEVERE SEPSIS (HCC): ICD-10-CM

## 2021-10-06 DIAGNOSIS — R65.21 SEPSIS WITH ACUTE ORGAN DYSFUNCTION AND SEPTIC SHOCK, DUE TO UNSPECIFIED ORGANISM, UNSPECIFIED TYPE (HCC): ICD-10-CM

## 2021-10-06 DIAGNOSIS — L73.2 HIDRADENITIS SUPPURATIVA OF LEFT AXILLA: ICD-10-CM

## 2021-10-06 DIAGNOSIS — T14.8XXA PAIN ASSOCIATED WITH WOUND: Primary | ICD-10-CM

## 2021-10-06 DIAGNOSIS — R52 PAIN ASSOCIATED WITH WOUND: Primary | ICD-10-CM

## 2021-10-06 DIAGNOSIS — L02.91 ABSCESS: ICD-10-CM

## 2021-10-06 DIAGNOSIS — L73.2 HIDRADENITIS: ICD-10-CM

## 2021-10-06 DIAGNOSIS — Z51.89 ENCOUNTER FOR WOUND CARE: ICD-10-CM

## 2021-10-06 DIAGNOSIS — R65.20 SEVERE SEPSIS (HCC): ICD-10-CM

## 2021-10-06 DIAGNOSIS — L05.91 PILONIDAL CYST: ICD-10-CM

## 2021-10-06 LAB
ALBUMIN SERPL-MCNC: 2.6 G/DL (ref 3.5–5)
ALBUMIN/GLOB SERPL: 0.5 {RATIO} (ref 1.1–2.2)
ALP SERPL-CCNC: 105 U/L (ref 45–117)
ALT SERPL-CCNC: 13 U/L (ref 12–78)
ANION GAP BLD CALC-SCNC: 13 MMOL/L (ref 10–20)
ANION GAP SERPL CALC-SCNC: 9 MMOL/L (ref 5–15)
APPEARANCE UR: CLEAR
AST SERPL-CCNC: 12 U/L (ref 15–37)
BACTERIA URNS QL MICRO: NEGATIVE /HPF
BASOPHILS # BLD: 0 K/UL (ref 0–0.1)
BASOPHILS NFR BLD: 0 % (ref 0–1)
BILIRUB SERPL-MCNC: 0.5 MG/DL (ref 0.2–1)
BILIRUB UR QL: NEGATIVE
BUN SERPL-MCNC: 6 MG/DL (ref 6–20)
BUN/CREAT SERPL: 7 (ref 12–20)
CA-I BLD-MCNC: 1.11 MMOL/L (ref 1.12–1.32)
CALCIUM SERPL-MCNC: 8.8 MG/DL (ref 8.5–10.1)
CHLORIDE BLD-SCNC: 97 MMOL/L (ref 100–108)
CHLORIDE SERPL-SCNC: 99 MMOL/L (ref 97–108)
CO2 BLD-SCNC: 25 MMOL/L (ref 19–24)
CO2 SERPL-SCNC: 27 MMOL/L (ref 21–32)
COLOR UR: NORMAL
CREAT SERPL-MCNC: 0.89 MG/DL (ref 0.7–1.3)
CREAT UR-MCNC: 0.7 MG/DL (ref 0.6–1.3)
DIFFERENTIAL METHOD BLD: ABNORMAL
EOSINOPHIL # BLD: 0.4 K/UL (ref 0–0.4)
EOSINOPHIL NFR BLD: 3 % (ref 0–7)
EPITH CASTS URNS QL MICRO: NORMAL /LPF
ERYTHROCYTE [DISTWIDTH] IN BLOOD BY AUTOMATED COUNT: 19.7 % (ref 11.5–14.5)
GLOBULIN SER CALC-MCNC: 5.4 G/DL (ref 2–4)
GLUCOSE BLD STRIP.AUTO-MCNC: 120 MG/DL (ref 74–106)
GLUCOSE SERPL-MCNC: 117 MG/DL (ref 65–100)
GLUCOSE UR STRIP.AUTO-MCNC: NEGATIVE MG/DL
HCT VFR BLD AUTO: 27 % (ref 36.6–50.3)
HGB BLD-MCNC: 8.1 G/DL (ref 12.1–17)
HGB UR QL STRIP: NEGATIVE
IMM GRANULOCYTES # BLD AUTO: 0.1 K/UL (ref 0–0.04)
IMM GRANULOCYTES NFR BLD AUTO: 1 % (ref 0–0.5)
KETONES UR QL STRIP.AUTO: NEGATIVE MG/DL
LACTATE BLD-SCNC: 0.93 MMOL/L (ref 0.4–2)
LACTATE BLD-SCNC: 3.25 MMOL/L (ref 0.4–2)
LEUKOCYTE ESTERASE UR QL STRIP.AUTO: NEGATIVE
LYMPHOCYTES # BLD: 1.1 K/UL (ref 0.8–3.5)
LYMPHOCYTES NFR BLD: 8 % (ref 12–49)
MCH RBC QN AUTO: 20.5 PG (ref 26–34)
MCHC RBC AUTO-ENTMCNC: 30 G/DL (ref 30–36.5)
MCV RBC AUTO: 68.4 FL (ref 80–99)
MONOCYTES # BLD: 1.1 K/UL (ref 0–1)
MONOCYTES NFR BLD: 8 % (ref 5–13)
NEUTS SEG # BLD: 11 K/UL (ref 1.8–8)
NEUTS SEG NFR BLD: 80 % (ref 32–75)
NITRITE UR QL STRIP.AUTO: NEGATIVE
NRBC # BLD: 0 K/UL (ref 0–0.01)
NRBC BLD-RTO: 0 PER 100 WBC
PH UR STRIP: 7.5 [PH] (ref 5–8)
PLATELET # BLD AUTO: 524 K/UL (ref 150–400)
PMV BLD AUTO: 8.6 FL (ref 8.9–12.9)
POTASSIUM BLD-SCNC: 3.6 MMOL/L (ref 3.5–5.5)
POTASSIUM SERPL-SCNC: 3.6 MMOL/L (ref 3.5–5.1)
PROT SERPL-MCNC: 8 G/DL (ref 6.4–8.2)
PROT UR STRIP-MCNC: NEGATIVE MG/DL
RBC # BLD AUTO: 3.95 M/UL (ref 4.1–5.7)
RBC #/AREA URNS HPF: NORMAL /HPF (ref 0–5)
RBC MORPH BLD: ABNORMAL
RBC MORPH BLD: ABNORMAL
SODIUM BLD-SCNC: 134 MMOL/L (ref 136–145)
SODIUM SERPL-SCNC: 135 MMOL/L (ref 136–145)
SP GR UR REFRACTOMETRY: 1.01 (ref 1–1.03)
TROPONIN-HIGH SENSITIVITY: 4 NG/L (ref 0–76)
UA: UC IF INDICATED,UAUC: NORMAL
UROBILINOGEN UR QL STRIP.AUTO: 1 EU/DL (ref 0.2–1)
WBC # BLD AUTO: 13.7 K/UL (ref 4.1–11.1)
WBC URNS QL MICRO: NORMAL /HPF (ref 0–4)

## 2021-10-06 PROCEDURE — 83605 ASSAY OF LACTIC ACID: CPT

## 2021-10-06 PROCEDURE — 87153 DNA/RNA SEQUENCING: CPT

## 2021-10-06 PROCEDURE — 71045 X-RAY EXAM CHEST 1 VIEW: CPT

## 2021-10-06 PROCEDURE — 80047 BASIC METABLC PNL IONIZED CA: CPT

## 2021-10-06 PROCEDURE — 87077 CULTURE AEROBIC IDENTIFY: CPT

## 2021-10-06 PROCEDURE — 87040 BLOOD CULTURE FOR BACTERIA: CPT

## 2021-10-06 PROCEDURE — 93005 ELECTROCARDIOGRAM TRACING: CPT

## 2021-10-06 PROCEDURE — 72193 CT PELVIS W/DYE: CPT

## 2021-10-06 PROCEDURE — 96366 THER/PROPH/DIAG IV INF ADDON: CPT

## 2021-10-06 PROCEDURE — 85025 COMPLETE CBC W/AUTO DIFF WBC: CPT

## 2021-10-06 PROCEDURE — 81001 URINALYSIS AUTO W/SCOPE: CPT

## 2021-10-06 PROCEDURE — 71260 CT THORAX DX C+: CPT

## 2021-10-06 PROCEDURE — 96365 THER/PROPH/DIAG IV INF INIT: CPT

## 2021-10-06 PROCEDURE — 74011000258 HC RX REV CODE- 258: Performed by: EMERGENCY MEDICINE

## 2021-10-06 PROCEDURE — 96361 HYDRATE IV INFUSION ADD-ON: CPT

## 2021-10-06 PROCEDURE — 84484 ASSAY OF TROPONIN QUANT: CPT

## 2021-10-06 PROCEDURE — 99285 EMERGENCY DEPT VISIT HI MDM: CPT

## 2021-10-06 PROCEDURE — 87186 SC STD MICRODIL/AGAR DIL: CPT

## 2021-10-06 PROCEDURE — 74011000636 HC RX REV CODE- 636: Performed by: EMERGENCY MEDICINE

## 2021-10-06 PROCEDURE — 80053 COMPREHEN METABOLIC PANEL: CPT

## 2021-10-06 PROCEDURE — 74011250636 HC RX REV CODE- 250/636: Performed by: EMERGENCY MEDICINE

## 2021-10-06 PROCEDURE — 36415 COLL VENOUS BLD VENIPUNCTURE: CPT

## 2021-10-06 PROCEDURE — G0300 HHS/HOSPICE OF LPN EA 15 MIN: HCPCS

## 2021-10-06 RX ORDER — ENOXAPARIN SODIUM 100 MG/ML
1 INJECTION SUBCUTANEOUS ONCE
Status: DISCONTINUED | OUTPATIENT
Start: 2021-10-06 | End: 2021-10-06

## 2021-10-06 RX ORDER — SODIUM CHLORIDE 0.9 % (FLUSH) 0.9 %
5-10 SYRINGE (ML) INJECTION AS NEEDED
Status: DISCONTINUED | OUTPATIENT
Start: 2021-10-06 | End: 2021-10-07 | Stop reason: SDUPTHER

## 2021-10-06 RX ADMIN — CEFEPIME 2 G: 2 INJECTION, POWDER, FOR SOLUTION INTRAVENOUS at 19:39

## 2021-10-06 RX ADMIN — SODIUM CHLORIDE 932 ML: 9 INJECTION, SOLUTION INTRAVENOUS at 18:53

## 2021-10-06 RX ADMIN — SODIUM CHLORIDE 1000 ML: 9 INJECTION, SOLUTION INTRAVENOUS at 18:53

## 2021-10-06 RX ADMIN — IOPAMIDOL 100 ML: 755 INJECTION, SOLUTION INTRAVENOUS at 20:08

## 2021-10-06 RX ADMIN — VANCOMYCIN HYDROCHLORIDE 1500 MG: 1 INJECTION, POWDER, LYOPHILIZED, FOR SOLUTION INTRAVENOUS at 18:57

## 2021-10-06 NOTE — ED NOTES
Pt presents ambulatory to ED complaining of draining wounds to his bilateral axillary, groin and coccyx area. Pt has had these wounds and recently admitted for severe sepsis in September. Pt states he is on antibiotics at home but unsure which ones. Home health advised patient to come in for worsening drainage. Drainage from bilateral axillary are moderate, purulent and malodorous. Dressings from home were saturated. Abscess on groin and coccyx are purulent, malodorous and bloody. Pt is alert and oriented x 4, RR even and unlabored, skin is warm and dry. Assesment completed and pt updated on plan of care. Emergency Department Nursing Plan of Care       The Nursing Plan of Care is developed from the Nursing assessment and Emergency Department Attending provider initial evaluation. The plan of care may be reviewed in the ED Provider note.     The Plan of Care was developed with the following considerations:   Patient / Family readiness to learn indicated by:verbalized understanding  Persons(s) to be included in education: patient  Barriers to Learning/Limitations:No    Signed     Gabe Howe RN    10/6/2021   6:55 PM

## 2021-10-06 NOTE — ED PROVIDER NOTES
EMERGENCY DEPARTMENT HISTORY AND PHYSICAL EXAM      Date: 10/6/2021  Patient Name: Radha Gatica    History of Presenting Illness     Chief Complaint   Patient presents with    Skin Infection       History Provided By: Patient    HPI: Radha Gatica, 22 y.o. male with PMHx as noted below presents the emergency department with complaints of pain and drainage from his wounds. Also reports fatigue and chills. Patient notes onset of symptoms several days ago. Patient reports having some wound care at home but being unable to care for himself adequately at this point. Patient reports that home health nurse sent him today after evaluating wounds and concern for possible infection. He rates the pain is moderate to severe in intensity, worse in his groin as well as his left axilla. Pain is an aching pain. Pt denies any other alleviating or exacerbating factors. Additionally, pt specifically denies any recent  headache, nausea, vomiting, abdominal pain, CP, SOB, lightheadedness, dizziness, numbness, weakness, BLE swelling, heart palpitations, urinary sxs, diarrhea, constipation, melena, hematochezia, cough, or congestion.   PCP: Pippa Hinton., NP    Current Facility-Administered Medications   Medication Dose Route Frequency Provider Last Rate Last Admin    HYDROmorphone (DILAUDID) injection 0.5 mg  0.5 mg IntraVENous Q6H PRN [place] Last, Luis HO MD   0.5 mg at 10/09/21 1529    ascorbic acid (vitamin C) (VITAMIN C) tablet 500 mg  500 mg Oral BID Ava Prieto MD   500 mg at 10/09/21 1721    zinc sulfate (ZINCATE) 50 mg zinc (220 mg) capsule 1 Capsule  1 Capsule Oral DAILY Ava Prieto MD   1 Capsule at 10/09/21 0904    naloxone (NARCAN) injection 0.4 mg  0.4 mg IntraMUSCular EVERY 2 MINUTES AS NEEDED Juan F Huang MD        gabapentin (NEURONTIN) capsule 300 mg  300 mg Oral TID Abbe Moritz R, MD   300 mg at 10/09/21 1721    ferrous sulfate tablet 325 mg  325 mg Oral TID WITH MEALS Leticia Mckenzie MD   325 mg at 10/09/21 1721    sodium chloride (NS) flush 5-40 mL  5-40 mL IntraVENous Q8H Nate Ross MD   10 mL at 10/09/21 1306    sodium chloride (NS) flush 5-40 mL  5-40 mL IntraVENous PRN Nate Ross MD        acetaminophen (TYLENOL) tablet 650 mg  650 mg Oral Q6H PRN Nate Ross MD   650 mg at 10/08/21 2244    Or    acetaminophen (TYLENOL) suppository 650 mg  650 mg Rectal Q6H PRN Nate Ross MD        polyethylene glycol (MIRALAX) packet 17 g  17 g Oral DAILY PRN Nate Ross MD        ondansetron (ZOFRAN ODT) tablet 4 mg  4 mg Oral Q8H PRN Nate Ross MD        Or    ondansetron (ZOFRAN) injection 4 mg  4 mg IntraVENous Q6H PRN Nate Ross MD        enoxaparin (LOVENOX) injection 40 mg  40 mg SubCUTAneous DAILY Nate Ross MD   40 mg at 10/08/21 0824    oxyCODONE IR (ROXICODONE) tablet 5 mg  5 mg Oral Q4H PRN Nate Ross MD        Or   Arriaza oxyCODONE IR (ROXICODONE) tablet 10 mg  10 mg Oral Q4H PRN Nate Ross MD   10 mg at 10/09/21 1840       Past History     Past Medical History:  Past Medical History:   Diagnosis Date    Pilonidal cyst 9/30/2021       Past Surgical History:  History reviewed. No pertinent surgical history. Family History:  History reviewed. No pertinent family history. Social History:  Social History     Tobacco Use    Smoking status: Never Smoker    Smokeless tobacco: Never Used   Vaping Use    Vaping Use: Never used   Substance Use Topics    Alcohol use: Yes     Comment: Socially     Drug use: Not on file       Allergies: Allergies   Allergen Reactions    Penicillins Anaphylaxis    Pcn [Penicillins] Swelling         Review of Systems   Review of Systems  Constitutional: Positive for fever, chills, and fatigue. HENT: Negative for congestion, sore throat, rhinorrhea, sneezing and neck stiffness   Eyes: Negative for discharge and redness.    Respiratory: Negative for  shortness of breath, wheezing Cardiovascular: Negative for chest pain, palpitations   Gastrointestinal: Negative for nausea, vomiting, abdominal pain, constipation, diarrhea and blood in stool. Genitourinary: Negative for dysuria, urgency, frequency, hematuria, flank pain, decreased urine volume, discharge,   Musculoskeletal: Negative for myalgias or joint pain . Skin: Negative for rash. Positive lesions . Neurological: Negative weakness, light-headedness, numbness and headaches. Physical Exam   Physical Exam    GENERAL: alert and oriented, no acute distress  EYES: PEERL, No injection, discharge or icterus. ENT: Mucous membranes dry  NECK: Supple  LUNGS: Airway patent. Non-labored respirations. Breath sounds clear with good air entry bilaterally. HEART: Tachycardic, regular rhythm. No peripheral edema  ABDOMEN: Non-distended and non-tender, without guarding or rebound. SKIN:  warm, dry, multiple areas of fluctuance noted, there is copious purulent drainage coming from the left axilla. There is also some bloody drainage noted around the sacral wounds  EXTREMITIES: Without swelling, tenderness or deformity, symmetric with normal ROM  NEUROLOGICAL: Alert, oriented      Diagnostic Study Results     Labs -  Reviewed      Radiologic Studies -   CT PELV W CONT   Final Result   No deep space abscess. No significant change of small subcutaneous   fluid and gas collections. CT CHEST W CONT   Final Result   No axillary abscess. XR CHEST PORT   Final Result   No Acute Disease. CT Results  (Last 48 hours)    None        CXR Results  (Last 48 hours)    None            Medical Decision Making     INisa MD am the first provider for this patient and am the attending of record for this patient encounter. I reviewed the vital signs, available nursing notes, past medical history, past surgical history, family history and social history. Vital Signs-Reviewed the patient's vital signs.   Patient Vitals for the past 12 hrs:   Temp Pulse Resp BP SpO2   10/09/21 2024 98.8 °F (37.1 °C) 89 -- 110/60 100 %   10/09/21 1641 -- -- 14 -- --   10/09/21 1528 97.8 °F (36.6 °C) 98 16 114/70 98 %         Cardiac Monitor:   Rate: 147 bpm  Rhythm: Sinus Tachycardia    The cardiac monitor was ordered secondary to severe sepsis  and to monitor the patient for dysrhythmia and signs of decompensation      Records Reviewed: Nursing Notes and Old Medical Records    Provider Notes (Medical Decision Making):   59-year-old male presenting tachycardic, borderline hypotensive with copious purulent drainage from several wounds. Differential included severe sepsis, septic shock, cellulitis, abscess, necrotizing soft tissue infection, dehydration, metabolic abnormality. 30 cc/kg fluid bolus given, broad-spectrum antibiotics started after blood culture sent. Patient met Medicare for severe sepsis likely secondary to cellulitis versus abscess. We will plan to obtain CT scans and admit for further management. ED Course:   Initial assessment performed. The patients presenting problems have been discussed, and they are in agreement with the care plan formulated and outlined with them. I have encouraged them to ask questions as they arise throughout their visit. ED Course as of Oct 09 2228   Wed Oct 06, 2021   1830 ED [facility] ED SEPSIS NOTE:     6:30 PM The patient now meets criteria for: Severe Sepsis    Fluid resuscitation with: 30 mL/kg crystalloid bolus  Due to concern for rapidly advancing infection and deterioration of patient's condition, antibiotics are started STAT and cultures ordered. [BN]      ED Course User Index  [BN] Trisha Phelan MD       PROGRESS:  The patient has been re-evaluated and sx have improved. Blood pressure improved after IV fluids. Reviewed available results with patient and have counseled them on diagnosis and care plan. They have expressed understanding, and all their questions have been answered.  They agree with plan for admission. CONSULT:  Hernandez Scott MD spoke with the hospitalist.  Discussed HPI and PE, available diagnostic tests and clinical findings. He is in agreement with care plans as outlined and will evaluate for admission     Admit Note  Patient is being admitted to the hospitalist.  The results of their tests and reasons for their admission have been discussed with them and/or available family. They convey agreement and understanding for the need to be admitted and for their admission diagnosis. Consultation has been made with the inpatient physician specialist for hospitalization. Critical Care Note    IMPENDING DETERIORATION -Cardiovascular  ASSOCIATED RISK FACTORS - Hypotension  MANAGEMENT- Bedside Assessment and Supervision of Care  INTERPRETATION -  CT Scan and Blood Pressure  INTERVENTIONS - hemodynamic mngmt  CASE REVIEW - Hospitalist/Intensivist, general surgery  TREATMENT RESPONSE -Improved  PERFORMED BY - Self    NOTES   :  I have provided a total of 42 minutes of critical time not including time spent on separately documented procedures. The reason for providing this level of medical care for this critically ill patient was due to a critical illness that impaired one or more vital organ systems such that there was a high probability of imminent or life threatening deterioration in the patients condition. This care involved high complexity decision making to assess, manipulate, and support vital system functions,  lab review, consultations with specialist, family decision- making, bedside attention and documentation. During this entire length of time I was immediately available to the patient    Disposition:  admission    PLAN:  1. Admit     Diagnosis     Clinical Impression:   1. Severe sepsis ([address]    2. Abscess    3. Hidradenitis suppurativa of left axilla    4. Hidradenitis suppurativa    5.  Sepsis with acute organ dysfunction and septic shock, due to unspecified organism, unspecified type [facility])        Please note that this dictation was completed with Dragon, computer voice recognition software. Quite often unanticipated grammatical, syntax, homophones, and other interpretive errors are inadvertently transcribed by the computer software. Please disregard these errors. Additionally, please excuse any errors that have escaped final proofreading.

## 2021-10-06 NOTE — TELEPHONE ENCOUNTER
Returned to call to [facility]. Per Rufina Banda he visited the patients home today to install wound vac and was not able to due to very limited lighting in the patients room. Per Rufina Banda the patient isn't able to sit up or ambulate safely to another part of the residence for lighting. Patient was found laying on wound supplies and empty packaging. Patient has expressed that he gets SOB when he attempts to stand and/or sit up. Patient has also complained that his vision goes dark when he attempts to sit up and/or stand. Rufina Banda stated when he undressed the wound there was excessive drainasge that it has a \"putrid smell\" that could be noticed before he walked into the patients room. Per Rufina Banda patients temp was 98.2, heartrate was elevated, however, other vitals were WNL. Alf stated he believes patient should go to ED as the wound doesn't \"look good\". Called patient and left a voicemail message for return call.

## 2021-10-06 NOTE — TELEPHONE ENCOUNTER
Bhavik Baron stated that he was unable to do the wound VAC because their is insufficient lighting available. He will speak with his supervisor about getting a headlamp. For now he has put on a wet to dry dressing. The wound has a very putrid odor to it and there is a lot of drainage. The patient has been experiencing shortness of breath and his vision goes dark when he tries to stand and/or sit up. He also feels like he is going to pass out. Please give Bhavik Baron a call back to discuss.

## 2021-10-06 NOTE — ED TRIAGE NOTES
Pt has hydradenitis supporativa, has had multiple skin infections, currently on antibiotics at home. Pt sent to ED by home health nurse for worsening wounds to bilateral axilla, groin, buttocks.

## 2021-10-06 NOTE — TELEPHONE ENCOUNTER
Patient returned my call. Two patient identifiers. Advised patient to go to the ED for an eval of his wound to make sure there isn't any infection and to evaluate his complaints of SOB and darkened vision upon sitting or standing. Patient stated he would get a ride and go to [facility] today.

## 2021-10-06 NOTE — Clinical Note
Status[de-identified] INPATIENT [101]   Type of Bed: Surgical [18]   Inpatient Hospitalization Certified Necessary for the Following Reasons: 1.  Patient Failed outpatient treatment (further clarification in H&P documentation)   Admitting Diagnosis: Wound infection [[id number]]   Admitting Physician: Elmer Welch   Attending Physician: Fredrick Key [[id number]]   Estimated Length of Stay: 2 Midnights   Discharge Plan[de-identified] Home with Office Follow-up

## 2021-10-06 NOTE — ED NOTES
Verbal shift change report given to ALESSANDRO Fernandes (oncoming nurse) by Yolanda Villavicencio RN (offgoing nurse). Report included the following information SBAR, ED Summary, MAR and Recent Results.

## 2021-10-07 ENCOUNTER — HOME CARE VISIT (OUTPATIENT)
Dept: HOME HEALTH SERVICES | Facility: HOME HEALTH | Age: 26
End: 2021-10-07
Payer: MEDICAID

## 2021-10-07 PROBLEM — T14.8XXA WOUND INFECTION: Status: ACTIVE | Noted: 2021-10-07

## 2021-10-07 PROBLEM — L08.9 WOUND INFECTION: Status: ACTIVE | Noted: 2021-10-07

## 2021-10-07 LAB
ATRIAL RATE: 121 BPM
CALCULATED P AXIS, ECG09: 67 DEGREES
CALCULATED R AXIS, ECG10: 56 DEGREES
CALCULATED T AXIS, ECG11: 43 DEGREES
DIAGNOSIS, 93000: NORMAL
P-R INTERVAL, ECG05: 130 MS
Q-T INTERVAL, ECG07: 302 MS
QRS DURATION, ECG06: 82 MS
QTC CALCULATION (BEZET), ECG08: 428 MS
VENTRICULAR RATE, ECG03: 121 BPM

## 2021-10-07 PROCEDURE — 87205 SMEAR GRAM STAIN: CPT

## 2021-10-07 PROCEDURE — 99024 POSTOP FOLLOW-UP VISIT: CPT | Performed by: SURGERY

## 2021-10-07 PROCEDURE — 65270000032 HC RM SEMIPRIVATE

## 2021-10-07 PROCEDURE — 94760 N-INVAS EAR/PLS OXIMETRY 1: CPT

## 2021-10-07 PROCEDURE — 74011250637 HC RX REV CODE- 250/637: Performed by: STUDENT IN AN ORGANIZED HEALTH CARE EDUCATION/TRAINING PROGRAM

## 2021-10-07 PROCEDURE — 87186 SC STD MICRODIL/AGAR DIL: CPT

## 2021-10-07 PROCEDURE — 74011250636 HC RX REV CODE- 250/636: Performed by: HOSPITALIST

## 2021-10-07 PROCEDURE — 77030013575 HC DRSG HYDROFERA HOLL -B

## 2021-10-07 PROCEDURE — 74011250637 HC RX REV CODE- 250/637: Performed by: HOSPITALIST

## 2021-10-07 PROCEDURE — 87077 CULTURE AEROBIC IDENTIFY: CPT

## 2021-10-07 RX ORDER — ENOXAPARIN SODIUM 100 MG/ML
40 INJECTION SUBCUTANEOUS DAILY
Status: DISCONTINUED | OUTPATIENT
Start: 2021-10-08 | End: 2021-11-01 | Stop reason: HOSPADM

## 2021-10-07 RX ORDER — SODIUM CHLORIDE 9 MG/ML
100 INJECTION, SOLUTION INTRAVENOUS CONTINUOUS
Status: DISCONTINUED | OUTPATIENT
Start: 2021-10-07 | End: 2021-10-07

## 2021-10-07 RX ORDER — ONDANSETRON 2 MG/ML
4 INJECTION INTRAMUSCULAR; INTRAVENOUS
Status: DISCONTINUED | OUTPATIENT
Start: 2021-10-07 | End: 2021-11-01 | Stop reason: HOSPADM

## 2021-10-07 RX ORDER — OXYCODONE HYDROCHLORIDE 5 MG/1
10 TABLET ORAL
Status: DISCONTINUED | OUTPATIENT
Start: 2021-10-07 | End: 2021-11-01 | Stop reason: HOSPADM

## 2021-10-07 RX ORDER — ONDANSETRON 4 MG/1
4 TABLET, ORALLY DISINTEGRATING ORAL
Status: DISCONTINUED | OUTPATIENT
Start: 2021-10-07 | End: 2021-11-01 | Stop reason: HOSPADM

## 2021-10-07 RX ORDER — ACETAMINOPHEN 650 MG/1
650 SUPPOSITORY RECTAL
Status: DISCONTINUED | OUTPATIENT
Start: 2021-10-07 | End: 2021-10-15

## 2021-10-07 RX ORDER — GABAPENTIN 300 MG/1
300 CAPSULE ORAL 3 TIMES DAILY
Status: DISCONTINUED | OUTPATIENT
Start: 2021-10-07 | End: 2021-11-01 | Stop reason: HOSPADM

## 2021-10-07 RX ORDER — SODIUM CHLORIDE 0.9 % (FLUSH) 0.9 %
5-40 SYRINGE (ML) INJECTION EVERY 8 HOURS
Status: DISCONTINUED | OUTPATIENT
Start: 2021-10-07 | End: 2021-11-01 | Stop reason: HOSPADM

## 2021-10-07 RX ORDER — ACETAMINOPHEN 325 MG/1
650 TABLET ORAL
Status: DISCONTINUED | OUTPATIENT
Start: 2021-10-07 | End: 2021-10-07

## 2021-10-07 RX ORDER — ENOXAPARIN SODIUM 100 MG/ML
1 INJECTION SUBCUTANEOUS ONCE
Status: COMPLETED | OUTPATIENT
Start: 2021-10-07 | End: 2021-10-07

## 2021-10-07 RX ORDER — POLYETHYLENE GLYCOL 3350 17 G/17G
17 POWDER, FOR SOLUTION ORAL DAILY PRN
Status: DISCONTINUED | OUTPATIENT
Start: 2021-10-07 | End: 2021-11-01 | Stop reason: HOSPADM

## 2021-10-07 RX ORDER — GABAPENTIN 600 MG/1
600 TABLET ORAL DAILY
COMMUNITY

## 2021-10-07 RX ORDER — OXYCODONE HYDROCHLORIDE 5 MG/1
5 TABLET ORAL
Status: DISCONTINUED | OUTPATIENT
Start: 2021-10-07 | End: 2021-11-01 | Stop reason: HOSPADM

## 2021-10-07 RX ORDER — FAMOTIDINE 20 MG/1
20 TABLET, FILM COATED ORAL 2 TIMES DAILY
Status: DISCONTINUED | OUTPATIENT
Start: 2021-10-07 | End: 2021-10-07

## 2021-10-07 RX ORDER — ONDANSETRON 2 MG/ML
4 INJECTION INTRAMUSCULAR; INTRAVENOUS
Status: DISCONTINUED | OUTPATIENT
Start: 2021-10-07 | End: 2021-10-07

## 2021-10-07 RX ORDER — ACETAMINOPHEN 325 MG/1
650 TABLET ORAL
Status: DISCONTINUED | OUTPATIENT
Start: 2021-10-07 | End: 2021-11-01 | Stop reason: HOSPADM

## 2021-10-07 RX ORDER — SODIUM CHLORIDE 0.9 % (FLUSH) 0.9 %
5-40 SYRINGE (ML) INJECTION AS NEEDED
Status: DISCONTINUED | OUTPATIENT
Start: 2021-10-07 | End: 2021-11-01 | Stop reason: HOSPADM

## 2021-10-07 RX ORDER — POLYETHYLENE GLYCOL 3350 17 G/17G
17 POWDER, FOR SOLUTION ORAL DAILY PRN
Status: DISCONTINUED | OUTPATIENT
Start: 2021-10-07 | End: 2021-10-07

## 2021-10-07 RX ORDER — CLINDAMYCIN HYDROCHLORIDE 300 MG/1
300 CAPSULE ORAL 3 TIMES DAILY
COMMUNITY
End: 2021-11-01

## 2021-10-07 RX ORDER — LANOLIN ALCOHOL/MO/W.PET/CERES
325 CREAM (GRAM) TOPICAL
Status: DISCONTINUED | OUTPATIENT
Start: 2021-10-07 | End: 2021-11-01 | Stop reason: HOSPADM

## 2021-10-07 RX ORDER — MORPHINE SULFATE 2 MG/ML
1 INJECTION, SOLUTION INTRAMUSCULAR; INTRAVENOUS
Status: DISCONTINUED | OUTPATIENT
Start: 2021-10-07 | End: 2021-10-07

## 2021-10-07 RX ORDER — SODIUM CHLORIDE 9 MG/ML
250 INJECTION, SOLUTION INTRAVENOUS AS NEEDED
Status: DISCONTINUED | OUTPATIENT
Start: 2021-10-07 | End: 2021-10-07

## 2021-10-07 RX ADMIN — OXYCODONE HYDROCHLORIDE 10 MG: 5 TABLET ORAL at 21:20

## 2021-10-07 RX ADMIN — FERROUS SULFATE TAB 325 MG (65 MG ELEMENTAL FE) 325 MG: 325 (65 FE) TAB at 12:43

## 2021-10-07 RX ADMIN — OXYCODONE HYDROCHLORIDE 10 MG: 5 TABLET ORAL at 12:13

## 2021-10-07 RX ADMIN — GABAPENTIN 300 MG: 300 CAPSULE ORAL at 08:14

## 2021-10-07 RX ADMIN — Medication 10 ML: at 08:15

## 2021-10-07 RX ADMIN — GABAPENTIN 300 MG: 300 CAPSULE ORAL at 21:20

## 2021-10-07 RX ADMIN — SODIUM CHLORIDE 100 ML/HR: 9 INJECTION, SOLUTION INTRAVENOUS at 01:44

## 2021-10-07 RX ADMIN — Medication 10 ML: at 14:34

## 2021-10-07 RX ADMIN — GABAPENTIN 300 MG: 300 CAPSULE ORAL at 16:33

## 2021-10-07 RX ADMIN — ACETAMINOPHEN 650 MG: 325 TABLET ORAL at 08:14

## 2021-10-07 RX ADMIN — FAMOTIDINE 20 MG: 20 TABLET ORAL at 08:14

## 2021-10-07 RX ADMIN — FERROUS SULFATE TAB 325 MG (65 MG ELEMENTAL FE) 325 MG: 325 (65 FE) TAB at 08:14

## 2021-10-07 RX ADMIN — ENOXAPARIN SODIUM 60 MG: 60 INJECTION SUBCUTANEOUS at 01:44

## 2021-10-07 RX ADMIN — Medication 10 ML: at 21:20

## 2021-10-07 RX ADMIN — Medication 10 ML: at 12:14

## 2021-10-07 RX ADMIN — FERROUS SULFATE TAB 325 MG (65 MG ELEMENTAL FE) 325 MG: 325 (65 FE) TAB at 16:33

## 2021-10-07 NOTE — PROGRESS NOTES
Physical therapy:     Patient unavailable secondary to wound care. Will attempt as able.      Apolinar Alberts, PT

## 2021-10-07 NOTE — PROGRESS NOTES
Problem: Pain  Goal: *Control of Pain  Outcome: Progressing Towards Goal  Goal: *PALLIATIVE CARE:  Alleviation of Pain  Outcome: Progressing Towards Goal     Problem: Falls - Risk of  Goal: *Absence of Falls  Description: Document Omid Fall Risk and appropriate interventions in the flowsheet. Outcome: Progressing Towards Goal  Note: Fall Risk Interventions:                                Problem: Activity Intolerance  Goal: *Oxygen saturation during activity within specified parameters  Outcome: Progressing Towards Goal  Goal: *Able to remain out of bed as prescribed  Outcome: Progressing Towards Goal     Problem: Pressure Injury - Risk of  Goal: *Prevention of pressure injury  Description: Document Dann Scale and appropriate interventions in the flowsheet.   Outcome: Progressing Towards Goal  Note: Pressure Injury Interventions:       Moisture Interventions: Contain wound drainage, Absorbent underpads

## 2021-10-07 NOTE — PROGRESS NOTES
TRANSFER - IN REPORT:    Verbal report received from ALESSANDRO Fernandes(name) on [employer] [facility]  being received from ED(unit) for routine progression of care      Report consisted of patients Situation, Background, Assessment and   Recommendations(SBAR). Information from the following report(s) SBAR, Kardex, ED Summary and Intake/Output was reviewed with the receiving nurse. Opportunity for questions and clarification was provided. Assessment completed upon patients arrival to unit and care assumed.

## 2021-10-07 NOTE — PROGRESS NOTES
Patients wounds cleaned with soap, water, and wound . Applied wound dressing, abd pads, and gauze roll. Patient tolerated well. Wound culture obtained from right axilla and sacrum.

## 2021-10-07 NOTE — PROGRESS NOTES
JM  RUR: 11%    Spoke to patient extensively about readmission to hospital due to wounds. As per Dr Evangelina Gutierrez suggestion of a snf, discussed option with patient. He wanted a list of the facilities. Gave him the list and stated CM department will follow up on Friday, 10/8 regarding his decision. Reason for Readmission:    Earnestine Mcintyre is a 22 y.o. male with history of hidradenitis recently discharged from our facility on 5 days of cipro and clindamycin after hospitalization for pilonidal cystectomy presents to the ER today at the request of his home nurse due to foul smelling drainage from wound generalized weakness tachycardia lightheadedness and SOB on standing. He denies any fever. He appears quite lethargic. Denies prior history of DVT/PE. He is also non compliant with medicines still taking cipro and clindamycin when they were supposed to be completed a week ago          RUR Score/Risk Level:    11%     PCP: First and Last name:  Ashley Schaffer   Name of Practice:    Are you a current patient: Yes/No: yes   Approximate date of last visit:    Can you participate in a virtual visit with your PCP:     Is a Care Conference indicated: no      Did you attend your follow up appointment (s): If not, why not:  Patient attended surgery follow up with Dr Evangelina Gutierrez on 9/30. Did not attend pcp on 10/4  Did not attend wound care appt on 10/5         Resources/supports as identified by patient/family:   Roommates are supports for patient        Top Challenges facing patient (as identified by patient/family and CM): Finances/Medication cost?     no  Transportation      yes  Support system or lack thereof? At times      Living arrangements? yes  Self-care/ADLs/Cognition? yes       Current Advanced Directive/Advance Care Plan:  Patient understands advance directive discussion and wants CPR and oxygen if needed. He states his grandparent, Susy Chopra as next of kin. [phone]. Plan for utilizing home health:   Is current with [facility]             Transition of Care Plan:    Based on readmission, the patient's previous Plan of Care   has been evaluated and/or modified. The current Transition of Care Plan is:         CM assessed patient at bedside. Verified demographics. Patient is a RE-ADMIT. Last d/c from IP admission on 9/24/21. Address and number correct on facesheet. Has not worked since May 2021. Lives with 3 room mates. Has a cane, grabber and rolling walker. Has WUT. Was receiving Home care from [place] [id number] for wound care. He was also receiving PT x2week. PCP is Burt Garcia [address]. May need transport upon discharge.      Readmission Assessment  Number of days since last admission?: 8-30 days  Previous disposition: Home with Home Health  Who is being interviewed?: Patient  What was the patient's/caregiver's perception as to why they think they needed to return back to the hospital?: Other (Comment)  Did you visit your Primary Care Physician after you left the hospital, before you returned this time?: No  Why weren't you able to visit your PCP?: Did not want to go (Comment) (states he was getting ready to go then did not feel well)  Did you see a specialist, such as Cardiac, Pulmonary, Orthopedic Physician, etc. after you left the hospital?: Yes  Who advised the patient to return to the hospital?: [facility] Staff  Does the patient report anything that got in the way of taking their medications?: No  In our efforts to provide the best possible care to you and others like you, can you think of anything that we could have done to help you after you left the hospital the first time, so that you might not have needed to return so soon?: Other (Comment)    FARHANA Gibson, [facility] [place] [address]

## 2021-10-07 NOTE — PROGRESS NOTES
0700) Verbal shift change report given to Alejandra Solo, RN (oncoming nurse) by Jacob Cota RN (offgoing nurse). Report included the following information SBAR, Kardex, Intake/Output, MAR and Recent Results. 0800) Pt assessed and vital signs stable. Pt c/o pain 6/10 to groin and axilla, PRN tylenol given. Wound on buttocks redressed and underwear provided. Condom cath disconnected and urinal provided, 725 ml or urine emptied from cath. Morning meds given and pt left resting in bed.     0945) Pt resting in bed at this time. Attempted to hang IV antibiotics, orders d/c. Pt stated no needs at this time. 1030) Interdisciplinary team rounds were held 10/7/2021 with the following team members:Care Management, Nursing, Pharmacy, Physician and Wound Care. Plan of care discussed. See clinical pathway and/or care plan for interventions and desired outcomes. ) Wound care nurse at the bedside at this time assessing wounds. IV flushed and patent. 1245) Pt eating in bed at this time and stated pain resolved to 6/10 at this time but \"still going down\". Iron given and pt left eating lunch in bed. 1410) Case management in the room with pt at this time. Ice provided per request.     0929) IV's flushed and patent. New dressing placed on right wrist. Drinks provided per request.     7947) Pt reassessed and no changes to note. Pt has no c/o pain at this time. 1630) Gabapentin and iron given. Pt stated no additional needs at this time. 1800) Ice and ginger ale provided per request. Pt stated no additional needs at this time and left finish dinner in bed.     1900) Bedside shift change report given to Jacob Cota, RN (oncoming nurse) by Alejandra Solo RN (offgoing nurse). Report included the following information SBAR, Kardex, Intake/Output and MAR.

## 2021-10-07 NOTE — PROGRESS NOTES
Comprehensive Nutrition Assessment    Type and Reason for Visit: (P) Initial, Wound, Consult    Nutrition Recommendations/Plan: Double portions at mealtime with wound healing supplements on meal trays. Add Ocuvite once per day for wound healing. Enc po and fluid intake. Ensure pt that nutrition will help his wounds to heal.    Nutrition Assessment:  (P) Pt admitted with wound infection/hidradenitis; d/c'd from [address] [place] [address] 5 days ago and now back with lehthargy, foul-smelling wound, anemia. Rec'd 1 unit packed cells and is on FeSO4 daily. s/p cleaning and granulating today. Possible wound vac. Appropriate kcal and supplements ordered for meal trays. Mild malnutrition noted for this patient at this time. Estimated Daily Nutrient Needs:  Energy (kcal): (P) 2474; Weight Used for Energy Requirements: (P) Ideal  Protein (g): (P) 92.5 (1.35 g/kg CBW); Weight Used for Protein Requirements: (P) Ideal  Fluid (ml/day): (P) 2474; Method Used for Fluid Requirements: (P) 1 ml/kcal      Nutrition Related Findings:  (P) Pt tolerating diet and willing to try supplements      Wounds:    (P) Open wounds, Deep tissue injury       Current Nutrition Therapies:  ADULT ORAL NUTRITION SUPPLEMENT Breakfast, Dinner; Standard High Calorie/High Protein  ADULT ORAL NUTRITION SUPPLEMENT Lunch; Frozen Supplement  ADULT ORAL NUTRITION SUPPLEMENT Dinner; Wound Healing Supplement  ADULT DIET Regular    Anthropometric Measures:  Height:  (P) 6' 3\" (190.5 cm)  Current Body Wt:  (P) 68.6 kg (151 lb 3.8 oz)   Admission Body Wt:       Usual Body Wt:        Ideal Body Wt:  (P) 196 lbs:  (P) 77.2 %   Adjusted Body Weight:   ; Weight Adjustment for:     Adjusted BMI:       BMI Category:  (P) Normal weight (BMI 18.5-24. 9)       Nutrition Diagnosis:   (P) Predicted inadequate energy intake, Increased nutrient needs, Mild malnutrition, In context of acute illness or injury, Unintended weight loss, Limited adherence to nutrition-related recommendations related to (P) catabolic illness, acute injury/trauma, lack or limited access to food, inadequate protein-energy intake, psychological cause or life stress, increased demand for energy/nutrients, food and nutrition related knowledge deficit as evidenced by (P) intake 51-75%, poor intake prior to admission, wounds, BMI, weight loss, moderate loss of subcutaneous fat, mild muscle loss, constipation    Nutrition Interventions:   Food and/or Nutrient Delivery: (P) Modify current diet, Modify oral nutrition supplement  Nutrition Education and Counseling: (P) Education not indicated  Coordination of Nutrition Care: (P) Continue to monitor while inpatient    Goals:  (P) PO intake of meals and ONS > 75% most meals next 4-7 days       Nutrition Monitoring and Evaluation:   Behavioral-Environmental Outcomes: (P) Knowledge or skill, Beliefs and attitudes  Food/Nutrient Intake Outcomes: (P) Food and nutrient intake, Supplement intake  Physical Signs/Symptoms Outcomes: (P) Biochemical data, Constipation, Meal time behavior, Skin, Weight, Nutrition focused physical findings    Discharge Planning:    (P) Continue current diet, Continue oral nutrition supplement     Electronically signed by Schuyler Mccall, PhD, RD, [address]  on 10/7/2021 at 1:04 PM    Contact: [phone]

## 2021-10-07 NOTE — H&P
History and Physical    Patient: Arcelia Moya MRN: [id number]  SSN: [id number]    YOB: 1995  Age: 22 y.o. Sex: male      Subjective:      Arcelia Moya is a 22 y.o. male with history of hidradenitis recently discharged from our facility on 5 days of cipro and clindamycin after hospitalization for pilonidal cystectomy presents to the ER today at the request of his home nurse due to foul smelling drainage from wound generalized weakness tachycardia lightheadedness and SOB on standing. He denies any fever. He appears quite lethargic. Denies prior history of DVT/PE. He is also non compliant with medicines still taking cipro and clindamycin when they were supposed to be completed a week ago. Past Medical History:   Diagnosis Date    Pilonidal cyst 9/30/2021     History reviewed. No pertinent surgical history. History reviewed. No pertinent family history. Social History     Tobacco Use    Smoking status: Never Smoker    Smokeless tobacco: Never Used   Substance Use Topics    Alcohol use: Yes     Comment: Socially       Prior to Admission medications    Medication Sig Start Date End Date Taking? Authorizing Provider   ferrous sulfate 325 mg (65 mg iron) tablet Take 1 Tablet by mouth three (3) times daily (with meals). 9/24/21   Rukhsana Gann MD   gabapentin (NEURONTIN) 600 mg tablet Take 0.5 Tablets by mouth two (2) times a day for 30 days. Max Daily Amount: 600 mg. 9/24/21 10/24/21  Rukhsana Gann MD   ibuprofen (MOTRIN) 200 mg tablet Take 2 Tablets by mouth every eight (8) hours as needed (mod pain).  9/24/21   Rukhsana Gann MD        Allergies   Allergen Reactions    Penicillins Anaphylaxis    Pcn [Penicillins] Swelling       Review of Systems:  10 point ROS is negative except for HPI    Objective:     Vitals:    10/06/21 1752 10/06/21 1900 10/06/21 2053 10/06/21 2321   BP: (!) 92/56 113/61 112/65 109/65   Pulse: (!) 147 (!) 121 (!) 111 (!) 107   Resp: 20 24 18 18   Temp: 98.7 °F (37.1 °C)      SpO2: 98% 100% 99% 100%   Weight: 64.4 kg (142 lb)      Height: 6' 3\" (1.905 m)           Physical Exam:  General: Patient is lethargic not in distress  Chest: No accessory muscle use, speaking in full sentences  Abdomen: Not distended  Head: No jaundice or cyanosis  Muskloskletal: fair range of motion    Assessment:     Hospital Problems  Date Reviewed: 10/1/2021        Codes Class Noted POA    Wound infection ICD-10-CM: T14. 8XXA, L08.9  ICD-9-CM: 958.3  10/7/2021 Unknown              Plan:     1. Wound infection/sepsis: start vancomycin cefepime. Follow wound and blood cultures. Hydrate. CT on chest and groin shows no axillary or groin abscess. Consult surgery  2. Hypotension tachycardia hypoxia: Although this maybe from sepsis dehydration, will have to r/o PE as he is recumbent all past period. Will give 1 full dose lovenox till we get CTA to r/o PE as he already got contrast today. CT chest performed was a non dedicated study. Consider discussing first with radiologist to see if they can comment on PE?  3. Symptomatic anemia: Will give 1 unit packed red cells. He is agreeable  4. Generalized weakness deconditioning: from infection. Consult  for dispo. Consider placement  5. DVT prophylaxis: Receiving 1 full dose lovenox now  6.  Full code      Inpatient status    Signed By: Bryanna Hart MD     October 7, 2021

## 2021-10-07 NOTE — ROUTINE PROCESS
Wound care consult from floor to evaluate and change wound dressings. Patient is known to me from previous admission. This time patient admitted with infected wound S/P Pilonidal Cystectomy Wound: Clean and granulating. No active infection per DR Guillaume Hylton. All his wounds Axillary Hidradenitis ,,groin and around dorothy matthieu area all looks better   Cleaned  All wounds with NS and applied Hydrofera foam and covered with abdominal pads and tape. Patient tolerated the procedure fairly.

## 2021-10-07 NOTE — PROGRESS NOTES
Bedside shift report given to Alondra Perkins RN (oncoming) from Elsa Duron RN (offgoing). Report included the following: SBAR, MAR, Kardex, and Recent Results. Patient resting in bed quietly, no complaints at this time.

## 2021-10-07 NOTE — PROGRESS NOTES
Mr. Dirk Rai reports pain at sites of hidradenitis as well as wound in intergluteal cleft. Tm 98.7 Tc 98.2 HR: 93 BP: 112/60 Resp Rate: 185 99% sat on room air. Intake/Output Summary (Last 24 hours) at 10/7/2021 1139  Last data filed at 10/7/2021 0754  Gross per 24 hour   Intake 2532 ml   Output 1425 ml   Net 1107 ml      Pilonidal Cystectomy Wound: Clean and granulating. No active infection. Axillary Hidradenitis - No significant change.               Recent Results (from the past 24 hour(s))   EKG, 12 LEAD, INITIAL    Collection Time: 10/06/21  6:26 PM   Result Value Ref Range    Ventricular Rate 121 BPM    Atrial Rate 121 BPM    P-R Interval 130 ms    QRS Duration 82 ms    Q-T Interval 302 ms    QTC Calculation (Bezet) 428 ms    Calculated P Axis 67 degrees    Calculated R Axis 56 degrees    Calculated T Axis 43 degrees    Diagnosis       Sinus tachycardia  Minimal voltage criteria for LVH, may be normal variant  Nonspecific T wave abnormality  Abnormal ECG  When compared with ECG of 20-SEP-2021 22:28,  Nonspecific T wave abnormality, worse in Inferior leads  Confirmed by Antonieta Crawford M.D., Titus Purvis ([id number]) on 10/7/2021 7:01:23 AM     CULTURE, BLOOD    Collection Time: 10/06/21  6:34 PM    Specimen: Blood   Result Value Ref Range    Special Requests: NO SPECIAL REQUESTS      Culture result: NO GROWTH AFTER 8 HOURS     CULTURE, BLOOD    Collection Time: 10/06/21  6:34 PM    Specimen: Blood   Result Value Ref Range    Special Requests: NO SPECIAL REQUESTS      Culture result: NO GROWTH AFTER 8 HOURS     METABOLIC PANEL, COMPREHENSIVE    Collection Time: 10/06/21  6:34 PM   Result Value Ref Range    Sodium 135 (L) 136 - 145 mmol/L    Potassium 3.6 3.5 - 5.1 mmol/L    Chloride 99 97 - 108 mmol/L    CO2 27 21 - 32 mmol/L    Anion gap 9 5 - 15 mmol/L    Glucose 117 (H) 65 - 100 mg/dL    BUN 6 6 - 20 MG/DL    Creatinine 0.89 0.70 - 1.30 MG/DL    BUN/Creatinine ratio 7 (L) 12 - 20      GFR est AA >60 >60 ml/min/1.73m2    GFR est non-AA >60 >60 ml/min/1.73m2    Calcium 8.8 8.5 - 10.1 MG/DL    Bilirubin, total 0.5 0.2 - 1.0 MG/DL    ALT (SGPT) 13 12 - 78 U/L    AST (SGOT) 12 (L) 15 - 37 U/L    Alk. phosphatase 105 45 - 117 U/L    Protein, total 8.0 6.4 - 8.2 g/dL    Albumin 2.6 (L) 3.5 - 5.0 g/dL    Globulin 5.4 (H) 2.0 - 4.0 g/dL    A-G Ratio 0.5 (L) 1.1 - 2.2     CBC WITH AUTOMATED DIFF    Collection Time: 10/06/21  6:34 PM   Result Value Ref Range    WBC 13.7 (H) 4.1 - 11.1 K/uL    RBC 3.95 (L) 4.10 - 5.70 M/uL    HGB 8.1 (L) 12.1 - 17.0 g/dL    HCT 27.0 (L) 36.6 - 50.3 %    MCV 68.4 (L) 80.0 - 99.0 FL    MCH 20.5 (L) 26.0 - 34.0 PG    MCHC 30.0 30.0 - 36.5 g/dL    RDW 19.7 (H) 11.5 - 14.5 %    PLATELET 291 (H) 185 - 400 K/uL    MPV 8.6 (L) 8.9 - 12.9 FL    NRBC 0.0 0  WBC    ABSOLUTE NRBC 0.00 0.00 - 0.01 K/uL    NEUTROPHILS 80 (H) 32 - 75 %    LYMPHOCYTES 8 (L) 12 - 49 %    MONOCYTES 8 5 - 13 %    EOSINOPHILS 3 0 - 7 %    BASOPHILS 0 0 - 1 %    IMMATURE GRANULOCYTES 1 (H) 0.0 - 0.5 %    ABS. NEUTROPHILS 11.0 (H) 1.8 - 8.0 K/UL    ABS. LYMPHOCYTES 1.1 0.8 - 3.5 K/UL    ABS. MONOCYTES 1.1 (H) 0.0 - 1.0 K/UL    ABS. EOSINOPHILS 0.4 0.0 - 0.4 K/UL    ABS. BASOPHILS 0.0 0.0 - 0.1 K/UL    ABS. IMM.  GRANS. 0.1 (H) 0.00 - 0.04 K/UL    DF SMEAR SCANNED      RBC COMMENTS MICROCYTOSIS  2+        RBC COMMENTS ANISOCYTOSIS  2+       TROPONIN-HIGH SENSITIVITY    Collection Time: 10/06/21  6:34 PM   Result Value Ref Range    Troponin-High Sensitivity 4 0 - 76 ng/L   POC CHEM8    Collection Time: 10/06/21  6:43 PM   Result Value Ref Range    CO2, POC 25 (H) 19 - 24 MMOL/L    Glucose,  (H) 74 - 106 MG/DL    Creatinine, POC 0.7 0.6 - 1.3 MG/DL    GFRAA, POC >60 >60 ml/min/1.73m2    GFRNA, POC >60 >60 ml/min/1.73m2    Sodium,  (L) 136 - 145 MMOL/L    Potassium, POC 3.6 3.5 - 5.5 MMOL/L    Calcium, ionized (POC) 1.11 (L) 1.12 - 1.32 mmol/L    Chloride, POC 97 (L) 100 - 108 MMOL/L    Anion gap, POC 13 10 - 20 POC LACTIC ACID    Collection Time: 10/06/21  6:43 PM   Result Value Ref Range    Lactic Acid (POC) 3.25 (HH) 0.40 - 2.00 mmol/L   POC LACTIC ACID    Collection Time: 10/06/21  8:54 PM   Result Value Ref Range    Lactic Acid (POC) 0.93 0.40 - 2.00 mmol/L   URINALYSIS W/ REFLEX CULTURE    Collection Time: 10/06/21 10:40 PM    Specimen: Urine   Result Value Ref Range    Color YELLOW/STRAW      Appearance CLEAR CLEAR      Specific gravity 1.010 1.003 - 1.030      pH (UA) 7.5 5.0 - 8.0      Protein Negative NEG mg/dL    Glucose Negative NEG mg/dL    Ketone Negative NEG mg/dL    Bilirubin Negative NEG      Blood Negative NEG      Urobilinogen 1.0 0.2 - 1.0 EU/dL    Nitrites Negative NEG      Leukocyte Esterase Negative NEG      WBC 0-4 0 - 4 /hpf    RBC 0-5 0 - 5 /hpf    Epithelial cells FEW FEW /lpf    Bacteria Negative NEG /hpf    UA:UC IF INDICATED CULTURE NOT INDICATED BY UA RESULT CNI     Reviewed recent imaging studies. Reassured Mr. Mont Gilford that he is doing well and that the pilonidal cystectomy wound is healing. Continue local wound care - possible wound vac placement. Do not believe that there is a need for abx therapy at this time. Diet and supplements as tolerated. Pain medication and anti-emetics as needed. Plans per Dr. Radha Hanley. Following.

## 2021-10-07 NOTE — ACP (ADVANCE CARE PLANNING)
Patient understands advance directive discussion and wants CPR and oxygen if needed. He states his grandparent, Siri Snow as next of kin. [phone].             Asia Campa, BSN, [address] [place] [address]  [phone]

## 2021-10-07 NOTE — PROGRESS NOTES
Physical Therapy Screening:  Services are not indicated at this time as patient is ambulating independently. [facility] was triggered for physical therapy based on results obtained during the nursing admission assessment. The patients chart was reviewed and the patient is not appropriate for a skilled therapy evaluation at this time. Please consult physical therapy if any therapy needs arise. Thank you.     Tarri Lesches, PT

## 2021-10-07 NOTE — PROGRESS NOTES
Problem: Pain  Goal: *Control of Pain  Outcome: Progressing Towards Goal     Problem: Falls - Risk of  Goal: *Absence of Falls  Description: Document Omid Fall Risk and appropriate interventions in the flowsheet. Outcome: Progressing Towards Goal  Note: Fall Risk Interventions:            Medication Interventions: Teach patient to arise slowly         History of Falls Interventions: Evaluate medications/consider consulting pharmacy         Problem: Activity Intolerance  Goal: *Oxygen saturation during activity within specified parameters  Outcome: Progressing Towards Goal     Problem: Pressure Injury - Risk of  Goal: *Prevention of pressure injury  Description: Document Dann Scale and appropriate interventions in the flowsheet.   Outcome: Progressing Towards Goal  Note: Pressure Injury Interventions:       Moisture Interventions: Apply protective barrier, creams and emollients, Absorbent underpads    Activity Interventions: Increase time out of bed    Mobility Interventions: PT/OT evaluation    Nutrition Interventions: Offer support with meals,snacks and hydration    Friction and Shear Interventions: Apply protective barrier, creams and emollients

## 2021-10-07 NOTE — PROGRESS NOTES
[address] [place] [address] Admission Pharmacy Medication Reconciliation     Information obtained from: Patient   RxQuery data available1:Yes     Comments/recommendations:  Medication reconciliation performed with patient via telephone. Patient admits to non-compliance with clindamycin and ferrous sulfate prescribed upon discharge from [address] [place] [address] on 9/24/21. Medication changes (since last review): Added  None  Removed  None  Adjusted  Gabapentin- adjusted to 1 tablet daily     The [facility] ([facility]) was accessed to determine fill history of any controlled medications. Last filled oxycodone 5mg (Qty 12; 3 days supply) and gabapenin 600mg (Qty 30; 30 days supply) on 9/24/21.    1RxQuery pharmacy benefit data reflects medications filled and processed through the patient's insurance, however                this data does NOT capture whether the medication was picked up or is currently being taken by the patient. Patient allergies: Allergies as of 10/06/2021 - Fully Reviewed 10/06/2021   Allergen Reaction Noted    Penicillins Anaphylaxis 07/13/2011    Pcn [penicillins] Swelling 11/15/2014     Prior to Admission Medications   Prescriptions Last Dose Informant Patient Reported? Taking? clindamycin (CLEOCIN) 300 mg capsule   Yes Yes   Sig: Take 300 mg by mouth three (3) times daily. ferrous sulfate 325 mg (65 mg iron) tablet   No Yes   Sig: Take 1 Tablet by mouth three (3) times daily (with meals). gabapentin (NEURONTIN) 600 mg tablet   Yes Yes   Sig: Take 600 mg by mouth daily. ibuprofen (MOTRIN) 200 mg tablet 10/6/2021 at Unknown time  No Yes   Sig: Take 2 Tablets by mouth every eight (8) hours as needed (mod pain).       Facility-Administered Medications: None        Thank you,     Jordis Goldberg, PharmD   Contact: [phone]

## 2021-10-08 ENCOUNTER — HOME CARE VISIT (OUTPATIENT)
Dept: HOME HEALTH SERVICES | Facility: HOME HEALTH | Age: 26
End: 2021-10-08
Payer: MEDICAID

## 2021-10-08 LAB
ALBUMIN SERPL-MCNC: 2.3 G/DL (ref 3.5–5)
ALBUMIN/GLOB SERPL: 0.4 {RATIO} (ref 1.1–2.2)
ALP SERPL-CCNC: 102 U/L (ref 45–117)
ALT SERPL-CCNC: 11 U/L (ref 12–78)
ANION GAP SERPL CALC-SCNC: 8 MMOL/L (ref 5–15)
AST SERPL-CCNC: 9 U/L (ref 15–37)
BASOPHILS # BLD: 0 K/UL (ref 0–0.1)
BASOPHILS NFR BLD: 0 % (ref 0–1)
BILIRUB DIRECT SERPL-MCNC: 0.1 MG/DL (ref 0–0.2)
BILIRUB SERPL-MCNC: 0.3 MG/DL (ref 0.2–1)
BUN SERPL-MCNC: 4 MG/DL (ref 6–20)
BUN/CREAT SERPL: 6 (ref 12–20)
CALCIUM SERPL-MCNC: 8.9 MG/DL (ref 8.5–10.1)
CHLORIDE SERPL-SCNC: 103 MMOL/L (ref 97–108)
CO2 SERPL-SCNC: 29 MMOL/L (ref 21–32)
CREAT SERPL-MCNC: 0.7 MG/DL (ref 0.7–1.3)
DIFFERENTIAL METHOD BLD: ABNORMAL
EOSINOPHIL # BLD: 0.9 K/UL (ref 0–0.4)
EOSINOPHIL NFR BLD: 7 % (ref 0–7)
ERYTHROCYTE [DISTWIDTH] IN BLOOD BY AUTOMATED COUNT: 19.9 % (ref 11.5–14.5)
GLOBULIN SER CALC-MCNC: 5.2 G/DL (ref 2–4)
GLUCOSE SERPL-MCNC: 98 MG/DL (ref 65–100)
HCT VFR BLD AUTO: 27.6 % (ref 36.6–50.3)
HGB BLD-MCNC: 8 G/DL (ref 12.1–17)
IMM GRANULOCYTES # BLD AUTO: 0.1 K/UL (ref 0–0.04)
IMM GRANULOCYTES NFR BLD AUTO: 1 % (ref 0–0.5)
LYMPHOCYTES # BLD: 1.2 K/UL (ref 0.8–3.5)
LYMPHOCYTES NFR BLD: 10 % (ref 12–49)
MAGNESIUM SERPL-MCNC: 2 MG/DL (ref 1.6–2.4)
MCH RBC QN AUTO: 20 PG (ref 26–34)
MCHC RBC AUTO-ENTMCNC: 29 G/DL (ref 30–36.5)
MCV RBC AUTO: 68.8 FL (ref 80–99)
MONOCYTES # BLD: 0.7 K/UL (ref 0–1)
MONOCYTES NFR BLD: 6 % (ref 5–13)
NEUTS SEG # BLD: 9.4 K/UL (ref 1.8–8)
NEUTS SEG NFR BLD: 76 % (ref 32–75)
NRBC # BLD: 0 K/UL (ref 0–0.01)
NRBC BLD-RTO: 0 PER 100 WBC
PHOSPHATE SERPL-MCNC: 3.4 MG/DL (ref 2.6–4.7)
PLATELET # BLD AUTO: 573 K/UL (ref 150–400)
PMV BLD AUTO: 8.5 FL (ref 8.9–12.9)
POTASSIUM SERPL-SCNC: 3.7 MMOL/L (ref 3.5–5.1)
PROT SERPL-MCNC: 7.5 G/DL (ref 6.4–8.2)
RBC # BLD AUTO: 4.01 M/UL (ref 4.1–5.7)
RBC MORPH BLD: ABNORMAL
RBC MORPH BLD: ABNORMAL
SODIUM SERPL-SCNC: 140 MMOL/L (ref 136–145)
WBC # BLD AUTO: 12.3 K/UL (ref 4.1–11.1)

## 2021-10-08 PROCEDURE — 74011250637 HC RX REV CODE- 250/637: Performed by: STUDENT IN AN ORGANIZED HEALTH CARE EDUCATION/TRAINING PROGRAM

## 2021-10-08 PROCEDURE — 36415 COLL VENOUS BLD VENIPUNCTURE: CPT

## 2021-10-08 PROCEDURE — 85025 COMPLETE CBC W/AUTO DIFF WBC: CPT

## 2021-10-08 PROCEDURE — 2709999900 HC NON-CHARGEABLE SUPPLY

## 2021-10-08 PROCEDURE — 87040 BLOOD CULTURE FOR BACTERIA: CPT

## 2021-10-08 PROCEDURE — 94760 N-INVAS EAR/PLS OXIMETRY 1: CPT

## 2021-10-08 PROCEDURE — 80076 HEPATIC FUNCTION PANEL: CPT

## 2021-10-08 PROCEDURE — 74011250636 HC RX REV CODE- 250/636: Performed by: STUDENT IN AN ORGANIZED HEALTH CARE EDUCATION/TRAINING PROGRAM

## 2021-10-08 PROCEDURE — 80048 BASIC METABOLIC PNL TOTAL CA: CPT

## 2021-10-08 PROCEDURE — 84100 ASSAY OF PHOSPHORUS: CPT

## 2021-10-08 PROCEDURE — 65270000032 HC RM SEMIPRIVATE

## 2021-10-08 PROCEDURE — 74011250637 HC RX REV CODE- 250/637: Performed by: HOSPITALIST

## 2021-10-08 PROCEDURE — 83735 ASSAY OF MAGNESIUM: CPT

## 2021-10-08 RX ORDER — NALOXONE HYDROCHLORIDE 0.4 MG/ML
0.4 INJECTION, SOLUTION INTRAMUSCULAR; INTRAVENOUS; SUBCUTANEOUS
Status: DISCONTINUED | OUTPATIENT
Start: 2021-10-08 | End: 2021-11-01 | Stop reason: HOSPADM

## 2021-10-08 RX ORDER — ASCORBIC ACID 500 MG
500 TABLET ORAL 2 TIMES DAILY
Status: DISCONTINUED | OUTPATIENT
Start: 2021-10-08 | End: 2021-10-11

## 2021-10-08 RX ORDER — ZINC SULFATE 50(220)MG
1 CAPSULE ORAL DAILY
Status: DISCONTINUED | OUTPATIENT
Start: 2021-10-08 | End: 2021-10-11

## 2021-10-08 RX ADMIN — Medication 10 ML: at 22:45

## 2021-10-08 RX ADMIN — GABAPENTIN 300 MG: 300 CAPSULE ORAL at 08:24

## 2021-10-08 RX ADMIN — OXYCODONE HYDROCHLORIDE AND ACETAMINOPHEN 500 MG: 500 TABLET ORAL at 17:49

## 2021-10-08 RX ADMIN — OXYCODONE HYDROCHLORIDE 10 MG: 5 TABLET ORAL at 08:25

## 2021-10-08 RX ADMIN — GABAPENTIN 300 MG: 300 CAPSULE ORAL at 22:44

## 2021-10-08 RX ADMIN — FERROUS SULFATE TAB 325 MG (65 MG ELEMENTAL FE) 325 MG: 325 (65 FE) TAB at 08:25

## 2021-10-08 RX ADMIN — FERROUS SULFATE TAB 325 MG (65 MG ELEMENTAL FE) 325 MG: 325 (65 FE) TAB at 16:00

## 2021-10-08 RX ADMIN — GABAPENTIN 300 MG: 300 CAPSULE ORAL at 15:59

## 2021-10-08 RX ADMIN — OXYCODONE HYDROCHLORIDE AND ACETAMINOPHEN 500 MG: 500 TABLET ORAL at 12:03

## 2021-10-08 RX ADMIN — Medication 1 CAPSULE: at 12:03

## 2021-10-08 RX ADMIN — Medication 10 ML: at 16:00

## 2021-10-08 RX ADMIN — ENOXAPARIN SODIUM 40 MG: 100 INJECTION SUBCUTANEOUS at 08:24

## 2021-10-08 RX ADMIN — Medication 10 ML: at 05:40

## 2021-10-08 RX ADMIN — ACETAMINOPHEN 650 MG: 325 TABLET ORAL at 22:44

## 2021-10-08 RX ADMIN — OXYCODONE HYDROCHLORIDE 10 MG: 5 TABLET ORAL at 12:03

## 2021-10-08 RX ADMIN — FERROUS SULFATE TAB 325 MG (65 MG ELEMENTAL FE) 325 MG: 325 (65 FE) TAB at 12:03

## 2021-10-08 NOTE — HOME CARE
The patient is open to [facility] for SN/PT. If HH is applicable we would need the TIFFANIE and wound orders prior to d/c. Thank you!  Carson Mchugh, [phone]

## 2021-10-08 NOTE — PROGRESS NOTES
Hospitalist Progress Note    NAME: Loraine Villasenor   :  1995   MRN:  [id number]   Room Number:  181/27  @ [facility]       Interim Hospital Summary: 22 y.o. male whom presented on 10/6/2021 with      Assessment / Plan:      #Hidradenitis suppurativa  -CT  Pelvis: No deep space abscess. No significant change of small subcutaneous  fluid and gas collections  -Recent history of pilonidal cystectomy and acellular graft placement on  by general surgery  -Seen by general surgery no need of antibiotic at this point  -Wound culture pending  -Blood culture gram-negative jean 1 / 2 mostly contamination. We will wait for speciation   -Wound care      #Generalized weakness and deconditioning  -PT OT    #Microcytic anemia  -Hemoglobin 8.1 MCV 68  -Iron saturation 18%, iron 27, TIBC 159 ferritin 570- 21   -Iron deficiency deficiency versus anemia of chronic disease  -On iron supplementation      Code status: Full  Prophylaxis: Lovenox  Recommended Disposition:  PT, OT, RN     Subjective:     Chief Complaint / Reason for Physician Visit  Romaine Schlatter Discussed with RN events overnight. Review of Systems:  No fevers, chills, appetite change, cough, sputum production, shortness of breath, dyspnea on exertion, nausea, vomitting, diarrhea, constipation, chest pain, leg edema, abdominal pain, joint pain, rash, itching. Tolerating PT/OT. Tolerating diet. Objective:     VITALS:   Last 24hrs VS reviewed since prior progress note. Most recent are:  Patient Vitals for the past 24 hrs:   Temp Pulse Resp BP SpO2   10/08/21 0819 99.5 °F (37.5 °C) 96 16 115/63 98 %   10/07/21 2046 98.8 °F (37.1 °C) 86 18 111/62 100 %   10/07/21 1540 97.9 °F (36.6 °C) 72 18 126/69 100 %       Intake/Output Summary (Last 24 hours) at 10/8/2021 0905  Last data filed at 10/8/2021 1521  Gross per 24 hour   Intake 350 ml   Output 2500 ml   Net -2150 ml        PHYSICAL EXAM:  General: WD, WN.  Alert, cooperative, no acute distress    EENT:  EOMI. Anicteric sclerae. MMM  Resp:  CTA bilaterally, no wheezing or rales. No accessory muscle use  CV:  Regular  rhythm,  normal S1/S2, no murmurs rubs gallops, No edema  GI:  Soft, Non distended, Non tender. +Bowel sounds  Neurologic:  Alert and oriented X 3, normal speech,   Psych:   Good insight. Not anxious nor agitated  Skin:  No rashes. No jaundice. Axillary and sacral wound     Reviewed most current lab test results and cultures  YES  Reviewed most current radiology test results   YES  Review and summation of old records today    NO  Reviewed patient's current orders and MAR    YES  PMH/SH reviewed - no change compared to H&P  ________________________________________________________________________  Care Plan discussed with:    Comments   Patient x    Family      RN x    Care Manager x    Consultant                       x Multidiciplinary team rounds were held today with , nursing, pharmacist and clinical coordinator. Patient's plan of care was discussed; medications were reviewed and discharge planning was addressed. ________________________________________________________________________  Total NON critical care TIME:  25   Minutes    Total CRITICAL CARE TIME Spent:   Minutes non procedure based      Comments   >50% of visit spent in counseling and coordination of care x    ________________________________________________________________________  Geary Homans, MD     Procedures: see electronic medical records for all procedures/Xrays and details which were not copied into this note but were reviewed prior to creation of Plan. LABS:  I reviewed today's most current labs and imaging studies.   Pertinent labs include:  Recent Labs     10/06/21  1834   WBC 13.7*   HGB 8.1*   HCT 27.0*   *     Recent Labs     10/06/21  1834   *   K 3.6   CL 99   CO2 27   *   BUN 6   CREA 0.89   CA 8.8   ALB 2.6*   TBILI 0.5   ALT 13       Signed: Luis HO Kelsi John MD

## 2021-10-08 NOTE — PROGRESS NOTES
PT note: Orders received and acknowledged. Patient known to therapy department from previous admissions. He has been mod I with and without a device just prior to admission and has cane in room. Currently he is reporting a 7/10 pain at rest and states severe pain with any kind of movement in the bed will defer PT eval today.  Destini Echavarria, PT

## 2021-10-08 NOTE — ROUTINE PROCESS
Wound care notes. Wound VAC ordered for the s/p pilonidal extraction. Assessed the patient old dressing removed cleaned the wound with NS. Fresh blood  Noted waited for sometimes again cleaned with dry 4 x4 the blood is still verysmall oozing informed DR Dmitry Zavaleta and he said we will try to place the VAc on Monday. A silicone contact lair placed in the wound and hydrofera blue placed on top of that and secured the dressing with sacral foam dressing. Explained to the patient and the patient tolerated the procedure well.   Parish Palomino RN BSN, [facility]

## 2021-10-08 NOTE — PROGRESS NOTES
0700) Verbal shift change report given to Yvonne Mejía RN (oncoming nurse) by Shannan John RN (offgoing nurse). Report included the following information SBAR, Kardex, Intake/Output, MAR and Recent Results. 0820) Pt assessed and vital signs stable. Pt c/o pain 7/10 to \"the whole bacl\", PRN 10 mg kenzie given along with morning meds. Dressing CDI. 1500 ml or urine emptied from urinal. Pt stated no additional needs at this time. 0915) Labs drawn and sent down. 1030) Interdisciplinary team rounds were held 10/8/2021 with the following team members:Care Management, Nursing, Pharmacy, Physician and Wound Care. Plan of care discussed. See clinical pathway and/or care plan for interventions and desired outcomes. [phone]) Wound care nurse in room at this time to place wound vac. Continuous bleeding at site. Consulted MD, will continue to assess over weekend and reevaluate on Monday. 1200) Pt c/o pain 8/10 to lower back, PRN kenzie given. Pt stated no additional needs at this time. 1300) Wound care completed on groin and bilateral axilla. Linens changed and gown provided. Pt stated no additional needs at this time. 1530) Vital signs obtained and stable. Pt reassessed and no changes to note. Pt has no c/o pain. Schedule meds given and pt left resting in bed. Ginger ale and ice provided per request.     1153) Pt resting in bed at this time and pt stated no needs. 1750) Vitamin C given. IV on right wrist loose and cath half way out of vein, IV removed. Pt stated no additional needs at this time. 1900) Bedside shift change report given to Herman Tracy (oncoming nurse) by Yvonne Mejía RN (offgoing nurse). Report included the following information SBAR, Kardex, Intake/Output, MAR and Recent Results.

## 2021-10-08 NOTE — PROGRESS NOTES
Problem: Pain  Goal: *Control of Pain  Outcome: Progressing Towards Goal  Goal: *PALLIATIVE CARE:  Alleviation of Pain  Outcome: Progressing Towards Goal     Problem: Falls - Risk of  Goal: *Absence of Falls  Description: Document Omid Fall Risk and appropriate interventions in the flowsheet. Outcome: Progressing Towards Goal  Note: Fall Risk Interventions:            Medication Interventions: Teach patient to arise slowly         History of Falls Interventions: Door open when patient unattended, Bed/chair exit alarm         Problem: Activity Intolerance  Goal: *Oxygen saturation during activity within specified parameters  Outcome: Progressing Towards Goal  Goal: *Able to remain out of bed as prescribed  Outcome: Progressing Towards Goal     Problem: Pressure Injury - Risk of  Goal: *Prevention of pressure injury  Description: Document Dann Scale and appropriate interventions in the flowsheet.   Outcome: Progressing Towards Goal  Note: Pressure Injury Interventions:       Moisture Interventions: Apply protective barrier, creams and emollients    Activity Interventions: Increase time out of bed    Mobility Interventions: PT/OT evaluation    Nutrition Interventions: Document food/fluid/supplement intake    Friction and Shear Interventions: Apply protective barrier, creams and emollients                Problem: Breathing Pattern - Ineffective  Goal: *Absence of hypoxia  Outcome: Progressing Towards Goal  Goal: *Use of effective breathing techniques  Outcome: Progressing Towards Goal  Goal: *PALLIATIVE CARE:  Alleviation of Dyspnea  Outcome: Progressing Towards Goal

## 2021-10-08 NOTE — PROGRESS NOTES
Problem: Pain  Goal: *Control of Pain  Outcome: Progressing Towards Goal     Problem: Falls - Risk of  Goal: *Absence of Falls  Description: Document Omid Fall Risk and appropriate interventions in the flowsheet. Outcome: Progressing Towards Goal  Note: Fall Risk Interventions:            Medication Interventions: Teach patient to arise slowly         History of Falls Interventions: Door open when patient unattended         Problem: Activity Intolerance  Goal: *Oxygen saturation during activity within specified parameters  Outcome: Progressing Towards Goal     Problem: Pressure Injury - Risk of  Goal: *Prevention of pressure injury  Description: Document Dann Scale and appropriate interventions in the flowsheet.   Outcome: Progressing Towards Goal  Note: Pressure Injury Interventions:       Moisture Interventions: Apply protective barrier, creams and emollients    Activity Interventions: Increase time out of bed    Mobility Interventions: Pressure redistribution bed/mattress (bed type)    Nutrition Interventions: Offer support with meals,snacks and hydration    Friction and Shear Interventions: Apply protective barrier, creams and emollients

## 2021-10-08 NOTE — PROGRESS NOTES
Attempted twice to draw blood, unsuccessful. Pt then, refused any further blood attempts due to pain.

## 2021-10-08 NOTE — PROGRESS NOTES
Mr. Bruce Sweeney still c/o pain today. Tm 99.5 HR: 96 BP: 115/63 Resp Rate: 16 98% sat on room air. Wound vac not placed due to bleeding. Continue local wound care for now. Diet and supplements as tolerated. Anti-emetics and pain medication as needed. Plans per Dr. Mario Morris. Following.

## 2021-10-09 LAB
BACTERIA SPEC CULT: ABNORMAL
GRAM STN SPEC: ABNORMAL
SERVICE CMNT-IMP: ABNORMAL
SERVICE CMNT-IMP: ABNORMAL

## 2021-10-09 PROCEDURE — 74011250637 HC RX REV CODE- 250/637: Performed by: STUDENT IN AN ORGANIZED HEALTH CARE EDUCATION/TRAINING PROGRAM

## 2021-10-09 PROCEDURE — 65270000032 HC RM SEMIPRIVATE

## 2021-10-09 PROCEDURE — 74011250636 HC RX REV CODE- 250/636: Performed by: STUDENT IN AN ORGANIZED HEALTH CARE EDUCATION/TRAINING PROGRAM

## 2021-10-09 PROCEDURE — 74011250637 HC RX REV CODE- 250/637: Performed by: HOSPITALIST

## 2021-10-09 RX ORDER — HYDROMORPHONE HYDROCHLORIDE 1 MG/ML
0.5 INJECTION, SOLUTION INTRAMUSCULAR; INTRAVENOUS; SUBCUTANEOUS
Status: DISCONTINUED | OUTPATIENT
Start: 2021-10-09 | End: 2021-10-12

## 2021-10-09 RX ADMIN — OXYCODONE HYDROCHLORIDE AND ACETAMINOPHEN 500 MG: 500 TABLET ORAL at 17:21

## 2021-10-09 RX ADMIN — OXYCODONE HYDROCHLORIDE 10 MG: 5 TABLET ORAL at 13:06

## 2021-10-09 RX ADMIN — GABAPENTIN 300 MG: 300 CAPSULE ORAL at 17:21

## 2021-10-09 RX ADMIN — Medication 10 ML: at 06:29

## 2021-10-09 RX ADMIN — FERROUS SULFATE TAB 325 MG (65 MG ELEMENTAL FE) 325 MG: 325 (65 FE) TAB at 09:04

## 2021-10-09 RX ADMIN — Medication 10 ML: at 22:54

## 2021-10-09 RX ADMIN — Medication 10 ML: at 13:06

## 2021-10-09 RX ADMIN — GABAPENTIN 300 MG: 300 CAPSULE ORAL at 09:04

## 2021-10-09 RX ADMIN — FERROUS SULFATE TAB 325 MG (65 MG ELEMENTAL FE) 325 MG: 325 (65 FE) TAB at 17:21

## 2021-10-09 RX ADMIN — HYDROMORPHONE HYDROCHLORIDE 0.5 MG: 1 INJECTION, SOLUTION INTRAMUSCULAR; INTRAVENOUS; SUBCUTANEOUS at 22:49

## 2021-10-09 RX ADMIN — OXYCODONE HYDROCHLORIDE 10 MG: 5 TABLET ORAL at 18:40

## 2021-10-09 RX ADMIN — GABAPENTIN 300 MG: 300 CAPSULE ORAL at 22:49

## 2021-10-09 RX ADMIN — OXYCODONE HYDROCHLORIDE 10 MG: 5 TABLET ORAL at 09:10

## 2021-10-09 RX ADMIN — FERROUS SULFATE TAB 325 MG (65 MG ELEMENTAL FE) 325 MG: 325 (65 FE) TAB at 12:04

## 2021-10-09 RX ADMIN — HYDROMORPHONE HYDROCHLORIDE 0.5 MG: 1 INJECTION, SOLUTION INTRAMUSCULAR; INTRAVENOUS; SUBCUTANEOUS at 15:29

## 2021-10-09 RX ADMIN — Medication 1 CAPSULE: at 09:04

## 2021-10-09 RX ADMIN — OXYCODONE HYDROCHLORIDE AND ACETAMINOPHEN 500 MG: 500 TABLET ORAL at 09:04

## 2021-10-09 NOTE — PROGRESS NOTES
0700) Verbal shift change report given to Marcelo López RN (oncoming nurse) by Tita Cedeno RN (offgoing nurse). Report included the following information SBAR, Kardex, Intake/Output, MAR and Recent Results. 8980) Pt assessed. BP soft at 101/59, and pt c/o pain 7/10 to the lower back. PRN kenzie given along with scheduled meds, pt refused lovenox. Dressings CDI. IV flushed and patent. 700 ml of urine voided, 400 ml of OJ given. Pt stated no additional needs at this time and left resting in bed.     0935) Pt stated pain resolved to 5/10. Pt stated no additional needs at this time. 1030) Pt resting in bed at this time and stated no needs. Speciality bed ordered. 1115) Wound care planned with patient. Kenzie to be given at 1300 and wound care to be completed at 1400. Pt stated no needs at this time and left resting on left side. 1205) Iron given. Drinks provided per request. Pt talking on the phone laying on right side at this time. 1230) Critical lab received from [facility]. E Coli and ESBL in right axilla and sacral wounds. 1250) Provider notified of critical lab.     [id number] given for pain 8/10 and prior to wound care. Pt stated no additional needs at this time. IV flushed and patent. 1320) Pt's speciality bed arrived. Will transfer pt to new bed after wound care. 1430) Wound care completed on bilateral axilla, groin and sacrum. Pt tolerated poorly. Md called for PRN dilaudid. 1530) Pt reassessed and no changes to note. Vital signs stable. Pt transferred into speciality bed. Dilaudid pulled byt Eda Emmanuel RN and wasted with Anmol Santiago RN and given to this writer for administration 0.5 mg of Dilaudid given for pain 8/10. Drinks provided per request.     1640) Pt asleep in bed at this time. RR 14.    1720) Scheduled meds given. Pt has no c/o pain at this time and stated no needs.       1840) 10 mg Kenzie given for pain 7/10 to lower back and left arm.     1900) Bedside shift change report given to Noman Fontaine (oncoming nurse) by Eron Vences RN (offgoing nurse). Report included the following information SBAR, Kardex, Intake/Output, MAR and Recent Results.

## 2021-10-09 NOTE — PROGRESS NOTES
0900: Pt in bed, resting quietly. Eating breakfast. Denies additional needs at this time. 1300: In bed, resting quietly. On the phone. Denies additional needs at this time. 1430: Pt in bed, resting quietly. On phone. Denies additional needs at this time. 1840: Pt in bed, resting quietly. Appears to be sleeping. No signs of distress noted.

## 2021-10-09 NOTE — PROGRESS NOTES
Hospitalist Progress Note    NAME: Harsha Kothari   :  1995   MRN:  [id number]   Room Number:  317/24  @ [facility]       Interim Hospital Summary: 22 y.o. male whom presented on 10/6/2021 with      Assessment / Plan:      #Hidradenitis suppurativa  -CT  Pelvis: No deep space abscess. No significant change of small subcutaneous  fluid and gas collections  -Recent history of pilonidal cystectomy and acellular graft placement on  by general surgery  -Seen by general surgery no need of antibiotic at this point  -Wound culture ESBL E. coli suspect colonization  -Blood culture diphtheroid 1/2 mostly contamination. Repeat BCX pending    -Wound care      #Generalized weakness and deconditioning  -PT OT    #Microcytic anemia  -Hemoglobin 8.1 MCV 68  -Iron saturation 18%, iron 27, TIBC 159 ferritin 570- 21   -Iron deficiency deficiency versus anemia of chronic disease  -On iron supplementation      Code status: Full  Prophylaxis: Lovenox  Recommended Disposition:  PT, OT, RN     Subjective:     Chief Complaint / Reason for Physician Visit  Yissel More Discussed with RN events overnight. Review of Systems:  No fevers, chills, appetite change, cough, sputum production, shortness of breath, dyspnea on exertion, nausea, vomitting, diarrhea, constipation, chest pain, leg edema, abdominal pain, joint pain, rash, itching. Tolerating PT/OT. Tolerating diet. Objective:     VITALS:   Last 24hrs VS reviewed since prior progress note.  Most recent are:  Patient Vitals for the past 24 hrs:   Temp Pulse Resp BP SpO2   10/09/21 0854 99.3 °F (37.4 °C) 92 18 (!) 101/59 97 %   10/09/21 0306 97.5 °F (36.4 °C) 94 16 122/68 96 %   10/08/21 1941 99.6 °F (37.6 °C) 100 18 (!) 101/52 97 %   10/08/21 1556 97.3 °F (36.3 °C) (!) 103 17 105/60 100 %       Intake/Output Summary (Last 24 hours) at 10/9/2021 0920  Last data filed at 10/9/2021 0854  Gross per 24 hour   Intake 400 ml   Output 1700 ml   Net -1300 ml        PHYSICAL EXAM:  General: WD, WN. Alert, cooperative, no acute distress    EENT:  EOMI. Anicteric sclerae. MMM  Resp:  CTA bilaterally, no wheezing or rales. No accessory muscle use  CV:  Regular  rhythm,  normal S1/S2, no murmurs rubs gallops, No edema  GI:  Soft, Non distended, Non tender. +Bowel sounds  Neurologic:  Alert and oriented X 3, normal speech,   Psych:   Good insight. Not anxious nor agitated  Skin:  No rashes. No jaundice. Axillary and sacral wound     Reviewed most current lab test results and cultures  YES  Reviewed most current radiology test results   YES  Review and summation of old records today    NO  Reviewed patient's current orders and MAR    YES  PMH/SH reviewed - no change compared to H&P  ________________________________________________________________________  Care Plan discussed with:    Comments   Patient x    Family      RN x    Care Manager x    Consultant                       x Multidiciplinary team rounds were held today with , nursing, pharmacist and clinical coordinator. Patient's plan of care was discussed; medications were reviewed and discharge planning was addressed. ________________________________________________________________________  Total NON critical care TIME:  25   Minutes    Total CRITICAL CARE TIME Spent:   Minutes non procedure based      Comments   >50% of visit spent in counseling and coordination of care x    ________________________________________________________________________  Rena Anaya MD     Procedures: see electronic medical records for all procedures/Xrays and details which were not copied into this note but were reviewed prior to creation of Plan. LABS:  I reviewed today's most current labs and imaging studies.   Pertinent labs include:  Recent Labs     10/08/21  0925 10/06/21  1834   WBC 12.3* 13.7*   HGB 8.0* 8.1*   HCT 27.6* 27.0*   * 524*     Recent Labs     10/08/21  0925 10/06/21  1834   NA 140 135*   K 3.7 3.6    99   CO2 29 27   GLU 98 117*   BUN 4* 6   CREA 0.70 0.89   CA 8.9 8.8   MG 2.0  --    PHOS 3.4  --    ALB 2.3* 2.6*   TBILI 0.3 0.5   ALT 11* 13       Signed: Geary Homans, MD

## 2021-10-09 NOTE — PROGRESS NOTES
Problem: Pain  Goal: *Control of Pain  Outcome: Progressing Towards Goal     Problem: Falls - Risk of  Goal: *Absence of Falls  Description: Document Omid Fall Risk and appropriate interventions in the flowsheet. Outcome: Progressing Towards Goal  Note: Fall Risk Interventions:            Medication Interventions: Teach patient to arise slowly         History of Falls Interventions: Room close to nurse's station         Problem: Activity Intolerance  Goal: *Oxygen saturation during activity within specified parameters  Outcome: Progressing Towards Goal     Problem: Pressure Injury - Risk of  Goal: *Prevention of pressure injury  Description: Document Dann Scale and appropriate interventions in the flowsheet. Outcome: Progressing Towards Goal  Note: Pressure Injury Interventions:       Moisture Interventions: Apply protective barrier, creams and emollients    Activity Interventions: Increase time out of bed    Mobility Interventions: PT/OT evaluation    Nutrition Interventions: Offer support with meals,snacks and hydration    Friction and Shear Interventions: Foam dressings/transparent film/skin sealants                Problem: Risk for Spread of Infection  Goal: Prevent transmission of infectious organism to others  Description: Prevent the transmission of infectious organisms to other patients, staff members, and visitors.   Outcome: Progressing Towards Goal

## 2021-10-10 PROCEDURE — 2709999900 HC NON-CHARGEABLE SUPPLY

## 2021-10-10 PROCEDURE — 97161 PT EVAL LOW COMPLEX 20 MIN: CPT | Performed by: PHYSICAL THERAPIST

## 2021-10-10 PROCEDURE — 97530 THERAPEUTIC ACTIVITIES: CPT | Performed by: PHYSICAL THERAPIST

## 2021-10-10 PROCEDURE — 74011250636 HC RX REV CODE- 250/636: Performed by: STUDENT IN AN ORGANIZED HEALTH CARE EDUCATION/TRAINING PROGRAM

## 2021-10-10 PROCEDURE — 65270000032 HC RM SEMIPRIVATE

## 2021-10-10 PROCEDURE — 77030013575 HC DRSG HYDROFERA HOLL -B

## 2021-10-10 PROCEDURE — 74011250637 HC RX REV CODE- 250/637: Performed by: STUDENT IN AN ORGANIZED HEALTH CARE EDUCATION/TRAINING PROGRAM

## 2021-10-10 PROCEDURE — 74011250637 HC RX REV CODE- 250/637: Performed by: HOSPITALIST

## 2021-10-10 RX ADMIN — HYDROMORPHONE HYDROCHLORIDE 0.5 MG: 1 INJECTION, SOLUTION INTRAMUSCULAR; INTRAVENOUS; SUBCUTANEOUS at 16:46

## 2021-10-10 RX ADMIN — Medication 10 ML: at 22:43

## 2021-10-10 RX ADMIN — HYDROMORPHONE HYDROCHLORIDE 0.5 MG: 1 INJECTION, SOLUTION INTRAMUSCULAR; INTRAVENOUS; SUBCUTANEOUS at 22:44

## 2021-10-10 RX ADMIN — OXYCODONE HYDROCHLORIDE 10 MG: 5 TABLET ORAL at 20:27

## 2021-10-10 RX ADMIN — OXYCODONE HYDROCHLORIDE AND ACETAMINOPHEN 500 MG: 500 TABLET ORAL at 08:41

## 2021-10-10 RX ADMIN — OXYCODONE HYDROCHLORIDE 10 MG: 5 TABLET ORAL at 08:40

## 2021-10-10 RX ADMIN — Medication 10 ML: at 16:33

## 2021-10-10 RX ADMIN — GABAPENTIN 300 MG: 300 CAPSULE ORAL at 16:30

## 2021-10-10 RX ADMIN — ENOXAPARIN SODIUM 40 MG: 100 INJECTION SUBCUTANEOUS at 08:41

## 2021-10-10 RX ADMIN — FERROUS SULFATE TAB 325 MG (65 MG ELEMENTAL FE) 325 MG: 325 (65 FE) TAB at 11:48

## 2021-10-10 RX ADMIN — OXYCODONE HYDROCHLORIDE AND ACETAMINOPHEN 500 MG: 500 TABLET ORAL at 16:30

## 2021-10-10 RX ADMIN — Medication 1 CAPSULE: at 08:41

## 2021-10-10 RX ADMIN — GABAPENTIN 300 MG: 300 CAPSULE ORAL at 08:41

## 2021-10-10 RX ADMIN — Medication 10 ML: at 06:15

## 2021-10-10 RX ADMIN — GABAPENTIN 300 MG: 300 CAPSULE ORAL at 22:43

## 2021-10-10 RX ADMIN — FERROUS SULFATE TAB 325 MG (65 MG ELEMENTAL FE) 325 MG: 325 (65 FE) TAB at 08:41

## 2021-10-10 RX ADMIN — OXYCODONE HYDROCHLORIDE 10 MG: 5 TABLET ORAL at 12:28

## 2021-10-10 RX ADMIN — FERROUS SULFATE TAB 325 MG (65 MG ELEMENTAL FE) 325 MG: 325 (65 FE) TAB at 16:30

## 2021-10-10 NOTE — PROGRESS NOTES
Hospitalist Progress Note    NAME: James Miller   :  1995   MRN:  [id number]   Room Number:  201/67  @ [facility]       Interim Hospital Summary: 22 y.o. male whom presented on 10/6/2021 with      Assessment / Plan:      #Hidradenitis suppurativa  -CT  Pelvis: No deep space abscess. No significant change of small subcutaneous  fluid and gas collections  -Recent history of pilonidal cystectomy and acellular graft placement on  by general surgery  -Seen by general surgery no need of antibiotic at this point  -Wound culture ESBL E. coli suspect colonization  -Blood culture diphtheroid 1/2 mostly contamination. Repeat BCX NGT  -Wound care  -IV Dilaudid during dressing change      #Generalized weakness and deconditioning  -PT OT    #Microcytic anemia  -Hemoglobin 8.1 MCV 68  -Iron saturation 18%, iron 27, TIBC 159 ferritin 570- 21   -Iron deficiency deficiency versus anemia of chronic disease  -On iron supplementation      Code status: Full  Prophylaxis: Lovenox  Recommended Disposition:  PT, OT, RN     Subjective:     Chief Complaint / Reason for Physician Visit  Tristan Sorenson Discussed with RN events overnight. Review of Systems:  No fevers, chills, appetite change, cough, sputum production, shortness of breath, dyspnea on exertion, nausea, vomitting, diarrhea, constipation, chest pain, leg edema, abdominal pain, joint pain, rash, itching. Tolerating PT/OT. Tolerating diet. Objective:     VITALS:   Last 24hrs VS reviewed since prior progress note.  Most recent are:  Patient Vitals for the past 24 hrs:   Temp Pulse Resp BP SpO2   10/10/21 0808 98.9 °F (37.2 °C) 87 14 110/63 99 %   10/09/21 2024 98.8 °F (37.1 °C) 89 -- 110/60 100 %   10/09/21 1641 -- -- 14 -- --   10/09/21 1528 97.8 °F (36.6 °C) 98 16 114/70 98 %       Intake/Output Summary (Last 24 hours) at 10/10/2021 0944  Last data filed at 10/10/2021 0930  Gross per 24 hour   Intake 1050 ml   Output 1150 ml   Net -100 ml        PHYSICAL EXAM:  General: WD, WN. Alert, cooperative, no acute distress    EENT:  EOMI. Anicteric sclerae. MMM  Resp:  CTA bilaterally, no wheezing or rales. No accessory muscle use  CV:  Regular  rhythm,  normal S1/S2, no murmurs rubs gallops, No edema  GI:  Soft, Non distended, Non tender. +Bowel sounds  Neurologic:  Alert and oriented X 3, normal speech,   Psych:   Good insight. Not anxious nor agitated  Skin:  No rashes. No jaundice. Axillary and sacral wound     Reviewed most current lab test results and cultures  YES  Reviewed most current radiology test results   YES  Review and summation of old records today    NO  Reviewed patient's current orders and MAR    YES  PMH/SH reviewed - no change compared to H&P  ________________________________________________________________________  Care Plan discussed with:    Comments   Patient x    Family      RN x    Care Manager x    Consultant                       x Multidiciplinary team rounds were held today with , nursing, pharmacist and clinical coordinator. Patient's plan of care was discussed; medications were reviewed and discharge planning was addressed. ________________________________________________________________________  Total NON critical care TIME:  25   Minutes    Total CRITICAL CARE TIME Spent:   Minutes non procedure based      Comments   >50% of visit spent in counseling and coordination of care x    ________________________________________________________________________  Zena Walton MD     Procedures: see electronic medical records for all procedures/Xrays and details which were not copied into this note but were reviewed prior to creation of Plan. LABS:  I reviewed today's most current labs and imaging studies.   Pertinent labs include:  Recent Labs     10/08/21  0925   WBC 12.3*   HGB 8.0*   HCT 27.6*   *     Recent Labs     10/08/21  0925      K 3.7      CO2 29   GLU 98   BUN 4*   CREA 0.70   CA 8.9   MG 2.0   PHOS 3.4   ALB 2.3*   TBILI 0.3   ALT 11*       Signed: Lit Beatty MD

## 2021-10-10 NOTE — PROGRESS NOTES
0710 Bedside report received from Tita Cedeno, [facility]. Report given via SBAR, Kardex and MAR. Patient resting comfortably. 0840 Patient resting comfortably. C/O pain in buttocks and shoulder. Patients morning medications administered along with pain medication. Patient bed in lowest position with the call bell in reach. 0900 Patients pain re-assessed. 5/10. Patient resting comfortably with bed in lowest position and call bell in reach. 1149 Patient resting comfortably in bed . C/O pain 5/10 but stated did not want anything for pain at this time. Medications administered to patient. Patient call bell within reach and bed in the lowest position. 1228 Patient given pain medication. Will change patients dressing in about an hour. Patient sitting in bed eating lunch. Patient bed in lowest position with call bell in reach. 1320 Patients Sacral/Buttocls dressing changed . Blood noted on dressing. Patient tolerated procedure fairly well. 1646 Patient was medicated with IV pain medication. All dressings were changed in the groin and axillary. Patient gown changed. The patient tolerated the dressing changes well. Lights were dimmed and patients bed in the lowest position with the call bell within reach. 1903 Bedside report given to Eda Emmanuel, RN (oncoming RN). Report given via KARDEX, SBAR and MAR.

## 2021-10-10 NOTE — PROGRESS NOTES
Transition of Care Plan:     RUR: 13% Low Risk of Readmission     * Disposition- Home with support of significant other vs. SNF  * [address] to open for skilled nursing services  * Appointment with [address] [place] [address] [id number]  * Appointment with Tyler SANCHEZ  * Appointment with Dr. Jose Colorado   * Patient arranged transport    Patient a re-admission per RN patient asking information about disability for patient CM discuss with RN patient was given information last admission to follow-up with [facility] department and patient will need to follow-up when he is able to. CM to discuss with patient wound care needs and teaching and schedule follow-up appointment. Patient has appointment at the wound care clinic on 10/12/21 @ 0856 CM need to re-schedule appointment.      [address] [place] [address]  [phone]

## 2021-10-10 NOTE — PROGRESS NOTES
5413: Obtained AM VS, RN notified of 6/10 pain. Provided fresh cup of ice and apple juice per request. Denies additional needs at this time. 0900: Pt in bed, eating breakfast.     1030: Pt in bed, resting quietly. Provided fresh pitcher of ice per request. Denies additional needs at this time. 1200: Pt in bed, resting. C/o pain, RN notified. Denies additional needs at this time. 1500: Pt in bed, resting quietly. On phone. Emptied urinal and documented in flowsheets. Denies additional needs at this time. 1700: Pt in bed, resting quietly. Provided cup of ice per request. Denies additional needs at this time. Rn present at bedside. 1830: Pt in bed, resting quietly. Watching TV and on phone. Denies additional needs at this time.

## 2021-10-10 NOTE — PROGRESS NOTES
Problem: Mobility Impaired (Adult and Pediatric)  Goal: *Acute Goals and Plan of Care (Insert Text)  Description: FUNCTIONAL STATUS PRIOR TO ADMISSION: Patient was modified independent using a single point cane for functional mobility. HOME SUPPORT PRIOR TO ADMISSION: The patient lived with roommates who assist as needed. Physical Therapy Goals  Initiated 10/10/2021  1. Patient will move from supine to sit and sit to supine  in bed with modified independence within 7 day(s). 2.  Patient will transfer from bed to chair and chair to bed with modified independence using the least restrictive device within 7 day(s). 3.  Patient will perform sit to stand with modified independence within 7 day(s). 4.  Patient will ambulate with modified independence for 50 feet with the least restrictive device within 7 day(s). Outcome: Not Met    PHYSICAL THERAPY EVALUATION  Patient: Mabel Quijano (37 y.o. male)  Date: 10/10/2021  Primary Diagnosis: Wound infection [T14. 8XXA, L08.9]        Precautions:        ASSESSMENT  Based on the objective data described below, the patient presents with decreased activity tolerance and functional mobility secondary to multiple wounds and increased pain. Patient modified independent with straight cane and receives assist as needed from roommates and girlfriend. Patient required additional time for all mobility today due to pain (5/10). Patient performed bed mobility and sit to stand transfers with SBA. Unable to attempt ambulation as patient bleeding from wounds and pain increasing to 7-8/10 despite slow movement. Patient returned to supine and RN alerted of increase in pain and bleeding. Recommend resuming home health PT/OT. Current Level of Function Impacting Discharge (mobility/balance): SBA    Other factors to consider for discharge: multiple wounds      Patient will benefit from skilled therapy intervention to address the above noted impairments.        PLAN :  Recommendations and Planned Interventions: bed mobility training, transfer training, gait training, therapeutic exercises, neuromuscular re-education, patient and family training/education, and therapeutic activities      Frequency/Duration: Patient will be followed by physical therapy:  3 times a week to address goals. Recommendation for discharge: (in order for the patient to meet his/her long term goals)  Physical therapy at least 2 days/week in the home AND ensure assist and/or supervision for safety with ADLs and mobility    This discharge recommendation:  Has been made in collaboration with the attending provider and/or case management    IF patient discharges home will need the following DME: none         SUBJECTIVE:   Patient stated It's just painful to move around.     OBJECTIVE DATA SUMMARY:   HISTORY:    Past Medical History:   Diagnosis Date    Pilonidal cyst 9/30/2021   History reviewed. No pertinent surgical history. Home Situation  Home Environment: Patient room  One/Two Story Residence: One story  Living Alone: No  Support Systems: Other Family Member(s), Friend/Neighbor  Patient Expects to be Discharged to[de-identified] House  Current DME Used/Available at Home: None    EXAMINATION/PRESENTATION/DECISION MAKING:   Critical Behavior:  Neurologic State: Alert  Orientation Level: Oriented X4  Cognition: Follows commands     Hearing: Auditory  Auditory Impairment: None    Range Of Motion:  AROM: Generally decreased, functional    PROM: Generally decreased, functional    Strength:    Strength: Generally decreased, functional     Tone & Sensation:   Tone: Normal    Coordination:  Coordination: Within functional limits     Functional Mobility:  Bed Mobility:     Supine to Sit: Stand-by assistance  Sit to Supine: Stand-by assistance  Scooting: Stand-by assistance    Transfers:  Sit to Stand: Stand-by assistance  Stand to Sit: Stand-by assistance    Balance:   Sitting: Intact  Standing: Impaired; Without support  Standing - Static: Good  Standing - Dynamic : Good;Fair    Ambulation/Gait Training:   Deferred secondary to bleeding from wounds and increased pain      Based on the above components, the patient evaluation is determined to be of the following complexity level: LOW     Pain Ratin/10 at start of session; 7-8/10 with activity; RN aware    Activity Tolerance:   Fair    After treatment patient left in no apparent distress:   Supine in bed, Call bell within reach, and Side rails x 3    COMMUNICATION/EDUCATION:   The patients plan of care was discussed with: Registered nurse. Fall prevention education was provided and the patient/caregiver indicated understanding., Patient/family have participated as able in goal setting and plan of care. , and Patient/family agree to work toward stated goals and plan of care.     Thank you for this referral.  Marichuy Barbour, PT   Time Calculation: 15 mins

## 2021-10-10 NOTE — PROGRESS NOTES
1900  Shift change report received from [facility]. Report included review of SBAR, accordion report, results review, orders, meds, ROS, and POC. Pt's dressings changed at 21  and are currently CDI. All questions answered. Transfer of care complete.

## 2021-10-10 NOTE — PROGRESS NOTES
Problem: Pain  Goal: *Control of Pain  Outcome: Progressing Towards Goal     Problem: Falls - Risk of  Goal: *Absence of Falls  Description: Document Omid Fall Risk and appropriate interventions in the flowsheet. Outcome: Progressing Towards Goal  Note: Fall Risk Interventions:            Medication Interventions: Patient to call before getting OOB, Teach patient to arise slowly         History of Falls Interventions: Utilize gait belt for transfer/ambulation         Problem: Activity Intolerance  Goal: *Oxygen saturation during activity within specified parameters  Outcome: Progressing Towards Goal     Problem: Risk for Spread of Infection  Goal: Prevent transmission of infectious organism to others  Description: Prevent the transmission of infectious organisms to other patients, staff members, and visitors.   Outcome: Progressing Towards Goal

## 2021-10-11 LAB
BACTERIA SPEC CULT: NORMAL
SERVICE CMNT-IMP: NORMAL

## 2021-10-11 PROCEDURE — 74011250636 HC RX REV CODE- 250/636: Performed by: STUDENT IN AN ORGANIZED HEALTH CARE EDUCATION/TRAINING PROGRAM

## 2021-10-11 PROCEDURE — 2709999900 HC NON-CHARGEABLE SUPPLY

## 2021-10-11 PROCEDURE — 94760 N-INVAS EAR/PLS OXIMETRY 1: CPT

## 2021-10-11 PROCEDURE — 74011000258 HC RX REV CODE- 258: Performed by: STUDENT IN AN ORGANIZED HEALTH CARE EDUCATION/TRAINING PROGRAM

## 2021-10-11 PROCEDURE — 97606 NEG PRS WND THER DME>50 SQCM: CPT

## 2021-10-11 PROCEDURE — 77030038554 HC DRSG WND THERAHONEY MDII -Z

## 2021-10-11 PROCEDURE — 77030013575 HC DRSG HYDROFERA HOLL -B

## 2021-10-11 PROCEDURE — 65270000032 HC RM SEMIPRIVATE

## 2021-10-11 PROCEDURE — 74011250637 HC RX REV CODE- 250/637: Performed by: STUDENT IN AN ORGANIZED HEALTH CARE EDUCATION/TRAINING PROGRAM

## 2021-10-11 PROCEDURE — 74011250637 HC RX REV CODE- 250/637: Performed by: HOSPITALIST

## 2021-10-11 PROCEDURE — 77030041076 HC DRSG AG OPTICELL MDII -A

## 2021-10-11 RX ORDER — HYDROMORPHONE HYDROCHLORIDE 1 MG/ML
0.5 INJECTION, SOLUTION INTRAMUSCULAR; INTRAVENOUS; SUBCUTANEOUS ONCE
Status: COMPLETED | OUTPATIENT
Start: 2021-10-11 | End: 2021-10-11

## 2021-10-11 RX ADMIN — FERROUS SULFATE TAB 325 MG (65 MG ELEMENTAL FE) 325 MG: 325 (65 FE) TAB at 16:45

## 2021-10-11 RX ADMIN — MEROPENEM 500 MG: 500 INJECTION, POWDER, FOR SOLUTION INTRAVENOUS at 23:52

## 2021-10-11 RX ADMIN — FERROUS SULFATE TAB 325 MG (65 MG ELEMENTAL FE) 325 MG: 325 (65 FE) TAB at 12:20

## 2021-10-11 RX ADMIN — FERROUS SULFATE TAB 325 MG (65 MG ELEMENTAL FE) 325 MG: 325 (65 FE) TAB at 08:16

## 2021-10-11 RX ADMIN — Medication 10 ML: at 16:46

## 2021-10-11 RX ADMIN — GABAPENTIN 300 MG: 300 CAPSULE ORAL at 21:32

## 2021-10-11 RX ADMIN — Medication 10 ML: at 06:25

## 2021-10-11 RX ADMIN — GABAPENTIN 300 MG: 300 CAPSULE ORAL at 16:46

## 2021-10-11 RX ADMIN — Medication 1 CAPSULE: at 08:16

## 2021-10-11 RX ADMIN — I-VITE, TAB 1000-60-2MG (60/BT) 1 TABLET: TAB at 12:20

## 2021-10-11 RX ADMIN — MEROPENEM 500 MG: 500 INJECTION, POWDER, FOR SOLUTION INTRAVENOUS at 12:21

## 2021-10-11 RX ADMIN — HYDROMORPHONE HYDROCHLORIDE 0.5 MG: 1 INJECTION, SOLUTION INTRAMUSCULAR; INTRAVENOUS; SUBCUTANEOUS at 06:25

## 2021-10-11 RX ADMIN — GABAPENTIN 300 MG: 300 CAPSULE ORAL at 08:16

## 2021-10-11 RX ADMIN — Medication 10 ML: at 21:32

## 2021-10-11 RX ADMIN — OXYCODONE HYDROCHLORIDE 10 MG: 5 TABLET ORAL at 01:03

## 2021-10-11 RX ADMIN — OXYCODONE HYDROCHLORIDE 10 MG: 5 TABLET ORAL at 23:52

## 2021-10-11 RX ADMIN — HYDROMORPHONE HYDROCHLORIDE 0.5 MG: 1 INJECTION, SOLUTION INTRAMUSCULAR; INTRAVENOUS; SUBCUTANEOUS at 11:35

## 2021-10-11 RX ADMIN — HYDROMORPHONE HYDROCHLORIDE 0.5 MG: 1 INJECTION, SOLUTION INTRAMUSCULAR; INTRAVENOUS; SUBCUTANEOUS at 10:57

## 2021-10-11 RX ADMIN — OXYCODONE HYDROCHLORIDE AND ACETAMINOPHEN 500 MG: 500 TABLET ORAL at 08:16

## 2021-10-11 RX ADMIN — OXYCODONE HYDROCHLORIDE 10 MG: 5 TABLET ORAL at 08:16

## 2021-10-11 RX ADMIN — MEROPENEM 500 MG: 500 INJECTION, POWDER, FOR SOLUTION INTRAVENOUS at 16:46

## 2021-10-11 NOTE — PROGRESS NOTES
Hospitalist Progress Note    NAME: Alvaro Yang   :  1995   MRN:  [id number]   Room Number:  632/33  @ [facility]       Interim Hospital Summary: 22 y.o. male whom presented on 10/6/2021 with      Assessment / Plan:      Hidradenitis suppurativa  -CT  Pelvis: No deep space abscess. No significant change of small subcutaneous  fluid and gas collections  -Recent history of pilonidal cystectomy and acellular graft placement on  by general surgery  -Seen by general surgery no need of antibiotic at this point  -Wound culture ESBL E. coli   -Blood culture diphtheroid 1/2 mostly contamination. Repeat BCX NGT  -Wound care  -IV Dilaudid during dressing change  - Start meropenem x 5 days        Generalized weakness and deconditioning  -PT OT     Microcytic anemia  -Hemoglobin 8.1 MCV 68  -Iron saturation 18%, iron 27, TIBC 159 ferritin 570- 21   -Iron deficiency deficiency versus anemia of chronic disease  -On iron supplementation        Code status: Full  Prophylaxis: Lovenox  Recommended Disposition:  PT, OT, RN           Subjective:     Chief Complaint / Reason for Physician Visit  \"im in pain\". Discussed with RN events overnight. Review of Systems:  No fevers, chills, appetite change, cough, sputum production, shortness of breath, dyspnea on exertion, nausea, vomitting, diarrhea, constipation, chest pain, leg edema, abdominal pain, joint pain, rash, itching. Tolerating PT/OT. Tolerating diet. Objective:     VITALS:   Last 24hrs VS reviewed since prior progress note.  Most recent are:  Patient Vitals for the past 24 hrs:   Temp Pulse Resp BP SpO2   10/11/21 0813 99.1 °F (37.3 °C) 91 18 (!) 115/53 97 %   10/11/21 0345 99.2 °F (37.3 °C) 89 16 (!) 101/58 98 %   10/10/21 2244 99 °F (37.2 °C) -- -- -- --   10/10/21 2022 99.8 °F (37.7 °C) (!) 104 18 (!) 104/55 97 %   10/10/21 1525 98.7 °F (37.1 °C) 100 16 (!) 90/53 97 %       Intake/Output Summary (Last 24 hours) at 10/11/2021 1317  Last data filed at 10/11/2021 0548  Gross per 24 hour   Intake 472 ml   Output 1250 ml   Net -778 ml        PHYSICAL EXAM:  General: WD, WN. Alert, cooperative, no acute distress    EENT:  EOMI. Anicteric sclerae. MMM  Resp:  CTA bilaterally, no wheezing or rales. No accessory muscle use  CV:  Regular  rhythm,  normal S1/S2, no murmurs rubs gallops, No edema  GI:  Soft, Non distended, Non tender. +Bowel sounds  Neurologic:  Alert and oriented X 3, normal speech,   Psych:   Good insight. Not anxious nor agitated  Skin:  No rashes. No jaundice  VAC in place   Reviewed most current lab test results and cultures  YES  Reviewed most current radiology test results   YES  Review and summation of old records today    NO  Reviewed patient's current orders and MAR    YES  PMH/SH reviewed - no change compared to H&P  ________________________________________________________________________  Care Plan discussed with:    Comments   Patient x    Family      RN x    Care Manager x    Consultant                       x Multidiciplinary team rounds were held today with , nursing, pharmacist and clinical coordinator. Patient's plan of care was discussed; medications were reviewed and discharge planning was addressed. ________________________________________________________________________  Total NON critical care TIME:  25  Minutes    Total CRITICAL CARE TIME Spent:   Minutes non procedure based      Comments   >50% of visit spent in counseling and coordination of care     ________________________________________________________________________  Erika Body, MD     Procedures: see electronic medical records for all procedures/Xrays and details which were not copied into this note but were reviewed prior to creation of Plan. LABS:  I reviewed today's most current labs and imaging studies.   Pertinent labs include:  No results for input(s): WBC, HGB, HCT, PLT, HGBEXT, HCTEXT, PLTEXT in the last 72 hours. No results for input(s): NA, K, CL, CO2, GLU, BUN, CREA, CA, MG, PHOS, ALB, TBIL, TBILI, ALT, INR, INREXT in the last 72 hours.     No lab exists for component: SGOT    Signed: Swati Harkins MD

## 2021-10-11 NOTE — PROGRESS NOTES
Wound care note:  Wound evaluated today at the sacral area s/p pilonidal excision wound. Draning sero sanguinous drainage no bleeding at this time. Wound cleaned with wound cleanser dried well area prepared. Applied mepitel one in the wound and placed white foam.  Applied drape and applied trac pad connected to the wound VAC. It was very difficult to get good seal in the area. Placed ostomy ring to get the area covered and get a good seal.  Pain medication given by Bernardino Ramos his primary care RN. She was present during the procedure. He tolerated the procedure fairly. VAC connected to the system at 125 mmhg. Will change the VAC 2 times a week Monday and Thursday.   Marquita Middleton RN,[facility]

## 2021-10-11 NOTE — PROGRESS NOTES
Care plan reviewed. Problem: Pain  Goal: *Control of Pain  Outcome: Progressing Towards Goal  Goal: *PALLIATIVE CARE:  Alleviation of Pain  Outcome: Progressing Towards Goal     Problem: Patient Education: Go to Patient Education Activity  Goal: Patient/Family Education  Outcome: Progressing Towards Goal     Problem: Falls - Risk of  Goal: *Absence of Falls  Description: Document Santa Kevinbob Fall Risk and appropriate interventions in the flowsheet. Outcome: Progressing Towards Goal  Note: Fall Risk Interventions:  Mobility Interventions: Assess mobility with egress test, Communicate number of staff needed for ambulation/transfer, OT consult for ADLs, Patient to call before getting OOB, PT Consult for mobility concerns, Utilize walker, cane, or other assistive device         Medication Interventions: Evaluate medications/consider consulting pharmacy, Patient to call before getting OOB, Teach patient to arise slowly         History of Falls Interventions: Evaluate medications/consider consulting pharmacy, Investigate reason for fall, Room close to nurse's station         Problem: Patient Education: Go to Patient Education Activity  Goal: Patient/Family Education  Outcome: Progressing Towards Goal     Problem: Activity Intolerance  Goal: *Oxygen saturation during activity within specified parameters  Outcome: Progressing Towards Goal  Goal: *Able to remain out of bed as prescribed  Outcome: Progressing Towards Goal     Problem: Patient Education: Go to Patient Education Activity  Goal: Patient/Family Education  Outcome: Progressing Towards Goal     Problem: Pressure Injury - Risk of  Goal: *Prevention of pressure injury  Description: Document Dann Scale and appropriate interventions in the flowsheet.   Outcome: Progressing Towards Goal  Note: Pressure Injury Interventions:       Moisture Interventions: Absorbent underpads, Limit adult briefs, Maintain skin hydration (lotion/cream), Minimize layers    Activity Interventions: Pressure redistribution bed/mattress(bed type), PT/OT evaluation    Mobility Interventions: HOB 30 degrees or less, Pressure redistribution bed/mattress (bed type), PT/OT evaluation    Nutrition Interventions: Offer support with meals,snacks and hydration, Discuss nutritional consult with provider    Friction and Shear Interventions: Foam dressings/transparent film/skin sealants, HOB 30 degrees or less, Minimize layers                Problem: Patient Education: Go to Patient Education Activity  Goal: Patient/Family Education  Outcome: Progressing Towards Goal     Problem: Breathing Pattern - Ineffective  Goal: *Absence of hypoxia  Outcome: Progressing Towards Goal  Goal: *Use of effective breathing techniques  Outcome: Progressing Towards Goal  Goal: *PALLIATIVE CARE:  Alleviation of Dyspnea  Outcome: Progressing Towards Goal     Problem: Patient Education: Go to Patient Education Activity  Goal: Patient/Family Education  Outcome: Progressing Towards Goal     Problem: Risk for Spread of Infection  Goal: Prevent transmission of infectious organism to others  Description: Prevent the transmission of infectious organisms to other patients, staff members, and visitors.   Outcome: Progressing Towards Goal     Problem: Patient Education:  Go to Education Activity  Goal: Patient/Family Education  Outcome: Progressing Towards Goal     Problem: Patient Education: Go to Patient Education Activity  Goal: Patient/Family Education  Outcome: Progressing Towards Goal

## 2021-10-11 NOTE — PROGRESS NOTES
Problem: Pain  Goal: *Control of Pain  Outcome: Progressing Towards Goal     Problem: Patient Education: Go to Patient Education Activity  Goal: Patient/Family Education  Outcome: Progressing Towards Goal     Problem: Activity Intolerance  Goal: *Oxygen saturation during activity within specified parameters  Outcome: Progressing Towards Goal     Problem: Activity Intolerance  Goal: *Able to remain out of bed as prescribed  Outcome: Progressing Towards Goal     Problem: Pressure Injury - Risk of  Goal: *Prevention of pressure injury  Description: Document Dann Scale and appropriate interventions in the flowsheet.   Outcome: Progressing Towards Goal  Note: Pressure Injury Interventions:       Moisture Interventions: Absorbent underpads    Activity Interventions: Pressure redistribution bed/mattress(bed type)    Mobility Interventions: HOB 30 degrees or less    Nutrition Interventions: Offer support with meals,snacks and hydration    Friction and Shear Interventions: Foam dressings/transparent film/skin sealants

## 2021-10-11 NOTE — PROGRESS NOTES
Bedside report given to Jennifer Woody RN (oncoming RN) from Nikhil Pulido RN (offgoing nurse). Report given via KARDEX, MAR, SBAR and labs.

## 2021-10-11 NOTE — PROGRESS NOTES
9959 Bedside report received from [facility], [facility] (offgoing RN). Report included SBAR, Kardex and MAR. Patient resting comfortably. 1057 Entered room with wound care to place wound vac on patients buttocks area. Patient was given IV pain medication prior to procedure. Patient was also given an additional dose during procedure. Patient did not tolerate the procedure well. Patients additional wound care deferred at this time. Will hand this off to night shift because patient wanted a break until this evening. 1220 Patient started on new medications and IV antibiotic. Patients tray placed next to bedside. Lights dimmed, patient bed in the lowest position with call bell within reach. No additional needs at this time. 1646 Patient resting comfortably in bed on right side. Pump starting beeping upon entry to room and patient's wound vac leaking because an air leak was detected. This dressing was reinforced and proper suction was restored. Patient tolerated procedure well. Evening medications were given, lights dimmed, bed in lowest position and call bell within reach. Patient has no other needs at this time. 1905 Bedside report given to Naty Leach RN (oncoming RN. Repoert given via Kardex, MAR, SBAR and labs.

## 2021-10-11 NOTE — PROGRESS NOTES
Physical therapy:      Patient unavailable secondary to wound care. Will attempt as able. Recommend resuming home health PT/OT upon discharge.      Tarri Lesches, PT

## 2021-10-12 PROCEDURE — 94760 N-INVAS EAR/PLS OXIMETRY 1: CPT

## 2021-10-12 PROCEDURE — 77030041076 HC DRSG AG OPTICELL MDII -A

## 2021-10-12 PROCEDURE — 65270000032 HC RM SEMIPRIVATE

## 2021-10-12 PROCEDURE — 74011000258 HC RX REV CODE- 258: Performed by: STUDENT IN AN ORGANIZED HEALTH CARE EDUCATION/TRAINING PROGRAM

## 2021-10-12 PROCEDURE — 2709999900 HC NON-CHARGEABLE SUPPLY

## 2021-10-12 PROCEDURE — 74011250636 HC RX REV CODE- 250/636: Performed by: STUDENT IN AN ORGANIZED HEALTH CARE EDUCATION/TRAINING PROGRAM

## 2021-10-12 PROCEDURE — 77030013575 HC DRSG HYDROFERA HOLL -B

## 2021-10-12 PROCEDURE — 74011250637 HC RX REV CODE- 250/637: Performed by: STUDENT IN AN ORGANIZED HEALTH CARE EDUCATION/TRAINING PROGRAM

## 2021-10-12 PROCEDURE — 74011250637 HC RX REV CODE- 250/637: Performed by: HOSPITALIST

## 2021-10-12 RX ORDER — HYDROMORPHONE HYDROCHLORIDE 1 MG/ML
1 INJECTION, SOLUTION INTRAMUSCULAR; INTRAVENOUS; SUBCUTANEOUS
Status: DISCONTINUED | OUTPATIENT
Start: 2021-10-12 | End: 2021-10-16

## 2021-10-12 RX ADMIN — MEROPENEM 500 MG: 500 INJECTION, POWDER, FOR SOLUTION INTRAVENOUS at 13:35

## 2021-10-12 RX ADMIN — OXYCODONE HYDROCHLORIDE 5 MG: 5 TABLET ORAL at 08:21

## 2021-10-12 RX ADMIN — Medication 10 ML: at 05:27

## 2021-10-12 RX ADMIN — GABAPENTIN 300 MG: 300 CAPSULE ORAL at 08:16

## 2021-10-12 RX ADMIN — ENOXAPARIN SODIUM 40 MG: 100 INJECTION SUBCUTANEOUS at 08:16

## 2021-10-12 RX ADMIN — Medication 10 ML: at 21:29

## 2021-10-12 RX ADMIN — HYDROMORPHONE HYDROCHLORIDE 1 MG: 1 INJECTION, SOLUTION INTRAMUSCULAR; INTRAVENOUS; SUBCUTANEOUS at 16:14

## 2021-10-12 RX ADMIN — I-VITE, TAB 1000-60-2MG (60/BT) 1 TABLET: TAB at 08:17

## 2021-10-12 RX ADMIN — FERROUS SULFATE TAB 325 MG (65 MG ELEMENTAL FE) 325 MG: 325 (65 FE) TAB at 13:35

## 2021-10-12 RX ADMIN — FERROUS SULFATE TAB 325 MG (65 MG ELEMENTAL FE) 325 MG: 325 (65 FE) TAB at 16:14

## 2021-10-12 RX ADMIN — FERROUS SULFATE TAB 325 MG (65 MG ELEMENTAL FE) 325 MG: 325 (65 FE) TAB at 08:17

## 2021-10-12 RX ADMIN — MEROPENEM 500 MG: 500 INJECTION, POWDER, FOR SOLUTION INTRAVENOUS at 05:27

## 2021-10-12 RX ADMIN — Medication 10 ML: at 13:35

## 2021-10-12 RX ADMIN — GABAPENTIN 300 MG: 300 CAPSULE ORAL at 21:28

## 2021-10-12 RX ADMIN — GABAPENTIN 300 MG: 300 CAPSULE ORAL at 16:14

## 2021-10-12 RX ADMIN — OXYCODONE HYDROCHLORIDE 10 MG: 5 TABLET ORAL at 14:34

## 2021-10-12 RX ADMIN — MEROPENEM 500 MG: 500 INJECTION, POWDER, FOR SOLUTION INTRAVENOUS at 18:17

## 2021-10-12 NOTE — PROGRESS NOTES
Pt's IV leaking. New IV attempt unsuccessful. Pt refused 2nd attempt. Will try again later. IV ABX on hold. New IV placed in r forearm. IVX restarted.

## 2021-10-12 NOTE — PROGRESS NOTES
JM  RUR: 12%    Weekly Re-assessment     CM met with patient at bedside to re-assess on discharge disposition. As per previous discussion, this writer informed patient of a possibility of discharging to a SNF for further care. Patient was unsure then and left to think and make a decision for self. Today, patient still confused about whether he wants to go to a facility or not. States, \" my priority is to not bleed when I stand up with therapy. \" he wants to focus on wound care over therapy. This writer mentioned you can also get wound care at the facility, but patient still unsure. Patient now has a wound vac applied to wound and wants to see how that works for him. If patient does not discharge to a SNF, plan is still to go home with Irineo Quinones. Will touch base with patient about disposition.      Rosa Song, DAVISN, [place], [place]  [phone]

## 2021-10-12 NOTE — PROGRESS NOTES
Problem: Pain  Goal: *Control of Pain  Outcome: Progressing Towards Goal  Goal: *PALLIATIVE CARE:  Alleviation of Pain  Outcome: Progressing Towards Goal     Problem: Falls - Risk of  Goal: *Absence of Falls  Description: Document Omid Fall Risk and appropriate interventions in the flowsheet.   Outcome: Progressing Towards Goal  Note: Fall Risk Interventions:  Mobility Interventions: PT Consult for mobility concerns         Medication Interventions: Teach patient to arise slowly         History of Falls Interventions: Utilize gait belt for transfer/ambulation, Evaluate medications/consider consulting pharmacy         Problem: Patient Education: Go to Patient Education Activity  Goal: Patient/Family Education  Outcome: Progressing Towards Goal

## 2021-10-12 NOTE — PROGRESS NOTES
454 Bedside report received from [address] [place] [address] [employer] (off going nurse). Report received via KARDEX, SBAR, MAR and labs. 5060 Patient appears to be resting comfortably in bed. Patients morning medications given and patient has no complaints or needs at this time. Patients bed in lowest position and call bell in reach. 1145 delivered patients tray into room. Patient laying on side and appears comfortable. I explained to patient that later in the afternoon that his wound care would be completed and that I would pre medicate him about an hour before and then he would be given IV pain medication right before wound care was started. Patient verbalized understanding and agreed to wound care. 1335 Patient given IV antibiotics. No complaints at this time. 1434 Patient given oral pain med for tentative dressing change at [phone] gather for patient wound care    [phone] Patient given IV pain medication and wound care started. Assisted by Marie Ma and student observed. Patients bilateral axillary dressing changed as well as bilateral groin dressing changed. Patients buttock wound vac dressing also reinforced due to leak detected. It was noted that the wound vac canister had 100 mL of serosanguinous fluid. Patient tolerated procedure fairly. Patient gown changed evening medications given, dinner tray placed within reach, bed placed in the lowest position with the call bell in reach and lights dimmed. Patient has no other needs at this time. 1817 Patient resting comfortably in bed. IV ABX started. Patient urinal emptied and patient eating dinner. Patient has no complaints at this time, bed is in lowest position with call bell in reach. 1905 Bedside shift report given to Gigi Camacho RN (oncoming RN) Report given via Kardex, SBAR, MAR and lab results.

## 2021-10-12 NOTE — PROGRESS NOTES
Bedside report given to Vahe Herrera RN (oncoming RN) from Marguerite Blanton RN (offgoing nurse). Report given via KARDEX, MAR, SBAR and labs. Wound care done. Pain meds increased for wound changes.  100cc in wound vac

## 2021-10-12 NOTE — PROGRESS NOTES
Problem: Pain  Goal: *Control of Pain  Outcome: Progressing Towards Goal     Problem: Falls - Risk of  Goal: *Absence of Falls  Description: Document Omid Fall Risk and appropriate interventions in the flowsheet. Outcome: Progressing Towards Goal  Note: Fall Risk Interventions:  Mobility Interventions: PT Consult for mobility concerns         Medication Interventions: Teach patient to arise slowly         History of Falls Interventions: Utilize gait belt for transfer/ambulation         Problem: Activity Intolerance  Goal: *Oxygen saturation during activity within specified parameters  Outcome: Progressing Towards Goal     Problem: Pressure Injury - Risk of  Goal: *Prevention of pressure injury  Description: Document Dann Scale and appropriate interventions in the flowsheet.   Outcome: Progressing Towards Goal  Note: Pressure Injury Interventions:       Moisture Interventions: Absorbent underpads    Activity Interventions: Pressure redistribution bed/mattress(bed type)    Mobility Interventions: HOB 30 degrees or less    Nutrition Interventions: Offer support with meals,snacks and hydration    Friction and Shear Interventions: Foam dressings/transparent film/skin sealants

## 2021-10-12 NOTE — PROGRESS NOTES
Hospitalist Progress Note    NAME: Pastor Figueroa   :  1995   MRN:  [id number]   Room Number:  107/92  @ [facility]       Interim Hospital Summary: 22 y.o. male whom presented on 10/6/2021 with      Assessment / Plan:    Hidradenitis suppurativa  -CT  Pelvis: No deep space abscess. No significant change of small subcutaneous  fluid and gas collections  -Recent history of pilonidal cystectomy and acellular graft placement on  by general surgery  -Seen by general surgery no need of antibiotic at this point  -Wound culture ESBL E. coli   -Blood culture diphtheroid 1/2 mostly contamination. Repeat BCX NGT  -Wound care  -IV Dilaudid during dressing change  -Start meropenem x 5 days        Generalized weakness and deconditioning  -PT OT     Microcytic anemia  -Hemoglobin 8.1 MCV 68  -Iron saturation 18%, iron 27, TIBC 159 ferritin 570- 21   -Iron deficiency deficiency versus anemia of chronic disease  -On iron supplementation       Code status: Full  Prophylaxis: Lovenox  Recommended Disposition:  PT, OT, RN       Subjective:     Chief Complaint / Reason for Physician Visit  No acute issues. Discussed with RN events overnight. Review of Systems:  No fevers, chills, appetite change, cough, sputum production, shortness of breath, dyspnea on exertion, nausea, vomitting, diarrhea, constipation, chest pain, leg edema, abdominal pain, joint pain, rash, itching. Tolerating diet. Objective:     VITALS:   Last 24hrs VS reviewed since prior progress note.  Most recent are:  Patient Vitals for the past 24 hrs:   Temp Pulse Resp BP SpO2   10/12/21 0812 99.2 °F (37.3 °C) 94 18 (!) 93/58 99 %   10/11/21 2000 99.7 °F (37.6 °C) (!) 105 18 (!) 95/50 95 %       Intake/Output Summary (Last 24 hours) at 10/12/2021 1005  Last data filed at 10/12/2021 1223  Gross per 24 hour   Intake 597 ml   Output 2275 ml   Net -1678 ml        PHYSICAL EXAM:  General: Alert, cooperative, no acute distress    EENT:  EOMI. Anicteric sclerae. MMM  Resp:  CTA bilaterally, no wheezing or rales. No accessory muscle use  CV:  Regular  rhythm,  normal S1/S2, no murmurs rubs gallops, No edema  GI:  Soft, Non distended, Non tender. +Bowel sounds  Neurologic:  Alert and oriented X 3, normal speech,   Psych:   Good insight. Not anxious nor agitated  Skin:  No rashes. No jaundice    Reviewed most current lab test results and cultures  YES  Reviewed most current radiology test results   YES  Review and summation of old records today    NO  Reviewed patient's current orders and MAR    YES  PMH/SH reviewed - no change compared to H&P  ________________________________________________________________________  Care Plan discussed with:    Comments   Patient x    Family      RN x    Care Manager x    Consultant                       x Multidiciplinary team rounds were held today with , nursing, pharmacist and clinical coordinator. Patient's plan of care was discussed; medications were reviewed and discharge planning was addressed. ________________________________________________________________________  Total NON critical care TIME:  25  Minutes    Total CRITICAL CARE TIME Spent:   Minutes non procedure based      Comments   >50% of visit spent in counseling and coordination of care     ________________________________________________________________________  Radha Borja MD     Procedures: see electronic medical records for all procedures/Xrays and details which were not copied into this note but were reviewed prior to creation of Plan. LABS:  I reviewed today's most current labs and imaging studies. Pertinent labs include:  No results for input(s): WBC, HGB, HCT, PLT, HGBEXT, HCTEXT, PLTEXT in the last 72 hours. No results for input(s): NA, K, CL, CO2, GLU, BUN, CREA, CA, MG, PHOS, ALB, TBIL, TBILI, ALT, INR, INREXT in the last 72 hours.     No lab exists for component: SGOT    Signed: Darell Dc Alana Khoury MD

## 2021-10-12 NOTE — PROGRESS NOTES
Bedside report given to Kristel Lugo RN (oncoming RN) from Qi LOZANO RN (offgoing nurse). Report given via KARDEX, MAR, SBAR and labs.     New wound vac to pernium

## 2021-10-13 LAB
BACTERIA SPEC CULT: NORMAL
SERVICE CMNT-IMP: NORMAL

## 2021-10-13 PROCEDURE — 74011250637 HC RX REV CODE- 250/637: Performed by: STUDENT IN AN ORGANIZED HEALTH CARE EDUCATION/TRAINING PROGRAM

## 2021-10-13 PROCEDURE — 65270000032 HC RM SEMIPRIVATE

## 2021-10-13 PROCEDURE — 74011000258 HC RX REV CODE- 258: Performed by: STUDENT IN AN ORGANIZED HEALTH CARE EDUCATION/TRAINING PROGRAM

## 2021-10-13 PROCEDURE — 74011250637 HC RX REV CODE- 250/637: Performed by: HOSPITALIST

## 2021-10-13 PROCEDURE — 74011250636 HC RX REV CODE- 250/636: Performed by: STUDENT IN AN ORGANIZED HEALTH CARE EDUCATION/TRAINING PROGRAM

## 2021-10-13 PROCEDURE — 94760 N-INVAS EAR/PLS OXIMETRY 1: CPT

## 2021-10-13 RX ADMIN — MEROPENEM 500 MG: 500 INJECTION, POWDER, FOR SOLUTION INTRAVENOUS at 05:34

## 2021-10-13 RX ADMIN — Medication 10 ML: at 05:34

## 2021-10-13 RX ADMIN — Medication 10 ML: at 22:06

## 2021-10-13 RX ADMIN — HYDROMORPHONE HYDROCHLORIDE 1 MG: 1 INJECTION, SOLUTION INTRAMUSCULAR; INTRAVENOUS; SUBCUTANEOUS at 12:46

## 2021-10-13 RX ADMIN — OXYCODONE HYDROCHLORIDE 10 MG: 5 TABLET ORAL at 08:29

## 2021-10-13 RX ADMIN — GABAPENTIN 300 MG: 300 CAPSULE ORAL at 08:30

## 2021-10-13 RX ADMIN — FERROUS SULFATE TAB 325 MG (65 MG ELEMENTAL FE) 325 MG: 325 (65 FE) TAB at 12:03

## 2021-10-13 RX ADMIN — OXYCODONE HYDROCHLORIDE 10 MG: 5 TABLET ORAL at 00:22

## 2021-10-13 RX ADMIN — Medication 10 ML: at 15:43

## 2021-10-13 RX ADMIN — MEROPENEM 500 MG: 500 INJECTION, POWDER, FOR SOLUTION INTRAVENOUS at 12:03

## 2021-10-13 RX ADMIN — I-VITE, TAB 1000-60-2MG (60/BT) 1 TABLET: TAB at 08:29

## 2021-10-13 RX ADMIN — OXYCODONE HYDROCHLORIDE 10 MG: 5 TABLET ORAL at 15:43

## 2021-10-13 RX ADMIN — MEROPENEM 500 MG: 500 INJECTION, POWDER, FOR SOLUTION INTRAVENOUS at 00:22

## 2021-10-13 RX ADMIN — HYDROMORPHONE HYDROCHLORIDE 1 MG: 1 INJECTION, SOLUTION INTRAMUSCULAR; INTRAVENOUS; SUBCUTANEOUS at 22:03

## 2021-10-13 RX ADMIN — MEROPENEM 500 MG: 500 INJECTION, POWDER, FOR SOLUTION INTRAVENOUS at 23:38

## 2021-10-13 RX ADMIN — GABAPENTIN 300 MG: 300 CAPSULE ORAL at 17:06

## 2021-10-13 RX ADMIN — MEROPENEM 500 MG: 500 INJECTION, POWDER, FOR SOLUTION INTRAVENOUS at 17:06

## 2021-10-13 RX ADMIN — FERROUS SULFATE TAB 325 MG (65 MG ELEMENTAL FE) 325 MG: 325 (65 FE) TAB at 17:06

## 2021-10-13 RX ADMIN — GABAPENTIN 300 MG: 300 CAPSULE ORAL at 22:04

## 2021-10-13 RX ADMIN — FERROUS SULFATE TAB 325 MG (65 MG ELEMENTAL FE) 325 MG: 325 (65 FE) TAB at 08:30

## 2021-10-13 NOTE — PROGRESS NOTES
Hospitalist Progress Note    NAME: Lory Mcnamara   :  1995   MRN:  [id number]   Room Number:  561/57  @ [facility]       Interim Hospital Summary: 22 y.o. male whom presented on 10/6/2021 with      Assessment / Plan:      #Hidradenitis suppurativa  -CT  Pelvis: No deep space abscess. No significant change of small subcutaneous  fluid and gas collections  -Recent history of pilonidal cystectomy and acellular graft placement on  by general surgery  -Seen by general surgery no need of antibiotic at this point  -Wound culture ESBL E. coli suspect colonization  -Blood culture diphtheroid 1/2 mostly contamination. Repeat BCX NGT  -Wound care  -IV Dilaudid during dressing change      #Generalized weakness and deconditioning  -PT OT    #Microcytic anemia  -Hemoglobin 8.1 MCV 68  -Iron saturation 18%, iron 27, TIBC 159 ferritin 570- 21   -Iron deficiency deficiency versus anemia of chronic disease  -On iron supplementation      Code status: Full  Prophylaxis: Lovenox  Recommended Disposition:  PT, OT, RN     Subjective:     Chief Complaint / Reason for Physician Visit  Yaniv Blake Discussed with RN events overnight. Review of Systems:  No fevers, chills, appetite change, cough, sputum production, shortness of breath, dyspnea on exertion, nausea, vomitting, diarrhea, constipation, chest pain, leg edema, abdominal pain, joint pain, rash, itching. Tolerating PT/OT. Tolerating diet. Objective:     VITALS:   Last 24hrs VS reviewed since prior progress note. Most recent are:  Patient Vitals for the past 24 hrs:   Temp Pulse Resp BP SpO2   10/13/21 0744 99.2 °F (37.3 °C) 91 18 104/60 99 %   10/12/21 1956 98.3 °F (36.8 °C) 93 18 (!) 98/55 96 %       Intake/Output Summary (Last 24 hours) at 10/13/2021 0929  Last data filed at 10/13/2021 8004  Gross per 24 hour   Intake --   Output 2355 ml   Net -2355 ml        PHYSICAL EXAM:  General: WD, WN.  Alert, cooperative, no acute distress EENT:  EOMI. Anicteric sclerae. MMM  Resp:  CTA bilaterally, no wheezing or rales. No accessory muscle use  CV:  Regular  rhythm,  normal S1/S2, no murmurs rubs gallops, No edema  GI:  Soft, Non distended, Non tender. +Bowel sounds  Neurologic:  Alert and oriented X 3, normal speech,   Psych:   Good insight. Not anxious nor agitated  Skin:  No rashes. No jaundice. Axillary and sacral wound     Reviewed most current lab test results and cultures  YES  Reviewed most current radiology test results   YES  Review and summation of old records today    NO  Reviewed patient's current orders and MAR    YES  PMH/SH reviewed - no change compared to H&P  ________________________________________________________________________  Care Plan discussed with:    Comments   Patient x    Family      RN x    Care Manager x    Consultant                       x Multidiciplinary team rounds were held today with , nursing, pharmacist and clinical coordinator. Patient's plan of care was discussed; medications were reviewed and discharge planning was addressed. ________________________________________________________________________  Total NON critical care TIME:  25   Minutes    Total CRITICAL CARE TIME Spent:   Minutes non procedure based      Comments   >50% of visit spent in counseling and coordination of care x    ________________________________________________________________________  Favio Marin MD     Procedures: see electronic medical records for all procedures/Xrays and details which were not copied into this note but were reviewed prior to creation of Plan. LABS:  I reviewed today's most current labs and imaging studies. Pertinent labs include:  No results for input(s): WBC, HGB, HCT, PLT, HGBEXT, HCTEXT, PLTEXT, HGBEXT, HCTEXT, PLTEXT in the last 72 hours. No results for input(s): NA, K, CL, CO2, GLU, BUN, CREA, CA, MG, PHOS, ALB, TBIL, TBILI, ALT, INR, INREXT, INREXT in the last 72 hours.     No lab exists for component: SGOT    Signed: Lacy Gray MD

## 2021-10-13 NOTE — PROGRESS NOTES
Physical therapy:     Patient unavailable secondary to nursing care. Will attempt as able.      Noemi Aguilar, PT

## 2021-10-13 NOTE — PROGRESS NOTES
Spiritual Care Assessment/Progress Note  [facility]      NAME: Loraine Villasenor      MRN: [id number]  AGE: 22 y.o. SEX: male  Islam Affiliation: Non Holiness   Language: English     10/13/2021     Total Time (in minutes): 7     Spiritual Assessment begun in [address] through conversation with:         [x]Patient        [] Family    [] Friend(s)        Reason for Consult: Initial/Spiritual assessment, patient floor     Spiritual beliefs: (Please include comment if needed)     [] Identifies with a chas tradition:         [] Supported by a chas community:            [] Claims no spiritual orientation:           [] Seeking spiritual identity:                [] Adheres to an individual form of spirituality:           [x] Not able to assess:                           Identified resources for coping:      [] Prayer                               [] Music                  [] Guided Imagery     [] Family/friends                 [] Pet visits     [] Devotional reading                         [x] Unknown     [] Other:                                               Interventions offered during this visit: (See comments for more details)    Patient Interventions: Initial/Spiritual assessment, patient floor           Plan of Care:     [] Support spiritual and/or cultural needs    [] Support AMD and/or advance care planning process      [] Support grieving process   [] Coordinate Rites and/or Rituals    [] Coordination with community clergy   [x] No spiritual needs identified at this time   [] Detailed Plan of Care below (See Comments)  [] Make referral to Music Therapy  [] Make referral to Pet Therapy     [] Make referral to Addiction services  [] Make referral to [facility]  [] Make referral to Spiritual Care Partner  [] No future visits requested        [x] Follow up upon further referrals     Comments: Attempted to offer supportive visit for pt in [facility].   Reviewed pt's chart prior to this visit to augment any observed or expressed support needs this pt may have. Pt expressed no particular support needs and declined visit at this time. Affirmed ongoing availability of support. Veronica Anguiano MDiv.  Staff   Request  Support/Spiritual Care Services via [facility]

## 2021-10-13 NOTE — PROGRESS NOTES
Problem: Pain  Goal: *Control of Pain  Outcome: Progressing Towards Goal  Goal: *PALLIATIVE CARE:  Alleviation of Pain  Outcome: Progressing Towards Goal     Problem:  Activity Intolerance  Goal: *Oxygen saturation during activity within specified parameters  Outcome: Progressing Towards Goal  Goal: *Able to remain out of bed as prescribed  Outcome: Progressing Towards Goal     Problem: Patient Education: Go to Patient Education Activity  Goal: Patient/Family Education  Outcome: Progressing Towards Goal

## 2021-10-13 NOTE — PROGRESS NOTES
Problem: Pain  Goal: *Control of Pain  Outcome: Progressing Towards Goal  Goal: *PALLIATIVE CARE:  Alleviation of Pain  Outcome: Progressing Towards Goal     Problem: Falls - Risk of  Goal: *Absence of Falls  Description: Document Omid Fall Risk and appropriate interventions in the flowsheet.   Outcome: Progressing Towards Goal  Note: Fall Risk Interventions:  Mobility Interventions: PT Consult for mobility concerns         Medication Interventions: Teach patient to arise slowly         History of Falls Interventions: Consult care management for discharge planning

## 2021-10-13 NOTE — PROGRESS NOTES
0715 Bedside shift report given to Gisselle Perea RN (oncoming) from ALESSANDRO Novak (offgoing). Report included the following: SBAR, MAR, Kardex, Intake/Output and Recent Results. 0744 VS stable, taken by jaye Juarez.     0830 Morning assessment complete. Pt accepted all meds except Lovenox injection. PRN Roxicodone admin for pt stated pain 6/10     0930 Pain reassessed, pt stated down to 4. Denies any further needs     1135 Pt being assisted w/ washing up by jaye Juarez. Pt denies any needs at this time     1203 Scheduled meds admin. IV abx infusing. Pt educated on increasing food & supplement intake, pt verbalized understanding     1246 PRN Dilaudid admin prior to wound care     1325 Wound care completed w/ Venancio rogers RN and jaye Arreaga. Pt tolerated fairly. Bed linens and gown changed     2357 Pt resting in bed talking w/ sana Mcdermott. Pt denies any needs     1706 IV abx hung. Scheduled meds accepted. Pt stated pain low at 5/10     1725 IV infiltrated. IV access removed    1912 IV access started by Ashley Rodriguez RN. IV abx restarted. Pt denies any further needs    1915 Bedside shift report given to Arun Shaw (oncoming) from Gisselle Perea RN (offgoing). Report included the following: SBAR, MAR, Kardex, Intake/Output and Recent Results.

## 2021-10-14 PROCEDURE — 74011000258 HC RX REV CODE- 258: Performed by: STUDENT IN AN ORGANIZED HEALTH CARE EDUCATION/TRAINING PROGRAM

## 2021-10-14 PROCEDURE — 77030019934 HC DRSG VAC ASST KCON -B

## 2021-10-14 PROCEDURE — 77030041076 HC DRSG AG OPTICELL MDII -A

## 2021-10-14 PROCEDURE — 74011250636 HC RX REV CODE- 250/636: Performed by: STUDENT IN AN ORGANIZED HEALTH CARE EDUCATION/TRAINING PROGRAM

## 2021-10-14 PROCEDURE — 74011250637 HC RX REV CODE- 250/637: Performed by: HOSPITALIST

## 2021-10-14 PROCEDURE — 77030040162

## 2021-10-14 PROCEDURE — 74011250637 HC RX REV CODE- 250/637: Performed by: STUDENT IN AN ORGANIZED HEALTH CARE EDUCATION/TRAINING PROGRAM

## 2021-10-14 PROCEDURE — 2709999900 HC NON-CHARGEABLE SUPPLY

## 2021-10-14 PROCEDURE — 65270000032 HC RM SEMIPRIVATE

## 2021-10-14 RX ADMIN — OXYCODONE HYDROCHLORIDE 10 MG: 5 TABLET ORAL at 08:41

## 2021-10-14 RX ADMIN — GABAPENTIN 300 MG: 300 CAPSULE ORAL at 22:22

## 2021-10-14 RX ADMIN — MEROPENEM 500 MG: 500 INJECTION, POWDER, FOR SOLUTION INTRAVENOUS at 18:21

## 2021-10-14 RX ADMIN — Medication 10 ML: at 22:23

## 2021-10-14 RX ADMIN — I-VITE, TAB 1000-60-2MG (60/BT) 1 TABLET: TAB at 08:41

## 2021-10-14 RX ADMIN — FERROUS SULFATE TAB 325 MG (65 MG ELEMENTAL FE) 325 MG: 325 (65 FE) TAB at 12:46

## 2021-10-14 RX ADMIN — GABAPENTIN 300 MG: 300 CAPSULE ORAL at 16:28

## 2021-10-14 RX ADMIN — Medication 10 ML: at 13:47

## 2021-10-14 RX ADMIN — MEROPENEM 500 MG: 500 INJECTION, POWDER, FOR SOLUTION INTRAVENOUS at 12:46

## 2021-10-14 RX ADMIN — Medication 10 ML: at 06:11

## 2021-10-14 RX ADMIN — HYDROMORPHONE HYDROCHLORIDE 1 MG: 1 INJECTION, SOLUTION INTRAMUSCULAR; INTRAVENOUS; SUBCUTANEOUS at 16:27

## 2021-10-14 RX ADMIN — GABAPENTIN 300 MG: 300 CAPSULE ORAL at 08:40

## 2021-10-14 RX ADMIN — OXYCODONE HYDROCHLORIDE 5 MG: 5 TABLET ORAL at 18:32

## 2021-10-14 RX ADMIN — FERROUS SULFATE TAB 325 MG (65 MG ELEMENTAL FE) 325 MG: 325 (65 FE) TAB at 16:28

## 2021-10-14 RX ADMIN — OXYCODONE HYDROCHLORIDE 10 MG: 5 TABLET ORAL at 22:22

## 2021-10-14 RX ADMIN — HYDROMORPHONE HYDROCHLORIDE 1 MG: 1 INJECTION, SOLUTION INTRAMUSCULAR; INTRAVENOUS; SUBCUTANEOUS at 10:57

## 2021-10-14 RX ADMIN — OXYCODONE HYDROCHLORIDE 10 MG: 5 TABLET ORAL at 12:46

## 2021-10-14 RX ADMIN — MEROPENEM 500 MG: 500 INJECTION, POWDER, FOR SOLUTION INTRAVENOUS at 06:11

## 2021-10-14 RX ADMIN — FERROUS SULFATE TAB 325 MG (65 MG ELEMENTAL FE) 325 MG: 325 (65 FE) TAB at 08:40

## 2021-10-14 NOTE — PROGRESS NOTES
[id number]: pt premedicated. wound care dressing change completed with Shalini. Wound vac on sacrum removed and replaced with wound packing and mepilex due to active bleeding. Linens and gown changed.

## 2021-10-14 NOTE — PROGRESS NOTES
Physical therapy:     Patient unavailable secondary to wound care. Will attempt as able. Continue to recommend home health PT/OT at discharge.      Nan Camp, PT

## 2021-10-14 NOTE — PROGRESS NOTES
Problem: Pain  Goal: *Control of Pain  Outcome: Progressing Towards Goal  Goal: *PALLIATIVE CARE:  Alleviation of Pain  Outcome: Progressing Towards Goal     Problem: Patient Education: Go to Patient Education Activity  Goal: Patient/Family Education  Outcome: Progressing Towards Goal     Problem: Falls - Risk of  Goal: *Absence of Falls  Description: Document Leafy Benitez Fall Risk and appropriate interventions in the flowsheet. Outcome: Progressing Towards Goal  Note: Fall Risk Interventions:  Mobility Interventions: PT Consult for mobility concerns         Medication Interventions: Teach patient to arise slowly         History of Falls Interventions: Consult care management for discharge planning         Problem: Patient Education: Go to Patient Education Activity  Goal: Patient/Family Education  Outcome: Progressing Towards Goal     Problem: Activity Intolerance  Goal: *Oxygen saturation during activity within specified parameters  Outcome: Progressing Towards Goal  Goal: *Able to remain out of bed as prescribed  Outcome: Progressing Towards Goal     Problem: Patient Education: Go to Patient Education Activity  Goal: Patient/Family Education  Outcome: Progressing Towards Goal     Problem: Pressure Injury - Risk of  Goal: *Prevention of pressure injury  Description: Document Dann Scale and appropriate interventions in the flowsheet.   Outcome: Progressing Towards Goal  Note: Pressure Injury Interventions:       Moisture Interventions: Minimize layers, Absorbent underpads    Activity Interventions: PT/OT evaluation    Mobility Interventions: HOB 30 degrees or less    Nutrition Interventions: Offer support with meals,snacks and hydration    Friction and Shear Interventions: Minimize layers                Problem: Patient Education: Go to Patient Education Activity  Goal: Patient/Family Education  Outcome: Progressing Towards Goal     Problem: Breathing Pattern - Ineffective  Goal: *Absence of hypoxia  Outcome: Progressing Towards Goal  Goal: *Use of effective breathing techniques  Outcome: Progressing Towards Goal  Goal: *PALLIATIVE CARE:  Alleviation of Dyspnea  Outcome: Progressing Towards Goal     Problem: Patient Education: Go to Patient Education Activity  Goal: Patient/Family Education  Outcome: Progressing Towards Goal     Problem: Risk for Spread of Infection  Goal: Prevent transmission of infectious organism to others  Description: Prevent the transmission of infectious organisms to other patients, staff members, and visitors.   Outcome: Progressing Towards Goal     Problem: Patient Education:  Go to Education Activity  Goal: Patient/Family Education  Outcome: Progressing Towards Goal     Problem: Patient Education: Go to Patient Education Activity  Goal: Patient/Family Education  Outcome: Progressing Towards Goal

## 2021-10-14 NOTE — PROGRESS NOTES
Hospitalist Progress Note    NAME: Mabel Quijano   :  1995   MRN:  [id number]   Room Number:  218/67  @ [facility]       Interim Hospital Summary: 22 y.o. male whom presented on 10/6/2021 with      Assessment / Plan:      #Hidradenitis suppurativa  -CT  Pelvis: No deep space abscess. No significant change of small subcutaneous  fluid and gas collections  -Recent history of pilonidal cystectomy and acellular graft placement on  by general surgery  -Seen by general surgery no need of antibiotic at this point  -Wound culture ESBL E. coli suspect colonization  -Blood culture diphtheroid / mostly contamination. Repeat BCX NGT  -Wound care  -IV Dilaudid during dressing change    # Bleeding from sacral wound   -  consulted   - Hold lovenox   - check CBC       #Generalized weakness and deconditioning  -PT OT    #Microcytic anemia  -Hemoglobin 8.1 MCV 68  -Iron saturation 18%, iron 27, TIBC 159 ferritin 570- 21   -Iron deficiency deficiency versus anemia of chronic disease  -On iron supplementation      Code status: Full  Prophylaxis: Lovenox  Recommended Disposition:  PT, OT, RN     Subjective:     Chief Complaint / Reason for Physician Visit  Taylor Snyder Discussed with RN events overnight. Review of Systems:  No fevers, chills, appetite change, cough, sputum production, shortness of breath, dyspnea on exertion, nausea, vomitting, diarrhea, constipation, chest pain, leg edema, abdominal pain, joint pain, rash, itching. Tolerating PT/OT. Tolerating diet. Objective:     VITALS:   Last 24hrs VS reviewed since prior progress note.  Most recent are:  Patient Vitals for the past 24 hrs:   Temp Pulse Resp BP SpO2   10/14/21 0757 98.8 °F (37.1 °C) 90 17 106/62 97 %   10/13/21 1934 98.1 °F (36.7 °C) 83 18 (!) 106/58 96 %   10/13/21 1539 97.9 °F (36.6 °C) 93 18 122/66 98 %       Intake/Output Summary (Last 24 hours) at 10/14/2021 0940  Last data filed at 10/14/2021 0466  Gross per 24 hour   Intake --   Output 2170 ml   Net -2170 ml        PHYSICAL EXAM:  General: WD, WN. Alert, cooperative, no acute distress    EENT:  EOMI. Anicteric sclerae. MMM  Resp:  CTA bilaterally, no wheezing or rales. No accessory muscle use  CV:  Regular  rhythm,  normal S1/S2, no murmurs rubs gallops, No edema  GI:  Soft, Non distended, Non tender. +Bowel sounds  Neurologic:  Alert and oriented X 3, normal speech,   Psych:   Good insight. Not anxious nor agitated  Skin:  No rashes. No jaundice. Axillary and sacral wound     Reviewed most current lab test results and cultures  YES  Reviewed most current radiology test results   YES  Review and summation of old records today    NO  Reviewed patient's current orders and MAR    YES  PMH/SH reviewed - no change compared to H&P  ________________________________________________________________________  Care Plan discussed with:    Comments   Patient x    Family      RN x    Care Manager x    Consultant                       x Multidiciplinary team rounds were held today with , nursing, pharmacist and clinical coordinator. Patient's plan of care was discussed; medications were reviewed and discharge planning was addressed. ________________________________________________________________________  Total NON critical care TIME:  25   Minutes    Total CRITICAL CARE TIME Spent:   Minutes non procedure based      Comments   >50% of visit spent in counseling and coordination of care x    ________________________________________________________________________  Cole Viera MD     Procedures: see electronic medical records for all procedures/Xrays and details which were not copied into this note but were reviewed prior to creation of Plan. LABS:  I reviewed today's most current labs and imaging studies. Pertinent labs include:  No results for input(s): WBC, HGB, HCT, PLT, HGBEXT, HCTEXT, PLTEXT, HGBEXT, HCTEXT, PLTEXT in the last 72 hours.   No results for input(s): NA, K, CL, CO2, GLU, BUN, CREA, CA, MG, PHOS, ALB, TBIL, TBILI, ALT, INR, INREXT, INREXT in the last 72 hours.     No lab exists for component: SGOT    Signed: Stephania Ptael MD

## 2021-10-14 NOTE — PROGRESS NOTES
JM   RUR 12 %  High-Risk for Readmit due to dx and complexity of wounds. GLOS 3.5   LOS 7 days   GLOS/SHARITA Undetermined due to the above   Barrier/Delay listed on the chart. Infection Control Nurse to assess the wounds today. Pain Controlled- pre-medicate before wound care   Now discontinue Wound VAC and Re-consult Surgery    CM assessing ongoing for disposition.      [address] MSW RN   [phone]

## 2021-10-14 NOTE — PROGRESS NOTES
Wound care progress note:  Wound s/p pilonidal  Excision wound is supposed to change the wound VAC dressing. Patient was in the toilet came back wound dressing removed wound is bleeding. The drainage in the cannister is about 125 ml sero sanguinous. This time noted fresh blood in the wound and all other perineal area wound. He stained in the toilet. Cleaned all the area the wound does not have any sign of infection other than bleeding. Dis continued the wound VAC and informed DR Irving Cardona the condition. Dressing change done:  Perineal area back, left axilla ,after cleaning the wounds with wound cleanser. Applied Aquacel AG dressing and hydrofera blue in the wound and covered with another foam dressing,  Patient was given pain medication prior to dressing change by Júnior Pastor his primary care RN and she was present in the room. Recommendation:   Hold the blood thinner for sometime? Surgical consult for revisiting the wound by surgeon? Regular dressing until bleeding stops. Will re evaluate placing the wound VAC.

## 2021-10-15 LAB
BASOPHILS # BLD: 0 K/UL (ref 0–0.1)
BASOPHILS NFR BLD: 0 % (ref 0–1)
DIFFERENTIAL METHOD BLD: ABNORMAL
EOSINOPHIL # BLD: 0.8 K/UL (ref 0–0.4)
EOSINOPHIL NFR BLD: 7 % (ref 0–7)
ERYTHROCYTE [DISTWIDTH] IN BLOOD BY AUTOMATED COUNT: 19.2 % (ref 11.5–14.5)
HCT VFR BLD AUTO: 24.3 % (ref 36.6–50.3)
HGB BLD-MCNC: 7.1 G/DL (ref 12.1–17)
IMM GRANULOCYTES # BLD AUTO: 0.3 K/UL (ref 0–0.04)
IMM GRANULOCYTES NFR BLD AUTO: 3 % (ref 0–0.5)
LYMPHOCYTES # BLD: 1.5 K/UL (ref 0.8–3.5)
LYMPHOCYTES NFR BLD: 14 % (ref 12–49)
MCH RBC QN AUTO: 19.8 PG (ref 26–34)
MCHC RBC AUTO-ENTMCNC: 29.2 G/DL (ref 30–36.5)
MCV RBC AUTO: 67.7 FL (ref 80–99)
MONOCYTES # BLD: 0.9 K/UL (ref 0–1)
MONOCYTES NFR BLD: 8 % (ref 5–13)
NEUTS SEG # BLD: 7.2 K/UL (ref 1.8–8)
NEUTS SEG NFR BLD: 68 % (ref 32–75)
NRBC # BLD: 0 K/UL (ref 0–0.01)
NRBC BLD-RTO: 0 PER 100 WBC
PLATELET # BLD AUTO: 556 K/UL (ref 150–400)
PMV BLD AUTO: 8 FL (ref 8.9–12.9)
RBC # BLD AUTO: 3.59 M/UL (ref 4.1–5.7)
RBC MORPH BLD: ABNORMAL
WBC # BLD AUTO: 10.7 K/UL (ref 4.1–11.1)

## 2021-10-15 PROCEDURE — 36415 COLL VENOUS BLD VENIPUNCTURE: CPT

## 2021-10-15 PROCEDURE — 74011250636 HC RX REV CODE- 250/636: Performed by: STUDENT IN AN ORGANIZED HEALTH CARE EDUCATION/TRAINING PROGRAM

## 2021-10-15 PROCEDURE — 74011250637 HC RX REV CODE- 250/637: Performed by: HOSPITALIST

## 2021-10-15 PROCEDURE — 74011000258 HC RX REV CODE- 258: Performed by: STUDENT IN AN ORGANIZED HEALTH CARE EDUCATION/TRAINING PROGRAM

## 2021-10-15 PROCEDURE — 85025 COMPLETE CBC W/AUTO DIFF WBC: CPT

## 2021-10-15 PROCEDURE — 74011250636 HC RX REV CODE- 250/636: Performed by: FAMILY MEDICINE

## 2021-10-15 PROCEDURE — 74011250637 HC RX REV CODE- 250/637: Performed by: STUDENT IN AN ORGANIZED HEALTH CARE EDUCATION/TRAINING PROGRAM

## 2021-10-15 PROCEDURE — 99024 POSTOP FOLLOW-UP VISIT: CPT | Performed by: SURGERY

## 2021-10-15 PROCEDURE — 65270000032 HC RM SEMIPRIVATE

## 2021-10-15 RX ORDER — HYDROMORPHONE HYDROCHLORIDE 1 MG/ML
0.5 INJECTION, SOLUTION INTRAMUSCULAR; INTRAVENOUS; SUBCUTANEOUS ONCE
Status: COMPLETED | OUTPATIENT
Start: 2021-10-15 | End: 2021-10-15

## 2021-10-15 RX ADMIN — HYDROMORPHONE HYDROCHLORIDE 1 MG: 1 INJECTION, SOLUTION INTRAMUSCULAR; INTRAVENOUS; SUBCUTANEOUS at 03:00

## 2021-10-15 RX ADMIN — GABAPENTIN 300 MG: 300 CAPSULE ORAL at 10:11

## 2021-10-15 RX ADMIN — FERROUS SULFATE TAB 325 MG (65 MG ELEMENTAL FE) 325 MG: 325 (65 FE) TAB at 17:23

## 2021-10-15 RX ADMIN — HYDROMORPHONE HYDROCHLORIDE 0.5 MG: 1 INJECTION, SOLUTION INTRAMUSCULAR; INTRAVENOUS; SUBCUTANEOUS at 21:29

## 2021-10-15 RX ADMIN — MEROPENEM 500 MG: 500 INJECTION, POWDER, FOR SOLUTION INTRAVENOUS at 12:36

## 2021-10-15 RX ADMIN — FERROUS SULFATE TAB 325 MG (65 MG ELEMENTAL FE) 325 MG: 325 (65 FE) TAB at 10:11

## 2021-10-15 RX ADMIN — Medication 10 ML: at 07:01

## 2021-10-15 RX ADMIN — Medication 10 ML: at 14:23

## 2021-10-15 RX ADMIN — OXYCODONE HYDROCHLORIDE 10 MG: 5 TABLET ORAL at 05:26

## 2021-10-15 RX ADMIN — I-VITE, TAB 1000-60-2MG (60/BT) 1 TABLET: TAB at 10:10

## 2021-10-15 RX ADMIN — Medication 10 ML: at 21:30

## 2021-10-15 RX ADMIN — HYDROMORPHONE HYDROCHLORIDE 1 MG: 1 INJECTION, SOLUTION INTRAMUSCULAR; INTRAVENOUS; SUBCUTANEOUS at 16:39

## 2021-10-15 RX ADMIN — GABAPENTIN 300 MG: 300 CAPSULE ORAL at 17:23

## 2021-10-15 RX ADMIN — MEROPENEM 500 MG: 500 INJECTION, POWDER, FOR SOLUTION INTRAVENOUS at 01:00

## 2021-10-15 RX ADMIN — GABAPENTIN 300 MG: 300 CAPSULE ORAL at 21:29

## 2021-10-15 RX ADMIN — MEROPENEM 500 MG: 500 INJECTION, POWDER, FOR SOLUTION INTRAVENOUS at 07:01

## 2021-10-15 RX ADMIN — OXYCODONE HYDROCHLORIDE 10 MG: 5 TABLET ORAL at 22:44

## 2021-10-15 RX ADMIN — FERROUS SULFATE TAB 325 MG (65 MG ELEMENTAL FE) 325 MG: 325 (65 FE) TAB at 12:36

## 2021-10-15 NOTE — PROGRESS NOTES
PT note: Patient continues to experience severe pain with minimal movement in the bed. Participation with therapy has been very limited due to pain and bleeding from wounds. Will sign off as he is not appropriate for OOB mobility at this time. Spoke to patient and he agrees. Patient familiar with exercises and will perform in the bed as able. Please re-consult when pain levels and wounds are more stable to tolerate OOB mobility and range of motion. Thank you.  Seven Gaxiola, PT

## 2021-10-15 NOTE — PROGRESS NOTES
Comprehensive Nutrition Assessment     Type and Reason for Visit: (P) Follow-Up, Wound     Nutrition Recommendations/Plan: Double portions at mealtime with wound healing supplements on meal trays. Add Ocuvite once per day for wound healing. Enc po and fluid intake. Ensure pt that nutrition will help his wounds to heal.     Nutrition Assessment:  (P) Pt admitted with wound infection/hidradenitis; d/c'd from [facility] days ago and now back with lehthargy, foul-smelling wound, anemia. Rec'd 1 unit packed cells and is on FeSO4 daily. s/p cleaning and granulating today. Possible wound vac. Appropriate kcal and supplements ordered for meal trays. Mild malnutrition noted for this patient at this time.        Estimated Daily Nutrient Needs:  Energy (kcal): (P) 2474; Weight Used for Energy Requirements: (P) Ideal  Protein (g): (P) 92.5 (1.35 g/kg CBW); Weight Used for Protein Requirements: (P) Ideal  Fluid (ml/day): (P) 2474; Method Used for Fluid Requirements: (P) 1 ml/kcal        Nutrition Related Findings:  (P) Pt tolerating diet and willing to try supplements       Wounds:    (P) Open wounds, Deep tissue injury        Current Nutrition Therapies:  ADULT ORAL NUTRITION SUPPLEMENT Breakfast, Dinner; Standard High Calorie/High Protein  ADULT ORAL NUTRITION SUPPLEMENT Lunch; Frozen Supplement  ADULT ORAL NUTRITION SUPPLEMENT Dinner; Wound Healing Supplement  ADULT DIET Regular     Anthropometric Measures:  · Height:  (P) 6' 3\" (190.5 cm)  · Current Body Wt:  (P) 68.6 kg (151 lb 11.2 oz)   · Admission Body Wt:       · Usual Body Wt:        · Ideal Body Wt:  (P) 196 lbs:  (P) 77.2 %   · Adjusted Body Weight:   ; Weight Adjustment for:     · Adjusted BMI:       · BMI Category:  (P) Normal weight (BMI 18.5-24. 9)        Nutrition Diagnosis:   · (P) Predicted inadequate energy intake, Increased nutrient needs, Mild malnutrition, In context of acute illness or injury, Unintended weight loss, Limited adherence to nutrition-related recommendations related to (P) catabolic illness, acute injury/trauma, lack or limited access to food, inadequate protein-energy intake, psychological cause or life stress, increased demand for energy/nutrients, food and nutrition related knowledge deficit as evidenced by (P) intake 51-75%, poor intake prior to admission, wounds, BMI, weight loss, moderate loss of subcutaneous fat, mild muscle loss, constipation     Nutrition Interventions:   Food and/or Nutrient Delivery: (P) Modify current diet, Modify oral nutrition supplement  Nutrition Education and Counseling: (P) Education not indicated  Coordination of Nutrition Care: (P) Continue to monitor while inpatient     Goals:  (P) PO intake of meals and ONS > 75% most meals next 4-7 days.  Previous goal met.        Nutrition Monitoring and Evaluation:   Behavioral-Environmental Outcomes: (P) Knowledge or skill, Beliefs and attitudes  Food/Nutrient Intake Outcomes: (P) Food and nutrient intake, Supplement intake  Physical Signs/Symptoms Outcomes: (P) Biochemical data, Constipation, Meal time behavior, Skin, Weight, Nutrition focused physical findings     Discharge Planning:    (P) Continue current diet, Continue oral nutrition supplement      Electronically signed by Shadia Pinedo, PhD, RD, [address] [facility] on 10/15/2021 at 11:07 AM     Contact: [phone]

## 2021-10-15 NOTE — PROGRESS NOTES
Mr. Javier Vela has no specific complaints today. Tm 99.1 Tc 98.2 HR: 92 BP: 113/64 Resp Rate: 20 99% sat on room air. Intake/Output Summary (Last 24 hours) at 10/15/2021 1634  Last data filed at 10/15/2021 0204  Gross per 24 hour   Intake --   Output 2000 ml   Net -2000 ml   Exam: Cor: RRR. Lungs: Bilateral breath sounds. Clear to auscultation. Abd: Soft. Non distended. Non tender. No guarding or rebound. Intergluteal Cleft: Wound is clean and granulating. Labs:   Recent Results (from the past 12 hour(s))   CBC WITH AUTOMATED DIFF    Collection Time: 10/15/21  5:10 AM   Result Value Ref Range    WBC 10.7 4.1 - 11.1 K/uL    RBC 3.59 (L) 4.10 - 5.70 M/uL    HGB 7.1 (L) 12.1 - 17.0 g/dL    HCT 24.3 (L) 36.6 - 50.3 %    MCV 67.7 (L) 80.0 - 99.0 FL    MCH 19.8 (L) 26.0 - 34.0 PG    MCHC 29.2 (L) 30.0 - 36.5 g/dL    RDW 19.2 (H) 11.5 - 14.5 %    PLATELET 336 (H) 742 - 400 K/uL    MPV 8.0 (L) 8.9 - 12.9 FL    NRBC 0.0 0  WBC    ABSOLUTE NRBC 0.00 0.00 - 0.01 K/uL    NEUTROPHILS 68 32 - 75 %    LYMPHOCYTES 14 12 - 49 %    MONOCYTES 8 5 - 13 %    EOSINOPHILS 7 0 - 7 %    BASOPHILS 0 0 - 1 %    IMMATURE GRANULOCYTES 3 (H) 0.0 - 0.5 %    ABS. NEUTROPHILS 7.2 1.8 - 8.0 K/UL    ABS. LYMPHOCYTES 1.5 0.8 - 3.5 K/UL    ABS. MONOCYTES 0.9 0.0 - 1.0 K/UL    ABS. EOSINOPHILS 0.8 (H) 0.0 - 0.4 K/UL    ABS. BASOPHILS 0.0 0.0 - 0.1 K/UL    ABS. IMM. GRANS. 0.3 (H) 0.00 - 0.04 K/UL    DF SMEAR SCANNED      RBC COMMENTS MICROCYTOSIS  1+       Mr. Javier Lulas is doing well following pilonidal cystectomy/application of acellular xenograft on September 23, 2021. Do not believe that there is a need for further surgery at this time. Continue local wound care - possible reapplication of wound vac. Would stop IV antibiotics - Blood culture from 10/8/2021 was negative. Diet and supplements as tolerated. Can restart Lovenox. Pain medication and anti-emetics as needed. PT following.   Plans per Dr. Eligha Koyanagi. Following.

## 2021-10-15 NOTE — PROGRESS NOTES
1915 - Received verbal bedside shift change report from ALESSANDRO Sotomayor to include SBAR, MAR, Kardex and recent results. 1925 - Assessment completed. A&O x4. Dressings to bilateral axilla, bilateral groin and sacral region all clean dry and intact. Reports current pain 4/10 and denies need for pain meds at this time. Informed pt of pain med regimen and educated on when next pain meds are due and pt states understanding. 2222 - pt c/o 7/10 pain mostly to bilateral wounds at axilla. Requested pain meds. Pt is able to take PO Oxy and requested 10mg PO which was given. 2320 - Pt states pain has decreased to axillary wounds but c/o sacral area pain increasing to 7/10 after attempting to move. Requested IV pain med but when this writer was about to give the med, pt changed his mind and asked if he could wait a little while longer to see if PO pain med kicked in more. 0015 - pt sleeping w/o distress - will reassess pain again upon wakening     0300 - pt c/o 8/10 pain to R axilla - states he \"yanked\" part of dressing off accidentally in his sleep. Kerlex to RUE was partially unravelled. Redressed wound at R axillary region with new roll of Kerlex and tape. Pt c/o severe pain (9/10) w/ movement during dressing change. Requested IV pain med he declined earlier. Gave Dilaudid 1mg IV.    0345 - pt sleeping but aroused easily - states pain to R axilla has improved to 2/10 and also reported pain to sacrum has diminished to 3/10. States he's comfortable at present. Provided drink. Voiding using urinal w/o difficulty. 9294 - medicated by Rayray Kiser RN w/ Oxycodone 10mg PO for reports of pain     0650 - denies need for pain med at this time - 4/10 pain to sacral region, groin and bilateral axilla. States he's comfortable at this time. 12 - Gave verbal bedside shift change report to ALESSANDRO Soliman to include SBAR, MAR, Kardex and recent results.

## 2021-10-15 NOTE — PROGRESS NOTES
Bedside report received from [facility], pt resting in bed comfortable at this time. No pain or distress noted    0930-Pt vitals stable, pt accepted scheduled medication, pain tolerable at this time. Pt consumed breakfast 100%    1120-Pt sleeping at this time    1245-pt refused lunch, consumed fluids, pt resting look at phone at this time. No distress noted    1501-pt resting comfortable, vitals stable. 1640-Dr Zelaya assessed pt sacral wound,pt initiated pain medication for \"excruciationg       1830-wound care completed, abx , and lovenox d/c'ed      Bedside and Verbal shift change report given to Citlali Ness (oncoming nurse) by Laureen Betancourt (offgoing nurse). Report included the following information SBAR, Intake/Output, MAR, Recent Results and Med Rec Status.

## 2021-10-15 NOTE — PROGRESS NOTES
Hospitalist Progress Note    NAME: Meghann Rendon   :  1995   MRN:  [id number]   Room Number:  943/60  @ [facility]       Interim Hospital Summary: 22 y.o. male whom presented on 10/6/2021 with      Assessment / Plan:      #Hidradenitis suppurativa  -CT  Pelvis: No deep space abscess. No significant change of small subcutaneous  fluid and gas collections  -Recent history of pilonidal cystectomy and acellular graft placement on  by general surgery  -Seen by general surgery no need of antibiotic at this point  -Wound culture ESBL E. coli suspect colonization  -Blood culture diphtheroid / mostly contamination. Repeat BCX NGT  -Wound care  -IV Dilaudid during dressing change    # Bleeding from sacral wound   - GS consulted   - Hold lovenox   - H/H 8 > 7   - trend        #Generalized weakness and deconditioning  -PT OT    #Microcytic anemia  -Hemoglobin 8.1 MCV 68  -Iron saturation 18%, iron 27, TIBC 159 ferritin 570- 21   -Iron deficiency deficiency versus anemia of chronic disease  -On iron supplementation      Code status: Full  Prophylaxis: Lovenox  Recommended Disposition:  PT, OT, RN     Subjective:     Chief Complaint / Reason for Physician Visit  Tammy Alvin Discussed with RN events overnight. Review of Systems:  No fevers, chills, appetite change, cough, sputum production, shortness of breath, dyspnea on exertion, nausea, vomitting, diarrhea, constipation, chest pain, leg edema, abdominal pain, joint pain, rash, itching. Tolerating PT/OT. Tolerating diet. Objective:     VITALS:   Last 24hrs VS reviewed since prior progress note.  Most recent are:  Patient Vitals for the past 24 hrs:   Temp Pulse Resp BP SpO2   10/14/21 1925 98.8 °F (37.1 °C) 95 16 108/64 100 %   10/14/21 1555 97.8 °F (36.6 °C) 98 14 94/61 100 %       Intake/Output Summary (Last 24 hours) at 10/15/2021 0937  Last data filed at 10/15/2021 0204  Gross per 24 hour   Intake --   Output 2125 ml Net -2125 ml        PHYSICAL EXAM:  General: WD, WN. Alert, cooperative, no acute distress    EENT:  EOMI. Anicteric sclerae. MMM  Resp:  CTA bilaterally, no wheezing or rales. No accessory muscle use  CV:  Regular  rhythm,  normal S1/S2, no murmurs rubs gallops, No edema  GI:  Soft, Non distended, Non tender. +Bowel sounds  Neurologic:  Alert and oriented X 3, normal speech,   Psych:   Good insight. Not anxious nor agitated  Skin:  No rashes. No jaundice. Axillary and sacral wound     Reviewed most current lab test results and cultures  YES  Reviewed most current radiology test results   YES  Review and summation of old records today    NO  Reviewed patient's current orders and MAR    YES  PMH/SH reviewed - no change compared to H&P  ________________________________________________________________________  Care Plan discussed with:    Comments   Patient x    Family      RN x    Care Manager x    Consultant  x GS                    x Multidiciplinary team rounds were held today with , nursing, pharmacist and clinical coordinator. Patient's plan of care was discussed; medications were reviewed and discharge planning was addressed. ________________________________________________________________________  Total NON critical care TIME:  25   Minutes    Total CRITICAL CARE TIME Spent:   Minutes non procedure based      Comments   >50% of visit spent in counseling and coordination of care x    ________________________________________________________________________  Mustapha Valle MD     Procedures: see electronic medical records for all procedures/Xrays and details which were not copied into this note but were reviewed prior to creation of Plan. LABS:  I reviewed today's most current labs and imaging studies.   Pertinent labs include:  Recent Labs     10/15/21  0510   WBC 10.7   HGB 7.1*   HCT 24.3*   *     No results for input(s): NA, K, CL, CO2, GLU, BUN, CREA, CA, MG, PHOS, ALB, TBIL, TBILI, ALT, INR, INREXT, INREXT in the last 72 hours.     No lab exists for component: SGOT    Signed: Mustapha Valle MD

## 2021-10-16 PROBLEM — B35.1 DERMATOPHYTOSIS OF NAIL: Status: ACTIVE | Noted: 2021-10-16

## 2021-10-16 LAB
HCT VFR BLD AUTO: 25.1 % (ref 36.6–50.3)
HGB BLD-MCNC: 7.4 G/DL (ref 12.1–17)

## 2021-10-16 PROCEDURE — 0HDRXZZ EXTRACTION OF TOE NAIL, EXTERNAL APPROACH: ICD-10-PCS | Performed by: PODIATRIST

## 2021-10-16 PROCEDURE — 74011250636 HC RX REV CODE- 250/636: Performed by: STUDENT IN AN ORGANIZED HEALTH CARE EDUCATION/TRAINING PROGRAM

## 2021-10-16 PROCEDURE — 65270000032 HC RM SEMIPRIVATE

## 2021-10-16 PROCEDURE — 85018 HEMOGLOBIN: CPT

## 2021-10-16 PROCEDURE — 74011250637 HC RX REV CODE- 250/637: Performed by: HOSPITALIST

## 2021-10-16 PROCEDURE — 77030041076 HC DRSG AG OPTICELL MDII -A

## 2021-10-16 PROCEDURE — 74011250637 HC RX REV CODE- 250/637: Performed by: STUDENT IN AN ORGANIZED HEALTH CARE EDUCATION/TRAINING PROGRAM

## 2021-10-16 PROCEDURE — 94760 N-INVAS EAR/PLS OXIMETRY 1: CPT

## 2021-10-16 PROCEDURE — 77030038554 HC DRSG WND THERAHONEY MDII -Z

## 2021-10-16 RX ORDER — HYDROMORPHONE HYDROCHLORIDE 1 MG/ML
2 INJECTION, SOLUTION INTRAMUSCULAR; INTRAVENOUS; SUBCUTANEOUS DAILY PRN
Status: DISCONTINUED | OUTPATIENT
Start: 2021-10-16 | End: 2021-10-23

## 2021-10-16 RX ORDER — HYDROMORPHONE HYDROCHLORIDE 1 MG/ML
1 INJECTION, SOLUTION INTRAMUSCULAR; INTRAVENOUS; SUBCUTANEOUS DAILY PRN
Status: DISCONTINUED | OUTPATIENT
Start: 2021-10-16 | End: 2021-10-16

## 2021-10-16 RX ADMIN — FERROUS SULFATE TAB 325 MG (65 MG ELEMENTAL FE) 325 MG: 325 (65 FE) TAB at 09:15

## 2021-10-16 RX ADMIN — GABAPENTIN 300 MG: 300 CAPSULE ORAL at 09:15

## 2021-10-16 RX ADMIN — OXYCODONE HYDROCHLORIDE 10 MG: 5 TABLET ORAL at 18:33

## 2021-10-16 RX ADMIN — OXYCODONE HYDROCHLORIDE 10 MG: 5 TABLET ORAL at 04:58

## 2021-10-16 RX ADMIN — OXYCODONE HYDROCHLORIDE 10 MG: 5 TABLET ORAL at 09:40

## 2021-10-16 RX ADMIN — FERROUS SULFATE TAB 325 MG (65 MG ELEMENTAL FE) 325 MG: 325 (65 FE) TAB at 17:16

## 2021-10-16 RX ADMIN — OXYCODONE HYDROCHLORIDE 10 MG: 5 TABLET ORAL at 21:34

## 2021-10-16 RX ADMIN — GABAPENTIN 300 MG: 300 CAPSULE ORAL at 21:34

## 2021-10-16 RX ADMIN — HYDROMORPHONE HYDROCHLORIDE 1 MG: 1 INJECTION, SOLUTION INTRAMUSCULAR; INTRAVENOUS; SUBCUTANEOUS at 01:31

## 2021-10-16 RX ADMIN — ACETAMINOPHEN 650 MG: 325 TABLET ORAL at 09:40

## 2021-10-16 RX ADMIN — GABAPENTIN 300 MG: 300 CAPSULE ORAL at 17:16

## 2021-10-16 RX ADMIN — FERROUS SULFATE TAB 325 MG (65 MG ELEMENTAL FE) 325 MG: 325 (65 FE) TAB at 12:41

## 2021-10-16 RX ADMIN — Medication 10 ML: at 01:32

## 2021-10-16 RX ADMIN — OXYCODONE HYDROCHLORIDE 10 MG: 5 TABLET ORAL at 14:49

## 2021-10-16 RX ADMIN — Medication 10 ML: at 22:00

## 2021-10-16 RX ADMIN — Medication 10 ML: at 14:17

## 2021-10-16 RX ADMIN — I-VITE, TAB 1000-60-2MG (60/BT) 1 TABLET: TAB at 09:15

## 2021-10-16 RX ADMIN — Medication 10 ML: at 06:00

## 2021-10-16 RX ADMIN — HYDROMORPHONE HYDROCHLORIDE 2 MG: 1 INJECTION, SOLUTION INTRAMUSCULAR; INTRAVENOUS; SUBCUTANEOUS at 13:06

## 2021-10-16 NOTE — PROGRESS NOTES
2829: AM VS obtained by Allen Cruz, LEIGH ANN. Pt in bed, resting quietly. Denies additional needs at this time. 1000: Pt in bed, resting. Denies additional needs at this time. 1215: Pt in bed, resting quietly. RN present at bedside. 1440: Pt in bed. [employer] instructor and nursing students at bedside performing wound care. Assisted Shahida Lewis RN with wound care. 1705: Pt in bed, resting.  Provided cranberry juice and ice per request.

## 2021-10-16 NOTE — PROGRESS NOTES
1915 - received verbal bedside shift change report from ALESSANDRO Soliman to include SBAR, Kardex, [facility] and recent results. 2134 - States pain has been much more tolerable today since IV pain med was adjusted and he received it prior to dsg change. Pt content with pain mgmt at this time. Does report pain returning and is 7/10 - mostly to sacral wounds - requested PO pain meds - gave Oxycodone 10mg. Bilateral axillary and bilateral groin wound dressings are all clean, dry and intact. Pt kindly declined turning to side so this writer can assess sacral dressing D/T extreme pain to that area with moving/turning. 2230 - pt sleeping soundly w/o distress    0045 - sleeping w/o distress    0230 - asleep and w/o distress    0415 - sleeping w/o distress    0648 - flushed IV - patent - no s/s phlebitis - pt awake and stated pain is returning - offered PO pain med - pt requested 10mg which was given. Pt slept 8 solid hrs last night which he stated was the first time he's slept all night since he's been admitted here. 4771 - gave verbal bedside shift change report to ALESSANDRO Soliman to include SBAR, MAR, Kardex and recent results.

## 2021-10-16 NOTE — PROGRESS NOTES
Pt c/o pain generalized aching pain  7/10, prn Vianney 5 mg administered,    Pt verbalize pain 3/10,  resting comfortable at this time. Pain tolerable    1017-pt educated on med change for wound care    1210-pt requested assistance with ADL's, Podiatry (Ginger) assessed and cut toe nails. Pt initiated prn Dilaudid 2 mg IV before routine wound care. 1311-prn administered. 1449-pt verbalize pain during wound care,  Vianney 10 mg po administered     1530-vitals stable, pt resting comfortable    1722-pt consuming lunch at this time. Bedside and Verbal shift change report given to [address] [place] [id number] (oncoming nurse) by Katiana Green (offgoing nurse). Report included the following information SBAR, Intake/Output, MAR, Recent Results and Med Rec Status.

## 2021-10-16 NOTE — PROGRESS NOTES
Foot and AnkleProgress Note    Admit Date: 10/6/2021      Subjective:   I was asked to consult this patient for foot care    Current Facility-Administered Medications   Medication Dose Route Frequency    HYDROmorphone (DILAUDID) injection 2 mg  2 mg IntraVENous DAILY PRN    vitamin D-W-C-lutein-minerals (OCUVITE) tablet 1 Tablet  1 Tablet Oral DAILY    naloxone (NARCAN) injection 0.4 mg  0.4 mg IntraMUSCular EVERY 2 MINUTES AS NEEDED    gabapentin (NEURONTIN) capsule 300 mg  300 mg Oral TID    ferrous sulfate tablet 325 mg  325 mg Oral TID WITH MEALS    sodium chloride (NS) flush 5-40 mL  5-40 mL IntraVENous Q8H    sodium chloride (NS) flush 5-40 mL  5-40 mL IntraVENous PRN    acetaminophen (TYLENOL) tablet 650 mg  650 mg Oral Q6H PRN    polyethylene glycol (MIRALAX) packet 17 g  17 g Oral DAILY PRN    ondansetron (ZOFRAN ODT) tablet 4 mg  4 mg Oral Q8H PRN    Or    ondansetron (ZOFRAN) injection 4 mg  4 mg IntraVENous Q6H PRN    [Held by provider] enoxaparin (LOVENOX) injection 40 mg  40 mg SubCUTAneous DAILY    oxyCODONE IR (ROXICODONE) tablet 5 mg  5 mg Oral Q4H PRN    Or    oxyCODONE IR (ROXICODONE) tablet 10 mg  10 mg Oral Q4H PRN        Review of Systems  Consistent with admitting H&P    Objective:     Patient Vitals for the past 8 hrs:   BP Temp Pulse Resp SpO2 Height Weight   10/16/21 0829 106/66 97.6 °F (36.4 °C) 100 18 98 % -- --   10/16/21 0702 -- -- -- -- -- 6' 3\" (1.905 m) 68.9 kg (152 lb)     10/16 0701 - 10/16 1900  In: -   Out: 400 [Urine:400]  10/14 1901 - 10/16 0700  In: -   Out: 2650 [Urine:2650]    Physical Exam: Lower extremities  Palpable pedal pulses with epicritic sensation intact   Good muscle strength   Toenails 1-5 both feet discolored and long          Data Review   Recent Results (from the past 24 hour(s))   HGB & HCT    Collection Time: 10/16/21  3:30 AM   Result Value Ref Range    HGB 7.4 (L) 12.1 - 17.0 g/dL    HCT 25.1 (L) 36.6 - 50.3 %           Assessment:     Active Problems:    Wound infection (10/7/2021)    onychomycosis    Plan:   Debride nails   Advised otc liquid antifungal for nails after discharge

## 2021-10-16 NOTE — PROGRESS NOTES
Hospitalist Progress Note    NAME: Lisset Perea   :  1995   MRN:  [id number]   Room Number:  554/21  @ [facility]       Interim Hospital Summary: 22 y.o. male whom presented on 10/6/2021 with      Assessment / Plan:      #Hidradenitis suppurativa  -CT  Pelvis: No deep space abscess. No significant change of small subcutaneous  fluid and gas collections  -Recent history of pilonidal cystectomy and acellular graft placement on  by general surgery  -Seen by general surgery no need of antibiotic at this point  -Wound culture ESBL E. coli suspect colonization  -Blood culture diphtheroid / mostly contamination. Repeat BCX NGT  -Wound care  -IV Dilaudid during dressing change    # Bleeding from sacral wound resolved  - GS consulted   -lovenox held   - H/H 8 > 7 > 7.4         #Generalized weakness and deconditioning  -PT OT    #Microcytic anemia  -Hemoglobin 8.1 MCV 68  -Iron saturation 18%, iron 27, TIBC 159 ferritin 570- 21   -Iron deficiency deficiency versus anemia of chronic disease  -On iron supplementation      Code status: Full  Prophylaxis: Lovenox- held   Recommended Disposition:  PT, OT, RN     Subjective:     Chief Complaint / Reason for Physician Visit  Caednce Yang Discussed with RN events overnight. Review of Systems:  No fevers, chills, appetite change, cough, sputum production, shortness of breath, dyspnea on exertion, nausea, vomitting, diarrhea, constipation, chest pain, leg edema, abdominal pain, joint pain, rash, itching. Tolerating PT/OT. Tolerating diet. Objective:     VITALS:   Last 24hrs VS reviewed since prior progress note.  Most recent are:  Patient Vitals for the past 24 hrs:   Temp Pulse Resp BP SpO2   10/16/21 0829 97.6 °F (36.4 °C) 100 18 106/66 98 %   10/15/21 2013 98.9 °F (37.2 °C) 98 18 122/71 95 %   10/15/21 1502 98.2 °F (36.8 °C) 92 20 113/64 99 %       Intake/Output Summary (Last 24 hours) at 10/16/2021 0941  Last data filed at 10/16/2021 0918  Gross per 24 hour   Intake --   Output 1050 ml   Net -1050 ml        PHYSICAL EXAM:  General: WD, WN. Alert, cooperative, no acute distress    EENT:  EOMI. Anicteric sclerae. MMM  Resp:  CTA bilaterally, no wheezing or rales. No accessory muscle use  CV:  Regular  rhythm,  normal S1/S2, no murmurs rubs gallops, No edema  GI:  Soft, Non distended, Non tender. +Bowel sounds  Neurologic:  Alert and oriented X 3, normal speech,   Psych:   Good insight. Not anxious nor agitated  Skin:  No rashes. No jaundice. Axillary and sacral wound     Reviewed most current lab test results and cultures  YES  Reviewed most current radiology test results   YES  Review and summation of old records today    NO  Reviewed patient's current orders and MAR    YES  PMH/SH reviewed - no change compared to H&P  ________________________________________________________________________  Care Plan discussed with:    Comments   Patient x    Family      RN x    Care Manager x    Consultant  x GS                    x Multidiciplinary team rounds were held today with , nursing, pharmacist and clinical coordinator. Patient's plan of care was discussed; medications were reviewed and discharge planning was addressed. ________________________________________________________________________  Total NON critical care TIME:  25   Minutes    Total CRITICAL CARE TIME Spent:   Minutes non procedure based      Comments   >50% of visit spent in counseling and coordination of care x    ________________________________________________________________________  Marion Morgan MD     Procedures: see electronic medical records for all procedures/Xrays and details which were not copied into this note but were reviewed prior to creation of Plan. LABS:  I reviewed today's most current labs and imaging studies.   Pertinent labs include:  Recent Labs     10/16/21  0330 10/15/21  0510   WBC  --  10.7   HGB 7.4* 7.1*   HCT 25.1* 24.3* PLT  --  556*     No results for input(s): NA, K, CL, CO2, GLU, BUN, CREA, CA, MG, PHOS, ALB, TBIL, TBILI, ALT, INR, INREXT, INREXT in the last 72 hours.     No lab exists for component: SGOT    Signed: Ivy Faith MD

## 2021-10-16 NOTE — PROGRESS NOTES
1945 - received bedside and verbal shift change report from ALESSANDRO Soliman to include SBAR, Kardex, [facility] and recent results. 1945 - received verbal and bedside shift change report from ALESSANDRO Soliman to include SBAR, Kardex, [facility] and recent results. 2040 - upon assessment of pt, he c/o significant pain from dressing changes that were just completed. He's not due for pain meds for another 2 hrs. Pt stated he was tearful while lying on stomach during sacral dressing change. Requested extra dose of fast-acting pain med and pt doesn't usually request pain meds frequently. Message sent to on-call provider:  Pt had multiple significant dressing changes in the past hour and isn't due for IV or PO pain meds for another 2 hrs. He doesn't usually request pain meds often but is fluctuating between 8-10/10 pain since dressing change. He c/o being tearful during dsg change as well. He's asking for an extra one time fast-acting dose. Thank you. 2045 - Dressings to bilateral axillary regions, bilateral groin and sacral region were just changed as of 1945 this evening by day nurse, ALESSANDRO Soliman. All dressings are currently clean, dry and intact. 2129 - received one time order for Dilaudid 0.5mg IV - administered to pt    2200 - pt reports pain diminished to 4/10 - resting comfortably - talking on phone at present    [id number] - c/o pain returning at 7/10 pain - to wounds at bilateral axillary; bilateral groin and sacrum. Requested Oxy - gave 10mg Oxycodone PO.     2340 - resting in bed - talking on phone - reports pain to wounds has decreased to 4/10 at present with intermittent episodes of burning. Denies need for IV pain med at this time. 0131 - pt states he's had a hard time this evening feeling comfortable since wound care was completed yesterday evening. Pain has been difficult to manage.  Pt re-iterated he was tearful while in a prone position due to \"extreme 14/10 pain\" - pain returning and is currently 7/10 and pt requested IV pain med. Gave Dilaudid 1mg IV.     0200 - on phone watching [employer] videos. States he's having a hard time sleeping. Suggested pt turn phone off and keep lights dim to try and rest. Provided cranberry juice and ice. States pain decreased to 4/10.     0300 - labs drawn and taken to laboratory     0458 - Pt slept for a while and awoke c/o 7/10 burning/shooting pain to coccyx, groin and axillary wounds. Caitlin Wilson RN medicated pt w/ Oxycodone 10mg PO.     0545 - pt sleeping soundly - no distress     0702 - weight obtained via bed scale - 152.0lb. Sleeping soundly w/o distress. 5686 - gave verbal bedside shift change report to ALESSANDRO Soliman to include SBAR, Kardex, MAR and recent results.

## 2021-10-16 NOTE — PROGRESS NOTES
TELE-HOSPITALIST CROSS COVER NOTE:    Visit Vitals  /71 (BP 1 Location: Left lower arm, BP Patient Position: At rest)   Pulse 98   Temp 98.9 °F (37.2 °C)   Resp 18   Ht 6' 3\" (1.905 m)   Wt 68.8 kg (151 lb 10.8 oz)   SpO2 95%   BMI 18.96 kg/m²         D/W RN; medical records reviewed; Hydromorphone 0.5mg IV x 1 for pain related to dressing changes.       Franki Farah

## 2021-10-17 LAB
BACTERIA SPEC CULT: ABNORMAL
SERVICE CMNT-IMP: ABNORMAL

## 2021-10-17 PROCEDURE — 74011250637 HC RX REV CODE- 250/637: Performed by: STUDENT IN AN ORGANIZED HEALTH CARE EDUCATION/TRAINING PROGRAM

## 2021-10-17 PROCEDURE — 74011250636 HC RX REV CODE- 250/636: Performed by: STUDENT IN AN ORGANIZED HEALTH CARE EDUCATION/TRAINING PROGRAM

## 2021-10-17 PROCEDURE — 74011250637 HC RX REV CODE- 250/637: Performed by: HOSPITALIST

## 2021-10-17 PROCEDURE — 77030041076 HC DRSG AG OPTICELL MDII -A

## 2021-10-17 PROCEDURE — 77030038554 HC DRSG WND THERAHONEY MDII -Z

## 2021-10-17 PROCEDURE — 65270000032 HC RM SEMIPRIVATE

## 2021-10-17 RX ADMIN — OXYCODONE HYDROCHLORIDE 10 MG: 5 TABLET ORAL at 15:44

## 2021-10-17 RX ADMIN — GABAPENTIN 300 MG: 300 CAPSULE ORAL at 22:34

## 2021-10-17 RX ADMIN — FERROUS SULFATE TAB 325 MG (65 MG ELEMENTAL FE) 325 MG: 325 (65 FE) TAB at 09:29

## 2021-10-17 RX ADMIN — GABAPENTIN 300 MG: 300 CAPSULE ORAL at 09:29

## 2021-10-17 RX ADMIN — GABAPENTIN 300 MG: 300 CAPSULE ORAL at 17:53

## 2021-10-17 RX ADMIN — HYDROMORPHONE HYDROCHLORIDE 2 MG: 1 INJECTION, SOLUTION INTRAMUSCULAR; INTRAVENOUS; SUBCUTANEOUS at 13:59

## 2021-10-17 RX ADMIN — FERROUS SULFATE TAB 325 MG (65 MG ELEMENTAL FE) 325 MG: 325 (65 FE) TAB at 12:11

## 2021-10-17 RX ADMIN — FERROUS SULFATE TAB 325 MG (65 MG ELEMENTAL FE) 325 MG: 325 (65 FE) TAB at 17:53

## 2021-10-17 RX ADMIN — ACETAMINOPHEN 650 MG: 325 TABLET ORAL at 22:34

## 2021-10-17 RX ADMIN — Medication 10 ML: at 22:00

## 2021-10-17 RX ADMIN — I-VITE, TAB 1000-60-2MG (60/BT) 1 TABLET: TAB at 09:29

## 2021-10-17 RX ADMIN — OXYCODONE HYDROCHLORIDE 10 MG: 5 TABLET ORAL at 06:48

## 2021-10-17 RX ADMIN — Medication 10 ML: at 06:48

## 2021-10-17 RX ADMIN — ACETAMINOPHEN 650 MG: 325 TABLET ORAL at 15:45

## 2021-10-17 RX ADMIN — Medication 10 ML: at 13:53

## 2021-10-17 NOTE — PROGRESS NOTES
Bedside report received from Promise Shah, pt resting comfortable, eyes closed no pain or distress noted at this time. 0900-pt vitals stable, scheduled medication administered. Pt verbalize minimal pain. Pt consumed breakfast 100%, fluids 300 ml      1130-pt sleeping at this time     1213-visitor dropped off cake to patient, pt consumed lunch 30%    1248-pt up to restroom for bowel movement, walking with stand by assistance. 1 medium formed bowel noted, brown tinged blood noted. pt verbalize feeling constipated, prn given. Vitals stable with elevated heart rate noted x2, pt resting, refused pain medication at this time, pt states he will wait until wound care for meds.     1405-pt accepted Dilaudid 2 mg IV, fluids encouraged,

## 2021-10-17 NOTE — ROUTINE PROCESS
Resident laying in bed in supine position. Resident present with no verbal complaint of pain or discomfort at this time.

## 2021-10-17 NOTE — PROGRESS NOTES
Hospitalist Progress Note    NAME: Radames Chavez   :  1995   MRN:  [id number]   Room Number:  626/78  @ [facility]       Interim Hospital Summary: 32 y.o. male whom presented on 10/6/2021 with      Assessment / Plan:      #Hidradenitis suppurativa  -CT  Pelvis: No deep space abscess. No significant change of small subcutaneous  fluid and gas collections  -Recent history of pilonidal cystectomy and acellular graft placement on  by general surgery  -Seen by general surgery no need of antibiotic at this point  -Wound culture ESBL E. coli suspect colonization  -Blood culture diphtheroid / mostly contamination. Repeat BCX NGT  -Wound care  -IV Dilaudid during dressing change    # Bleeding from sacral wound resolved  - GS consulted   -lovenox held   - H/H 8 > 7 > 7.4         #Generalized weakness and deconditioning  -PT OT    #Microcytic anemia  -Hemoglobin 8.1 MCV 68  -Iron saturation 18%, iron 27, TIBC 159 ferritin 570- 21   -Iron deficiency deficiency versus anemia of chronic disease  -On iron supplementation      Code status: Full  Prophylaxis: Lovenox- held   Recommended Disposition:  PT, OT, RN     Subjective:     Chief Complaint / Reason for Physician Visit  Too Pulse Discussed with RN events overnight. Review of Systems:  No fevers, chills, appetite change, cough, sputum production, shortness of breath, dyspnea on exertion, nausea, vomitting, diarrhea, constipation, chest pain, leg edema, abdominal pain, joint pain, rash, itching. Tolerating PT/OT. Tolerating diet. Objective:     VITALS:   Last 24hrs VS reviewed since prior progress note.  Most recent are:  Patient Vitals for the past 24 hrs:   Temp Pulse Resp BP SpO2   10/17/21 0743 98.8 °F (37.1 °C) 96 18 102/61 99 %   10/16/21 2025 98.9 °F (37.2 °C) 87 18 (!) 98/56 99 %   10/16/21 1657 97.8 °F (36.6 °C) 83 18 113/63 99 %       Intake/Output Summary (Last 24 hours) at 10/17/2021 1004  Last data filed at 10/17/2021 0649  Gross per 24 hour   Intake 236 ml   Output 900 ml   Net -664 ml        PHYSICAL EXAM:  General: WD, WN. Alert, cooperative, no acute distress    EENT:  EOMI. Anicteric sclerae. MMM  Resp:  CTA bilaterally, no wheezing or rales. No accessory muscle use  CV:  Regular  rhythm,  normal S1/S2, no murmurs rubs gallops, No edema  GI:  Soft, Non distended, Non tender. +Bowel sounds  Neurologic:  Alert and oriented X 3, normal speech,   Psych:   Good insight. Not anxious nor agitated  Skin:  No rashes. No jaundice. Axillary and sacral wound     Reviewed most current lab test results and cultures  YES  Reviewed most current radiology test results   YES  Review and summation of old records today    NO  Reviewed patient's current orders and MAR    YES  PMH/SH reviewed - no change compared to H&P  ________________________________________________________________________  Care Plan discussed with:    Comments   Patient x    Family      RN x    Care Manager x    Consultant                       x Multidiciplinary team rounds were held today with , nursing, pharmacist and clinical coordinator. Patient's plan of care was discussed; medications were reviewed and discharge planning was addressed. ________________________________________________________________________  Total NON critical care TIME:  25   Minutes    Total CRITICAL CARE TIME Spent:   Minutes non procedure based      Comments   >50% of visit spent in counseling and coordination of care x    ________________________________________________________________________  Terell Mcclain MD     Procedures: see electronic medical records for all procedures/Xrays and details which were not copied into this note but were reviewed prior to creation of Plan. LABS:  I reviewed today's most current labs and imaging studies.   Pertinent labs include:  Recent Labs     10/16/21  0330 10/15/21  0510   WBC  --  10.7   HGB 7.4* 7.1*   HCT 25.1* 24.3* PLT  --  556*     No results for input(s): NA, K, CL, CO2, GLU, BUN, CREA, CA, MG, PHOS, ALB, TBIL, TBILI, ALT, INR, INREXT, INREXT in the last 72 hours.     No lab exists for component: SGOT    Signed: Gurpreet Morel MD

## 2021-10-17 NOTE — PROGRESS NOTES
Resident present in bed laying in supine position. Resident remain alert and verbal with no complaints at this time.

## 2021-10-17 NOTE — PROGRESS NOTES
0830: Pt in bed, resting quietly. Appears to be sleeping. No signs of distress noted. 0930: Pt in bed, resting. Denies additional needs at this time. RN present at bedside with [address]. 1130: Pt in bed, resting quietly. Appears to be sleeping. No signs of distress noted. 1330: Pt in bathroom, Fanta Gonsalves RN and Elver Cannon student present at bedside. 1530: Pt in bed. Kandis Christina LPN, and [facility] present at bedside performing wound care. 1800: Pt in bed, eating dinner. Provided juice and fresh cup of ice per request. Denies additional needs at this time. 1900: Pt in bed, resting quietly. Visitor present at bedside. Provided fresh cup of ice and OJ per request. Denies additional needs at this time.

## 2021-10-17 NOTE — PROGRESS NOTES
Problem: Pain  Goal: *Control of Pain  10/17/2021 0452 by Jett Ahumada  Outcome: Progressing Towards Goal  10/17/2021 0452 by Jett Ahumada  Outcome: Progressing Towards Goal     Problem: Falls - Risk of  Goal: *Absence of Falls  Description: Document Jay Naranjo Fall Risk and appropriate interventions in the flowsheet.   10/17/2021 0452 by Jett Ahumada  Outcome: Progressing Towards Goal  Note: Fall Risk Interventions:  Mobility Interventions: Strengthening exercises (ROM-active/passive)    Mentation Interventions: Adequate sleep, hydration, pain control    Medication Interventions: Teach patient to arise slowly         History of Falls Interventions: Consult care management for discharge planning      10/17/2021 0452 by Jett Ahumada  Outcome: Progressing Towards Goal  Note: Fall Risk Interventions:  Mobility Interventions: Strengthening exercises (ROM-active/passive)    Mentation Interventions: Adequate sleep, hydration, pain control    Medication Interventions: Teach patient to arise slowly         History of Falls Interventions: Consult care management for discharge planning

## 2021-10-17 NOTE — PROGRESS NOTES
Resident present in bed laying in supine position. Resident appears resting quietly with both eyes closed and even respirations at this time.

## 2021-10-18 PROCEDURE — 74011250636 HC RX REV CODE- 250/636: Performed by: STUDENT IN AN ORGANIZED HEALTH CARE EDUCATION/TRAINING PROGRAM

## 2021-10-18 PROCEDURE — 74011250637 HC RX REV CODE- 250/637: Performed by: HOSPITALIST

## 2021-10-18 PROCEDURE — 2709999900 HC NON-CHARGEABLE SUPPLY

## 2021-10-18 PROCEDURE — 97606 NEG PRS WND THER DME>50 SQCM: CPT

## 2021-10-18 PROCEDURE — 74011250637 HC RX REV CODE- 250/637: Performed by: STUDENT IN AN ORGANIZED HEALTH CARE EDUCATION/TRAINING PROGRAM

## 2021-10-18 PROCEDURE — 65270000032 HC RM SEMIPRIVATE

## 2021-10-18 RX ADMIN — OXYCODONE HYDROCHLORIDE 5 MG: 5 TABLET ORAL at 08:51

## 2021-10-18 RX ADMIN — GABAPENTIN 300 MG: 300 CAPSULE ORAL at 22:10

## 2021-10-18 RX ADMIN — FERROUS SULFATE TAB 325 MG (65 MG ELEMENTAL FE) 325 MG: 325 (65 FE) TAB at 11:19

## 2021-10-18 RX ADMIN — GABAPENTIN 300 MG: 300 CAPSULE ORAL at 17:39

## 2021-10-18 RX ADMIN — GABAPENTIN 300 MG: 300 CAPSULE ORAL at 08:17

## 2021-10-18 RX ADMIN — ACETAMINOPHEN 650 MG: 325 TABLET ORAL at 08:52

## 2021-10-18 RX ADMIN — I-VITE, TAB 1000-60-2MG (60/BT) 1 TABLET: TAB at 08:17

## 2021-10-18 RX ADMIN — FERROUS SULFATE TAB 325 MG (65 MG ELEMENTAL FE) 325 MG: 325 (65 FE) TAB at 17:39

## 2021-10-18 RX ADMIN — Medication 10 ML: at 22:00

## 2021-10-18 RX ADMIN — ACETAMINOPHEN 650 MG: 325 TABLET ORAL at 19:30

## 2021-10-18 RX ADMIN — Medication 10 ML: at 14:00

## 2021-10-18 RX ADMIN — HYDROMORPHONE HYDROCHLORIDE 2 MG: 1 INJECTION, SOLUTION INTRAMUSCULAR; INTRAVENOUS; SUBCUTANEOUS at 11:23

## 2021-10-18 RX ADMIN — FERROUS SULFATE TAB 325 MG (65 MG ELEMENTAL FE) 325 MG: 325 (65 FE) TAB at 08:17

## 2021-10-18 NOTE — PROGRESS NOTES
JM   IDR Rounds with MD and team this am.     RUR 13 % High Risk for Readmit   GLOS 3.5   LOS 11.5 Days   ELOS 1-2 more weeks. Barrier Delay on the chart     IV ABX stopped   Continue Wound Care - Wound VAC   To monitor another week.     Plans home with [address] [place] [address] MSW RN   [phone]

## 2021-10-18 NOTE — PROGRESS NOTES
Resident present laying in supine position with nurse present. Resident remain verbal with no complaints at this time.

## 2021-10-18 NOTE — PROGRESS NOTES
Resident present in bed laying in supine position. Resident present with no verbal complaint of pain or discomfort at this time.

## 2021-10-18 NOTE — PROGRESS NOTES
Hospitalist Progress Note    NAME: Pastor Figueroa   :  1995   MRN:  [id number]   Room Number:  503/75  @ [facility]       Interim Hospital Summary: 32 y.o. male whom presented on 10/6/2021 with      Assessment / Plan:      #Hidradenitis suppurativa  -CT  Pelvis: No deep space abscess. No significant change of small subcutaneous  fluid and gas collections  -Recent history of pilonidal cystectomy and acellular graft placement on  by general surgery  -Seen by general surgery no need of antibiotic at this point  -Wound culture ESBL E. coli suspect colonization  -Blood culture diphtheroid / mostly contamination. Repeat BCX NGT  -Wound care  -IV Dilaudid during dressing change    # Bleeding from sacral wound resolved  - GS consulted   -lovenox held   - H/H 8 > 7 > 7.4         #Generalized weakness and deconditioning  -PT OT    #Microcytic anemia  -Hemoglobin 8.1 MCV 68  -Iron saturation 18%, iron 27, TIBC 159 ferritin 570- 21   -Iron deficiency deficiency versus anemia of chronic disease  -On iron supplementation      Code status: Full  Prophylaxis: Lovenox- held   Recommended Disposition:  PT, OT, RN     Subjective:     Chief Complaint / Reason for Physician Visit  Koby Edwards Discussed with RN events overnight. Review of Systems:  No fevers, chills, appetite change, cough, sputum production, shortness of breath, dyspnea on exertion, nausea, vomitting, diarrhea, constipation, chest pain, leg edema, abdominal pain, joint pain, rash, itching. Tolerating PT/OT. Tolerating diet. Objective:     VITALS:   Last 24hrs VS reviewed since prior progress note.  Most recent are:  Patient Vitals for the past 24 hrs:   Temp Pulse Resp BP SpO2   10/18/21 0818 98.8 °F (37.1 °C) 93 -- 109/63 95 %   10/17/21 2357 98 °F (36.7 °C) 98 18 106/64 98 %   10/17/21 1406 -- (!) 128 -- -- --   10/17/21 1239 98.6 °F (37 °C) (!) 104 20 102/64 100 %   10/17/21 1210 -- (!) 119 -- -- --     No intake or output data in the 24 hours ending 10/18/21 0917     PHYSICAL EXAM:  General: WD, WN. Alert, cooperative, no acute distress    EENT:  EOMI. Anicteric sclerae. MMM  Resp:  CTA bilaterally, no wheezing or rales. No accessory muscle use  CV:  Regular  rhythm,  normal S1/S2, no murmurs rubs gallops, No edema  GI:  Soft, Non distended, Non tender. +Bowel sounds  Neurologic:  Alert and oriented X 3, normal speech,   Psych:   Good insight. Not anxious nor agitated  Skin:  No rashes. No jaundice. Axillary and sacral wound     Reviewed most current lab test results and cultures  YES  Reviewed most current radiology test results   YES  Review and summation of old records today    NO  Reviewed patient's current orders and MAR    YES  PMH/SH reviewed - no change compared to H&P  ________________________________________________________________________  Care Plan discussed with:    Comments   Patient x    Family      RN x    Care Manager x    Consultant                       x Multidiciplinary team rounds were held today with , nursing, pharmacist and clinical coordinator. Patient's plan of care was discussed; medications were reviewed and discharge planning was addressed. ________________________________________________________________________  Total NON critical care TIME:  25   Minutes    Total CRITICAL CARE TIME Spent:   Minutes non procedure based      Comments   >50% of visit spent in counseling and coordination of care x    ________________________________________________________________________  Gerri Orourke MD     Procedures: see electronic medical records for all procedures/Xrays and details which were not copied into this note but were reviewed prior to creation of Plan. LABS:  I reviewed today's most current labs and imaging studies.   Pertinent labs include:  Recent Labs     10/16/21  0330   HGB 7.4*   HCT 25.1*     No results for input(s): NA, K, CL, CO2, GLU, BUN, CREA, CA, MG, PHOS, ALB, TBIL, TBILI, ALT, INR, INREXT, INREXT in the last 72 hours.     No lab exists for component: SGOT    Signed: Mustapha Valle MD

## 2021-10-18 NOTE — PROGRESS NOTES
Resident present in bed laying in supine position. Resident present with no verbal complaints or discomfort at this time.

## 2021-10-18 NOTE — PROGRESS NOTES
Wound care progress note:  Re evaluate the wounds in the Gluteal fold area s/p pilonidal excision wound. Wound has some area oozing pus due to his hydradenitis. Also bleeding noted. Cleaned the wounds applied mepital one in the wound applied non sting barrier around the lorena wound skin and a piece of drape applied to the area to protect the skin. Placed 1 piece of black foam and cover it with drape and track pad attached. It is connected to the wound VAC at 125 mm hg. It was very difficult to get a seal in that area ostomy ring molded to the anal area to get a good seal. Jose J Rios was present during the procedure. Patient tolerated the procedure well. Will change the VAC dressing on Friday.   Khai Copeland RN,BSN<WCC  .

## 2021-10-18 NOTE — PROGRESS NOTES
Problem: Pain  Goal: *Control of Pain  Outcome: Progressing Towards Goal  Goal: *PALLIATIVE CARE:  Alleviation of Pain  Outcome: Progressing Towards Goal     Problem: Patient Education: Go to Patient Education Activity  Goal: Patient/Family Education  Outcome: Progressing Towards Goal     Problem: Falls - Risk of  Goal: *Absence of Falls  Description: Document Bard Farrar Fall Risk and appropriate interventions in the flowsheet. Outcome: Progressing Towards Goal  Note: Fall Risk Interventions:  Mobility Interventions: Strengthening exercises (ROM-active/passive)    Mentation Interventions: Adequate sleep, hydration, pain control    Medication Interventions: Teach patient to arise slowly         History of Falls Interventions: Consult care management for discharge planning         Problem: Patient Education: Go to Patient Education Activity  Goal: Patient/Family Education  Outcome: Progressing Towards Goal     Problem: Activity Intolerance  Goal: *Oxygen saturation during activity within specified parameters  Outcome: Progressing Towards Goal  Goal: *Able to remain out of bed as prescribed  Outcome: Progressing Towards Goal     Problem: Patient Education: Go to Patient Education Activity  Goal: Patient/Family Education  Outcome: Progressing Towards Goal     Problem: Pressure Injury - Risk of  Goal: *Prevention of pressure injury  Description: Document Dann Scale and appropriate interventions in the flowsheet.   Outcome: Progressing Towards Goal  Note: Pressure Injury Interventions:       Moisture Interventions: Absorbent underpads    Activity Interventions: Pressure redistribution bed/mattress(bed type)    Mobility Interventions: Float heels    Nutrition Interventions: Document food/fluid/supplement intake    Friction and Shear Interventions: Apply protective barrier, creams and emollients                Problem: Patient Education: Go to Patient Education Activity  Goal: Patient/Family Education  Outcome: Progressing Towards Goal     Problem: Breathing Pattern - Ineffective  Goal: *Absence of hypoxia  Outcome: Progressing Towards Goal  Goal: *Use of effective breathing techniques  Outcome: Progressing Towards Goal  Goal: *PALLIATIVE CARE:  Alleviation of Dyspnea  Outcome: Progressing Towards Goal     Problem: Patient Education: Go to Patient Education Activity  Goal: Patient/Family Education  Outcome: Progressing Towards Goal     Problem: Risk for Spread of Infection  Goal: Prevent transmission of infectious organism to others  Description: Prevent the transmission of infectious organisms to other patients, staff members, and visitors.   Outcome: Progressing Towards Goal     Problem: Patient Education:  Go to Education Activity  Goal: Patient/Family Education  Outcome: Progressing Towards Goal     Problem: Patient Education: Go to Patient Education Activity  Goal: Patient/Family Education  Outcome: Progressing Towards Goal

## 2021-10-19 LAB
HCT VFR BLD AUTO: 27.2 % (ref 36.6–50.3)
HGB BLD-MCNC: 7.7 G/DL (ref 12.1–17)

## 2021-10-19 PROCEDURE — 77030038554 HC DRSG WND THERAHONEY MDII -Z

## 2021-10-19 PROCEDURE — 36415 COLL VENOUS BLD VENIPUNCTURE: CPT

## 2021-10-19 PROCEDURE — 74011250637 HC RX REV CODE- 250/637: Performed by: HOSPITALIST

## 2021-10-19 PROCEDURE — 85018 HEMOGLOBIN: CPT

## 2021-10-19 PROCEDURE — 74011250636 HC RX REV CODE- 250/636: Performed by: STUDENT IN AN ORGANIZED HEALTH CARE EDUCATION/TRAINING PROGRAM

## 2021-10-19 PROCEDURE — 65270000032 HC RM SEMIPRIVATE

## 2021-10-19 PROCEDURE — 74011250637 HC RX REV CODE- 250/637: Performed by: STUDENT IN AN ORGANIZED HEALTH CARE EDUCATION/TRAINING PROGRAM

## 2021-10-19 RX ADMIN — FERROUS SULFATE TAB 325 MG (65 MG ELEMENTAL FE) 325 MG: 325 (65 FE) TAB at 16:24

## 2021-10-19 RX ADMIN — FERROUS SULFATE TAB 325 MG (65 MG ELEMENTAL FE) 325 MG: 325 (65 FE) TAB at 12:57

## 2021-10-19 RX ADMIN — GABAPENTIN 300 MG: 300 CAPSULE ORAL at 16:24

## 2021-10-19 RX ADMIN — Medication 10 ML: at 20:49

## 2021-10-19 RX ADMIN — HYDROMORPHONE HYDROCHLORIDE 2 MG: 1 INJECTION, SOLUTION INTRAMUSCULAR; INTRAVENOUS; SUBCUTANEOUS at 01:20

## 2021-10-19 RX ADMIN — GABAPENTIN 300 MG: 300 CAPSULE ORAL at 09:55

## 2021-10-19 RX ADMIN — Medication 10 ML: at 16:25

## 2021-10-19 RX ADMIN — Medication 10 ML: at 05:16

## 2021-10-19 RX ADMIN — GABAPENTIN 300 MG: 300 CAPSULE ORAL at 20:49

## 2021-10-19 RX ADMIN — OXYCODONE HYDROCHLORIDE 10 MG: 5 TABLET ORAL at 05:28

## 2021-10-19 RX ADMIN — I-VITE, TAB 1000-60-2MG (60/BT) 1 TABLET: TAB at 09:54

## 2021-10-19 RX ADMIN — OXYCODONE HYDROCHLORIDE 10 MG: 5 TABLET ORAL at 09:54

## 2021-10-19 RX ADMIN — FERROUS SULFATE TAB 325 MG (65 MG ELEMENTAL FE) 325 MG: 325 (65 FE) TAB at 09:55

## 2021-10-19 RX ADMIN — HYDROMORPHONE HYDROCHLORIDE 2 MG: 1 INJECTION, SOLUTION INTRAMUSCULAR; INTRAVENOUS; SUBCUTANEOUS at 16:24

## 2021-10-19 NOTE — PROGRESS NOTES
JM  IDR rounds this am with team.    RUR 13%  GLOS 3/5  LOS 12 days  ELOS 1-2 more weeks  Delay in chart    Patient continues with wound care. Wound vac changed yesterday. Monitoring for pain and pain meds adjusted. Suggestion to give patient 2 Ensures with each meal.    Plan is to return home the home health services.   Solange Bradford, ANAHI/DEVIKA  [phone]

## 2021-10-19 NOTE — PROGRESS NOTES
Problem: Pain  Goal: *Control of Pain  Outcome: Progressing Towards Goal     Problem: Falls - Risk of  Goal: *Absence of Falls  Description: Document Omid Fall Risk and appropriate interventions in the flowsheet. Outcome: Progressing Towards Goal  Note: Fall Risk Interventions:  Mobility Interventions: Communicate number of staff needed for ambulation/transfer    Mentation Interventions: Adequate sleep, hydration, pain control    Medication Interventions: Teach patient to arise slowly         History of Falls Interventions: Room close to nurse's station, Utilize gait belt for transfer/ambulation         Problem: Activity Intolerance  Goal: *Oxygen saturation during activity within specified parameters  Outcome: Progressing Towards Goal     Problem: Pressure Injury - Risk of  Goal: *Prevention of pressure injury  Description: Document Dann Scale and appropriate interventions in the flowsheet.   Outcome: Progressing Towards Goal  Note: Pressure Injury Interventions:  Sensory Interventions: Assess changes in LOC    Moisture Interventions: Absorbent underpads    Activity Interventions: Pressure redistribution bed/mattress(bed type)    Mobility Interventions: HOB 30 degrees or less    Nutrition Interventions: Document food/fluid/supplement intake    Friction and Shear Interventions: Apply protective barrier, creams and emollients

## 2021-10-19 NOTE — PROGRESS NOTES
0710 Report received from 6 [facility] (off going RN). Report given via Kardex, SBAR, MAR and lab results. Patient resting comfortably with no complaints at this time. 9053 Patients vitals obtained and stable. Patient requested pain medications, pain level 6/10. No other needs at this time. Patients bed in lowest position with call bell in reach. 6551  Notified patient that he was unable to be medicated for pain at this time because it was too close to his previous dose. Patient verbalized understanding. 5522 Patient lying on right side in bed. Patient c/o pain 7/10. Patient medicated per orders and patient also given his morning medications. Patient has no other complaints at this time. Bed in lowest position and call bell in reach. 1624 Patient resting comfortably in bed on right side. Afternoon medications given to include pain medicine for wound care. All patients dressing in axillary an groin were cleansed and redressed. Patient tolerated dressing changes well. No complaints at this time. 1910 Bedside report given to Maylin Lynn RN (oncoming RN). Report given via Kardex, MAR, SBAR and labs.

## 2021-10-19 NOTE — PROGRESS NOTES
Hospitalist Progress Note    NAME: Jeane Walker   :  1995   MRN:  [id number]   Room Number:  195/83  @ [facility]       Interim Hospital Summary: 32 y.o. male whom presented on 10/6/2021 with      Assessment / Plan:      #Hidradenitis suppurativa  -CT  Pelvis: No deep space abscess. No significant change of small subcutaneous  fluid and gas collections  -Recent history of pilonidal cystectomy and acellular graft placement on  by general surgery  -Seen by general surgery no need of antibiotic at this point  -Wound culture ESBL E. coli suspect colonization  -Blood culture diphtheroid / mostly contamination. Repeat BCX NGT  -Wound care  -IV Dilaudid during dressing change    # Bleeding from sacral wound resolved  - GS consulted and no further recommendations received at this time   -lovenox held   - H/H stable         #Generalized weakness and deconditioning  -PT OT    #Microcytic anemia  -Hemoglobin 8.1 MCV 68  -Iron saturation 18%, iron 27, TIBC 159 ferritin 570- 21   -Iron deficiency deficiency versus anemia of chronic disease  -On iron supplementation      Code status: Full  Prophylaxis: Lovenox- held   Recommended Disposition:  PT, OT, RN     Subjective:     Chief Complaint / Reason for Physician Visit  Elby Greenfield Discussed with RN events overnight. Review of Systems:  No fevers, chills, appetite change, cough, sputum production, shortness of breath, dyspnea on exertion, nausea, vomitting, diarrhea, constipation, chest pain, leg edema, abdominal pain, joint pain, rash, itching. Tolerating PT/OT. Tolerating diet. Objective:     VITALS:   Last 24hrs VS reviewed since prior progress note.  Most recent are:  Patient Vitals for the past 24 hrs:   Temp Pulse Resp BP SpO2   10/19/21 0742 98.8 °F (37.1 °C) 84 18 111/64 100 %   10/19/21 0350 98.4 °F (36.9 °C) -- -- -- --   10/18/21 2015 98.4 °F (36.9 °C) -- -- -- --   10/18/21 193 99.2 °F (37.3 °C) 99 18 (!) 98/57 99 %   10/18/21 1500 98.8 °F (37.1 °C) 90 16 99/63 100 %       Intake/Output Summary (Last 24 hours) at 10/19/2021 0927  Last data filed at 10/19/2021 0535  Gross per 24 hour   Intake 360 ml   Output 1125 ml   Net -765 ml        PHYSICAL EXAM:  General: WD, WN. Alert, cooperative, no acute distress    EENT:  EOMI. Anicteric sclerae. MMM  Resp:  CTA bilaterally, no wheezing or rales. No accessory muscle use  CV:  Regular  rhythm,  normal S1/S2, no murmurs rubs gallops, No edema  GI:  Soft, Non distended, Non tender. +Bowel sounds  Neurologic:  Alert and oriented X 3, normal speech,   Psych:   Good insight. Not anxious nor agitated  Skin:  No rashes. No jaundice. Axillary and sacral wound     Reviewed most current lab test results and cultures  YES  Reviewed most current radiology test results   YES  Review and summation of old records today    NO  Reviewed patient's current orders and MAR    YES  PMH/ reviewed - no change compared to H&P  ________________________________________________________________________  Care Plan discussed with:    Comments   Patient x    Family      RN x    Care Manager x    Consultant                       x Multidiciplinary team rounds were held today with , nursing, pharmacist and clinical coordinator. Patient's plan of care was discussed; medications were reviewed and discharge planning was addressed. ________________________________________________________________________  Total NON critical care TIME:  25   Minutes    Total CRITICAL CARE TIME Spent:   Minutes non procedure based      Comments   >50% of visit spent in counseling and coordination of care x    ________________________________________________________________________  Lit Beatty MD     Procedures: see electronic medical records for all procedures/Xrays and details which were not copied into this note but were reviewed prior to creation of Plan.       LABS:  I reviewed today's most current labs and imaging studies. Pertinent labs include:  Recent Labs     10/19/21  0520   HGB 7.7*   HCT 27.2*     No results for input(s): NA, K, CL, CO2, GLU, BUN, CREA, CA, MG, PHOS, ALB, TBIL, TBILI, ALT, INR, INREXT, INREXT in the last 72 hours.     No lab exists for component: SGOT    Signed: Cole Viera MD

## 2021-10-20 PROCEDURE — 74011250637 HC RX REV CODE- 250/637: Performed by: STUDENT IN AN ORGANIZED HEALTH CARE EDUCATION/TRAINING PROGRAM

## 2021-10-20 PROCEDURE — 74011250636 HC RX REV CODE- 250/636: Performed by: STUDENT IN AN ORGANIZED HEALTH CARE EDUCATION/TRAINING PROGRAM

## 2021-10-20 PROCEDURE — 74011250637 HC RX REV CODE- 250/637: Performed by: HOSPITALIST

## 2021-10-20 PROCEDURE — 65270000032 HC RM SEMIPRIVATE

## 2021-10-20 RX ADMIN — OXYCODONE HYDROCHLORIDE 10 MG: 5 TABLET ORAL at 08:51

## 2021-10-20 RX ADMIN — GABAPENTIN 300 MG: 300 CAPSULE ORAL at 21:01

## 2021-10-20 RX ADMIN — Medication 10 ML: at 09:13

## 2021-10-20 RX ADMIN — HYDROMORPHONE HYDROCHLORIDE 2 MG: 1 INJECTION, SOLUTION INTRAMUSCULAR; INTRAVENOUS; SUBCUTANEOUS at 12:38

## 2021-10-20 RX ADMIN — Medication 10 ML: at 15:32

## 2021-10-20 RX ADMIN — GABAPENTIN 300 MG: 300 CAPSULE ORAL at 08:51

## 2021-10-20 RX ADMIN — OXYCODONE HYDROCHLORIDE 10 MG: 5 TABLET ORAL at 17:46

## 2021-10-20 RX ADMIN — FERROUS SULFATE TAB 325 MG (65 MG ELEMENTAL FE) 325 MG: 325 (65 FE) TAB at 12:38

## 2021-10-20 RX ADMIN — FERROUS SULFATE TAB 325 MG (65 MG ELEMENTAL FE) 325 MG: 325 (65 FE) TAB at 08:51

## 2021-10-20 RX ADMIN — Medication 10 ML: at 21:01

## 2021-10-20 RX ADMIN — GABAPENTIN 300 MG: 300 CAPSULE ORAL at 17:46

## 2021-10-20 RX ADMIN — FERROUS SULFATE TAB 325 MG (65 MG ELEMENTAL FE) 325 MG: 325 (65 FE) TAB at 17:46

## 2021-10-20 RX ADMIN — I-VITE, TAB 1000-60-2MG (60/BT) 1 TABLET: TAB at 08:51

## 2021-10-20 NOTE — PROGRESS NOTES
Problem: Pain  Goal: *Control of Pain  Outcome: Progressing Towards Goal     Problem: Falls - Risk of  Goal: *Absence of Falls  Description: Document Omid Fall Risk and appropriate interventions in the flowsheet. Outcome: Progressing Towards Goal  Note: Fall Risk Interventions:  Mobility Interventions: PT Consult for mobility concerns    Mentation Interventions: Adequate sleep, hydration, pain control    Medication Interventions: Teach patient to arise slowly         History of Falls Interventions: Room close to nurse's station         Problem:  Activity Intolerance  Goal: *Oxygen saturation during activity within specified parameters  Outcome: Progressing Towards Goal     Problem: Breathing Pattern - Ineffective  Goal: *Absence of hypoxia  Outcome: Progressing Towards Goal     Problem: Patient Education: Go to Patient Education Activity  Goal: Patient/Family Education  Outcome: Progressing Towards Goal

## 2021-10-20 NOTE — PROGRESS NOTES
Received verbal bedside shift change report from Deonte De La O, [facility] to [employer] [facility] [employer] RN include LILA, Eric, [facility] and recent results    Pt has complete IV ABX, Pt watching TV

## 2021-10-20 NOTE — PROGRESS NOTES
The documentation for this period is being entered following the guidelines as defined in the [address] [place] [address] downtime policy by Maria Alejandra Christensen RN.  Hours 1am-3am

## 2021-10-20 NOTE — PROGRESS NOTES
0710) Bedside shift change report given to Kai Lawler RN (oncoming nurse) by Cierra Pedroza RN (offgoing nurse). Report included the following information SBAR, Kardex, Intake/Output and MAR.     5627) Pt called out c/o pain 8/10. Kishor Demarco RN to assist with giving morning meds and obtaining vitals. 0900) Pt assessed and vital signs stable. Pt stated pain resolved to 6/10 at this time. Wounds CDI. Wound vac hooked to 125 mm Hg of continuous suction with good seal. IV flushed and patent. Pt eating breakfast at this time and stated no additional needs. 12) Interdisciplinary team rounds were held 10/20/2021 with the following team members:Care Management, Nursing, Pharmacy and Physician. Plan of care discussed. See clinical pathway and/or care plan for interventions and desired outcomes. 1045) Pt resting in bed at this time and stated he would like to complete wound care after lunch. Pt declined repositioning in bed at this time and stated no additional needs at this time. 1240) Dilaudid given PRN prior to wound care. Iron given as scheduled. Pt left resting in be talking with friend. No wound care orders at this time. Wound care nurse notified and this writer was given verbal orders of wound care. 1250) Wound care completed. Pt tolerated well. Bilateral axilla and groin wounds changed. Wound vac to buttocks hooked up to 125 mm Hg with good suction. Friend at this bedside observing wound care and stated no questions. Cranberry provided per request and pt left resting in bed with friend at the bedside. 1530) Pt reassessed and no changes to note. Vital signs stable. Pt stated pain 3/10, repositioned and stated no additional needs at this time. 1745) Scheduled meds given. Pt c/o pain 8/10 to lower back, PRN 10 mg kenzie given. Cranberry juice provided per request and pt stated no additional needs. 1900) Bedside shift change report given to Cierra Pedroza RN (oncoming nurse) by Kai Lawler RN (offgoing nurse).  Report included the following information SBAR, Kardex, Intake/Output and MAR.

## 2021-10-20 NOTE — PROGRESS NOTES
The documentation for this period is being entered following the guidelines as defined in the [facility] policy by [place] CNA. [id number]

## 2021-10-20 NOTE — PROGRESS NOTES
Problem: Falls - Risk of  Goal: *Absence of Falls  Description: Document [place] Johns Fall Risk and appropriate interventions in the flowsheet. Outcome: Progressing Towards Goal  Note: Fall Risk Interventions:  Mobility Interventions: Communicate number of staff needed for ambulation/transfer    Mentation Interventions: Adequate sleep, hydration, pain control    Medication Interventions: Teach patient to arise slowly         History of Falls Interventions: Room close to nurse's station         Problem: Pressure Injury - Risk of  Goal: *Prevention of pressure injury  Description: Document Dann Scale and appropriate interventions in the flowsheet.   Outcome: Progressing Towards Goal  Note: Pressure Injury Interventions:  Sensory Interventions: Assess changes in LOC    Moisture Interventions: Absorbent underpads    Activity Interventions: Pressure redistribution bed/mattress(bed type)    Mobility Interventions: HOB 30 degrees or less    Nutrition Interventions: Document food/fluid/supplement intake    Friction and Shear Interventions: Apply protective barrier, creams and emollients                Problem: Patient Education: Go to Patient Education Activity  Goal: Patient/Family Education  Outcome: Progressing Towards Goal

## 2021-10-20 NOTE — PROGRESS NOTES
Hospitalist Progress Note    NAME: Wily Scott   :  1995   MRN:  [id number]   Room Number:  612/43  @ [facility]       Interim Hospital Summary: 32 y.o. male whom presented on 10/6/2021 with      Assessment / Plan:    Hidradenitis suppurativa  -CT  Pelvis: No deep space abscess. No significant change of small subcutaneous  fluid and gas collections  -Recent history of pilonidal cystectomy and acellular graft placement on  by general surgery  -Seen by general surgery no need of antibiotic at this point  -Wound culture ESBL E. coli   -Blood culture diphtheroid 1/2 mostly contamination. Repeat BCX NGT  -Wound care  -IV Dilaudid during dressing change  -Start meropenem x 5 days        Generalized weakness and deconditioning  -PT OT     Microcytic anemia  -Hemoglobin 8.1 MCV 68  -Iron saturation 18%, iron 27, TIBC 159 ferritin 570- 21   -Iron deficiency deficiency versus anemia of chronic disease  -On iron supplementation        Code status: Full  Prophylaxis: Lovenox  Recommended Disposition:  PT, OT, RN       Subjective:     Chief Complaint / Reason for Physician Visit  Reports some soreness at site of wound vac. Discussed with RN events overnight. Review of Systems:  No fevers, chills, appetite change, cough, sputum production, shortness of breath, dyspnea on exertion, nausea, vomitting, diarrhea, constipation, chest pain, leg edema, abdominal pain, joint pain, rash, itching. Tolerating PT/OT. Tolerating diet. Objective:     VITALS:   Last 24hrs VS reviewed since prior progress note.  Most recent are:  Patient Vitals for the past 24 hrs:   Temp Pulse Resp BP SpO2   10/20/21 1528 98.5 °F (36.9 °C) 95 16 119/63 --   10/20/21 0845 97.7 °F (36.5 °C) 92 14 107/61 97 %   10/19/21 1958 99.1 °F (37.3 °C) (!) 103 18 126/69 99 %       Intake/Output Summary (Last 24 hours) at 10/20/2021 1637  Last data filed at 10/19/2021 2007  Gross per 24 hour   Intake 800 ml Output 1600 ml   Net -800 ml        PHYSICAL EXAM:  General: Alert, cooperative, no acute distress    EENT:  EOMI. Anicteric sclerae. MMM  Resp:  CTA bilaterally, no wheezing or rales. No accessory muscle use  CV:  Regular  rhythm,  normal S1/S2, no murmurs rubs gallops, No edema  GI:  Soft, Non distended, Non tender. +Bowel sounds  Neurologic:  Alert and oriented X 3, normal speech,   Psych:   Good insight. Not anxious nor agitated  Skin:  No rashes. No jaundice    Reviewed most current lab test results and cultures  YES  Reviewed most current radiology test results   YES  Review and summation of old records today    NO  Reviewed patient's current orders and MAR    YES  PMH/SH reviewed - no change compared to H&P  ________________________________________________________________________  Care Plan discussed with:    Comments   Patient x    Family      RN x    Care Manager x    Consultant                       x Multidiciplinary team rounds were held today with , nursing, pharmacist and clinical coordinator. Patient's plan of care was discussed; medications were reviewed and discharge planning was addressed. ________________________________________________________________________  Total NON critical care TIME: 15 Minutes    Total CRITICAL CARE TIME Spent:   Minutes non procedure based      Comments   >50% of visit spent in counseling and coordination of care x    ________________________________________________________________________  Layton Allison MD     Procedures: see electronic medical records for all procedures/Xrays and details which were not copied into this note but were reviewed prior to creation of Plan. LABS:  I reviewed today's most current labs and imaging studies.   Pertinent labs include:  Recent Labs     10/19/21  0520   HGB 7.7*   HCT 27.2*     No results for input(s): NA, K, CL, CO2, GLU, BUN, CREA, CA, MG, PHOS, ALB, TBIL, TBILI, ALT, INR, INREXT in the last 72 hours.    No lab exists for component: SGOT    Signed: Alfie Browne MD

## 2021-10-21 PROCEDURE — 94760 N-INVAS EAR/PLS OXIMETRY 1: CPT

## 2021-10-21 PROCEDURE — 74011250636 HC RX REV CODE- 250/636: Performed by: STUDENT IN AN ORGANIZED HEALTH CARE EDUCATION/TRAINING PROGRAM

## 2021-10-21 PROCEDURE — 74011250637 HC RX REV CODE- 250/637: Performed by: HOSPITALIST

## 2021-10-21 PROCEDURE — 74011250637 HC RX REV CODE- 250/637: Performed by: STUDENT IN AN ORGANIZED HEALTH CARE EDUCATION/TRAINING PROGRAM

## 2021-10-21 PROCEDURE — 65270000032 HC RM SEMIPRIVATE

## 2021-10-21 RX ADMIN — HYDROMORPHONE HYDROCHLORIDE 2 MG: 1 INJECTION, SOLUTION INTRAMUSCULAR; INTRAVENOUS; SUBCUTANEOUS at 18:10

## 2021-10-21 RX ADMIN — FERROUS SULFATE TAB 325 MG (65 MG ELEMENTAL FE) 325 MG: 325 (65 FE) TAB at 12:43

## 2021-10-21 RX ADMIN — GABAPENTIN 300 MG: 300 CAPSULE ORAL at 09:00

## 2021-10-21 RX ADMIN — FERROUS SULFATE TAB 325 MG (65 MG ELEMENTAL FE) 325 MG: 325 (65 FE) TAB at 09:00

## 2021-10-21 RX ADMIN — Medication 10 ML: at 18:09

## 2021-10-21 RX ADMIN — I-VITE, TAB 1000-60-2MG (60/BT) 1 TABLET: TAB at 08:59

## 2021-10-21 RX ADMIN — OXYCODONE HYDROCHLORIDE 10 MG: 5 TABLET ORAL at 21:24

## 2021-10-21 RX ADMIN — GABAPENTIN 300 MG: 300 CAPSULE ORAL at 15:30

## 2021-10-21 RX ADMIN — FERROUS SULFATE TAB 325 MG (65 MG ELEMENTAL FE) 325 MG: 325 (65 FE) TAB at 18:09

## 2021-10-21 RX ADMIN — OXYCODONE HYDROCHLORIDE 10 MG: 5 TABLET ORAL at 09:00

## 2021-10-21 RX ADMIN — GABAPENTIN 300 MG: 300 CAPSULE ORAL at 21:24

## 2021-10-21 RX ADMIN — OXYCODONE HYDROCHLORIDE 10 MG: 5 TABLET ORAL at 15:30

## 2021-10-21 RX ADMIN — Medication 10 ML: at 21:25

## 2021-10-21 NOTE — PROGRESS NOTES
Hospitalist Progress Note    NAME: Nikolay Johnson   :  1995   MRN:  [id number]   Room Number:  370/97  @ [facility]       Interim Hospital Summary: 32 y.o. male whom presented on 10/6/2021 with      Assessment / Plan:    Hidradenitis suppurativa  -CT  Pelvis: No deep space abscess. No significant change of small subcutaneous  fluid and gas collections  -Recent history of pilonidal cystectomy and acellular graft placement on  by general surgery  -Seen by general surgery no need of antibiotic at this point  -Wound culture ESBL E. coli   -Blood culture diphtheroid 1/2 mostly contamination. Repeat BCX NGT  -Wound care  -IV Dilaudid during dressing change          Generalized weakness and deconditioning  -PT OT     Microcytic anemia  -Hemoglobin 8.1 MCV 68  -Iron saturation 18%, iron 27, TIBC 159 ferritin 570- 21   -Iron deficiency deficiency versus anemia of chronic disease  -On iron supplementation        Code status: Full  Prophylaxis: Lovenox  Recommended Disposition:  PT, OT, RN         Subjective:     Chief Complaint / Reason for Physician Visit  \"I dont feel so good\". Discussed with RN events overnight. Review of Systems:  No fevers, chills, appetite change, cough, sputum production, shortness of breath, dyspnea on exertion, nausea, vomitting, diarrhea, constipation, chest pain, leg edema, abdominal pain, joint pain, rash, itching. Tolerating PT/OT. Tolerating diet. Objective:     VITALS:   Last 24hrs VS reviewed since prior progress note.  Most recent are:  Patient Vitals for the past 24 hrs:   Temp Pulse Resp BP SpO2   10/21/21 0752 98.8 °F (37.1 °C) 96 16 (!) 101/56 --   10/21/21 0415 98.1 °F (36.7 °C) 96 16 119/63 98 %   10/20/21 2010 99.1 °F (37.3 °C) 96 16 136/64 98 %   10/20/21 1528 98.5 °F (36.9 °C) 95 16 119/63 --       Intake/Output Summary (Last 24 hours) at 10/21/2021 1417  Last data filed at 10/21/2021 0549  Gross per 24 hour   Intake -- Output 575 ml   Net -575 ml        PHYSICAL EXAM:  General: WD, WN. Alert, cooperative, no acute distress    EENT:  EOMI. Anicteric sclerae. MMM  Resp:  CTA bilaterally, no wheezing or rales. No accessory muscle use  CV:  Regular  rhythm,  normal S1/S2, no murmurs rubs gallops, No edema  GI:  Soft, Non distended, Non tender. +Bowel sounds  Neurologic:  Alert and oriented X 3, normal speech,   Psych:   Good insight. Not anxious nor agitated  Skin:  No rashes. No jaundice    Reviewed most current lab test results and cultures  YES  Reviewed most current radiology test results   YES  Review and summation of old records today    NO  Reviewed patient's current orders and MAR    YES  PMH/SH reviewed - no change compared to H&P  ________________________________________________________________________  Care Plan discussed with:    Comments   Patient x    Family      RN x    Care Manager x    Consultant                       x Multidiciplinary team rounds were held today with , nursing, pharmacist and clinical coordinator. Patient's plan of care was discussed; medications were reviewed and discharge planning was addressed. ________________________________________________________________________  Total NON critical care TIME:  15   Minutes    Total CRITICAL CARE TIME Spent:   Minutes non procedure based      Comments   >50% of visit spent in counseling and coordination of care     ________________________________________________________________________  Erika Berkowitz MD     Procedures: see electronic medical records for all procedures/Xrays and details which were not copied into this note but were reviewed prior to creation of Plan. LABS:  I reviewed today's most current labs and imaging studies.   Pertinent labs include:  Recent Labs     10/19/21  0520   HGB 7.7*   HCT 27.2*     No results for input(s): NA, K, CL, CO2, GLU, BUN, CREA, CA, MG, PHOS, ALB, TBIL, TBILI, ALT, INR, INREXT in the last 72 hours.    No lab exists for component: SGOT    Signed: Erika Berkowitz MD

## 2021-10-21 NOTE — PROGRESS NOTES
Received bedside report from ALESSANDRO Hogan to Phillip FRANCOIS. Report received via Kardex, MAR, Labs and SBAR    No report changes.  Completed iv abx. ? SNF

## 2021-10-21 NOTE — PROGRESS NOTES
Problem: Pain  Goal: *Control of Pain  Outcome: Progressing Towards Goal  Goal: *PALLIATIVE CARE:  Alleviation of Pain  Outcome: Progressing Towards Goal     Problem: Patient Education: Go to Patient Education Activity  Goal: Patient/Family Education  Outcome: Progressing Towards Goal     Problem: Falls - Risk of  Goal: *Absence of Falls  Description: Document Batsheva Xavier Fall Risk and appropriate interventions in the flowsheet. Outcome: Progressing Towards Goal  Note: Fall Risk Interventions:  Mobility Interventions: PT Consult for mobility concerns    Mentation Interventions: Adequate sleep, hydration, pain control    Medication Interventions: Teach patient to arise slowly         History of Falls Interventions: Room close to nurse's station         Problem: Patient Education: Go to Patient Education Activity  Goal: Patient/Family Education  Outcome: Progressing Towards Goal     Problem: Activity Intolerance  Goal: *Oxygen saturation during activity within specified parameters  Outcome: Progressing Towards Goal  Goal: *Able to remain out of bed as prescribed  Outcome: Progressing Towards Goal     Problem: Patient Education: Go to Patient Education Activity  Goal: Patient/Family Education  Outcome: Progressing Towards Goal     Problem: Pressure Injury - Risk of  Goal: *Prevention of pressure injury  Description: Document Dann Scale and appropriate interventions in the flowsheet.   Outcome: Progressing Towards Goal  Note: Pressure Injury Interventions:  Sensory Interventions: Assess changes in LOC    Moisture Interventions: Absorbent underpads    Activity Interventions: Increase time out of bed    Mobility Interventions: PT/OT evaluation    Nutrition Interventions: Offer support with meals,snacks and hydration    Friction and Shear Interventions: Transferring/repositioning devices                Problem: Patient Education: Go to Patient Education Activity  Goal: Patient/Family Education  Outcome: Progressing Towards Goal     Problem: Breathing Pattern - Ineffective  Goal: *Absence of hypoxia  Outcome: Progressing Towards Goal  Goal: *Use of effective breathing techniques  Outcome: Progressing Towards Goal  Goal: *PALLIATIVE CARE:  Alleviation of Dyspnea  Outcome: Progressing Towards Goal     Problem: Patient Education: Go to Patient Education Activity  Goal: Patient/Family Education  Outcome: Progressing Towards Goal     Problem: Risk for Spread of Infection  Goal: Prevent transmission of infectious organism to others  Description: Prevent the transmission of infectious organisms to other patients, staff members, and visitors.   Outcome: Progressing Towards Goal     Problem: Patient Education:  Go to Education Activity  Goal: Patient/Family Education  Outcome: Progressing Towards Goal     Problem: Patient Education: Go to Patient Education Activity  Goal: Patient/Family Education  Outcome: Progressing Towards Goal

## 2021-10-21 NOTE — PROGRESS NOTES
Bedside and Verbal shift change report given to Dian RN (oncoming nurse) by Namrata Castillo RN (offgoing nurse).  Report included the following information SBAR, Kardex and MAR.    0700- report given

## 2021-10-22 PROCEDURE — 74011250637 HC RX REV CODE- 250/637: Performed by: STUDENT IN AN ORGANIZED HEALTH CARE EDUCATION/TRAINING PROGRAM

## 2021-10-22 PROCEDURE — 74011250637 HC RX REV CODE- 250/637: Performed by: HOSPITALIST

## 2021-10-22 PROCEDURE — 65270000032 HC RM SEMIPRIVATE

## 2021-10-22 PROCEDURE — 94760 N-INVAS EAR/PLS OXIMETRY 1: CPT

## 2021-10-22 RX ORDER — HYDROMORPHONE HYDROCHLORIDE 1 MG/ML
0.5 INJECTION, SOLUTION INTRAMUSCULAR; INTRAVENOUS; SUBCUTANEOUS ONCE
Status: DISPENSED | OUTPATIENT
Start: 2021-10-22 | End: 2021-10-22

## 2021-10-22 RX ADMIN — GABAPENTIN 300 MG: 300 CAPSULE ORAL at 16:00

## 2021-10-22 RX ADMIN — FERROUS SULFATE TAB 325 MG (65 MG ELEMENTAL FE) 325 MG: 325 (65 FE) TAB at 17:59

## 2021-10-22 RX ADMIN — OXYCODONE HYDROCHLORIDE 10 MG: 5 TABLET ORAL at 16:00

## 2021-10-22 RX ADMIN — GABAPENTIN 300 MG: 300 CAPSULE ORAL at 22:43

## 2021-10-22 RX ADMIN — I-VITE, TAB 1000-60-2MG (60/BT) 1 TABLET: TAB at 08:44

## 2021-10-22 RX ADMIN — Medication 10 ML: at 06:00

## 2021-10-22 RX ADMIN — FERROUS SULFATE TAB 325 MG (65 MG ELEMENTAL FE) 325 MG: 325 (65 FE) TAB at 12:54

## 2021-10-22 RX ADMIN — FERROUS SULFATE TAB 325 MG (65 MG ELEMENTAL FE) 325 MG: 325 (65 FE) TAB at 08:44

## 2021-10-22 RX ADMIN — OXYCODONE HYDROCHLORIDE 10 MG: 5 TABLET ORAL at 22:43

## 2021-10-22 RX ADMIN — Medication 10 ML: at 14:00

## 2021-10-22 RX ADMIN — GABAPENTIN 300 MG: 300 CAPSULE ORAL at 08:44

## 2021-10-22 NOTE — PROGRESS NOTES
Problem: Falls - Risk of  Goal: *Absence of Falls  Description: Document Clydene Bone Fall Risk and appropriate interventions in the flowsheet.   Outcome: Progressing Towards Goal  Note: Fall Risk Interventions:  Mobility Interventions: PT Consult for mobility concerns    Mentation Interventions: Adequate sleep, hydration, pain control    Medication Interventions: Teach patient to arise slowly         History of Falls Interventions: Room close to nurse's station         Problem: Patient Education: Go to Patient Education Activity  Goal: Patient/Family Education  Outcome: Progressing Towards Goal

## 2021-10-22 NOTE — PROGRESS NOTES
Problem: Pain  Goal: *Control of Pain  Outcome: Progressing Towards Goal     Problem: Falls - Risk of  Goal: *Absence of Falls  Description: Document Omid Fall Risk and appropriate interventions in the flowsheet. Outcome: Progressing Towards Goal  Note: Fall Risk Interventions:  Mobility Interventions: Utilize walker, cane, or other assistive device    Mentation Interventions: Adequate sleep, hydration, pain control    Medication Interventions: Teach patient to arise slowly         History of Falls Interventions: Room close to nurse's station         Problem: Breathing Pattern - Ineffective  Goal: *Absence of hypoxia  Outcome: Progressing Towards Goal     Problem: Pressure Injury - Risk of  Goal: *Prevention of pressure injury  Description: Document Dann Scale and appropriate interventions in the flowsheet.   Outcome: Progressing Towards Goal  Note: Pressure Injury Interventions:  Sensory Interventions: Keep linens dry and wrinkle-free    Moisture Interventions: Absorbent underpads    Activity Interventions: Pressure redistribution bed/mattress(bed type)    Mobility Interventions: HOB 30 degrees or less    Nutrition Interventions: Document food/fluid/supplement intake    Friction and Shear Interventions: HOB 30 degrees or less

## 2021-10-22 NOTE — PROGRESS NOTES
Hospitalist Progress Note    NAME: Griffin Emmanuel   :  1995   MRN:  [id number]   Room Number:  990/35  @ [facility]       Interim Hospital Summary: 32 y.o. male whom presented on 10/6/2021 with      Assessment / Plan:    Hidradenitis suppurativa  -CT  Pelvis: No deep space abscess. No significant change of small subcutaneous  fluid and gas collections  -Recent history of pilonidal cystectomy and acellular graft placement on  by general surgery  -Seen by general surgery no need of antibiotic at this point  -Wound culture ESBL E. coli   -Blood culture diphtheroid 1/ mostly contamination. Repeat BCX NGT  -Wound care  -IV Dilaudid during dressing change           Generalized weakness and deconditioning  -PT OT     Microcytic anemia  -Hemoglobin 8.1 MCV 68  -Iron saturation 18%, iron 27, TIBC 159 ferritin 570- 21   -Iron deficiency deficiency versus anemia of chronic disease  -On iron supplementation        Code status: Full  Prophylaxis: Lovenox  Recommended Disposition:  PT, OT, RN               Subjective:     Chief Complaint / Reason for Physician Visit  No acute issues. .  Discussed with RN events overnight. Review of Systems:  No fevers, chills, appetite change, cough, sputum production, shortness of breath, dyspnea on exertion, nausea, vomitting, diarrhea, constipation, chest pain, leg edema, abdominal pain, joint pain, rash, itching. Tolerating PT/OT. Tolerating diet. Objective:     VITALS:   Last 24hrs VS reviewed since prior progress note. Most recent are:  Patient Vitals for the past 24 hrs:   Temp Pulse Resp BP SpO2   10/21/21 2045 97.2 °F (36.2 °C) (!) 110 16 110/68 99 %   10/21/21 1643 -- (!) 113 16 104/69 98 %       Intake/Output Summary (Last 24 hours) at 10/22/2021 1026  Last data filed at 10/22/2021 0921  Gross per 24 hour   Intake 680 ml   Output 660 ml   Net 20 ml        PHYSICAL EXAM:  General: WD, WN.  Alert, cooperative, no acute distress EENT:  EOMI. Anicteric sclerae. MMM  Resp:  CTA bilaterally, no wheezing or rales. No accessory muscle use  CV:  Regular  rhythm,  normal S1/S2, no murmurs rubs gallops, No edema  GI:  Soft, Non distended, Non tender. +Bowel sounds  Neurologic:  Alert and oriented X 3, normal speech,   Psych:   Good insight. Not anxious nor agitated  Skin:  No rashes. No jaundice    Reviewed most current lab test results and cultures  YES  Reviewed most current radiology test results   YES  Review and summation of old records today    NO  Reviewed patient's current orders and MAR    YES  PMH/SH reviewed - no change compared to H&P  ________________________________________________________________________  Care Plan discussed with:    Comments   Patient x    Family      RN x    Care Manager x    Consultant                       x Multidiciplinary team rounds were held today with , nursing, pharmacist and clinical coordinator. Patient's plan of care was discussed; medications were reviewed and discharge planning was addressed. ________________________________________________________________________  Total NON critical care TIME: 10  Minutes    Total CRITICAL CARE TIME Spent:   Minutes non procedure based      Comments   >50% of visit spent in counseling and coordination of care     ________________________________________________________________________  Mari Asher MD     Procedures: see electronic medical records for all procedures/Xrays and details which were not copied into this note but were reviewed prior to creation of Plan. LABS:  I reviewed today's most current labs and imaging studies. Pertinent labs include:  No results for input(s): WBC, HGB, HCT, PLT, HGBEXT, HCTEXT, PLTEXT in the last 72 hours. No results for input(s): NA, K, CL, CO2, GLU, BUN, CREA, CA, MG, PHOS, ALB, TBIL, TBILI, ALT, INR, INREXT in the last 72 hours.     No lab exists for component: SGOT    Signed: Mari Asher, MD

## 2021-10-22 NOTE — PROGRESS NOTES
07:15- received bedside report from night ALESSANDRO Marrufo. Included SBAR, MAR, kardex and recent results. 08:30- Shift assessment completed and morning meds given. Patient laying in bed, room air, bilateral axillary dressings intact, no drainage noted. Wound vac intact, 125mmhhg. Less than 100ccs. Patient denies any pain. VSS    1000- completed rounds with Dr. Cristobal Reyes. No new orders given. 12:30- patient eating lunch. No complaints of pain    1530- Patient requested pain meds for bilateral axillary pain and low back pain. 10mg of oxycodone given. Pain 7/10.     1800- patient having dinner. No new changes. Will continue to monitor    1900- report given to relief ALESSANDRO Marrufo. No new changes.

## 2021-10-22 NOTE — PROGRESS NOTES
1915 - received verbal bedside shift change report from [facility], RN to include SBAR, MAR, Kardex and recent results. 2125 - gave PO pain med Oxycodone 10mg PO per pt request for 7/10 \"burning\" type pain to bilateral axillary wounds and sacral wound. Bilateral axillary dsg clean, dry & intact. Pt does not want to turn to have sacral wound assessed D/T pain w/ mvmt and fear of interrupting wound vac suction. Wound vac currently at 125mmHg w/ sanguinous drainage at 50mL at present. C/o minimal pain to bilateral groin wounds when lying still - dressings are currently saturated w/ sanguinous drainage and will be changed. 0130 - Bilateral groin and buttock dressings were not changed yesterday. In the process of beginning extensive wound care and dressing changes to bilateral groin and buttocks b/c dressings are saturated and leaking onto ciara pad and pt c/o significant pain. Malodorous odor noted w/ significant amount of serosanguinous drainage. Do not want to use his one daily PRN dose of IV pain med. Sent tele med request to MD to see about additional dose for pt. Additional 10mL of serosanguinous drainage has emptied into wound vac just since beginning wound care which appears to be from increased mvmt. 0145 - while awaiting MD to return page, pt wanted to go ahead and complete wound care and dsg change to bilateral groin and buttocks before pain med ordered. Will administer pain med if MD writes order. 0220 - All open wounds w/ large amount of purulent serosanguinous drainage include both groin, small area above penis, large open bright pink/red wound to R interior thigh, L buttock/gluteal fold w/ moderate size open pink/red wound. Wounds cleansed w/ NS, then wound cleanser, dried thoroughly w/ 4x4 gauze, then Silver strips laid atop open wounds as described above with Hydrafera blue (moistened w/ Carrasyn gel) - covered w/ ABD pads and secured w/ medipore tape.  Wound vac suction remains at continuous suction at 125mmHg w/ small amount drainage. Pt reports current pain level of 7/10. Has very difficult time lifting pelvis up to assist w/ dsg change and stated he was unable to turn onto side D/T severe pain to sacral wound where wound vac is currently. 9397 - went to administer one time dose IV pain med that MD ordered and pt was sleeping - attempted to awaken pt but was sleeping soundly. Pt briefly awoke & said he now does not feel like he needs pain med but will let this writer know if pain remains to where he can't go back to sleep. 4730 - pt awake - reports pain 5/10 and does not feel like he needs any pain meds just yet. 2931 - gave verbal bedside shift change report to [employer], to include SBAR, MAR, Kardex and recent results.

## 2021-10-23 PROCEDURE — 65270000032 HC RM SEMIPRIVATE

## 2021-10-23 PROCEDURE — 74011250637 HC RX REV CODE- 250/637: Performed by: HOSPITALIST

## 2021-10-23 PROCEDURE — 74011250636 HC RX REV CODE- 250/636: Performed by: STUDENT IN AN ORGANIZED HEALTH CARE EDUCATION/TRAINING PROGRAM

## 2021-10-23 PROCEDURE — 74011250637 HC RX REV CODE- 250/637: Performed by: STUDENT IN AN ORGANIZED HEALTH CARE EDUCATION/TRAINING PROGRAM

## 2021-10-23 RX ORDER — OXYCODONE HYDROCHLORIDE 5 MG/1
10 TABLET ORAL ONCE
Status: COMPLETED | OUTPATIENT
Start: 2021-10-23 | End: 2021-10-23

## 2021-10-23 RX ORDER — HYDROMORPHONE HYDROCHLORIDE 1 MG/ML
2 INJECTION, SOLUTION INTRAMUSCULAR; INTRAVENOUS; SUBCUTANEOUS DAILY PRN
Status: DISCONTINUED | OUTPATIENT
Start: 2021-10-23 | End: 2021-10-24

## 2021-10-23 RX ADMIN — GABAPENTIN 300 MG: 300 CAPSULE ORAL at 21:10

## 2021-10-23 RX ADMIN — Medication 10 ML: at 06:00

## 2021-10-23 RX ADMIN — Medication 10 ML: at 02:43

## 2021-10-23 RX ADMIN — OXYCODONE HYDROCHLORIDE 10 MG: 5 TABLET ORAL at 20:40

## 2021-10-23 RX ADMIN — HYDROMORPHONE HYDROCHLORIDE 2 MG: 1 INJECTION, SOLUTION INTRAMUSCULAR; INTRAVENOUS; SUBCUTANEOUS at 12:12

## 2021-10-23 RX ADMIN — FERROUS SULFATE TAB 325 MG (65 MG ELEMENTAL FE) 325 MG: 325 (65 FE) TAB at 08:43

## 2021-10-23 RX ADMIN — HYDROMORPHONE HYDROCHLORIDE 2 MG: 1 INJECTION, SOLUTION INTRAMUSCULAR; INTRAVENOUS; SUBCUTANEOUS at 02:44

## 2021-10-23 RX ADMIN — GABAPENTIN 300 MG: 300 CAPSULE ORAL at 16:25

## 2021-10-23 RX ADMIN — OXYCODONE HYDROCHLORIDE 10 MG: 5 TABLET ORAL at 14:36

## 2021-10-23 RX ADMIN — I-VITE, TAB 1000-60-2MG (60/BT) 1 TABLET: TAB at 08:43

## 2021-10-23 RX ADMIN — GABAPENTIN 300 MG: 300 CAPSULE ORAL at 08:43

## 2021-10-23 RX ADMIN — OXYCODONE HYDROCHLORIDE 10 MG: 5 TABLET ORAL at 16:23

## 2021-10-23 RX ADMIN — Medication 10 ML: at 14:38

## 2021-10-23 RX ADMIN — FERROUS SULFATE TAB 325 MG (65 MG ELEMENTAL FE) 325 MG: 325 (65 FE) TAB at 12:22

## 2021-10-23 RX ADMIN — OXYCODONE HYDROCHLORIDE 10 MG: 5 TABLET ORAL at 04:44

## 2021-10-23 RX ADMIN — FERROUS SULFATE TAB 325 MG (65 MG ELEMENTAL FE) 325 MG: 325 (65 FE) TAB at 16:25

## 2021-10-23 NOTE — PROGRESS NOTES
0800: Wound vac seal ineffective despite attempts to reinforce dressing. Significant amount of pirulent, serosanguinous drainage noted. MD notified;   0930: Received permission for removing wound vac in favor of regular dressing until wound care consult on Monday to address poor seal and discharge issue. 1145: pt premedicated for wound care dressing change on sacrum area.      1220: dressing change completed; wound vac removed wound cleansed& new dressing applied

## 2021-10-23 NOTE — PROGRESS NOTES
1915 - received verbal bedside shift change report from Venancio rogers RN to include SBAR, MAR, Kardex and recent results. 2020 - A&O x4. Wound vac @ 125mmHg w/ drainage at 80mL at present. Pt states axillary wounds are draining a lot today along w/ L buttock/gluteal fold wound. Dressings to groin intact w/ minimal drainage. Axillary dsgs w/ moderate amt drainage and L buttock/gluteal fold dsg w/ large amt of drainage - a lot of which seems to be leaking from sacral wound vac dsg. C/o a lot about wound vac and is requesting to have it removed as he states there's significantly more drainage with wound vac along with problems of seal remaining intact. Advised pt he will have to speak with MD about this and stated understanding. Pt requesting dressings to be changed and informed it can be done as soon as possible tonight. 2200 - advised pt this writer will be in a little while later to complete wound care/dressing change when adequate amt of time available and set aside to designate to time consuming wound care. Pt stated understanding. 2243 - requested PO pain med for 7/10 pain to all wounds - gave oxycodone 10mg PO along w/ HS med. 2330 - awake, on phone. No distress. Rates pain 3/10 at present to wounds. Reminded pt wound care will be completed after a while when large amt time is available to designate to wound cleansing and dressing changes and states understanding. 36 - this writer available to set aside large amt of time to complete wound care - pt still awake, on phone. Medicated w/ 2mg IV Dilaudid prior to wound care/dressing changes - per pt request - all dressings except bilateral groin area are saturated w/ large amt serosanguinous purulent drainage and are very malodorous. 0330 - pain decreased to 3/10 to wounds    0444 - Wound care completed to bilateral axillary, R inner thigh and L buttock/gluteal fold. All wounds w/ large amt purulent malodorous serosanguinous drainage.  Wounds cleansed w/ NS and wound cleanser. Wound edges appear to be healing well. Silver applied and topped w/ Hydrofera blue which was saturated in Carrasyn gel. ABD pads applied after and taped in place w/ medipore tape. Pt tolerated wound care and dressing changes well. Sacral wound vac area continues to leak purulent serosanguinous drainage but pt does not want this area touched at this time. Continues to request wound vac be D/C'd but understands he must speak w/ MD. Requested PO pain med D/T increasing pain to wounds after wound care w/ current rating 7/10. Marcia Carson RN medicated pt w/ Oxycodone 10mg PO. Wound vac alarming intermittently w/ leak detected but will then work properly. Drainage at present @ 100mL. 0530 - pt sleeping soundly w/o distress    0715 - verbal bedside shift change report given to Bossman Agee RN to include SBAR, MAR, Kardex and recent results.

## 2021-10-23 NOTE — PROGRESS NOTES
Problem: Pain  Goal: *Control of Pain  Outcome: Progressing Towards Goal  Goal: *PALLIATIVE CARE:  Alleviation of Pain  Outcome: Progressing Towards Goal     Problem: Patient Education: Go to Patient Education Activity  Goal: Patient/Family Education  Outcome: Progressing Towards Goal     Problem: Falls - Risk of  Goal: *Absence of Falls  Description: Document Sandovalneil Baltazar Fall Risk and appropriate interventions in the flowsheet. Outcome: Progressing Towards Goal  Note: Fall Risk Interventions:  Mobility Interventions: Patient to call before getting OOB, Strengthening exercises (ROM-active/passive)    Mentation Interventions: Adequate sleep, hydration, pain control    Medication Interventions: Teach patient to arise slowly         History of Falls Interventions: Investigate reason for fall, Room close to nurse's station         Problem: Patient Education: Go to Patient Education Activity  Goal: Patient/Family Education  Outcome: Progressing Towards Goal     Problem: Activity Intolerance  Goal: *Oxygen saturation during activity within specified parameters  Outcome: Progressing Towards Goal  Goal: *Able to remain out of bed as prescribed  Outcome: Progressing Towards Goal     Problem: Patient Education: Go to Patient Education Activity  Goal: Patient/Family Education  Outcome: Progressing Towards Goal     Problem: Pressure Injury - Risk of  Goal: *Prevention of pressure injury  Description: Document Dann Scale and appropriate interventions in the flowsheet. Outcome: Progressing Towards Goal  Note: Pressure Injury Interventions:  Sensory Interventions: Keep linens dry and wrinkle-free    Moisture Interventions: Absorbent underpads    Activity Interventions: Increase time out of bed, Pressure redistribution bed/mattress(bed type), PT/OT evaluation    Mobility Interventions: HOB 30 degrees or less, Pressure redistribution bed/mattress (bed type), Turn and reposition approx.  every two hours(pillow and wedges)    Nutrition Interventions: Document food/fluid/supplement intake    Friction and Shear Interventions: HOB 30 degrees or less                Problem: Patient Education: Go to Patient Education Activity  Goal: Patient/Family Education  Outcome: Progressing Towards Goal     Problem: Breathing Pattern - Ineffective  Goal: *Absence of hypoxia  Outcome: Progressing Towards Goal  Goal: *Use of effective breathing techniques  Outcome: Progressing Towards Goal  Goal: *PALLIATIVE CARE:  Alleviation of Dyspnea  Outcome: Progressing Towards Goal     Problem: Patient Education: Go to Patient Education Activity  Goal: Patient/Family Education  Outcome: Progressing Towards Goal     Problem: Risk for Spread of Infection  Goal: Prevent transmission of infectious organism to others  Description: Prevent the transmission of infectious organisms to other patients, staff members, and visitors.   Outcome: Progressing Towards Goal     Problem: Patient Education:  Go to Education Activity  Goal: Patient/Family Education  Outcome: Progressing Towards Goal     Problem: Patient Education: Go to Patient Education Activity  Goal: Patient/Family Education  Outcome: Progressing Towards Goal

## 2021-10-23 NOTE — PROGRESS NOTES
Hospitalist Progress Note    NAME: Radames Chavez   :  1995   MRN:  [id number]   Room Number:  090/94  @ [facility]       Interim Hospital Summary: 32 y.o. male whom presented on 10/6/2021 with      Assessment / Plan:    Hidradenitis suppurativa  -CT  Pelvis: No deep space abscess. No significant change of small subcutaneous  fluid and gas collections  -Recent history of pilonidal cystectomy and acellular graft placement on  by general surgery  -Seen by general surgery no need of antibiotic at this point  -Wound culture ESBL E. coli   -Blood culture diphtheroid / mostly contamination. Repeat BCX NGT  -Wound care  - VAC removed due to poor seal, mepilex dressing. Wound care consulted  -IM Dilaudid during dressing change           Generalized weakness and deconditioning  -PT OT     Microcytic anemia  -Hemoglobin 8.1 MCV 68  -Iron saturation 18%, iron 27, TIBC 159 ferritin 570- 21   -Iron deficiency deficiency versus anemia of chronic disease  -On iron supplementation        Code status: Full  Prophylaxis: Lovenox  Recommended Disposition:  PT, OT, RN                Subjective:     Chief Complaint / Reason for Physician Visit  \"the vac was leaking\". Discussed with RN events overnight. Review of Systems:  No fevers, chills, appetite change, cough, sputum production, shortness of breath, dyspnea on exertion, nausea, vomitting, diarrhea, constipation, chest pain, leg edema, abdominal pain, joint pain, rash, itching. Tolerating PT/OT. Tolerating diet. Objective:     VITALS:   Last 24hrs VS reviewed since prior progress note.  Most recent are:  Patient Vitals for the past 24 hrs:   Temp Pulse Resp BP SpO2   10/23/21 0830 98.2 °F (36.8 °C) 98 18 111/64 98 %   10/22/21 2015 98 °F (36.7 °C) 98 16 117/64 99 %   10/22/21 1602 98.8 °F (37.1 °C) -- -- 106/60 97 %       Intake/Output Summary (Last 24 hours) at 10/23/2021 1105  Last data filed at 10/23/2021 0100  Gross per 24 hour   Intake --   Output 1100 ml   Net -1100 ml        PHYSICAL EXAM:  General: WD, WN. Alert, cooperative, no acute distress    EENT:  EOMI. Anicteric sclerae. MMM  Resp:  CTA bilaterally, no wheezing or rales. No accessory muscle use  CV:  Regular  rhythm,  normal S1/S2, no murmurs rubs gallops, No edema  GI:  Soft, Non distended, Non tender. +Bowel sounds  Neurologic:  Alert and oriented X 3, normal speech,   Psych:   Good insight. Not anxious nor agitated  Skin:  No rashes. No jaundice    Reviewed most current lab test results and cultures  YES  Reviewed most current radiology test results   YES  Review and summation of old records today    NO  Reviewed patient's current orders and MAR    YES  PMH/SH reviewed - no change compared to H&P  ________________________________________________________________________  Care Plan discussed with:    Comments   Patient x    Family      RN x    Care Manager x    Consultant                       x Multidiciplinary team rounds were held today with , nursing, pharmacist and clinical coordinator. Patient's plan of care was discussed; medications were reviewed and discharge planning was addressed. ________________________________________________________________________  Total NON critical care TIME: 15   Minutes    Total CRITICAL CARE TIME Spent:   Minutes non procedure based      Comments   >50% of visit spent in counseling and coordination of care     ________________________________________________________________________  Justine Henry MD     Procedures: see electronic medical records for all procedures/Xrays and details which were not copied into this note but were reviewed prior to creation of Plan. LABS:  I reviewed today's most current labs and imaging studies. Pertinent labs include:  No results for input(s): WBC, HGB, HCT, PLT, HGBEXT, HCTEXT, PLTEXT in the last 72 hours.   No results for input(s): NA, K, CL, CO2, GLU, BUN, CREA, CA, MG, PHOS, ALB, TBIL, TBILI, ALT, INR, INREXT in the last 72 hours.     No lab exists for component: SGOT    Signed: Eddie Allan MD

## 2021-10-24 PROCEDURE — 74011250636 HC RX REV CODE- 250/636: Performed by: INTERNAL MEDICINE

## 2021-10-24 PROCEDURE — 77030013575 HC DRSG HYDROFERA HOLL -B

## 2021-10-24 PROCEDURE — 74011250637 HC RX REV CODE- 250/637: Performed by: HOSPITALIST

## 2021-10-24 PROCEDURE — 65270000032 HC RM SEMIPRIVATE

## 2021-10-24 PROCEDURE — 77030041076 HC DRSG AG OPTICELL MDII -A

## 2021-10-24 PROCEDURE — 74011250637 HC RX REV CODE- 250/637: Performed by: STUDENT IN AN ORGANIZED HEALTH CARE EDUCATION/TRAINING PROGRAM

## 2021-10-24 RX ORDER — HYDROMORPHONE HYDROCHLORIDE 1 MG/ML
2 INJECTION, SOLUTION INTRAMUSCULAR; INTRAVENOUS; SUBCUTANEOUS DAILY PRN
Status: DISCONTINUED | OUTPATIENT
Start: 2021-10-24 | End: 2021-11-01 | Stop reason: HOSPADM

## 2021-10-24 RX ADMIN — Medication 10 ML: at 00:26

## 2021-10-24 RX ADMIN — Medication 10 ML: at 15:32

## 2021-10-24 RX ADMIN — HYDROMORPHONE HYDROCHLORIDE 2 MG: 1 INJECTION, SOLUTION INTRAMUSCULAR; INTRAVENOUS; SUBCUTANEOUS at 15:31

## 2021-10-24 RX ADMIN — FERROUS SULFATE TAB 325 MG (65 MG ELEMENTAL FE) 325 MG: 325 (65 FE) TAB at 09:00

## 2021-10-24 RX ADMIN — GABAPENTIN 300 MG: 300 CAPSULE ORAL at 15:31

## 2021-10-24 RX ADMIN — GABAPENTIN 300 MG: 300 CAPSULE ORAL at 21:54

## 2021-10-24 RX ADMIN — OXYCODONE HYDROCHLORIDE 10 MG: 5 TABLET ORAL at 09:00

## 2021-10-24 RX ADMIN — FERROUS SULFATE TAB 325 MG (65 MG ELEMENTAL FE) 325 MG: 325 (65 FE) TAB at 18:00

## 2021-10-24 RX ADMIN — GABAPENTIN 300 MG: 300 CAPSULE ORAL at 09:00

## 2021-10-24 RX ADMIN — Medication 10 ML: at 06:00

## 2021-10-24 RX ADMIN — I-VITE, TAB 1000-60-2MG (60/BT) 1 TABLET: TAB at 09:00

## 2021-10-24 RX ADMIN — OXYCODONE HYDROCHLORIDE 10 MG: 5 TABLET ORAL at 18:47

## 2021-10-24 RX ADMIN — Medication 10 ML: at 21:56

## 2021-10-24 RX ADMIN — OXYCODONE HYDROCHLORIDE 10 MG: 5 TABLET ORAL at 15:31

## 2021-10-24 NOTE — PROGRESS NOTES
Problem: Pain  Goal: *Control of Pain  Outcome: Progressing Towards Goal     Problem: Falls - Risk of  Goal: *Absence of Falls  Description: Document Omid Fall Risk and appropriate interventions in the flowsheet.   Outcome: Progressing Towards Goal  Note: Fall Risk Interventions:  Mobility Interventions: Bed/chair exit alarm, Patient to call before getting OOB    Mentation Interventions: Adequate sleep, hydration, pain control, Bed/chair exit alarm    Medication Interventions: Bed/chair exit alarm, Patient to call before getting OOB, Teach patient to arise slowly         History of Falls Interventions: Bed/chair exit alarm, Room close to nurse's station

## 2021-10-24 NOTE — PROGRESS NOTES
Problem: Pain  Goal: *Control of Pain  Outcome: Progressing Towards Goal  Goal: *PALLIATIVE CARE:  Alleviation of Pain  Outcome: Progressing Towards Goal     Problem: Patient Education: Go to Patient Education Activity  Goal: Patient/Family Education  Outcome: Progressing Towards Goal     Problem: Falls - Risk of  Goal: *Absence of Falls  Description: Document Pelon Esquivel Fall Risk and appropriate interventions in the flowsheet. Outcome: Progressing Towards Goal  Note: Fall Risk Interventions:  Mobility Interventions: Communicate number of staff needed for ambulation/transfer, Patient to call before getting OOB    Mentation Interventions: Adequate sleep, hydration, pain control, Bed/chair exit alarm, Toileting rounds    Medication Interventions: Patient to call before getting OOB, Teach patient to arise slowly         History of Falls Interventions: Bed/chair exit alarm, Investigate reason for fall, Room close to nurse's station         Problem: Patient Education: Go to Patient Education Activity  Goal: Patient/Family Education  Outcome: Progressing Towards Goal     Problem: Activity Intolerance  Goal: *Oxygen saturation during activity within specified parameters  Outcome: Progressing Towards Goal  Goal: *Able to remain out of bed as prescribed  Outcome: Progressing Towards Goal     Problem: Patient Education: Go to Patient Education Activity  Goal: Patient/Family Education  Outcome: Progressing Towards Goal     Problem: Pressure Injury - Risk of  Goal: *Prevention of pressure injury  Description: Document Dann Scale and appropriate interventions in the flowsheet.   Outcome: Progressing Towards Goal  Note: Pressure Injury Interventions:  Sensory Interventions: Assess changes in LOC, Check visual cues for pain, Keep linens dry and wrinkle-free    Moisture Interventions: Absorbent underpads, Contain wound drainage    Activity Interventions: Pressure redistribution bed/mattress(bed type), Increase time out of bed, PT/OT evaluation    Mobility Interventions: HOB 30 degrees or less, Float heels, Pressure redistribution bed/mattress (bed type), PT/OT evaluation    Nutrition Interventions: Document food/fluid/supplement intake    Friction and Shear Interventions: HOB 30 degrees or less                Problem: Patient Education: Go to Patient Education Activity  Goal: Patient/Family Education  Outcome: Progressing Towards Goal     Problem: Breathing Pattern - Ineffective  Goal: *Absence of hypoxia  Outcome: Progressing Towards Goal  Goal: *Use of effective breathing techniques  Outcome: Progressing Towards Goal  Goal: *PALLIATIVE CARE:  Alleviation of Dyspnea  Outcome: Progressing Towards Goal     Problem: Patient Education: Go to Patient Education Activity  Goal: Patient/Family Education  Outcome: Progressing Towards Goal     Problem: Risk for Spread of Infection  Goal: Prevent transmission of infectious organism to others  Description: Prevent the transmission of infectious organisms to other patients, staff members, and visitors.   Outcome: Progressing Towards Goal     Problem: Patient Education:  Go to Education Activity  Goal: Patient/Family Education  Outcome: Progressing Towards Goal     Problem: Patient Education: Go to Patient Education Activity  Goal: Patient/Family Education  Outcome: Progressing Towards Goal

## 2021-10-24 NOTE — PROGRESS NOTES
5414 - received verbal bedside shift change report from Crystal Lewis RN to include SBAR, MAR, Kardex and recent results. 2040 - gave oxycodone 10mg PO for 7/10 pain to sacral wound. 2130 - sleeping soundly w/o distress and no obvious visual signs of pain    2345 - axillary wounds w/ small amt drainage - pt wishes to sleep - rates pain 3/10. Denies need for PO pain med. 0230  - asleep w/o distress. Awoke for VS. Rates buttock/sacral wound pain 4/10 at present but denies need for PO pain med.     0420 - Pt awake to urinate - voided 300mL in urinal - Offered to change bilateral axillary dressings D/T small amt purulent, malodorous serosanguinous drainage but stated he's very tired and does not want dressings changed. Wishes to go back to sleep. Has rested well all night, sleeping at least 5.5 hrs straight thus far.     0615 - sleeping comfortably. RR WDL. Reports minimal pain at this time = 3/10 and denies need for PO pain med. 0800 - verbal bedside shift change report given to Crystal Lewis RN to include SBAR, MAR, Kardex and recent results.

## 2021-10-24 NOTE — PROGRESS NOTES
Hospitalist Progress Note    NAME: Randy Mari   :  1995   MRN:  [id number]       Assessment / Plan:  Hidradenitis suppurativa  Recent history of pilonidal cystectomy  Off wound VAC  Wound culture ESBL E. coli  VAC removed due to poor sealing Mepilex  IV Dilaudid    Generalized weakness and deconditioning  PT OT    Macrocytic anemia hemoglobin 8.1 MCV 68    Iron studies completed   / Body mass index is 19.63 kg/m². Estimated discharge date:  Barriers:    Code status: Full  Prophylaxis: Lovenox  Recommended Disposition: Home health     Subjective:     Chief Complaint / Reason for Physician Visit  Patient seen and examined at bedside no events noted. Discussed with RN events overnight. Review of Systems:  Symptom Y/N Comments  Symptom Y/N Comments   Fever/Chills    Chest Pain     Poor Appetite    Edema     Cough    Abdominal Pain     Sputum    Joint Pain     SOB/THAKUR    Pruritis/Rash     Nausea/vomit    Tolerating PT/OT     Diarrhea    Tolerating Diet     Constipation    Other       Could NOT obtain due to:      Objective:     VITALS:   Last 24hrs VS reviewed since prior progress note. Most recent are:  Patient Vitals for the past 24 hrs:   Temp Pulse Resp BP SpO2   10/24/21 0900 98 °F (36.7 °C) (!) 102 17 (!) 115/59 98 %   10/24/21 0414 98.6 °F (37 °C) 87 18 109/61 99 %   10/23/21 1930 99.2 °F (37.3 °C) 90 18 (!) 103/55 99 %   10/23/21 1700 98.9 °F (37.2 °C) -- -- -- --   10/23/21 1619 -- 98 18 103/63 99 %       Intake/Output Summary (Last 24 hours) at 10/24/2021 1017  Last data filed at 10/24/2021 4909  Gross per 24 hour   Intake 740 ml   Output 1870 ml   Net -1130 ml        I had a face to face encounter and independently examined this patient on 10/24/2021, as outlined below:  PHYSICAL EXAM:  General: WD, WN. Alert, cooperative, no acute distress    EENT:  EOMI. Anicteric sclerae. MMM  Resp:  CTA bilaterally, no wheezing or rales.   No accessory muscle use  CV:  Regular  rhythm,  No edema  GI:  Soft, Non distended, Non tender. +Bowel sounds  Neurologic:  Alert and oriented X 3, normal speech,   Psych:   Good insight. Not anxious nor agitated  Skin:  No rashes. No jaundice    Reviewed most current lab test results and cultures  YES  Reviewed most current radiology test results   YES  Review and summation of old records today    NO  Reviewed patient's current orders and MAR    YES  PMH/SH reviewed - no change compared to H&P  ________________________________________________________________________  Care Plan discussed with:    Comments   Patient     Family      RN     Care Manager     Consultant                        Multidiciplinary team rounds were held today with , nursing, pharmacist and clinical coordinator. Patient's plan of care was discussed; medications were reviewed and discharge planning was addressed. ________________________________________________________________________  Total NON critical care TIME:    Total CRITICAL CARE TIME Spent:   Minutes non procedure based      Comments   >50% of visit spent in counseling and coordination of care     ________________________________________________________________________  Brant Leon MD     Procedures: see electronic medical records for all procedures/Xrays and details which were not copied into this note but were reviewed prior to creation of Plan. LABS:  I reviewed today's most current labs and imaging studies. Pertinent labs include:  No results for input(s): WBC, HGB, HCT, PLT, HGBEXT, HCTEXT, PLTEXT in the last 72 hours. No results for input(s): NA, K, CL, CO2, GLU, BUN, CREA, CA, MG, PHOS, ALB, TBIL, TBILI, ALT, INR, INREXT in the last 72 hours.     No lab exists for component: SGOT    Signed: Brant Leon MD

## 2021-10-25 PROCEDURE — 2709999900 HC NON-CHARGEABLE SUPPLY

## 2021-10-25 PROCEDURE — 74011250637 HC RX REV CODE- 250/637: Performed by: HOSPITALIST

## 2021-10-25 PROCEDURE — 74011250637 HC RX REV CODE- 250/637: Performed by: STUDENT IN AN ORGANIZED HEALTH CARE EDUCATION/TRAINING PROGRAM

## 2021-10-25 PROCEDURE — 65270000032 HC RM SEMIPRIVATE

## 2021-10-25 PROCEDURE — 74011250636 HC RX REV CODE- 250/636: Performed by: INTERNAL MEDICINE

## 2021-10-25 PROCEDURE — 77030019934 HC DRSG VAC ASST KCON -B

## 2021-10-25 PROCEDURE — 94760 N-INVAS EAR/PLS OXIMETRY 1: CPT

## 2021-10-25 PROCEDURE — 77030040162

## 2021-10-25 RX ADMIN — FERROUS SULFATE TAB 325 MG (65 MG ELEMENTAL FE) 325 MG: 325 (65 FE) TAB at 10:00

## 2021-10-25 RX ADMIN — Medication 10 ML: at 10:00

## 2021-10-25 RX ADMIN — HYDROMORPHONE HYDROCHLORIDE 2 MG: 1 INJECTION, SOLUTION INTRAMUSCULAR; INTRAVENOUS; SUBCUTANEOUS at 13:15

## 2021-10-25 RX ADMIN — FERROUS SULFATE TAB 325 MG (65 MG ELEMENTAL FE) 325 MG: 325 (65 FE) TAB at 12:23

## 2021-10-25 RX ADMIN — GABAPENTIN 300 MG: 300 CAPSULE ORAL at 21:50

## 2021-10-25 RX ADMIN — GABAPENTIN 300 MG: 300 CAPSULE ORAL at 10:00

## 2021-10-25 RX ADMIN — OXYCODONE HYDROCHLORIDE 10 MG: 5 TABLET ORAL at 00:07

## 2021-10-25 RX ADMIN — HYDROMORPHONE HYDROCHLORIDE 2 MG: 1 INJECTION, SOLUTION INTRAMUSCULAR; INTRAVENOUS; SUBCUTANEOUS at 03:20

## 2021-10-25 RX ADMIN — GABAPENTIN 300 MG: 300 CAPSULE ORAL at 16:30

## 2021-10-25 RX ADMIN — Medication 10 ML: at 21:50

## 2021-10-25 RX ADMIN — FERROUS SULFATE TAB 325 MG (65 MG ELEMENTAL FE) 325 MG: 325 (65 FE) TAB at 16:31

## 2021-10-25 RX ADMIN — Medication 10 ML: at 16:32

## 2021-10-25 RX ADMIN — I-VITE, TAB 1000-60-2MG (60/BT) 1 TABLET: TAB at 09:59

## 2021-10-25 NOTE — PROGRESS NOTES
Hospitalist Progress Note    NAME: Radha Gatica   :  1995   MRN:  [id number]   Room Number:  259  @ [facility]       Interim Hospital Summary: 32 y.o. male whom presented on 10/6/2021 with      Assessment / Plan:    Hidradenitis suppurativa  -CT  Pelvis: No deep space abscess. No significant change of small subcutaneous  fluid and gas collections  -Recent history of pilonidal cystectomy and acellular graft placement on  by general surgery  -Seen by general surgery no need of antibiotic at this point  -Wound culture ESBL E. coli   -Blood culture diphtheroid / mostly contamination. Repeat BCX NGT  -Wound care RN Shalini  - Will reach out to Dr. Chaim Simms general surgeon to re-evaluate patient   - [facility] removed due to bleeding  -IV Dilaudid during dressing change          Generalized weakness and deconditioning  -PT OT     Microcytic anemia  -Hemoglobin 8.1 MCV 68  -Iron saturation 18%, iron 27, TIBC 159 ferritin 570- 21   -Iron deficiency deficiency versus anemia of chronic disease  -On iron supplementation        Code status: Full  Prophylaxis: Lovenox  Recommended Disposition:  PT, OT, RN           Subjective:     Chief Complaint / Reason for Physician Visit  \" I have pictures of my wound from the \". Discussed with RN events overnight. Review of Systems:  No fevers, chills, appetite change, cough, sputum production, shortness of breath, dyspnea on exertion, nausea, vomitting, diarrhea, constipation, chest pain, leg edema, abdominal pain, joint pain, rash, itching. Tolerating PT/OT. Tolerating diet. Objective:     VITALS:   Last 24hrs VS reviewed since prior progress note.  Most recent are:  Patient Vitals for the past 24 hrs:   Temp Pulse Resp BP SpO2   10/25/21 1225 -- -- -- 109/62 --   10/25/21 0931 98.7 °F (37.1 °C) (!) 105 16 128/70 98 %   10/24/21 2011 99.8 °F (37.7 °C) 98 18 (!) 105/53 98 %   10/24/21 1540 98.9 °F (37.2 °C) 98 16 119/60 98 % Intake/Output Summary (Last 24 hours) at 10/25/2021 1406  Last data filed at 10/25/2021 1228  Gross per 24 hour   Intake --   Output 480 ml   Net -480 ml        PHYSICAL EXAM:  General: Thin, Alert, cooperative, no acute distress    EENT:  EOMI. Anicteric sclerae. MMM  Resp:  CTA bilaterally, no wheezing or rales. No accessory muscle use  CV:  Regular  rhythm,  normal S1/S2, no murmurs rubs gallops, No edema  GI:  Soft, Non distended, Non tender. +Bowel sounds  Neurologic:  Alert and oriented X 3, normal speech,   Psych:   Good insight. Not anxious nor agitated  Skin:  Gaping wound noted in gluteal cleft. +bleeding, no discharge              No jaundice    Reviewed most current lab test results and cultures  YES  Reviewed most current radiology test results   YES  Review and summation of old records today    NO  Reviewed patient's current orders and MAR    YES  PMH/ reviewed - no change compared to H&P  ________________________________________________________________________  Care Plan discussed with:    Comments   Patient x    Family      RN x    Care Manager x    Consultant  x                     x Multidiciplinary team rounds were held today with , nursing, pharmacist and clinical coordinator. Patient's plan of care was discussed; medications were reviewed and discharge planning was addressed. ________________________________________________________________________  Total NON critical care TIME: 25  Minutes    Total CRITICAL CARE TIME Spent:   Minutes non procedure based      Comments   >50% of visit spent in counseling and coordination of care x    ________________________________________________________________________  Efrem Montalvo MD     Procedures: see electronic medical records for all procedures/Xrays and details which were not copied into this note but were reviewed prior to creation of Plan. LABS:  I reviewed today's most current labs and imaging studies.   Pertinent labs include:  No results for input(s): WBC, HGB, HCT, PLT, HGBEXT, HCTEXT, PLTEXT in the last 72 hours. No results for input(s): NA, K, CL, CO2, GLU, BUN, CREA, CA, MG, PHOS, ALB, TBIL, TBILI, ALT, INR, INREXT in the last 72 hours.     No lab exists for component: SGOT    Signed: Erin Bautista MD

## 2021-10-25 NOTE — PROGRESS NOTES
Verbal report RN change report given to Nakia RN (oncoming nurse) by Phillip FRANCOIS (offgoing).  Report included the following information SBAR, Kardex and MAR    No report changes

## 2021-10-25 NOTE — PROGRESS NOTES
07:30- report received from RN. Included SBAR and recent updates on patient    0830 - full assessment complete. VSS. No new issues. Patient resting and denies any pain    1000- Rounds complete with Dr. Charlotte Rey, pharmacy and case management. No new orders received. 1330- IV dilaudid given for pain prior to wound dressing. Pain 7/10 per patient. [address], wound care nurse at bedside to possibly apply wound vac dressing. However, with how wound appears, decided against applying wound vac.    1600- patient resting and reports minimal  Pain. Reassessment complete. VSS.    1800- dinner served. Patient appears comfortable. No new complaints    1900- Report given to relief RN. All questions answered.

## 2021-10-25 NOTE — PROGRESS NOTES
Wound care progress note;  Patient was on wound VAC Saturday it was beeping and lost the seal even with reinforcing dressing it did not work and the [facility] canister has blood and the floor staff discontinued the VAC and regular dressings applied. This morning I went to do the wound VAc and the patient had low BP the staff held the pain medication. Again went at 1:#0 patient was given pain medicine by his primary care RN Nona. Dressing removed and the wound is had clots and cleaned all the blood with NS and 4 x 4. Last week there was purulent drainage no purulent drainage noted today but a small amount of serous drainage noted of  DR Charlotte Rey is on the bed side took picture and it is in the chart. Wound is bleeding so could not place wound VAC back. After cleaning applied pressure and placed opticel silver dressing and hydrotera blue softened with Carrasyn gel over the wound and  Foam dressing applied. Patient tolerated the procedure fairly. Once the wound is ready for wound VAC will re evaluated later and place wound VAC. Recommendation:  Surgical re evalution may help him.   Will continue with regular dressing with Hydrafera blue and silver dressing after cleaning the wound with N/S /wound cleanser      Shalini Hannah RN,BSN,WCC

## 2021-10-25 NOTE — PROGRESS NOTES
Bedside and Verbal shift change report given to Phillip RN (oncoming nurse) by Adri FRANCOIS (offgoing). Report included the following information SBAR, Kardex and MAR. Pt no longer has wound vac d/t bad suction. Otherwise no changes.

## 2021-10-25 NOTE — PROGRESS NOTES
2200 - received verbal bedside report from ALESSANDRO Novak for transfer of care to include SBAR, MAR, Kardex and recent results. 0000 - Pt c/o 8/10 pain to L axillary wound and sacral/buttock wound. 0007 - Gave 10mg Oxycodone PO for pain. Resting in bed - on phone. 0320 - requested wound care and dressing change - gave IV Dilaudid 2mg for pre-wound care/dressing change. 0400 - bilateral groin wounds cleansed w/ N/S and wound cleanser - silver, hydrofera blue soaked in Carrafyn gel, ABD and medipore tape applied to bilateral groin, R inner thigh wound and L buttock gluteal fold wound.

## 2021-10-26 PROCEDURE — 99024 POSTOP FOLLOW-UP VISIT: CPT | Performed by: SURGERY

## 2021-10-26 PROCEDURE — 65270000032 HC RM SEMIPRIVATE

## 2021-10-26 PROCEDURE — 94760 N-INVAS EAR/PLS OXIMETRY 1: CPT

## 2021-10-26 PROCEDURE — 74011250636 HC RX REV CODE- 250/636: Performed by: INTERNAL MEDICINE

## 2021-10-26 PROCEDURE — 74011250637 HC RX REV CODE- 250/637: Performed by: HOSPITALIST

## 2021-10-26 PROCEDURE — 74011250637 HC RX REV CODE- 250/637: Performed by: STUDENT IN AN ORGANIZED HEALTH CARE EDUCATION/TRAINING PROGRAM

## 2021-10-26 RX ADMIN — GABAPENTIN 300 MG: 300 CAPSULE ORAL at 16:49

## 2021-10-26 RX ADMIN — Medication 10 ML: at 22:09

## 2021-10-26 RX ADMIN — HYDROMORPHONE HYDROCHLORIDE 2 MG: 1 INJECTION, SOLUTION INTRAMUSCULAR; INTRAVENOUS; SUBCUTANEOUS at 16:49

## 2021-10-26 RX ADMIN — Medication 10 ML: at 14:00

## 2021-10-26 RX ADMIN — FERROUS SULFATE TAB 325 MG (65 MG ELEMENTAL FE) 325 MG: 325 (65 FE) TAB at 16:49

## 2021-10-26 RX ADMIN — I-VITE, TAB 1000-60-2MG (60/BT) 1 TABLET: TAB at 09:44

## 2021-10-26 RX ADMIN — FERROUS SULFATE TAB 325 MG (65 MG ELEMENTAL FE) 325 MG: 325 (65 FE) TAB at 13:45

## 2021-10-26 RX ADMIN — GABAPENTIN 300 MG: 300 CAPSULE ORAL at 22:00

## 2021-10-26 RX ADMIN — GABAPENTIN 300 MG: 300 CAPSULE ORAL at 09:44

## 2021-10-26 RX ADMIN — OXYCODONE HYDROCHLORIDE 10 MG: 5 TABLET ORAL at 09:59

## 2021-10-26 RX ADMIN — FERROUS SULFATE TAB 325 MG (65 MG ELEMENTAL FE) 325 MG: 325 (65 FE) TAB at 09:44

## 2021-10-26 NOTE — PROGRESS NOTES
Bedside and Verbal shift change report given to Phillip RN (oncoming nurse) by Nona RN (offgoing). Report included the following information SBAR, Kardex and MAR    No report changes. ?Plastics for graft. No wound vac replacement.

## 2021-10-26 NOTE — PROGRESS NOTES
46- report received from Night RN. Report included SBAR, kardex, MAR and recent updates    1000-Full shift assessment complete. Patient appears down in spirits. Complains of belly ache, and pain in axilla and sacrum. Oxycodone given for pain. Rounds completed with Dr. Cristobal Reeys, pharmacist, and case management. Updated Dr. Cristobal Reyes on patient status. 1400-  Reassessment complete. VSS. No new changes    1730-   PIV inserted in right wrist by Marquis Nath. Dilaudid given for pain for wound dressing. 1830- wound dressing complete. Patient appears in high spirits and states he's feeling much better    1900- Report given to relief RN Shakira Albright.

## 2021-10-26 NOTE — PROGRESS NOTES
Hospitalist Progress Note    NAME: Wily Scott   :  1995   MRN:  [id number]   Room Number:  616/22  @ [facility]       Interim Hospital Summary: 32 y.o. male whom presented on 10/6/2021 with      Assessment / Plan:      Hidradenitis suppurativa  -CT  Pelvis: No deep space abscess. No significant change of small subcutaneous  fluid and gas collections  -Recent history of pilonidal cystectomy and acellular graft placement on  by general surgery  -Seen by general surgery no need of antibiotic at this point  -Wound culture ESBL E. coli   -Blood culture diphtheroid / mostly contamination. Repeat BCX NGT  -Wound care RN Shalini  - Will reach out to Dr. Sydnie Brennan general surgeon to re-evaluate patient   - [facility] removed due to bleeding  -IV Dilaudid during dressing change           Generalized weakness and deconditioning  -PT OT     Microcytic anemia  -Hemoglobin 8.1 MCV 68  -Iron saturation 18%, iron 27, TIBC 159 ferritin 570- 21   -Iron deficiency deficiency versus anemia of chronic disease  -On iron supplementation        Code status: Full  Prophylaxis: Lovenox  Recommended Disposition:  PT, OT, RN                       Subjective:     Chief Complaint / Reason for Physician Visit  \"this mornign I had a lot of cramps I think it was my constipation, I feel better after going to the restroom \". Discussed with RN events overnight. Review of Systems:  No fevers, chills, appetite change, cough, sputum production, shortness of breath, dyspnea on exertion, nausea, vomitting, diarrhea, chest pain, leg edema, abdominal pain, joint pain, rash, itching. Tolerating PT/OT. Tolerating diet. Objective:     VITALS:   Last 24hrs VS reviewed since prior progress note.  Most recent are:  Patient Vitals for the past 24 hrs:   Temp Pulse Resp BP SpO2   10/26/21 0945 99.1 °F (37.3 °C) (!) 108 22 130/71 99 %   10/25/21 2030 99 °F (37.2 °C) (!) 106 18 (!) 95/57 98 %       Intake/Output Summary (Last 24 hours) at 10/26/2021 1440  Last data filed at 10/25/2021 2030  Gross per 24 hour   Intake --   Output 980 ml   Net -980 ml        PHYSICAL EXAM:  General: Alert, cooperative, no acute distress    EENT:  EOMI. Anicteric sclerae. MMM  Resp:  CTA bilaterally, no wheezing or rales. No accessory muscle use  CV:  Regular  rhythm,  normal S1/S2, no murmurs rubs gallops, No edema  GI:  Soft, Non distended, Non tender. +Bowel sounds  Neurologic:  Alert and oriented X 3, normal speech,   Psych:   Good insight. Not anxious nor agitated  Skin:  No rashes. No jaundice    Reviewed most current lab test results and cultures  YES  Reviewed most current radiology test results   YES  Review and summation of old records today    NO  Reviewed patient's current orders and MAR    YES  PMH/SH reviewed - no change compared to H&P  ________________________________________________________________________  Care Plan discussed with:    Comments   Patient x    Family      RN x    Care Manager x    Consultant  x                     x Multidiciplinary team rounds were held today with , nursing, pharmacist and clinical coordinator. Patient's plan of care was discussed; medications were reviewed and discharge planning was addressed. ________________________________________________________________________  Total NON critical care TIME: 15 Minutes    Total CRITICAL CARE TIME Spent:   Minutes non procedure based      Comments   >50% of visit spent in counseling and coordination of care     ________________________________________________________________________  Titi Quinonez MD     Procedures: see electronic medical records for all procedures/Xrays and details which were not copied into this note but were reviewed prior to creation of Plan. LABS:  I reviewed today's most current labs and imaging studies.   Pertinent labs include:  No results for input(s): WBC, HGB, HCT, PLT, HGBEXT, HCTEXT, PLTEXT in the last 72 hours. No results for input(s): NA, K, CL, CO2, GLU, BUN, CREA, CA, MG, PHOS, ALB, TBIL, TBILI, ALT, INR, INREXT in the last 72 hours.     No lab exists for component: SGOT    Signed: Mariluz Turner MD

## 2021-10-26 NOTE — PROGRESS NOTES
Mr. Kristel Bates reports some improvement in his pain. No other complaints today. Tm 99.1 HR: 108 BP: 130/71 Resp Rate: 22 99% sat on room air. Intake/Output Summary (Last 24 hours) at 10/26/2021 1221  Last data filed at 10/25/2021 2030  Gross per 24 hour   Intake --   Output 1460 ml   Net -1460 ml   Exam: Cor: RRR. Lungs: Bilateral breath sounds. Clear to auscultation. Abd: Soft. Non distended. Non tender. No guarding or rebound. Skin: Pilonidal cystectomy wound is clean and granulating. No active bleeding. No purulent appearing drainage is noted. Labs: No results found for this or any previous visit (from the past 12 hour(s)). Mr. Kristel Bates is doing well follow pilonidal cystectomy/application of acellular xenograft on September 23, 2021. Do not believe that there is a need for further surgical intervention at this time. Continue local wound care. Diet and supplements as tolerated. Pain medication and anti-emetics as needed. Plans per Dr. Mike penaloza. Following.

## 2021-10-26 NOTE — PROGRESS NOTES
Problem: Activity Intolerance  Goal: *Oxygen saturation during activity within specified parameters  Outcome: Progressing Towards Goal  Goal: *Able to remain out of bed as prescribed  Outcome: Progressing Towards Goal     Problem: Pressure Injury - Risk of  Goal: *Prevention of pressure injury  Description: Document Dann Scale and appropriate interventions in the flowsheet.   Outcome: Progressing Towards Goal  Note: Pressure Injury Interventions:  Sensory Interventions: Pad between skin to skin, Assess need for specialty bed    Moisture Interventions: Absorbent underpads, Assess need for specialty bed    Activity Interventions: Assess need for specialty bed    Mobility Interventions: HOB 30 degrees or less    Nutrition Interventions: Document food/fluid/supplement intake    Friction and Shear Interventions: HOB 30 degrees or less                Problem: Patient Education: Go to Patient Education Activity  Goal: Patient/Family Education  Outcome: Progressing Towards Goal

## 2021-10-27 PROCEDURE — 65270000032 HC RM SEMIPRIVATE

## 2021-10-27 PROCEDURE — 74011250637 HC RX REV CODE- 250/637: Performed by: STUDENT IN AN ORGANIZED HEALTH CARE EDUCATION/TRAINING PROGRAM

## 2021-10-27 PROCEDURE — 74011250637 HC RX REV CODE- 250/637: Performed by: HOSPITALIST

## 2021-10-27 PROCEDURE — 94760 N-INVAS EAR/PLS OXIMETRY 1: CPT

## 2021-10-27 PROCEDURE — 74011250636 HC RX REV CODE- 250/636: Performed by: INTERNAL MEDICINE

## 2021-10-27 RX ADMIN — OXYCODONE HYDROCHLORIDE 5 MG: 5 TABLET ORAL at 21:12

## 2021-10-27 RX ADMIN — HYDROMORPHONE HYDROCHLORIDE 2 MG: 1 INJECTION, SOLUTION INTRAMUSCULAR; INTRAVENOUS; SUBCUTANEOUS at 13:02

## 2021-10-27 RX ADMIN — FERROUS SULFATE TAB 325 MG (65 MG ELEMENTAL FE) 325 MG: 325 (65 FE) TAB at 16:15

## 2021-10-27 RX ADMIN — FERROUS SULFATE TAB 325 MG (65 MG ELEMENTAL FE) 325 MG: 325 (65 FE) TAB at 11:59

## 2021-10-27 RX ADMIN — OXYCODONE HYDROCHLORIDE 10 MG: 5 TABLET ORAL at 10:44

## 2021-10-27 RX ADMIN — GABAPENTIN 300 MG: 300 CAPSULE ORAL at 08:37

## 2021-10-27 RX ADMIN — GABAPENTIN 300 MG: 300 CAPSULE ORAL at 21:13

## 2021-10-27 RX ADMIN — Medication 10 ML: at 21:13

## 2021-10-27 RX ADMIN — I-VITE, TAB 1000-60-2MG (60/BT) 1 TABLET: TAB at 08:37

## 2021-10-27 RX ADMIN — GABAPENTIN 300 MG: 300 CAPSULE ORAL at 16:15

## 2021-10-27 RX ADMIN — Medication 10 ML: at 13:02

## 2021-10-27 RX ADMIN — OXYCODONE HYDROCHLORIDE 10 MG: 5 TABLET ORAL at 15:34

## 2021-10-27 RX ADMIN — FERROUS SULFATE TAB 325 MG (65 MG ELEMENTAL FE) 325 MG: 325 (65 FE) TAB at 08:37

## 2021-10-27 NOTE — PROGRESS NOTES
Resident present in bed with the [facility] flat. Resident watching tv and talking on cell phone. Resident present with no verbal complaints at this time.

## 2021-10-27 NOTE — PROGRESS NOTES
Hospitalist Progress Note    NAME: Adele Bower   :  1995   MRN:  [id number]   Room Number:  691/67  @ [address] [facility]       Interim Hospital Summary: 32 y.o. male whom presented on 10/6/2021 with      Assessment / Plan:      Hidradenitis suppurativa  -CT  Pelvis: No deep space abscess. No significant change of small subcutaneous  fluid and gas collections  -Recent history of pilonidal cystectomy and acellular graft placement on  by general surgery  -Seen by general surgery no need of antibiotic at this point  -Wound culture ESBL E. coli   -Blood culture diphtheroid / mostly contamination. Repeat BCX NGT  -Wound care RN Maryuri Gutierrez removed due to bleeding  -IV Dilaudid during dressing change           Generalized weakness and deconditioning  -PT OT     Microcytic anemia  -Hemoglobin 8.1 MCV 68  -Iron saturation 18%, iron 27, TIBC 159 ferritin 570- 21   -Iron deficiency deficiency versus anemia of chronic disease  -On iron supplementation        Code status: Full  Prophylaxis: Lovenox  Recommended Disposition:  PT, OT, RN                       Subjective:     Chief Complaint / Reason for Physician Visit  \" Abdominal cramps. Discussed with RN events overnight. Review of Systems:  No fevers, chills, appetite change, cough, sputum production, shortness of breath, dyspnea on exertion, nausea, vomitting, diarrhea, chest pain, leg edema, abdominal pain, joint pain, rash, itching. Tolerating PT/OT. Tolerating diet. Objective:     VITALS:   Last 24hrs VS reviewed since prior progress note.  Most recent are:  Patient Vitals for the past 24 hrs:   Temp Pulse Resp BP SpO2   10/27/21 0836 98.8 °F (37.1 °C) 100 18 104/67 98 %   10/26/21 1940 98.6 °F (37 °C) (!) 101 18 (!) 144/79 98 %   10/26/21 1400 98.9 °F (37.2 °C) 99 18 120/72 98 %   10/26/21 0945 99.1 °F (37.3 °C) (!) 108 22 130/71 99 %       Intake/Output Summary (Last 24 hours) at 10/27/2021 0936  Last data filed at 10/26/2021 1944  Gross per 24 hour   Intake --   Output 700 ml   Net -700 ml        PHYSICAL EXAM:  General: Alert, cooperative, no acute distress    EENT:  EOMI. Anicteric sclerae. MMM  Resp:  CTA bilaterally, no wheezing or rales. No accessory muscle use  CV:  Regular  rhythm,  normal S1/S2, no murmurs rubs gallops, No edema  GI:  Soft, Non distended, Non tender. +Bowel sounds  Neurologic:  Alert and oriented X 3, normal speech,   Psych:   Good insight. Not anxious nor agitated  Skin:  No rashes. No jaundice    Reviewed most current lab test results and cultures  YES  Reviewed most current radiology test results   YES  Review and summation of old records today    NO  Reviewed patient's current orders and MAR    YES  PMH/SH reviewed - no change compared to H&P  ________________________________________________________________________  Care Plan discussed with:    Comments   Patient x    Family      RN x    Care Manager x    Consultant                       x Multidiciplinary team rounds were held today with , nursing, pharmacist and clinical coordinator. Patient's plan of care was discussed; medications were reviewed and discharge planning was addressed. ________________________________________________________________________  Total NON critical care TIME: 15 Minutes    Total CRITICAL CARE TIME Spent:   Minutes non procedure based      Comments   >50% of visit spent in counseling and coordination of care     ________________________________________________________________________  Abhishek Willis MD     Procedures: see electronic medical records for all procedures/Xrays and details which were not copied into this note but were reviewed prior to creation of Plan. LABS:  I reviewed today's most current labs and imaging studies. Pertinent labs include:  No results for input(s): WBC, HGB, HCT, PLT, HGBEXT, HCTEXT, PLTEXT, HGBEXT, HCTEXT, PLTEXT in the last 72 hours.   No results for input(s): NA, K, CL, CO2, GLU, BUN, CREA, CA, MG, PHOS, ALB, TBIL, TBILI, ALT, INR, INREXT, INREXT in the last 72 hours.     No lab exists for component: SGOT    Signed: Cole Viera MD

## 2021-10-27 NOTE — PROGRESS NOTES
0710 Bedside shift report given to Adryan Rojas, RN (oncoming) from Scarlet RN (offgoing). Report included the following: SBAR, MAR, Kardex, Intake/Output and recent results. Pt appears to be sleeping. 0836 VS stable, pt stated no pain is noticeable at this time. Morning assessment complete. Pt accepted all schedule meds. Urinal emptied of 800mL CYU. Pt advised to make nursing staff aware if wound dressing falls into toilet to prevent clogging, pt verbalized understanding. Pt requests wound care be completed after lunch. Pt denies any further needs. 1044 PRN Roxicodone admin for pt stated pain 6/10 in groim, sacrum & axilla. Pt denies any further needs     1123 Pt stated pain has gone down but stated he has noticed some cramping in his stomach. Heat pack provided. Dr. Kevin Larsen at bedside, stated if heat does not work he can put something in    [phone] Scheduled iron supplement given. Pt stated cramping in stomach has resolved from heat pack. Pt stated this RN can start wound care immediately after IV pain med admin after lunch    1302 PRN IV Dilaudid admin prior to wound care. 1410 Wound care completed on axilla & groin. Pt tolerated fairly. Pt stated wound care was completed yesterday on sacrum and could be done again tomorrow per wound care order every other day    1533 VS stable, pt stated pain 7/10. PRN Roxicodone admin for pt stated pain. Reassessment complete, no changes to note. 1615 Pt stated pain has gone down. Scheduled meds accepted. Ice pitcher refilled per pt request. Denies any further needs    1755 Pt resting in bed on phone, denies any needs at this time     1910 Bedside shift report given to Babatunde Beavers, [facility] (oncoming) from Adryan Rojas, [address] [facility] (offgoing).  Report included the following: SBAR, MAR, Kardex, Intake/Output and recent results

## 2021-10-27 NOTE — PROGRESS NOTES
Bedside and Verbal shift change report given to Phillip FRANCOIS (oncoming nurse) by [employer] (offgoing nurse). Report included the following information SBAR, Intake/Output, MAR, Recent Results and Med Rec Status    BM x 2; pain issues. No other report changes.

## 2021-10-27 NOTE — PROGRESS NOTES
JM   RUR 14 %  High Risk for Readmit     IDR Rounds again today with MD and team   GLOS 3.5   LOS 20 days   SHARITA undetermined possible 1-2 more week of wound care   Barrier & Delay on the chart. Wound VAC taken off. Surgical Consult again today. No additional surgery at this time. Continue would care orders.      CM to follow and assess ongoing for transition plan     Krystin MCKEON RN   [phone]

## 2021-10-27 NOTE — PROGRESS NOTES
Problem: Pain  Goal: *Control of Pain  Outcome: Progressing Towards Goal  Goal: *PALLIATIVE CARE:  Alleviation of Pain  Outcome: Progressing Towards Goal     Problem: Falls - Risk of  Goal: *Absence of Falls  Description: Document Omid Fall Risk and appropriate interventions in the flowsheet.   Outcome: Progressing Towards Goal  Note: Fall Risk Interventions:  Mobility Interventions: Patient to call before getting OOB    Mentation Interventions: Adequate sleep, hydration, pain control    Medication Interventions: Patient to call before getting OOB    Elimination Interventions: Call light in reach, Urinal in reach    History of Falls Interventions: Door open when patient unattended, Room close to nurse's station

## 2021-10-28 PROCEDURE — 77030041076 HC DRSG AG OPTICELL MDII -A

## 2021-10-28 PROCEDURE — 65270000032 HC RM SEMIPRIVATE

## 2021-10-28 PROCEDURE — 74011250637 HC RX REV CODE- 250/637: Performed by: STUDENT IN AN ORGANIZED HEALTH CARE EDUCATION/TRAINING PROGRAM

## 2021-10-28 PROCEDURE — 74011250637 HC RX REV CODE- 250/637: Performed by: HOSPITALIST

## 2021-10-28 PROCEDURE — 2709999900 HC NON-CHARGEABLE SUPPLY

## 2021-10-28 PROCEDURE — 74011250636 HC RX REV CODE- 250/636: Performed by: INTERNAL MEDICINE

## 2021-10-28 RX ORDER — AMOXICILLIN 250 MG
1 CAPSULE ORAL DAILY PRN
Status: DISCONTINUED | OUTPATIENT
Start: 2021-10-28 | End: 2021-10-29

## 2021-10-28 RX ADMIN — GABAPENTIN 300 MG: 300 CAPSULE ORAL at 21:27

## 2021-10-28 RX ADMIN — FERROUS SULFATE TAB 325 MG (65 MG ELEMENTAL FE) 325 MG: 325 (65 FE) TAB at 11:48

## 2021-10-28 RX ADMIN — I-VITE, TAB 1000-60-2MG (60/BT) 1 TABLET: TAB at 09:22

## 2021-10-28 RX ADMIN — OXYCODONE HYDROCHLORIDE 10 MG: 5 TABLET ORAL at 13:56

## 2021-10-28 RX ADMIN — HYDROMORPHONE HYDROCHLORIDE 2 MG: 1 INJECTION, SOLUTION INTRAMUSCULAR; INTRAVENOUS; SUBCUTANEOUS at 13:57

## 2021-10-28 RX ADMIN — GABAPENTIN 300 MG: 300 CAPSULE ORAL at 18:11

## 2021-10-28 RX ADMIN — GABAPENTIN 300 MG: 300 CAPSULE ORAL at 09:22

## 2021-10-28 RX ADMIN — OXYCODONE HYDROCHLORIDE 10 MG: 5 TABLET ORAL at 21:26

## 2021-10-28 RX ADMIN — OXYCODONE HYDROCHLORIDE 5 MG: 5 TABLET ORAL at 09:22

## 2021-10-28 RX ADMIN — Medication 10 ML: at 14:00

## 2021-10-28 RX ADMIN — FERROUS SULFATE TAB 325 MG (65 MG ELEMENTAL FE) 325 MG: 325 (65 FE) TAB at 09:22

## 2021-10-28 RX ADMIN — FERROUS SULFATE TAB 325 MG (65 MG ELEMENTAL FE) 325 MG: 325 (65 FE) TAB at 18:11

## 2021-10-28 RX ADMIN — Medication 10 ML: at 21:27

## 2021-10-28 RX ADMIN — POLYETHYLENE GLYCOL 3350 17 G: 17 POWDER, FOR SOLUTION ORAL at 18:10

## 2021-10-28 NOTE — PROGRESS NOTES
Problem: Pain  Goal: *Control of Pain  Outcome: Progressing Towards Goal  Goal: *PALLIATIVE CARE:  Alleviation of Pain  Outcome: Progressing Towards Goal     Problem: Patient Education: Go to Patient Education Activity  Goal: Patient/Family Education  Outcome: Progressing Towards Goal     Problem: Falls - Risk of  Goal: *Absence of Falls  Description: Document Alvin Tillman Fall Risk and appropriate interventions in the flowsheet. Outcome: Progressing Towards Goal  Note: Fall Risk Interventions:  Mobility Interventions: Patient to call before getting OOB    Mentation Interventions: Adequate sleep, hydration, pain control    Medication Interventions: Patient to call before getting OOB    Elimination Interventions: Call light in reach    History of Falls Interventions: Door open when patient unattended, Room close to nurse's station         Problem: Patient Education: Go to Patient Education Activity  Goal: Patient/Family Education  Outcome: Progressing Towards Goal     Problem: Activity Intolerance  Goal: *Oxygen saturation during activity within specified parameters  Outcome: Progressing Towards Goal  Goal: *Able to remain out of bed as prescribed  Outcome: Progressing Towards Goal     Problem: Patient Education: Go to Patient Education Activity  Goal: Patient/Family Education  Outcome: Progressing Towards Goal     Problem: Pressure Injury - Risk of  Goal: *Prevention of pressure injury  Description: Document Dann Scale and appropriate interventions in the flowsheet.   Outcome: Progressing Towards Goal  Note: Pressure Injury Interventions:  Sensory Interventions: Pad between skin to skin    Moisture Interventions: Contain wound drainage    Activity Interventions: Assess need for specialty bed    Mobility Interventions: Assess need for specialty bed    Nutrition Interventions: Offer support with meals,snacks and hydration    Friction and Shear Interventions: HOB 30 degrees or less                Problem: Patient Education: Go to Patient Education Activity  Goal: Patient/Family Education  Outcome: Progressing Towards Goal     Problem: Breathing Pattern - Ineffective  Goal: *Absence of hypoxia  Outcome: Progressing Towards Goal  Goal: *Use of effective breathing techniques  Outcome: Progressing Towards Goal  Goal: *PALLIATIVE CARE:  Alleviation of Dyspnea  Outcome: Progressing Towards Goal     Problem: Patient Education: Go to Patient Education Activity  Goal: Patient/Family Education  Outcome: Progressing Towards Goal     Problem: Risk for Spread of Infection  Goal: Prevent transmission of infectious organism to others  Description: Prevent the transmission of infectious organisms to other patients, staff members, and visitors.   Outcome: Progressing Towards Goal     Problem: Patient Education:  Go to Education Activity  Goal: Patient/Family Education  Outcome: Progressing Towards Goal     Problem: Patient Education: Go to Patient Education Activity  Goal: Patient/Family Education  Outcome: Progressing Towards Goal

## 2021-10-28 NOTE — PROGRESS NOTES
Problem: Pain  Goal: *Control of Pain  Outcome: Progressing Towards Goal     Problem: Falls - Risk of  Goal: *Absence of Falls  Description: Document Omid Fall Risk and appropriate interventions in the flowsheet. Outcome: Progressing Towards Goal  Note: Fall Risk Interventions:  Mobility Interventions: Patient to call before getting OOB    Mentation Interventions: Adequate sleep, hydration, pain control    Medication Interventions: Patient to call before getting OOB    Elimination Interventions: Call light in reach    History of Falls Interventions: Door open when patient unattended, Room close to nurse's station         Problem:  Activity Intolerance  Goal: *Oxygen saturation during activity within specified parameters  Outcome: Progressing Towards Goal

## 2021-10-28 NOTE — PROGRESS NOTES
JM  RUR 14%  LOS 21d  ELOS- Undetermined. Needs 1-2 more weeks of wound care. OT/PT are continuing to work with patient. Focus continuing on wound care. [place] [facility] [place] with home health services.   Arneta Olszewski, BSW/DEVIKA  [phone]

## 2021-10-28 NOTE — PROGRESS NOTES
1930- shift change report given to Charley Parks RN  (oncoming nurse) by Jimmie Dancer, RN  (offgoing nurse). Report included the following information SBAR, Kardex and MAR. Medicated for pain with Roxicodone 5mg at HS. See flowsheet. No further complaints. Rested rest of shift. No labs ordered this am.     0730-  shift change report given to Oskar Spencer RN (oncoming nurse) by Charley Parks RN (offgoing nurse). Report included the following information SBAR, Kardex and MAR.

## 2021-10-28 NOTE — PROGRESS NOTES
Hospitalist Progress Note    NAME: Meredith Bush   :  1995   MRN:  [id number]   Room Number:  627/04  @ [facility]       Interim Hospital Summary: 32 y.o. male whom presented on 10/6/2021 with      Assessment / Plan:      Hidradenitis suppurativa  -CT  Pelvis: No deep space abscess. No significant change of small subcutaneous  fluid and gas collections  -Recent history of pilonidal cystectomy and acellular graft placement on  by general surgery  -Seen by general surgery no need of antibiotic at this point  -Wound culture ESBL E. coli   -Blood culture diphtheroid / mostly contamination. Repeat BCX NGT  -Wound care RN Latonia Clutter removed due to bleeding  -IV Dilaudid during dressing change           Generalized weakness and deconditioning  -PT OT     Microcytic anemia  -Hemoglobin 8.1 MCV 68  -Iron saturation 18%, iron 27, TIBC 159 ferritin 570- 21   -Iron deficiency deficiency versus anemia of chronic disease  -On iron supplementation        Code status: Full  Prophylaxis: Lovenox  Recommended Disposition:  PT, OT, RN                       Subjective:     Chief Complaint / Reason for Physician Visit  \" Abdominal  Fullness  + constipation . Discussed with RN events overnight. Review of Systems:  No fevers, chills, appetite change, cough, sputum production, shortness of breath, dyspnea on exertion, nausea, vomitting, diarrhea, chest pain, leg edema, abdominal pain, joint pain, rash, itching. Tolerating PT/OT. Tolerating diet. Objective:     VITALS:   Last 24hrs VS reviewed since prior progress note. Most recent are:  Patient Vitals for the past 24 hrs:   Temp Pulse Resp BP SpO2   10/27/21 1932 98.3 °F (36.8 °C) (!) 101 18 108/61 98 %   10/27/21 1533 98.1 °F (36.7 °C) 90 18 125/71 --     No intake or output data in the 24 hours ending 10/28/21 0846     PHYSICAL EXAM:  General: Alert, cooperative, no acute distress    EENT:  EOMI. Anicteric sclerae. MMM  Resp:  CTA bilaterally, no wheezing or rales. No accessory muscle use  CV:  Regular  rhythm,  normal S1/S2, no murmurs rubs gallops, No edema  GI:  Soft, Non distended, Non tender. +Bowel sounds  Neurologic:  Alert and oriented X 3, normal speech,   Psych:   Good insight. Not anxious nor agitated  Skin:  No rashes. No jaundice    Reviewed most current lab test results and cultures  YES  Reviewed most current radiology test results   YES  Review and summation of old records today    NO  Reviewed patient's current orders and MAR    YES  PMH/SH reviewed - no change compared to H&P  ________________________________________________________________________  Care Plan discussed with:    Comments   Patient x    Family      RN x    Care Manager x    Consultant                       x Multidiciplinary team rounds were held today with , nursing, pharmacist and clinical coordinator. Patient's plan of care was discussed; medications were reviewed and discharge planning was addressed. ________________________________________________________________________  Total NON critical care TIME: 15 Minutes    Total CRITICAL CARE TIME Spent:   Minutes non procedure based      Comments   >50% of visit spent in counseling and coordination of care     ________________________________________________________________________  Lacy Gray MD     Procedures: see electronic medical records for all procedures/Xrays and details which were not copied into this note but were reviewed prior to creation of Plan. LABS:  I reviewed today's most current labs and imaging studies. Pertinent labs include:  No results for input(s): WBC, HGB, HCT, PLT, HGBEXT, HCTEXT, PLTEXT, HGBEXT, HCTEXT, PLTEXT in the last 72 hours. No results for input(s): NA, K, CL, CO2, GLU, BUN, CREA, CA, MG, PHOS, ALB, TBIL, TBILI, ALT, INR, INREXT, INREXT in the last 72 hours.     No lab exists for component: SGOT    Signed: Lacy Gray, MD

## 2021-10-29 PROCEDURE — 74011250637 HC RX REV CODE- 250/637: Performed by: STUDENT IN AN ORGANIZED HEALTH CARE EDUCATION/TRAINING PROGRAM

## 2021-10-29 PROCEDURE — 65270000032 HC RM SEMIPRIVATE

## 2021-10-29 PROCEDURE — 74011250637 HC RX REV CODE- 250/637: Performed by: HOSPITALIST

## 2021-10-29 RX ORDER — AMOXICILLIN 250 MG
1 CAPSULE ORAL DAILY
Status: DISCONTINUED | OUTPATIENT
Start: 2021-10-29 | End: 2021-11-01 | Stop reason: HOSPADM

## 2021-10-29 RX ADMIN — GABAPENTIN 300 MG: 300 CAPSULE ORAL at 08:29

## 2021-10-29 RX ADMIN — FERROUS SULFATE TAB 325 MG (65 MG ELEMENTAL FE) 325 MG: 325 (65 FE) TAB at 12:35

## 2021-10-29 RX ADMIN — FERROUS SULFATE TAB 325 MG (65 MG ELEMENTAL FE) 325 MG: 325 (65 FE) TAB at 08:29

## 2021-10-29 RX ADMIN — GABAPENTIN 300 MG: 300 CAPSULE ORAL at 22:08

## 2021-10-29 RX ADMIN — GABAPENTIN 300 MG: 300 CAPSULE ORAL at 15:48

## 2021-10-29 RX ADMIN — OXYCODONE HYDROCHLORIDE 5 MG: 5 TABLET ORAL at 20:24

## 2021-10-29 RX ADMIN — Medication 10 ML: at 17:34

## 2021-10-29 RX ADMIN — Medication 10 ML: at 22:09

## 2021-10-29 RX ADMIN — SENNOSIDES AND DOCUSATE SODIUM 1 TABLET: 50; 8.6 TABLET ORAL at 15:48

## 2021-10-29 RX ADMIN — OXYCODONE HYDROCHLORIDE 10 MG: 5 TABLET ORAL at 15:48

## 2021-10-29 RX ADMIN — FERROUS SULFATE TAB 325 MG (65 MG ELEMENTAL FE) 325 MG: 325 (65 FE) TAB at 17:34

## 2021-10-29 RX ADMIN — I-VITE, TAB 1000-60-2MG (60/BT) 1 TABLET: TAB at 08:29

## 2021-10-29 NOTE — PROGRESS NOTES
Problem: Pain  Goal: *Control of Pain  Outcome: Progressing Towards Goal     Problem: Falls - Risk of  Goal: *Absence of Falls  Description: Document Omid Fall Risk and appropriate interventions in the flowsheet. Outcome: Progressing Towards Goal  Note: Fall Risk Interventions:  Mobility Interventions: OT consult for ADLs    Mentation Interventions: Adequate sleep, hydration, pain control    Medication Interventions: Patient to call before getting OOB, Teach patient to arise slowly    Elimination Interventions: Call light in reach    History of Falls Interventions: Door open when patient unattended, Room close to nurse's station         Problem: Pressure Injury - Risk of  Goal: *Prevention of pressure injury  Description: Document Dann Scale and appropriate interventions in the flowsheet. Outcome: Progressing Towards Goal  Note: Pressure Injury Interventions:  Sensory Interventions: Float heels, Minimize linen layers    Moisture Interventions: Absorbent underpads    Activity Interventions: PT/OT evaluation    Mobility Interventions: HOB 30 degrees or less, PT/OT evaluation    Nutrition Interventions: Document food/fluid/supplement intake    Friction and Shear Interventions: Minimize layers                Problem: Risk for Spread of Infection  Goal: Prevent transmission of infectious organism to others  Description: Prevent the transmission of infectious organisms to other patients, staff members, and visitors.   Outcome: Progressing Towards Goal

## 2021-10-29 NOTE — PROGRESS NOTES
0740  Report received from Saul Walker (off going nurse) Report consisted of Kardex, SBAR and MAR.     3655  Patient sleeping in supine position. Scheduled medication given per MAR. No needs at this time and no other complaints.

## 2021-10-29 NOTE — PROGRESS NOTES
JM  RUR 15%  LOS 22d  ELOS- Undetermined. PT/OT are working with patient. Continues to require wound care. Seen by general surgery and there is no need for ABT at this time. No longer has a wound vac. Discharge plan is to return home with services. Brian referral via [employer] to [facility]. Received call from [facility] that they have information and need order for resumption of services and wound care orders. CM called Dr. Marichuy Weller to put in orders.       ANAHI Melendrez/DEVIKA  [phone]

## 2021-10-29 NOTE — PROGRESS NOTES
Transition of Care Plan:   RUR: 15%     * Disposition- Home with [facility]  * Transportation at Discharge: Family to transport home vs Medicaid Leocadia Goldberg  * Appointment with PCP TBD  * Appointment with surgery Dr. Randall Aguirre [id number]      Barriers:  * social admit   *No caretaker  *wound care       Additional Resources:  * [facility] [phone]  * Wound care clinic       Patient have multiple barriers to discharge SHARITA undetermined at this time as patient needs extensive wound care and currently no additional support at home for wound care. Patient had a wound vac that was removed Per wound nurse note \"Patient was on wound VAC Saturday it was beeping and lost the seal even with reinforcing dressing it did not work and the [facility] canister has blood and the floor staff discontinued the VAC and regular dressings applied. This morning I went to do the wound VAc and the patient had low BP the staff held the pain medication\". Per Dr. Randall Aguirre note Mr. Mavis Justice is doing well follow pilonidal cystectomy/application of acellular xenograft on September 23, 2021. Do not believe that there is a need for further surgical intervention at this time. Continue local wound care. CM to follow-up with patient and discuss discharge planning for patient.     [address] [facility]  [phone]

## 2021-10-29 NOTE — PROGRESS NOTES
JM    RUR: 15%    CM met with patient at bedside for the purpose of discharge planning. Patient has extensive wounds secondary to diagnosis of Hidradenitis suppurativa, and cannot perform his wound care without assistance. He lives with 3 roommates whom he said are trained in wound care and are supportive of him. Patient stated that he is able to provide wound care to his groin and that he has gained some strength in his arms since being hospitalized. He expressed confidence in his roommates' ability and willingness to assist with his wound care. It is questionable whether the support at home is adequate. CM discussed the option of nursing home placement with patient and he stated that he wants to try one more time at home to  determine if he is able to remain in his home with home health and the assistance of his roommates. If he has to return to the hospital, he stated that he would consider placement in a nursing home. Referral has been submitted to [facility] and they have agreed to resume home health services for him. They will advise on a start date for the resumption of [facility] services.

## 2021-10-29 NOTE — PROGRESS NOTES
8519 Report received from [facility] (offgoing nurse) Report received via Kardex, SBAR and MAR.     0296 Patient sleeping in the supine position. Patient awakened for assessment and scheduled medication administration. 1000 Patient is supine position sleeping. 1237 Patient in supine position sleeping. Patient awakened for scheduled medication administration. Patient has no complaints or needs at this time. [phone] Patient c/o pain 7/10. Patient medicated appropriately and also given other scheduled medications. Patient water refilled and patient given a cup of ice and cranberry juice. No other needs at this time. Patient requested his wounds be done on night shift. [phone] Patient sitting in bed eating dinner. Patient given scheduled medications and has no other needs or complaints at this time. 1918 Bedside report given to ALESSANDRO Eng (oncoming nurse).  Report relayed via Kardex, SBAR, and MAR

## 2021-10-29 NOTE — PROGRESS NOTES
Problem: Pain  Goal: *Control of Pain  Outcome: Progressing Towards Goal     Problem: Patient Education: Go to Patient Education Activity  Goal: Patient/Family Education  Outcome: Progressing Towards Goal     Problem: Falls - Risk of  Goal: *Absence of Falls  Description: Document Drew Valerio Fall Risk and appropriate interventions in the flowsheet.   Outcome: Progressing Towards Goal  Note: Fall Risk Interventions:  Mobility Interventions: Utilize gait belt for transfers/ambulation    Mentation Interventions: Adequate sleep, hydration, pain control    Medication Interventions: Teach patient to arise slowly    Elimination Interventions: Call light in reach    History of Falls Interventions: Door open when patient unattended, Room close to nurse's station

## 2021-10-29 NOTE — PROGRESS NOTES
1930-  shift change report given to Christine Mccarty, [facility] nurse) by ALESSANDRO Nunez (offgoing nurse). Report included the following information SBAR, Kardex and MAR. Medicated for pain with Roxicodone 5mg at HS. See flowsheet. No further complaints. Rested rest of shift.      No labs ordered this am.      0730-  shift change report given to  Michelle Genao RN (oncoming nurse) by Christine Mccarty RN (offgoing nurse). Report included the following information SBAR, Kardex and MAR.

## 2021-10-29 NOTE — PROGRESS NOTES
Resident present in bed laying flat in supine position. Resident resting quietly in bed and easy to arouse. Resident present with no verba complaint of pain or discomfort at this time.

## 2021-10-29 NOTE — PROGRESS NOTES
Hospitalist Progress Note    NAME: Harsha Kothari   :  1995   MRN:  [id number]   Room Number:  178/14  @ [facility]       Interim Hospital Summary: 32 y.o. male whom presented on 10/6/2021 with      Assessment / Plan:      Hidradenitis suppurativa  -CT  Pelvis: No deep space abscess. No significant change of small subcutaneous  fluid and gas collections  -Recent history of pilonidal cystectomy and acellular graft placement on  by general surgery  -Seen by general surgery no need of antibiotic at this point  -Wound culture ESBL E. coli   -Blood culture diphtheroid 1/ mostly contamination. Repeat BCX NGT  -Wound care RN Mercedez Schwab removed due to bleeding  -IV Dilaudid during dressing change           Generalized weakness and deconditioning  -PT OT     Microcytic anemia  -Hemoglobin 8.1 MCV 68  -Iron saturation 18%, iron 27, TIBC 159 ferritin 570- 21   -Iron deficiency deficiency versus anemia of chronic disease  -On iron supplementation        Code status: Full  Prophylaxis: Lovenox  Recommended Disposition:  PT, OT, RN                       Subjective:     Chief Complaint / Reason for Physician Visit  \" Abdominal  Fullness  + constipation . Discussed with RN events overnight. Review of Systems:  No fevers, chills, appetite change, cough, sputum production, shortness of breath, dyspnea on exertion, nausea, vomitting, diarrhea, chest pain, leg edema, abdominal pain, joint pain, rash, itching. Tolerating PT/OT. Tolerating diet. Objective:     VITALS:   Last 24hrs VS reviewed since prior progress note.  Most recent are:  Patient Vitals for the past 24 hrs:   Temp Pulse Resp BP SpO2   10/29/21 0845 97.1 °F (36.2 °C) 100 20 107/61 98 %   10/29/21 0425 99.4 °F (37.4 °C) 97 18 110/68 98 %   10/28/21 2004 98.2 °F (36.8 °C) (!) 108 18 111/62 98 %       Intake/Output Summary (Last 24 hours) at 10/29/2021 0909  Last data filed at 10/29/2021 0541  Gross per 24 hour Intake --   Output 800 ml   Net -800 ml        PHYSICAL EXAM:  General: Alert, cooperative, no acute distress    EENT:  EOMI. Anicteric sclerae. MMM  Resp:  CTA bilaterally, no wheezing or rales. No accessory muscle use  CV:  Regular  rhythm,  normal S1/S2, no murmurs rubs gallops, No edema  GI:  Soft, Non distended, Non tender. +Bowel sounds  Neurologic:  Alert and oriented X 3, normal speech,   Psych:   Good insight. Not anxious nor agitated  Skin:  No rashes. No jaundice    Reviewed most current lab test results and cultures  YES  Reviewed most current radiology test results   YES  Review and summation of old records today    NO  Reviewed patient's current orders and MAR    YES  PMH/SH reviewed - no change compared to H&P  ________________________________________________________________________  Care Plan discussed with:    Comments   Patient x    Family      RN x    Care Manager x    Consultant                       x Multidiciplinary team rounds were held today with , nursing, pharmacist and clinical coordinator. Patient's plan of care was discussed; medications were reviewed and discharge planning was addressed. ________________________________________________________________________  Total NON critical care TIME: 15 Minutes    Total CRITICAL CARE TIME Spent:   Minutes non procedure based      Comments   >50% of visit spent in counseling and coordination of care     ________________________________________________________________________  Evelia Weinberg MD     Procedures: see electronic medical records for all procedures/Xrays and details which were not copied into this note but were reviewed prior to creation of Plan. LABS:  I reviewed today's most current labs and imaging studies. Pertinent labs include:  No results for input(s): WBC, HGB, HCT, PLT, HGBEXT, HCTEXT, PLTEXT, HGBEXT, HCTEXT, PLTEXT in the last 72 hours.   No results for input(s): NA, K, CL, CO2, GLU, BUN, CREA, CA, MG, PHOS, ALB, TBIL, TBILI, ALT, INR, INREXT, INREXT in the last 72 hours.     No lab exists for component: SGOT    Signed: Claudeen Canes, MD

## 2021-10-30 PROCEDURE — 94760 N-INVAS EAR/PLS OXIMETRY 1: CPT

## 2021-10-30 PROCEDURE — 2709999900 HC NON-CHARGEABLE SUPPLY

## 2021-10-30 PROCEDURE — 74011250636 HC RX REV CODE- 250/636: Performed by: INTERNAL MEDICINE

## 2021-10-30 PROCEDURE — 74011250637 HC RX REV CODE- 250/637: Performed by: STUDENT IN AN ORGANIZED HEALTH CARE EDUCATION/TRAINING PROGRAM

## 2021-10-30 PROCEDURE — 65270000032 HC RM SEMIPRIVATE

## 2021-10-30 PROCEDURE — 74011250637 HC RX REV CODE- 250/637: Performed by: HOSPITALIST

## 2021-10-30 PROCEDURE — 77030041076 HC DRSG AG OPTICELL MDII -A

## 2021-10-30 RX ADMIN — Medication 10 ML: at 06:51

## 2021-10-30 RX ADMIN — GABAPENTIN 300 MG: 300 CAPSULE ORAL at 22:47

## 2021-10-30 RX ADMIN — OXYCODONE HYDROCHLORIDE 10 MG: 5 TABLET ORAL at 08:24

## 2021-10-30 RX ADMIN — Medication 10 ML: at 15:41

## 2021-10-30 RX ADMIN — SENNOSIDES AND DOCUSATE SODIUM 1 TABLET: 50; 8.6 TABLET ORAL at 09:06

## 2021-10-30 RX ADMIN — FERROUS SULFATE TAB 325 MG (65 MG ELEMENTAL FE) 325 MG: 325 (65 FE) TAB at 12:40

## 2021-10-30 RX ADMIN — Medication 10 ML: at 10:51

## 2021-10-30 RX ADMIN — FERROUS SULFATE TAB 325 MG (65 MG ELEMENTAL FE) 325 MG: 325 (65 FE) TAB at 16:54

## 2021-10-30 RX ADMIN — HYDROMORPHONE HYDROCHLORIDE 2 MG: 1 INJECTION, SOLUTION INTRAMUSCULAR; INTRAVENOUS; SUBCUTANEOUS at 10:48

## 2021-10-30 RX ADMIN — OXYCODONE HYDROCHLORIDE 10 MG: 5 TABLET ORAL at 17:44

## 2021-10-30 RX ADMIN — I-VITE, TAB 1000-60-2MG (60/BT) 1 TABLET: TAB at 09:06

## 2021-10-30 RX ADMIN — GABAPENTIN 300 MG: 300 CAPSULE ORAL at 09:06

## 2021-10-30 RX ADMIN — GABAPENTIN 300 MG: 300 CAPSULE ORAL at 15:25

## 2021-10-30 RX ADMIN — FERROUS SULFATE TAB 325 MG (65 MG ELEMENTAL FE) 325 MG: 325 (65 FE) TAB at 09:06

## 2021-10-30 RX ADMIN — Medication 10 ML: at 22:47

## 2021-10-30 NOTE — PROGRESS NOTES
Hospitalist Progress Note    NAME: Magaly Vasquez   :  1995   MRN:  [id number]   Room Number:  861/92  @ [facility]       Interim Hospital Summary: 32 y.o. male whom presented on 10/6/2021 with      Assessment / Plan:      Hidradenitis suppurativa stage III POA     -CT  Pelvis: No deep space abscess. No significant change of small subcutaneous  fluid and gas collections  -Recent history of pilonidal cystectomy and acellular graft placement on  by general surgery  -Seen by general surgery no need of antibiotic and or surgical intervention at this point  -Blood culture diphtheroid 1/2 mostly contamination. Repeat BCX NGT  -Patient was given wound care by wound care nurse in the hospital and would be discharged home with home health for further wound care.  -I have discussed with the patient that he would need to follow-up with hidradenitis suppurativa clinic at [facility] and plastic surgeon as an outpatient further management of chronic wounds  -Patient agreed and verbalized understanding      Microcytic anemia  -Hemoglobin 8.1 MCV 68  -Iron saturation 18%, iron 27, TIBC 159 ferritin 570- 21   -Iron deficiency deficiency versus anemia of chronic disease  -On iron supplementation     Code status: Full  Prophylaxis: Lovenox  Recommended Disposition:  PT, OT, RN                       Subjective:     Chief Complaint / Reason for Physician Visit  \" Yelitza Beer . Discussed with RN events overnight. Review of Systems:  No fevers, chills, appetite change, cough, sputum production, shortness of breath, dyspnea on exertion, nausea, vomitting, diarrhea, chest pain, leg edema, abdominal pain, joint pain, rash, itching. Tolerating PT/OT. Tolerating diet. Objective:     VITALS:   Last 24hrs VS reviewed since prior progress note.  Most recent are:  Patient Vitals for the past 24 hrs:   Temp Pulse Resp BP SpO2   10/30/21 0751 98.6 °F (37 °C) 98 18 120/63 98 %   10/29/21 2016 99.4 °F (37.4 °C) 97 18 120/66 97 %   10/29/21 1544 99.1 °F (37.3 °C) (!) 102 20 106/62 98 %       Intake/Output Summary (Last 24 hours) at 10/30/2021 1104  Last data filed at 10/30/2021 0825  Gross per 24 hour   Intake 1400 ml   Output 2050 ml   Net -650 ml        PHYSICAL EXAM:  General: Alert, cooperative, no acute distress    EENT:  EOMI. Anicteric sclerae. MMM  Resp:  CTA bilaterally, no wheezing or rales. No accessory muscle use  CV:  Regular  rhythm,  normal S1/S2, no murmurs rubs gallops, No edema  GI:  Soft, Non distended, Non tender. +Bowel sounds  Neurologic:  Alert and oriented X 3, normal speech,   Psych:   Good insight. Not anxious nor agitated  Skin:  No rashes. No jaundice    Reviewed most current lab test results and cultures  YES  Reviewed most current radiology test results   YES  Review and summation of old records today    NO  Reviewed patient's current orders and MAR    YES  PMH/ reviewed - no change compared to H&P  ________________________________________________________________________  Care Plan discussed with:    Comments   Patient x    Family      RN x    Care Manager x    Consultant                       x Multidiciplinary team rounds were held today with , nursing, pharmacist and clinical coordinator. Patient's plan of care was discussed; medications were reviewed and discharge planning was addressed. ________________________________________________________________________  Total NON critical care TIME: 15 Minutes    Total CRITICAL CARE TIME Spent:   Minutes non procedure based      Comments   >50% of visit spent in counseling and coordination of care     ________________________________________________________________________  Isa Lizama MD     Procedures: see electronic medical records for all procedures/Xrays and details which were not copied into this note but were reviewed prior to creation of Plan.       LABS:  I reviewed today's most current labs and imaging studies. Pertinent labs include:  No results for input(s): WBC, HGB, HCT, PLT, HGBEXT, HCTEXT, PLTEXT, HGBEXT, HCTEXT, PLTEXT in the last 72 hours. No results for input(s): NA, K, CL, CO2, GLU, BUN, CREA, CA, MG, PHOS, ALB, TBIL, TBILI, ALT, INR, INREXT, INREXT in the last 72 hours.     No lab exists for component: SGOT    Signed: Yue Leyva MD

## 2021-10-30 NOTE — PROGRESS NOTES
0710) Bedside shift change report given to Savanah Choe RN (oncoming nurse) by Chato Calzada RN (offgoing nurse). Report included the following information SBAR, Kardex and MAR.   1007) IDR with Dr. Angelita Banks and Karlos Gipson (nursing supervisor), discuss wound care today, teaching wound dressing to patient and roomates, discharge Monday 1100) wound care with pt. Pt described how to perform wound care for his axilla, groin, and buttocks. Pt somewhat assisted with arms and groin but required assistance with all wounds. 1930) Bedside shift change report given to Duane Kendall RN (oncoming nurse) by Savanah Choe RN (offgoing nurse). Report included the following information SBAR, Kardex and MAR.

## 2021-10-30 NOTE — ROUTINE PROCESS
Bedside shift change report given to [facility] (oncoming nurse) by Rosanna Flores (offgoing nurse). Report included the following information SBAR, Kardex, Intake/Output, MAR and Recent Results.

## 2021-10-31 PROCEDURE — 74011250637 HC RX REV CODE- 250/637: Performed by: HOSPITALIST

## 2021-10-31 PROCEDURE — 74011250637 HC RX REV CODE- 250/637: Performed by: STUDENT IN AN ORGANIZED HEALTH CARE EDUCATION/TRAINING PROGRAM

## 2021-10-31 PROCEDURE — 74011250636 HC RX REV CODE- 250/636: Performed by: INTERNAL MEDICINE

## 2021-10-31 PROCEDURE — 94760 N-INVAS EAR/PLS OXIMETRY 1: CPT

## 2021-10-31 PROCEDURE — 65270000032 HC RM SEMIPRIVATE

## 2021-10-31 RX ADMIN — I-VITE, TAB 1000-60-2MG (60/BT) 1 TABLET: TAB at 08:06

## 2021-10-31 RX ADMIN — Medication 10 ML: at 21:35

## 2021-10-31 RX ADMIN — FERROUS SULFATE TAB 325 MG (65 MG ELEMENTAL FE) 325 MG: 325 (65 FE) TAB at 08:06

## 2021-10-31 RX ADMIN — GABAPENTIN 300 MG: 300 CAPSULE ORAL at 08:05

## 2021-10-31 RX ADMIN — GABAPENTIN 300 MG: 300 CAPSULE ORAL at 21:35

## 2021-10-31 RX ADMIN — FERROUS SULFATE TAB 325 MG (65 MG ELEMENTAL FE) 325 MG: 325 (65 FE) TAB at 13:58

## 2021-10-31 RX ADMIN — HYDROMORPHONE HYDROCHLORIDE 2 MG: 1 INJECTION, SOLUTION INTRAMUSCULAR; INTRAVENOUS; SUBCUTANEOUS at 09:32

## 2021-10-31 RX ADMIN — GABAPENTIN 300 MG: 300 CAPSULE ORAL at 16:09

## 2021-10-31 RX ADMIN — FERROUS SULFATE TAB 325 MG (65 MG ELEMENTAL FE) 325 MG: 325 (65 FE) TAB at 16:09

## 2021-10-31 RX ADMIN — Medication 10 ML: at 14:00

## 2021-10-31 RX ADMIN — Medication 10 ML: at 06:34

## 2021-10-31 RX ADMIN — OXYCODONE HYDROCHLORIDE 10 MG: 5 TABLET ORAL at 06:38

## 2021-10-31 RX ADMIN — SENNOSIDES AND DOCUSATE SODIUM 1 TABLET: 50; 8.6 TABLET ORAL at 08:06

## 2021-10-31 RX ADMIN — OXYCODONE HYDROCHLORIDE 10 MG: 5 TABLET ORAL at 13:58

## 2021-10-31 NOTE — PROGRESS NOTES
Hospitalist Progress Note    NAME: Radha Gatica   :  1995   MRN:  [id number]   Room Number:  403/97  @ [facility]       Interim Hospital Summary: 32 y.o. male whom presented on 10/6/2021 with      Assessment / Plan:      Hidradenitis suppurativa stage III POA     -CT  Pelvis: No deep space abscess. No significant change of small subcutaneous  fluid and gas collections  -Recent history of pilonidal cystectomy and acellular graft placement on  by general surgery  -Seen by general surgery no need of antibiotic and or surgical intervention at this point  -Blood culture diphtheroid 1/2 mostly contamination. Repeat BCX NGT  -Patient was given wound care by wound care nurse in the hospital and would be discharged home with home health for further wound care.  -I have discussed with the patient that he would need to follow-up with hidradenitis suppurativa clinic at [facility] and plastic surgeon as an outpatient further management of chronic wounds  -Patient agreed and verbalized understanding      Microcytic anemia  -Hemoglobin 8.1 MCV 68  -Iron saturation 18%, iron 27, TIBC 159 ferritin 570- 21   -Iron deficiency deficiency versus anemia of chronic disease  -On iron supplementation     Code status: Full  Prophylaxis: Lovenox  Recommended Disposition:  PT, OT, RN                       Subjective:     Chief Complaint / Reason for Physician Visit  \"  Olp . Discussed with RN events overnight. Review of Systems:  No fevers, chills, appetite change, cough, sputum production, shortness of breath, dyspnea on exertion, nausea, vomitting, diarrhea, chest pain, leg edema, abdominal pain, joint pain, rash, itching. Tolerating PT/OT. Tolerating diet. Objective:     VITALS:   Last 24hrs VS reviewed since prior progress note.  Most recent are:  Patient Vitals for the past 24 hrs:   Temp Pulse Resp BP SpO2   10/31/21 0803 98.6 °F (37 °C) 96 16 (!) 109/56 97 %   10/30/21 2037 100.1 °F (37.8 °C) (!) 117 18 (!) 104/57 100 %   10/30/21 1555 97.9 °F (36.6 °C) (!) 101 20 123/73 100 %       Intake/Output Summary (Last 24 hours) at 10/31/2021 1012  Last data filed at 10/31/2021 0640  Gross per 24 hour   Intake 600 ml   Output 1400 ml   Net -800 ml        PHYSICAL EXAM:  General: Alert, cooperative, no acute distress    EENT:  EOMI. Anicteric sclerae. MMM  Resp:  CTA bilaterally, no wheezing or rales. No accessory muscle use  CV:  Regular  rhythm,  normal S1/S2, no murmurs rubs gallops, No edema  GI:  Soft, Non distended, Non tender. +Bowel sounds  Neurologic:  Alert and oriented X 3, normal speech,   Psych:   Good insight. Not anxious nor agitated  Skin:  No rashes. No jaundice    Reviewed most current lab test results and cultures  YES  Reviewed most current radiology test results   YES  Review and summation of old records today    NO  Reviewed patient's current orders and MAR    YES  PMH/SH reviewed - no change compared to H&P  ________________________________________________________________________  Care Plan discussed with:    Comments   Patient x    Family      RN x    Care Manager x    Consultant                       x Multidiciplinary team rounds were held today with , nursing, pharmacist and clinical coordinator. Patient's plan of care was discussed; medications were reviewed and discharge planning was addressed. ________________________________________________________________________  Total NON critical care TIME: 15 Minutes    Total CRITICAL CARE TIME Spent:   Minutes non procedure based      Comments   >50% of visit spent in counseling and coordination of care     ________________________________________________________________________  Gerir Orourke MD     Procedures: see electronic medical records for all procedures/Xrays and details which were not copied into this note but were reviewed prior to creation of Plan.       LABS:  I reviewed today's most current labs and imaging studies. Pertinent labs include:  No results for input(s): WBC, HGB, HCT, PLT, HGBEXT, HCTEXT, PLTEXT, HGBEXT, HCTEXT, PLTEXT in the last 72 hours. No results for input(s): NA, K, CL, CO2, GLU, BUN, CREA, CA, MG, PHOS, ALB, TBIL, TBILI, ALT, INR, INREXT, INREXT in the last 72 hours.     No lab exists for component: SGOT    Signed: Humberto Mallory MD

## 2021-10-31 NOTE — PROGRESS NOTES
Bedside shift change report given to [facility] (oncoming nurse) by Mere Hendrickson (offgoing nurse). Report included the following information SBAR, Kardex, Intake/Output, MAR and Recent Results.

## 2021-10-31 NOTE — PROGRESS NOTES
0720) Bedside shift change report given to ALESSANDRO Lima (oncoming nurse) by Alisia Henry RN (offgoing nurse). Report included the following information SBAR, Kardex and MAR.   0930) pt request L axilla wound care be redone-- pain from dressing. Dilaudid given. 1920) Bedside shift change report given to Alisia Henry RN (oncoming nurse) by [facility], RN (offgoing nurse). Report included the following information SBAR, Kardex and MAR.

## 2021-11-01 VITALS
WEIGHT: 151.8 LBS | OXYGEN SATURATION: 98 % | RESPIRATION RATE: 16 BRPM | SYSTOLIC BLOOD PRESSURE: 119 MMHG | TEMPERATURE: 99.1 F | DIASTOLIC BLOOD PRESSURE: 75 MMHG | HEART RATE: 96 BPM | HEIGHT: 75 IN | BODY MASS INDEX: 18.88 KG/M2

## 2021-11-01 PROCEDURE — 2709999900 HC NON-CHARGEABLE SUPPLY

## 2021-11-01 PROCEDURE — 77030013575 HC DRSG HYDROFERA HOLL -B

## 2021-11-01 PROCEDURE — 74011250637 HC RX REV CODE- 250/637: Performed by: HOSPITALIST

## 2021-11-01 PROCEDURE — 74011250636 HC RX REV CODE- 250/636: Performed by: INTERNAL MEDICINE

## 2021-11-01 PROCEDURE — 74011250637 HC RX REV CODE- 250/637: Performed by: STUDENT IN AN ORGANIZED HEALTH CARE EDUCATION/TRAINING PROGRAM

## 2021-11-01 RX ORDER — TRAMADOL HYDROCHLORIDE 50 MG/1
50 TABLET ORAL
Qty: 20 TABLET | Refills: 0 | Status: SHIPPED | OUTPATIENT
Start: 2021-11-01 | End: 2021-11-06

## 2021-11-01 RX ORDER — AMOXICILLIN 250 MG
1 CAPSULE ORAL DAILY
Qty: 30 TABLET | Refills: 0 | Status: SHIPPED | OUTPATIENT
Start: 2021-11-02

## 2021-11-01 RX ADMIN — HYDROMORPHONE HYDROCHLORIDE 2 MG: 1 INJECTION, SOLUTION INTRAMUSCULAR; INTRAVENOUS; SUBCUTANEOUS at 13:01

## 2021-11-01 RX ADMIN — I-VITE, TAB 1000-60-2MG (60/BT) 1 TABLET: TAB at 08:47

## 2021-11-01 RX ADMIN — OXYCODONE HYDROCHLORIDE 10 MG: 5 TABLET ORAL at 08:50

## 2021-11-01 RX ADMIN — GABAPENTIN 300 MG: 300 CAPSULE ORAL at 08:47

## 2021-11-01 RX ADMIN — SENNOSIDES AND DOCUSATE SODIUM 1 TABLET: 50; 8.6 TABLET ORAL at 08:47

## 2021-11-01 RX ADMIN — FERROUS SULFATE TAB 325 MG (65 MG ELEMENTAL FE) 325 MG: 325 (65 FE) TAB at 08:47

## 2021-11-01 RX ADMIN — Medication 10 ML: at 05:45

## 2021-11-01 RX ADMIN — FERROUS SULFATE TAB 325 MG (65 MG ELEMENTAL FE) 325 MG: 325 (65 FE) TAB at 12:13

## 2021-11-01 NOTE — PROGRESS NOTES
0703 [facility] [address] (offgoing nurse). Report included the folliwing information Kardex, SBAR, I/O, MAR and lab results. 1211 Patient in restroom upon entering room. Patient scheduled medications given. 1440 Patient discharging home with home health. Wound care and [facility] changed all patients dressing before discharge. Dinorah Valente RN went over discharge paperwork with patient and pain medication sent to the outpatient pharmacy. Patient verbalized understanding of discharge instructions. IV removed. Patient has no other needs at this time.

## 2021-11-01 NOTE — PROGRESS NOTES
Bedside shift change report given to ALESSANDRO Marino (oncoming nurse) by Mere Hendrickson (offgoing nurse). Report included the following information SBAR, Kardex, Intake/Output, MAR and Recent Results.

## 2021-11-01 NOTE — PROGRESS NOTES
Problem: Pain  Goal: *Control of Pain  Outcome: Progressing Towards Goal     Problem: Patient Education: Go to Patient Education Activity  Goal: Patient/Family Education  Outcome: Progressing Towards Goal     Problem: Falls - Risk of  Goal: *Absence of Falls  Description: Document Justen Cordero Fall Risk and appropriate interventions in the flowsheet.   Outcome: Progressing Towards Goal  Note: Fall Risk Interventions:  Mobility Interventions: Communicate number of staff needed for ambulation/transfer    Mentation Interventions: Adequate sleep, hydration, pain control, Update white board    Medication Interventions: Teach patient to arise slowly    Elimination Interventions: Call light in reach    History of Falls Interventions: Door open when patient unattended

## 2021-11-01 NOTE — DISCHARGE INSTRUCTIONS
-Please follow-up with Luis Antonio de souza at [facility] for hidradenitis suppurativa treatment  -Please follow-up with Dr. Jacqueline Galo for follow-up for wound care  -Please follow-up with wound care center

## 2021-11-01 NOTE — PROGRESS NOTES
Hospitalist Discharge Summary     Patient ID:  Nikolay Johnson  [id number]  24 y.o.  1995    PCP on record: Edwin Ramírez NP    Admit date: 10/6/2021  Discharge date and time: 11/1/2021      Admission Diagnoses: Wound infection [T14. 8XXA, L08.9]    Discharge Diagnoses: Active Problems:    Wound infection (10/7/2021)      Dermatophytosis of nail (10/16/2021)           Hospital Course:     Hidradenitis suppurativa stage III POA      -CT  Pelvis: No deep space abscess. No significant change of small subcutaneous  fluid and gas collections  -Recent history of pilonidal cystectomy and acellular graft placement on 9/ 23 by general surgery  -Seen by general surgery no need of antibiotic and or surgical intervention at this point  -Blood culture diphtheroid 1/2 mostly contamination.  Repeat BCX NGT  -Patient was given wound care by wound care nurse in the hospital and would be discharged home with home health for further wound care.  -Discharged on tramadol 50 mg every 6 as needed for wound care as patient stated he has pain during dressing change  -Patient to follow with primary care for the management of pain associated with wound care  -I have discussed with the patient that he would need to follow-up with hidradenitis suppurativa clinic at [facility] and plastic surgeon as an outpatient further management of chronic wounds  -Patient agreed and verbalized understanding        Microcytic anemia  -Hemoglobin 8.1 MCV 68  -Iron saturation 18%, iron 27, TIBC 159 ferritin 570- 6/5/21   -Iron deficiency deficiency versus anemia of chronic disease  -On iron supplementation      CONSULTATIONS:  IP CONSULT TO GENERAL SURGERY  IP CONSULT TO GENERAL SURGERY  IP CONSULT TO PODIATRY    Excerpted HPI from H&P of Rachna Lagunas MD:  Nikolay Johnson is a 22 y.o. male with history of hidradenitis recently discharged from our facility on 5 days of cipro and clindamycin after hospitalization for pilonidal cystectomy presents to the ER today at the request of his home nurse due to foul smelling drainage from wound generalized weakness tachycardia lightheadedness and SOB on standing. He denies any fever. He appears quite lethargic. Denies prior history of DVT/PE. He is also non compliant with medicines still taking cipro and clindamycin when they were supposed to be completed a week ago.     ______________________________________________________________________  Lázaro Santoyo SUMMARY/HOSPITAL COURSE:  for full details see H&P, daily progress notes, labs, consult notes. _______________________________________________________________________  Patient seen and examined by me on discharge day. Pertinent Findings:  Gen:    Not in distress  Chest: Clear lungs  CVS:   Regular rhythm. No edema  Abd:  Soft, not distended, not tender  Neuro:  Alert with good insight. Oriented to person, place, and time   _______________________________________________________________________  DISCHARGE MEDICATIONS:   Current Discharge Medication List      START taking these medications    Details   senna-docusate (PERICOLACE) 8.6-50 mg per tablet Take 1 Tablet by mouth daily. Qty: 30 Tablet, Refills: 0  Start date: 11/2/2021      vitamin T-X-Q-lutein-minerals (OCUVITE) tablet Take 1 Tablet by mouth daily. Qty: 30 Tablet, Refills: 1  Start date: 11/2/2021      traMADoL (Ultram) 50 mg tablet Take 1 Tablet by mouth every six (6) hours as needed for Pain for up to 5 days. Max Daily Amount: 200 mg. Qty: 20 Tablet, Refills: 0  Start date: 11/1/2021, End date: 11/6/2021    Associated Diagnoses: Encounter for wound care; Hidradenitis; Pain associated with wound         CONTINUE these medications which have NOT CHANGED    Details   gabapentin (NEURONTIN) 600 mg tablet Take 600 mg by mouth daily. ferrous sulfate 325 mg (65 mg iron) tablet Take 1 Tablet by mouth three (3) times daily (with meals).   Qty: 90 Tablet, Refills: 0 STOP taking these medications       clindamycin (CLEOCIN) 300 mg capsule Comments:   Reason for Stopping:         ibuprofen (MOTRIN) 200 mg tablet Comments:   Reason for Stopping:               My Recommended Diet, Activity, Wound Care, and follow-up labs are listed in the patient's Discharge Insturctions which I have personally completed and reviewed. _______________________________________________________________________  DISPOSITION:     Home with Family:    Home with HH/PT/OT/RN: x   SNF/LTC:    SABRINA:    OTHER:        Condition at Discharge:  Stable  _______________________________________________________________________  Follow up with:   PCP : Eleanor Pacheco NP  Follow-up Information     Follow up With Specialties Details Why Contact Info    Eleanor Pacheco NP Nurse Practitioner On 11/3/2021 Your appointment time is 11am.  , Bring ins card, picture id, and discharge papers, Please keep this appointment, Please arrive 15 minutes early. [address]      Ynug Tobar MD General Surgery, Breast Surgery, Oncology On 11/11/2021 Your appointment time is 1:45pm  , Bring ins card, picture id, and discharge papers, Please arrive 15 minutes early. [address] [place] [address]  On 11/9/2021 Your appointment time is 8:15pm.  Dr Vivien Torrez will be seeing you.  , Bring ins card, picture id, and discharge papers, Please keep this appointment, Please arrive 15 minutes early. [address] [place] [address] [address]  [address] [place] [address] On 11/2/2021 Home Health will call you with a day/time to begin services. [address] Surgery  Call on 11/3/2021  has called office to schdule an appointment.   If you do not here from  or [address] [place] [address] Surgery bu Wed, 11/3 at 2pm, please call office to verify day/time of appointment 1200 [facility], Eun Hughes  Phone: [phone]    [address] [address]  On 5/13/2022 Your appointment time is 2:30pm.  , Bring ins card, picture id, and discharge papers, Please keep this appointment, Please arrive 15 minutes early.   [address] , [place], [address]  Phone: [phone]              Total time in minutes spent coordinating this discharge (includes going over instructions, follow-up, prescriptions, and preparing report for sign off to her PCP) : 45minutes    Signed:  Dinorah Orozco MD

## 2021-11-01 NOTE — PROGRESS NOTES
Wound care progress note:  Patient was admitted on 10/7/21 and I saw the patient in room 225. He was s/p pilonidal cystectomy for wound VAC application. Lower back wound almost 16 x 2.5x2. He also has both axillas, groin perineal area multiple open and draining wounds it was bleeding in the beginning getting better now. The lower back wound is not draining puss just a small amount of serous drainage today. Granulating well wound edges are intact   In the left axilla one area was draining purulent drainage he has 3 open areas big measuring 4x4 and 3 x 3x 1 x1  Areas and a 1 cm x 05 deep wound still bloody drainage. Perineal wounds same multiple open wounds draining less now. Treatment done today:  Lower back wound:  Cleaned with wound cleanser and dried well all the drainage wiped well. Applied a piece of Aquacel AG and covered with hydro ferablue. antebacterial dressing . Secured with heart shape foams and some medi pore tapes    Left Axilla:   applied  Small pieces cut from Thera honey sheet and packed in the deep area daring and rest of the areas moistened hydrofera blue with NS and a small amount of Carrasyn gel covered with extra absorbant foam and abdominal pads . Secured with  Medi pore tape. Rt Axilla:  Cleaned with wound cleanser and dried well with 4 x 4. Moistened Hydrafera blue placed on the open area and covred it with abdominal pads and medi pore tape. Perineal wound cleaned with wound cleanser and dried well with 4x4 and applied Aquacel AG and hyrofera blue and covered with abdominal pads and pull up to fix the dressing. Arcelia FRANCOIS the team lean was present during the procedure. And the patient tolerated the procedure well. Recommendation:    Lower back wound:  Clean with wound cleanser/ NS and dry  Well with 4 x 4 and press out all drainage gently and wipe again l. Apply a piece of Aquacel AG and cover with hydro ferablue. antebacterial dressing.  .  Secure with heart shape foams and some medi pore tapes. Left Axilla:   Apply  Small pieces cut from Thera honey sheet and pack in the deep area daring and rest of the areas moistened hydrofera blue with NS and a small amount of Carrasyn gel cover with extra absorbant foam and abdominal pads . Secure with  Medi pore tape. Rt Axilla:  Clean with wound cleanser and dry well with 4 x 4. Moistened Hydrafera blue place on the open area and cover it with abdominal pads and medi pore tape. Perineal wound clean with wound cleanser and dry well with 4x4 and apply Aquacel AG and hyrofera blue and cover with abdominal pads and pull up( maternity under wear) can use loose under wear  to keep the dressing from falling as it is difficult to apply tape in the hairy area fix the dressing.       Laura Santiago RN [facility]

## 2021-11-01 NOTE — PROGRESS NOTES
Reviewed discharge instructions with pt including follow-up appointments, new medications and side effects, medications to continue, medications to discontinue, wound care education and diagnosis of hydradentitis, signs/symptoms of stroke and heart attack, and MyChart information. Pt expressed understanding. IV was removed. Belongings sent home with pt. Including a generous amount of Dressing supplies.

## 2021-11-01 NOTE — PROGRESS NOTES
JM  RUR 15%    Patient being discharged home today. 1540   CM met with patient to discuss plans for d/c home today. .  Patient stated that he does not have transportation home. Discussed upcoming appointments. Nurse was present in room and we discussed need for wound care supplies to go home with him. CM arranged for ride home with a  time of 3:15pm Per [employer] (Medicaid transport). CM called [facility] and spoke with Burak Joseph [phone] with [facility]. Informed her that patient is being discharged today. 46  Notified by floor that patient not longer needs transportation home. CM cancelled ride via [employer]. CM called Dr. Kelly Null office Located within [facility] Plastic Surgery) to schedule a f/u appointment. Informed that office is not taking Medicaid patients at this time. CM called [address] [place] [address] Surgery [phone] and left message for a return call to schedule appointment.  these medications from [employer], [address]  1 senna-docusate  2 vitamin H-S-W-lutein-minerals   Icon Checkbox    Follow up with [address] [place] [address] on 11/2/2021   Specialty: [address] [place] [address]  [id number]   [address] [place] [address] will call you with a day/time to begin services. Icon Checkbox    Follow up with Ever Lane NP on 11/3/2021   Specialty: Nurse Practitioner   Sallie Harris   [id number] [phone]   Your appointment time is 11am.  , Bring ins card, picture id, and discharge papers, Please keep this appointment, Please arrive 15 minutes early.    Icon Checkbox    [facility]   Next steps: Call on 11/3/2021   1200 [facility]   Phone: [phone]    has called office to schdule an appointment.  If you do not here from  or [address] [place] [address] Wed, 11/3 at 2pm, please call office to verify day/time of appointment   Icon Checkbox Follow up with [address] [place] [address] Dr Gan on 11/9/2021   [address]   [address] [facility]   [phone]   Your appointment time is 8:15pm.  Dr Michelle You will be seeing you.  , Bring ins card, picture id, and discharge papers, Please keep this appointment, Please arrive 15 minutes early. Icon Checkbox    Follow up with Abelardo Orozco MD on 11/11/2021   Specialty: General Surgery, Breast Surgery, Oncology   [address] [place] [address]   [phone]   Your appointment time is 1:45pm  , Bring ins card, picture id, and discharge papers, Please arrive 15 minutes early. Icon Checkbox    [facility]   Next steps: Follow up on 5/13/2022   Dl Sheldon, [address]   Phone: [phone]   Your appointment time is 2:30pm.  , Bring ins card, picture id, and discharge papers, Please keep this appointment, Please arrive 15 minutes early. Care Management Interventions  PCP Verified by CM:  Yes  Mode of Transport at Discharge: Self  Transition of Care Consult (CM Consult): [address]: Yes  Health Maintenance Reviewed: Yes  Support Systems: Other Family Member(s), Friend/Neighbor  Confirm Follow Up Transport: Self  The Plan for Transition of Care is Related to the Following Treatment Goals : HH SNV, specialist appointments and PCP  The Patient and/or Patient Representative was Provided with a Choice of Provider and Agrees with the Discharge Plan?: Yes  Freedom of Choice List was Provided with Basic Dialogue that Supports the Patient's Individualized Plan of Care/Goals, Treatment Preferences and Shares the Quality Data Associated with the Providers?: Yes  Discharge Location  Discharge Placement: Home with home health        ANAHI Jacobo/DEVIKA  [phone]

## 2021-11-02 ENCOUNTER — HOME CARE VISIT (OUTPATIENT)
Dept: SCHEDULING | Facility: HOME HEALTH | Age: 26
End: 2021-11-02
Payer: MEDICAID

## 2021-11-02 VITALS
HEART RATE: 100 BPM | TEMPERATURE: 99.8 F | SYSTOLIC BLOOD PRESSURE: 108 MMHG | OXYGEN SATURATION: 97 % | RESPIRATION RATE: 16 BRPM | DIASTOLIC BLOOD PRESSURE: 64 MMHG

## 2021-11-02 PROCEDURE — G0299 HHS/HOSPICE OF RN EA 15 MIN: HCPCS

## 2021-11-02 PROCEDURE — 400013 HH SOC

## 2021-11-02 NOTE — DISCHARGE SUMMARY
Hospitalist Discharge Summary     Patient ID:  Afua Minor  [id number]  89 y.o.  1995    PCP on record: Sean Hills NP    Admit date: 10/6/2021  Discharge date and time: 11/2/2021      Admission Diagnoses: Wound infection [T14. 8XXA, L08.9]    Discharge Diagnoses: Active Problems:    Wound infection (10/7/2021)      Dermatophytosis of nail (10/16/2021)           Hospital Course:     Hidradenitis suppurativa stage III POA      -CT  Pelvis: No deep space abscess. No significant change of small subcutaneous  fluid and gas collections  -Recent history of pilonidal cystectomy and acellular graft placement on 9/ 23 by general surgery  -Seen by general surgery no need of antibiotic and or surgical intervention at this point  -Blood culture diphtheroid 1/2 mostly contamination.  Repeat BCX NGT  -Patient was given wound care by wound care nurse in the hospital and would be discharged home with home health for further wound care.  -Discharged on tramadol 50 mg every 6 as needed for wound care as patient stated he has pain during dressing change  -Patient to follow with primary care for the management of pain associated with wound care  -I have discussed with the patient that he would need to follow-up with hidradenitis suppurativa clinic at [facility] and plastic surgeon as an outpatient further management of chronic wounds  -Patient agreed and verbalized understanding        Microcytic anemia  -Hemoglobin 8.1 MCV 68  -Iron saturation 18%, iron 27, TIBC 159 ferritin 570- 6/5/21   -Iron deficiency deficiency versus anemia of chronic disease  -On iron supplementation      CONSULTATIONS:  None    Excerpted HPI from H&P of Melanie Owens MD:  Afua Minor is a 22 y.o. male with history of hidradenitis recently discharged from our facility on 5 days of cipro and clindamycin after hospitalization for pilonidal cystectomy presents to the ER today at the request of his home nurse due to foul smelling drainage from wound generalized weakness tachycardia lightheadedness and SOB on standing. He denies any fever. He appears quite lethargic. Denies prior history of DVT/PE. He is also non compliant with medicines still taking cipro and clindamycin when they were supposed to be completed a week ago.     ______________________________________________________________________  Arnold Triana SUMMARY/HOSPITAL COURSE:  for full details see H&P, daily progress notes, labs, consult notes. _______________________________________________________________________  Patient seen and examined by me on discharge day. Pertinent Findings:  Gen:    Not in distress  Chest: Clear lungs  CVS:   Regular rhythm. No edema  Abd:  Soft, not distended, not tender  Neuro:  Alert with good insight. Oriented to person, place, and time   _______________________________________________________________________  DISCHARGE MEDICATIONS:   Discharge Medication List as of 11/1/2021  3:04 PM      START taking these medications    Details   senna-docusate (PERICOLACE) 8.6-50 mg per tablet Take 1 Tablet by mouth daily. , Normal, Disp-30 Tablet, R-0      vitamin F-W-U-lutein-minerals (OCUVITE) tablet Take 1 Tablet by mouth daily. , Normal, Disp-30 Tablet, R-1      traMADoL (Ultram) 50 mg tablet Take 1 Tablet by mouth every six (6) hours as needed for Pain for up to 5 days. Max Daily Amount: 200 mg., Normal, Disp-20 Tablet, R-0         CONTINUE these medications which have NOT CHANGED    Details   gabapentin (NEURONTIN) 600 mg tablet Take 600 mg by mouth daily. , Historical Med      ferrous sulfate 325 mg (65 mg iron) tablet Take 1 Tablet by mouth three (3) times daily (with meals). , Normal, Disp-90 Tablet, R-0         STOP taking these medications       clindamycin (CLEOCIN) 300 mg capsule Comments:   Reason for Stopping:         ibuprofen (MOTRIN) 200 mg tablet Comments:   Reason for Stopping:               My Recommended Diet, Activity, Wound Care, and follow-up labs are listed in the patient's Discharge Insturctions which I have personally completed and reviewed. _______________________________________________________________________  DISPOSITION:     Home with Family:    Home with HH/PT/OT/RN: x   SNF/LTC:    SABRINA:    OTHER:        Condition at Discharge:  Stable  _______________________________________________________________________  Follow up with:   PCP : Thuan Reyes NP  Follow-up Information     Follow up With Specialties Details Why Contact Info    Thuan Reyes NP Nurse Practitioner On 11/3/2021 Your appointment time is 11am.  , Bring ins card, picture id, and discharge papers, Please keep this appointment, Please arrive 15 minutes early. [facility] 1429      Benitez Sapp MD General Surgery, Breast Surgery, Oncology On 11/11/2021 Your appointment time is 1:45pm  , Bring ins card, picture id, and discharge papers, Please arrive 15 minutes early. [address] [place] [address]  On 11/9/2021 Your appointment time is 8:15pm.  Dr Ilana Garcia will be seeing you.  , Bring ins card, picture id, and discharge papers, Please keep this appointment, Please arrive 15 minutes early. [address] [facility]  [address]   [address] [address]  [address] [place] [address] On 11/2/2021 Home Health will call you with a day/time to begin services. [facility] Surgery  Call on 11/3/2021  has called office to schdule an appointment.   If you do not here from  or Alvin haas Wed, 11/3 at 2pm, please call office to verify day/time of appointment [address] [facility]  Phone: [phone] [address] [address]  On 5/13/2022 Your appointment time is 2:30pm.  , Bring ins card, picture id, and discharge papers, Please keep this appointment, Please arrive 15 minutes early.   [address], [address] [facility], [address]  Phone: [phone]              Total time in minutes spent coordinating this discharge (includes going over instructions, follow-up, prescriptions, and preparing report for sign off to her PCP) : 45minutes    Signed:  Kelsey Cardenas MD

## 2021-11-03 ENCOUNTER — HOME CARE VISIT (OUTPATIENT)
Dept: SCHEDULING | Facility: HOME HEALTH | Age: 26
End: 2021-11-03
Payer: MEDICAID

## 2021-11-03 ENCOUNTER — TELEPHONE (OUTPATIENT)
Dept: SURGERY | Age: 26
End: 2021-11-03

## 2021-11-03 VITALS
TEMPERATURE: 97.4 F | OXYGEN SATURATION: 100 % | DIASTOLIC BLOOD PRESSURE: 80 MMHG | HEART RATE: 110 BPM | RESPIRATION RATE: 18 BRPM | SYSTOLIC BLOOD PRESSURE: 124 MMHG

## 2021-11-03 LAB
ORG ID BY SEQUENCING, ORI1T: NORMAL
SPECIMEN SOURCE: NORMAL

## 2021-11-03 PROCEDURE — G0299 HHS/HOSPICE OF RN EA 15 MIN: HCPCS

## 2021-11-03 NOTE — TELEPHONE ENCOUNTER
Leonardo Johnson a nurse with [facility] called to report a fall. She states the patient slipped down a few steps today but reports he is not injured. She states this happened around 12:30pm because he was unlocking the door for the nurse. Any Questions call Loenardo Johnson.    CB: [phone]

## 2021-11-03 NOTE — TELEPHONE ENCOUNTER
Returned call to Concepcion Riggs and confirmed patient slipped down a couple of steps while unlocking the door for nurse. Per Concepcion Riggs patient reported he didn't hit his head and patient didn't have any injuries.

## 2021-11-04 ENCOUNTER — HOME CARE VISIT (OUTPATIENT)
Dept: HOME HEALTH SERVICES | Facility: HOME HEALTH | Age: 26
End: 2021-11-04
Payer: MEDICAID

## 2021-11-04 ENCOUNTER — HOME CARE VISIT (OUTPATIENT)
Dept: SCHEDULING | Facility: HOME HEALTH | Age: 26
End: 2021-11-04
Payer: MEDICAID

## 2021-11-04 PROCEDURE — G0300 HHS/HOSPICE OF LPN EA 15 MIN: HCPCS

## 2021-11-04 NOTE — Clinical Note
Thank you, Cuong Valerio.   ----- Message -----  From: Benjamin Quintero, PT  Sent: 11/4/2021   5:26 PM EST  To: Elmira Mcmillan RN      Per patient he wants no physical therapy at this time. Per him soon as he started to move his wound is bleeding and dont think PT is appropriate at this time.  Per patient he will contact the [facility]note 2[facility] when  he is ready for physical therapy

## 2021-11-05 ENCOUNTER — HOME CARE VISIT (OUTPATIENT)
Dept: HOME HEALTH SERVICES | Facility: HOME HEALTH | Age: 26
End: 2021-11-05
Payer: MEDICAID

## 2021-11-06 ENCOUNTER — HOME CARE VISIT (OUTPATIENT)
Dept: SCHEDULING | Facility: HOME HEALTH | Age: 26
End: 2021-11-06
Payer: MEDICAID

## 2021-11-06 PROCEDURE — G0300 HHS/HOSPICE OF LPN EA 15 MIN: HCPCS

## 2021-11-07 ENCOUNTER — HOME CARE VISIT (OUTPATIENT)
Dept: SCHEDULING | Facility: HOME HEALTH | Age: 26
End: 2021-11-07
Payer: MEDICAID

## 2021-11-07 VITALS
OXYGEN SATURATION: 100 % | RESPIRATION RATE: 14 BRPM | SYSTOLIC BLOOD PRESSURE: 120 MMHG | TEMPERATURE: 96.8 F | DIASTOLIC BLOOD PRESSURE: 62 MMHG | HEART RATE: 100 BPM

## 2021-11-07 LAB
OTHER ANTIBIOTIC SUSC ISLT: ABNORMAL
SPECIMEN SOURCE: ABNORMAL

## 2021-11-07 PROCEDURE — A6210 FOAM DRG >16<=48 SQ IN W/O B: HCPCS

## 2021-11-07 PROCEDURE — G0300 HHS/HOSPICE OF LPN EA 15 MIN: HCPCS

## 2021-11-07 PROCEDURE — A6197 ALGINATE DRSG >16 <=48 SQ IN: HCPCS

## 2021-11-07 PROCEDURE — A6252 ABSORPT DRG >16 <=48 W/O BDR: HCPCS

## 2021-11-08 ENCOUNTER — HOME CARE VISIT (OUTPATIENT)
Dept: SCHEDULING | Facility: HOME HEALTH | Age: 26
End: 2021-11-08
Payer: MEDICAID

## 2021-11-08 ENCOUNTER — OFFICE VISIT (OUTPATIENT)
Dept: INTERNAL MEDICINE CLINIC | Age: 26
End: 2021-11-08
Payer: MEDICAID

## 2021-11-08 VITALS
HEART RATE: 69 BPM | OXYGEN SATURATION: 100 % | BODY MASS INDEX: 18.97 KG/M2 | SYSTOLIC BLOOD PRESSURE: 92 MMHG | HEIGHT: 75 IN | RESPIRATION RATE: 18 BRPM | TEMPERATURE: 98.6 F | DIASTOLIC BLOOD PRESSURE: 66 MMHG

## 2021-11-08 VITALS
OXYGEN SATURATION: 97 % | TEMPERATURE: 97.1 F | RESPIRATION RATE: 19 BRPM | DIASTOLIC BLOOD PRESSURE: 66 MMHG | HEART RATE: 80 BPM | SYSTOLIC BLOOD PRESSURE: 108 MMHG

## 2021-11-08 VITALS
HEART RATE: 98 BPM | TEMPERATURE: 98.1 F | RESPIRATION RATE: 14 BRPM | DIASTOLIC BLOOD PRESSURE: 60 MMHG | OXYGEN SATURATION: 98 % | SYSTOLIC BLOOD PRESSURE: 108 MMHG

## 2021-11-08 DIAGNOSIS — L73.2 HIDRADENITIS SUPPURATIVA: ICD-10-CM

## 2021-11-08 DIAGNOSIS — Z76.89 ENCOUNTER FOR SUPPORT AND COORDINATION OF TRANSITION OF CARE: Primary | ICD-10-CM

## 2021-11-08 DIAGNOSIS — D50.9 IRON DEFICIENCY ANEMIA, UNSPECIFIED IRON DEFICIENCY ANEMIA TYPE: ICD-10-CM

## 2021-11-08 DIAGNOSIS — L05.91 PILONIDAL CYST: ICD-10-CM

## 2021-11-08 DIAGNOSIS — Z09 HOSPITAL DISCHARGE FOLLOW-UP: ICD-10-CM

## 2021-11-08 PROCEDURE — G0300 HHS/HOSPICE OF LPN EA 15 MIN: HCPCS

## 2021-11-08 PROCEDURE — 85018 HEMOGLOBIN: CPT | Performed by: NURSE PRACTITIONER

## 2021-11-08 PROCEDURE — 99213 OFFICE O/P EST LOW 20 MIN: CPT | Performed by: NURSE PRACTITIONER

## 2021-11-08 PROCEDURE — 1111F DSCHRG MED/CURRENT MED MERGE: CPT | Performed by: NURSE PRACTITIONER

## 2021-11-08 RX ORDER — TRAMADOL HYDROCHLORIDE 50 MG/1
50 TABLET ORAL
COMMUNITY

## 2021-11-08 RX ORDER — TRAMADOL HYDROCHLORIDE 50 MG/1
50 TABLET ORAL
Qty: 20 TABLET | Refills: 0 | Status: CANCELLED | OUTPATIENT
Start: 2021-11-08 | End: 2021-11-15

## 2021-11-08 RX ORDER — IBUPROFEN 800 MG/1
800 TABLET ORAL
Qty: 60 TABLET | Refills: 0 | Status: SHIPPED | OUTPATIENT
Start: 2021-11-08

## 2021-11-08 NOTE — PROGRESS NOTES
Subjective: (As above and below)     Chief Complaint   Patient presents with   [facility] Follow Up     [facility]-pt states left arm drains and refills very fast and is very painful- pt states he cannot stand or walk, he is extremely      Hinkle Carlos is a 32y.o. year old male who presents for transition of care    He was hospitalized from 10/6/21 to 11/2/21 for wound infection. This is his 2nd admission  Hx of hidradenitis and pilondial cyst which he underwent cystectomy and acellular grafting by gen sx on 9/23  He had fu w/ [facility] but missed appt  He has [place] at present    Tramadol #20 on 11/1    Depression: he has a flat affect and appears depressed, he states that it is due to being sick and thinks once this heals he will be better, denies other interventions at this point    Anemia: he does report feeling dizzy at times. He is not taking iron regularly, diet seems adequate. He denies dark/tarry/bloody stools  Per hospital notes NATHAN vs anemia of chronic disease    heis here w/ roommate who assist him      Reviewed PmHx, RxHx, FmHx, SocHx, AllgHx and updated in chart. History reviewed. No pertinent family history. Past Medical History:   Diagnosis Date    Pilonidal cyst 9/30/2021      Social History     Socioeconomic History    Marital status: SINGLE   Tobacco Use    Smoking status: Current Some Day Smoker    Smokeless tobacco: Never Used    Tobacco comment: rare   Vaping Use    Vaping Use: Never used   Substance and Sexual Activity    Alcohol use: Yes     Comment: Socially    Social History Narrative    ** Merged History Encounter **               Current Outpatient Medications   Medication Sig    traMADoL (ULTRAM) 50 mg tablet Take 50 mg by mouth every six (6) hours as needed for Pain.  ibuprofen (MOTRIN) 800 mg tablet Take 1 Tablet by mouth every eight (8) hours as needed for Pain. With food.  For mild to moderate pain (Patient not taking: Reported on 11/9/2021)    senna-docusate (PERICOLACE) 8.6-50 mg per tablet Take 1 Tablet by mouth daily.  vitamin S-M-P-lutein-minerals (OCUVITE) tablet Take 1 Tablet by mouth daily.  gabapentin (NEURONTIN) 600 mg tablet Take 600 mg by mouth daily.  ferrous sulfate 325 mg (65 mg iron) tablet Take 1 Tablet by mouth three (3) times daily (with meals). No current facility-administered medications for this visit. Facility-Administered Medications Ordered in Other Visits   Medication Dose Route Frequency    lidocaine (ALOCANE) 4 % topical gel   Topical WOUND ONCE       Review of Systems:   Constitutional:    Negative for fever and chills, negative diaphoresis. HEENT:              Negative for neck pain and stiffness. Eyes:                  Negative for visual disturbance, itching, redness or discharge. Respiratory:        Negative for cough and shortness of breath. Cardiovascular:  Negative for chest pain and palpitations. Gastrointestinal: Negative for nausea, vomiting, abdominal pain, diarrhea or constipation. Genitourinary:     Negative for dysuria and frequency. Musculoskeletal: Negative for falls, tenderness and swelling. Skin:                    Negative for rash, masses or lesions. Neurological:       Negative for dizzyness, seizure, loss of consciousness, weakness and numbness. Objective:     Vitals:    11/08/21 1113   BP: 92/66   Pulse: 69   Resp: 18   Temp: 98.6 °F (37 °C)   TempSrc: Temporal   SpO2: 100%   Height: 6' 3\" (1.905 m)           Physical Examination: General appearance - oriented to person, place, and time and flat affect  Mental status - alert, oriented to person, place, and time  Chest - clear to auscultation, no wheezes, rales or rhonchi, symmetric air entry  Heart - normal rate, regular rhythm, normal S1, S2, no murmurs, rubs, clicks or gallops  Extremities - no pedal edema noted  Skin - dressing to arm is CDI, pt is in a wheelchair due to buttocks pain     Assessment/ Plan:       1. Encounter for support and coordination of transition of care  [facility] info given for him to fu  Cont     2. Pilonidal cyst    - ibuprofen (MOTRIN) 800 mg tablet; Take 1 Tablet by mouth every eight (8) hours as needed for Pain. With food. For mild to moderate pain (Patient not taking: Reported on 11/9/2021)  Dispense: 60 Tablet; Refill: 0  - REFERRAL TO WOUND CARE    3. Hidradenitis suppurativa    - ibuprofen (MOTRIN) 800 mg tablet; Take 1 Tablet by mouth every eight (8) hours as needed for Pain. With food. For mild to moderate pain (Patient not taking: Reported on 11/9/2021)  Dispense: 60 Tablet; Refill: 0  - REFERRAL TO WOUND CARE    4. Iron deficiency anemia, unspecified iron deficiency anemia type  He has iron pills, discussed need to take, colace discussed  Close fu for recheck  - AMB POC HEMOGLOBIN (HGB)          I have discussed the diagnosis with the patient and the intended plan as seen in the above orders. The patient has received an after-visit summary and questions were answered concerning future plans. Pt conveyed understanding of plan. Medication Side Effects and Warnings were discussed with patient: yes  Patient Labs were reviewed: yes  Patient Past Records were reviewed:  yes    Raphael Fink.  Jed Enciso NP

## 2021-11-08 NOTE — PATIENT INSTRUCTIONS
[facility]   Call on 11/3/2021  has called office to schdule an appointment. If you do not here from  or [place] bu Wed, 11/3 at 2pm, please call office to verify day/time of appointment [address] [facility], [facility]  Phone: [phone] [facility]   On 5/13/2022 Your appointment time is 2:30pm.  , Bring ins card, picture id, and discharge papers, Please keep this appointment, Please arrive 15 minutes early.   [facility], [address] [place] [address]  Phone: [phone]

## 2021-11-08 NOTE — PROGRESS NOTES
Chief Complaint   Patient presents with   [facility] Follow Up     [facility] - wound infection-pt states left arm drains and refills very fast and is very painful- pt states he cannot stand or walk, he is extremely      1. Have you been to the ER, urgent care clinic since your last visit? Hospitalized since your last visit? Yes When: 10/06/2021-11/01/2021 Where: [facility] Reason for visit: wound infection    2. Have you seen or consulted any other health care providers outside of the [address] [place] [address] since your last visit? Include any pap smears or colon screening.  No

## 2021-11-09 ENCOUNTER — HOME CARE VISIT (OUTPATIENT)
Dept: SCHEDULING | Facility: HOME HEALTH | Age: 26
End: 2021-11-09
Payer: MEDICAID

## 2021-11-09 ENCOUNTER — HOSPITAL ENCOUNTER (OUTPATIENT)
Dept: WOUND CARE | Age: 26
Discharge: HOME | End: 2021-11-09
Payer: MEDICAID

## 2021-11-09 VITALS
HEART RATE: 137 BPM | RESPIRATION RATE: 16 BRPM | DIASTOLIC BLOOD PRESSURE: 79 MMHG | TEMPERATURE: 97.8 F | SYSTOLIC BLOOD PRESSURE: 138 MMHG

## 2021-11-09 VITALS
HEART RATE: 69 BPM | OXYGEN SATURATION: 100 % | TEMPERATURE: 98.6 F | SYSTOLIC BLOOD PRESSURE: 92 MMHG | DIASTOLIC BLOOD PRESSURE: 66 MMHG | RESPIRATION RATE: 18 BRPM

## 2021-11-09 PROCEDURE — G0299 HHS/HOSPICE OF RN EA 15 MIN: HCPCS

## 2021-11-09 PROCEDURE — 99215 OFFICE O/P EST HI 40 MIN: CPT

## 2021-11-09 NOTE — DISCHARGE INSTRUCTIONS
Discharge Instructions/Wound Orders  [address], [address] [place] [address] Liv Flores, [id number]  Telephone: [phone]    NAME:  Nithin Ovallesr OF BIRTH:  1995  MEDICAL RECORD NUMBER:  [id number]  DATE:  11/9/2021  Wound Care Orders:    Bilateral groin & axillary wounds, gluteal fold & inner thigh wounds - cleanse with saline or wound cleanser, cover with hydrofera blue classic moistened with saline, secure with foam border. Change daily. Follow-up with wound center in 2 weeks. Dietary:  [x] Diet as tolerated: [] Calorie Diabetic Diet:Low carb and no Sugar [] No Added Salt:[x] Increase Protein: [] Other:Limit the amount of liquid you are drinking and avoid drinking in between meals   Activity:  [x] Activity as tolerated:  [] Patient has no activity restrictions     [] Strict Bedrest: [] Remain off Work:     [] May return to full duty work:                                   [] Return to work with restrictions:             Return Appointment:  [x] Return Appointment: With DR Isabel Feliciano   in  2 Week(s)  [] Ordered tests:    Electronically signed Ashish Posada RN on 11/9/2021 at 10:34 AM     Amanda Rivas 281: Should you experience any significant changes in your wound(s) or have questions about your wound care, please contact the [address] [place] [address] at [address] [place] [address] 8:00 am - 4:30. If you need help with your wound outside these hours and cannot wait until we are again available, contact your PCP or go to the hospital emergency room. PLEASE NOTE: IF YOU ARE UNABLE TO OBTAIN WOUND SUPPLIES, CONTINUE TO USE THE SUPPLIES YOU HAVE AVAILABLE UNTIL YOU ARE ABLE TO REACH US. IT IS MOST IMPORTANT TO KEEP THE WOUND COVERED AT ALL TIMES.      Physician Signature:_______________________    Date: ___________ Time:  ____________

## 2021-11-09 NOTE — WOUND CARE
11/09/21 0911   Wound Axilla Right 11/09/21   Date First Assessed/Time First Assessed: 11/09/21 0909   Wound Approximate Age at First Assessment (Weeks): 40 weeks  Location: Axilla  Wound Location Orientation: Right  Date of First Observation: 11/09/21   Wound Image    Wound Etiology   (hydradenitis)   Dressing Status   (saturated old dressings)   Cleansed Cleansed with saline   Wound Length (cm) 9 cm   Wound Width (cm) 9 cm   Wound Depth (cm) 0.4 cm   Wound Surface Area (cm^2) 81 cm^2   Wound Volume (cm^3) 32.4 cm^3   Wound Assessment Pink/red   Drainage Amount Large   Drainage Description Sanguineous   Kayla-Wound/Incision Assessment   (fragile)   Edges Other (Comment)  (uneven edges)   Wound Thickness Description Full thickness   Wound Gluteal fold/cleft 11/09/21   Date First Assessed/Time First Assessed: 11/09/21 0931   Wound Approximate Age at First Assessment (Weeks): 40 weeks  Primary Wound Type: (c)   Location: Gluteal fold/cleft  Date of First Observation: 11/09/21   Wound Image    Wound Etiology   (hydradenitis)   Dressing Status Old drainage noted   Cleansed Cleansed with saline   Wound Length (cm) 19.6 cm   Wound Width (cm) 3.3 cm   Wound Depth (cm) 1.6 cm   Wound Surface Area (cm^2) 64.68 cm^2   Wound Volume (cm^3) 103.488 cm^3   Wound Assessment Pink/red   Drainage Amount Large   Drainage Description Sanguineous   Wound Odor None   Kayla-Wound/Incision Assessment Intact   Edges Unattached edges   Wound Thickness Description Full thickness   Wound Groin Right 11/09/21   Date First Assessed/Time First Assessed: 11/09/21 0933   Wound Approximate Age at First Assessment (Weeks): 40 weeks  Primary Wound Type: (c)   Location: Groin  Wound Location Orientation: Right  Date of First Observation: 11/09/21   Wound Image    Wound Etiology   (hydradenitis)   Dressing Status Old drainage noted   Cleansed Cleansed with saline   Wound Length (cm) 16.5 cm   Wound Width (cm) 1.3 cm   Wound Depth (cm) 0.3 cm   Wound Surface Area (cm^2) 21.45 cm^2   Wound Volume (cm^3) 6.435 cm^3   Wound Assessment Pink/red   Drainage Amount Large   Drainage Description Sanguineous   Wound Odor None   Kayla-Wound/Incision Assessment Fragile   Edges Flat/open edges   Wound Thickness Description Full thickness   Wound Thigh Inner; Right 11/09/21   Date First Assessed/Time First Assessed: 11/09/21 0935   Wound Approximate Age at First Assessment (Weeks): 40 weeks  Primary Wound Type: (c)   Location: Thigh  Wound Location Orientation: Inner; Right  Date of First Observation: 11/09/21   Wound Image    Wound Etiology   (hydrodenitis)   Dressing Status Old drainage noted   Cleansed Cleansed with saline   Wound Length (cm) 3 cm   Wound Width (cm) 5.5 cm   Wound Depth (cm) 0.1 cm   Wound Surface Area (cm^2) 16.5 cm^2   Wound Volume (cm^3) 1.65 cm^3   Drainage Amount Large   Drainage Description Sanguineous   Wound Odor None   Kayla-Wound/Incision Assessment Fragile   Edges Undefined edges   Wound Thickness Description Full thickness   Wound Groin Left 11/09/21   Date First Assessed/Time First Assessed: 11/09/21 0943   Wound Approximate Age at First Assessment (Weeks): 40 weeks  Primary Wound Type: (c)   Location: Groin  Wound Location Orientation: Left  Date of First Observation: 11/09/21   Wound Image    Wound Etiology   (hydradenitis)   Dressing Status Old drainage noted   Cleansed Cleansed with saline   Wound Length (cm) 18 cm   Wound Width (cm) 3 cm   Wound Depth (cm) 0.2 cm   Wound Surface Area (cm^2) 54 cm^2   Wound Volume (cm^3) 10.8 cm^3   Wound Assessment Pink/red   Drainage Amount Large   Drainage Description Sanguineous   Wound Odor None   Kayla-Wound/Incision Assessment Fragile   Edges Undefined edges   Wound Thickness Description Full thickness   Wound Axilla Left 11/09/21   Date First Assessed/Time First Assessed: 11/09/21 0957   Wound Approximate Age at First Assessment (Weeks): 40 weeks  Primary Wound Type: (c)   Location: Axilla  Wound Location Orientation: Left  Date of First Observation: 11/09/21   Wound Image     Wound Etiology   (hydradenitis)   Dressing Status Old drainage noted   Cleansed Cleansed with saline   Wound Length (cm) 9 cm   Wound Width (cm) 15 cm   Wound Depth (cm) 0.3 cm   Wound Surface Area (cm^2) 135 cm^2   Wound Volume (cm^3) 40.5 cm^3   Drainage Amount Large   Drainage Description Sanguineous   Wound Odor None   Kayla-Wound/Incision Assessment Fragile   Edges Unattached edges   Wound Thickness Description Full thickness     Visit Vitals  /79 (BP Patient Position: Supine)   Pulse (!) 137   Temp 97.8 °F (36.6 °C)   Resp 16

## 2021-11-10 ENCOUNTER — HOME CARE VISIT (OUTPATIENT)
Dept: SCHEDULING | Facility: HOME HEALTH | Age: 26
End: 2021-11-10
Payer: MEDICAID

## 2021-11-10 VITALS
SYSTOLIC BLOOD PRESSURE: 104 MMHG | TEMPERATURE: 98.5 F | HEART RATE: 90 BPM | OXYGEN SATURATION: 99 % | DIASTOLIC BLOOD PRESSURE: 62 MMHG | RESPIRATION RATE: 14 BRPM

## 2021-11-10 LAB
AEROBIC ID BY SEQ, ORI2T: NORMAL
BACTERIA ISLT: ABNORMAL
HGB BLD-MCNC: 7.4 G/DL
OTHER ANTIBIOTIC SUSC ISLT: ABNORMAL
SOURCE, RSRC70: ABNORMAL
SPECIMEN SOURCE: NORMAL

## 2021-11-10 PROCEDURE — G0300 HHS/HOSPICE OF LPN EA 15 MIN: HCPCS

## 2021-11-11 ENCOUNTER — OFFICE VISIT (OUTPATIENT)
Dept: SURGERY | Age: 26
End: 2021-11-11
Payer: MEDICAID

## 2021-11-11 ENCOUNTER — HOME CARE VISIT (OUTPATIENT)
Dept: HOME HEALTH SERVICES | Facility: HOME HEALTH | Age: 26
End: 2021-11-11
Payer: MEDICAID

## 2021-11-11 VITALS
HEART RATE: 106 BPM | DIASTOLIC BLOOD PRESSURE: 62 MMHG | TEMPERATURE: 98.7 F | HEIGHT: 75 IN | OXYGEN SATURATION: 99 % | WEIGHT: 150 LBS | BODY MASS INDEX: 18.65 KG/M2 | RESPIRATION RATE: 18 BRPM | SYSTOLIC BLOOD PRESSURE: 110 MMHG

## 2021-11-11 DIAGNOSIS — L05.91 PILONIDAL CYST: Primary | ICD-10-CM

## 2021-11-11 PROCEDURE — 99024 POSTOP FOLLOW-UP VISIT: CPT | Performed by: SURGERY

## 2021-11-11 NOTE — PROGRESS NOTES
Rafa Brooks is a 32 y.o. male who returns for a wound check. Mr. Tommi Skiff is s/p pilonidal cystectomy/application of acellular xenograft on September 23, 2021. Last seen on October 26, 2021 during a hospitalization for a wound infection. Discharged to home on November 2, 2021. Doing fairly well since then. Still c/o pain at pilonidal cystectomy wound and areas of hidradenitis. No fevers or chills. No nausea or vomitting. No problems with local wound care. Mr. Tommi Skiff tells me that he has an appointment with Dermatology in the next few weeks. He has otherwise been in his usual state of health. Past Medical History:   Diagnosis Date    Pilonidal cyst 9/30/2021     History reviewed. No pertinent surgical history. History reviewed. No pertinent family history. Social History     Socioeconomic History    Marital status: SINGLE   Tobacco Use    Smoking status: Current Some Day Smoker    Smokeless tobacco: Never Used    Tobacco comment: rare   Vaping Use    Vaping Use: Never used   Substance and Sexual Activity    Alcohol use: Yes     Comment: Socially    Social History Narrative    ** Merged History Encounter **          Review of systems negative except as noted. Review of Systems   Constitutional: Negative for chills and fever. Gastrointestinal: Negative for nausea and vomiting. Musculoskeletal:        Pain at areas of hidradenitis and wound in intergluteal cleft. Physical Exam  Vitals reviewed. Constitutional:       General: He is not in acute distress. Appearance: Normal appearance. He is normal weight. HENT:      Head: Normocephalic and atraumatic. Cardiovascular:      Rate and Rhythm: Normal rate and regular rhythm. Pulmonary:      Effort: Pulmonary effort is normal.      Breath sounds: Normal breath sounds. Abdominal:      General: There is no distension. Palpations: Abdomen is soft. Tenderness: There is no abdominal tenderness.    Musculoskeletal: General: Normal range of motion. Skin:     Comments: Pilonidal cystectomy wound is clean and granulating. Neurological:      General: No focal deficit present. Mental Status: He is alert. ASSESSMENT and PLAN  Pilonidal cystectomy wound is clean and granulating. Mr. Clorinda Klinefelter is doing fairly well post-operatively. Reassured Mr. Clorinda Klinefelter that he is doing well and that the wound is healing. Continue local wound care as before. Activity, diet and nutritional supplements as tolerated. Follow up with Dermatology as scheduled. Will see in three more weeks or earlier if need be.       CC: Dakotah Booth, NP

## 2021-11-11 NOTE — PROGRESS NOTES
1. Have you been to the ER, urgent care clinic since your last visit? Hospitalized since your last visit? No    2. Have you seen or consulted any other health care providers outside of the [address] [place] [address] since your last visit? Include any pap smears or colon screening.  No

## 2021-11-12 ENCOUNTER — HOME CARE VISIT (OUTPATIENT)
Dept: SCHEDULING | Facility: HOME HEALTH | Age: 26
End: 2021-11-12
Payer: MEDICAID

## 2021-11-12 VITALS
DIASTOLIC BLOOD PRESSURE: 64 MMHG | TEMPERATURE: 100.2 F | OXYGEN SATURATION: 98 % | HEART RATE: 102 BPM | RESPIRATION RATE: 18 BRPM | SYSTOLIC BLOOD PRESSURE: 102 MMHG

## 2021-11-12 PROCEDURE — G0299 HHS/HOSPICE OF RN EA 15 MIN: HCPCS

## 2021-11-12 PROCEDURE — A6210 FOAM DRG >16<=48 SQ IN W/O B: HCPCS

## 2021-11-13 ENCOUNTER — HOME CARE VISIT (OUTPATIENT)
Dept: HOME HEALTH SERVICES | Facility: HOME HEALTH | Age: 26
End: 2021-11-13
Payer: MEDICAID

## 2021-11-14 ENCOUNTER — HOME CARE VISIT (OUTPATIENT)
Dept: SCHEDULING | Facility: HOME HEALTH | Age: 26
End: 2021-11-14
Payer: MEDICAID

## 2021-11-14 PROCEDURE — G0300 HHS/HOSPICE OF LPN EA 15 MIN: HCPCS

## 2021-11-15 ENCOUNTER — HOME CARE VISIT (OUTPATIENT)
Dept: SCHEDULING | Facility: HOME HEALTH | Age: 26
End: 2021-11-15
Payer: MEDICAID

## 2021-11-15 VITALS
OXYGEN SATURATION: 99 % | DIASTOLIC BLOOD PRESSURE: 68 MMHG | RESPIRATION RATE: 20 BRPM | HEART RATE: 76 BPM | TEMPERATURE: 97.8 F | SYSTOLIC BLOOD PRESSURE: 127 MMHG

## 2021-11-15 VITALS
SYSTOLIC BLOOD PRESSURE: 108 MMHG | OXYGEN SATURATION: 100 % | HEART RATE: 105 BPM | TEMPERATURE: 99.8 F | DIASTOLIC BLOOD PRESSURE: 68 MMHG

## 2021-11-15 PROCEDURE — G0299 HHS/HOSPICE OF RN EA 15 MIN: HCPCS

## 2021-11-16 ENCOUNTER — HOME CARE VISIT (OUTPATIENT)
Dept: HOME HEALTH SERVICES | Facility: HOME HEALTH | Age: 26
End: 2021-11-16
Payer: MEDICAID

## 2021-11-16 ENCOUNTER — HOME CARE VISIT (OUTPATIENT)
Dept: SCHEDULING | Facility: HOME HEALTH | Age: 26
End: 2021-11-16
Payer: MEDICAID

## 2021-11-16 NOTE — CASE COMMUNICATION
Hi Dr. Katherin Conn declined a home health nursing visit today due to not being home. We will see him tomorrow as planned. Also, would you mind if we change from using abdominal pads to optilock due to his large amount of drainage?      Thanks so much,   Concepcion Riggs RN

## 2021-11-17 ENCOUNTER — HOME CARE VISIT (OUTPATIENT)
Dept: SCHEDULING | Facility: HOME HEALTH | Age: 26
End: 2021-11-17
Payer: MEDICAID

## 2021-11-17 VITALS
TEMPERATURE: 97.7 F | SYSTOLIC BLOOD PRESSURE: 108 MMHG | DIASTOLIC BLOOD PRESSURE: 62 MMHG | OXYGEN SATURATION: 100 % | HEART RATE: 120 BPM | RESPIRATION RATE: 16 BRPM

## 2021-11-17 PROCEDURE — A6196 ALGINATE DRESSING <=16 SQ IN: HCPCS

## 2021-11-17 PROCEDURE — A6197 ALGINATE DRSG >16 <=48 SQ IN: HCPCS

## 2021-11-17 PROCEDURE — A6198 ALGINATE DRESSING > 48 SQ IN: HCPCS

## 2021-11-17 PROCEDURE — G0299 HHS/HOSPICE OF RN EA 15 MIN: HCPCS

## 2021-11-17 NOTE — H&P
[facility]  HISTORY AND PHYSICAL    Name:  Camila Henry  MR#:  [id number]  :  1995  ACCOUNT #:  [de-identified]  ADMIT DATE:  2021    CHIEF COMPLAINT:  Hidradenitis. HISTORY OF PRESENT ILLNESS:  This unfortunate 26-year-old male presents with an approximately 10-month history of severe, multifocal hidradenitis suppurativa. He has been hospitalized several times within the past year and has been seen at the emergency department several times for antibiotics and for incision and drainage. Several weeks ago, he was hospitalized at [facility] and underwent debridement of an apparent pilonidal cyst with allograft placement. This has helped this particular area somewhat, but the other areas persist.  He also describes significant weight loss of approximately 50 pounds over this 10-month period. He had not had any evidence of hidradenitis prior to 2021. He has been on multiple rounds of both oral and IV antibiotics. He had an HIV test that was negative in 2021. He quit smoking years ago. He has no history of diabetes. PAST MEDICAL HISTORY:  As above. ALLERGIES:  PENICILLINS. CURRENT MEDICATIONS:  Include tramadol, ibuprofen, gabapentin, iron. SOCIAL HISTORY:  Denies current tobacco use. Works in security. FAMILY HISTORY:  Noncontributory. PHYSICAL EXAMINATION:  GENERAL:  He is a thin, ill-appearing young man. VITAL SIGNS:  Temperature 98.6, heart rate 69, blood pressure 92/66, respirations 18. HEENT:  Atraumatic. CHEST:  Normal respirations. HEART:  Regular. ABDOMEN:  Soft. EXTREMITIES:  Unremarkable. SKIN:  He has multiple areas of acute hidradenitis suppurativa, specifically bilateral axillae, bilateral groins, and his intergluteal cleft. There are multiple draining sinus tracts and fistulas with erythematous, fibrotic skin. There is no fluctuance or evidence of undrained abscess.     ASSESSMENT:  Multifocal, severe hidradenitis suppurativa. No underlying risk factors including nicotine use or obesity. He has tried and failed multiple conservative treatments including antibiotics. Also has significant weight loss over the last year. Review of recent labs showed that he is severely anemic with a most recent hemoglobin of 7.7. This is one of the more severe cases I have seen. I believe there is something immunologic, neoplastic, or other infection going on behind his hidradenitis, as it seems to have started and progressed quickly along with underlying weight loss and anemia. He does not appear to have a primary care physician. We will refer to [facility] Rahat and Deloris Espinoza. Until then, continue wound care as prescribed as being performed by home health.         Kathryn Spencer MD      [id number]  D:  11/17/2021 15:10  T:  11/17/2021 17:05  JOB #:  [id number]

## 2021-11-18 ENCOUNTER — HOME CARE VISIT (OUTPATIENT)
Dept: SCHEDULING | Facility: HOME HEALTH | Age: 26
End: 2021-11-18
Payer: MEDICAID

## 2021-11-18 PROCEDURE — A6210 FOAM DRG >16<=48 SQ IN W/O B: HCPCS

## 2021-11-18 PROCEDURE — G0300 HHS/HOSPICE OF LPN EA 15 MIN: HCPCS

## 2021-11-18 PROCEDURE — A4216 STERILE WATER/SALINE, 10 ML: HCPCS

## 2021-11-18 PROCEDURE — A6216 NON-STERILE GAUZE<=16 SQ IN: HCPCS

## 2021-11-18 PROCEDURE — A4452 WATERPROOF TAPE: HCPCS

## 2021-11-19 ENCOUNTER — HOME CARE VISIT (OUTPATIENT)
Dept: SCHEDULING | Facility: HOME HEALTH | Age: 26
End: 2021-11-19
Payer: MEDICAID

## 2021-11-19 PROCEDURE — G0300 HHS/HOSPICE OF LPN EA 15 MIN: HCPCS

## 2021-11-20 ENCOUNTER — HOME CARE VISIT (OUTPATIENT)
Dept: SCHEDULING | Facility: HOME HEALTH | Age: 26
End: 2021-11-20
Payer: MEDICAID

## 2021-11-20 PROCEDURE — A6252 ABSORPT DRG >16 <=48 W/O BDR: HCPCS

## 2021-11-20 PROCEDURE — MED12350

## 2021-11-20 PROCEDURE — A6210 FOAM DRG >16<=48 SQ IN W/O B: HCPCS

## 2021-11-21 ENCOUNTER — HOME CARE VISIT (OUTPATIENT)
Dept: SCHEDULING | Facility: HOME HEALTH | Age: 26
End: 2021-11-21
Payer: MEDICAID

## 2021-11-21 VITALS
DIASTOLIC BLOOD PRESSURE: 70 MMHG | HEART RATE: 94 BPM | RESPIRATION RATE: 18 BRPM | TEMPERATURE: 97 F | OXYGEN SATURATION: 99 % | SYSTOLIC BLOOD PRESSURE: 118 MMHG

## 2021-11-21 VITALS
TEMPERATURE: 98 F | SYSTOLIC BLOOD PRESSURE: 110 MMHG | HEART RATE: 98 BPM | DIASTOLIC BLOOD PRESSURE: 68 MMHG | OXYGEN SATURATION: 99 %

## 2021-11-21 PROCEDURE — G0299 HHS/HOSPICE OF RN EA 15 MIN: HCPCS

## 2021-11-22 ENCOUNTER — HOME CARE VISIT (OUTPATIENT)
Dept: SCHEDULING | Facility: HOME HEALTH | Age: 26
End: 2021-11-22
Payer: MEDICAID

## 2021-11-22 VITALS
TEMPERATURE: 99.7 F | DIASTOLIC BLOOD PRESSURE: 68 MMHG | SYSTOLIC BLOOD PRESSURE: 98 MMHG | OXYGEN SATURATION: 100 % | HEART RATE: 100 BPM | RESPIRATION RATE: 18 BRPM

## 2021-11-22 PROCEDURE — G0299 HHS/HOSPICE OF RN EA 15 MIN: HCPCS

## 2021-11-23 ENCOUNTER — HOME CARE VISIT (OUTPATIENT)
Dept: SCHEDULING | Facility: HOME HEALTH | Age: 26
End: 2021-11-23
Payer: MEDICAID

## 2021-11-24 ENCOUNTER — HOME CARE VISIT (OUTPATIENT)
Dept: HOME HEALTH SERVICES | Facility: HOME HEALTH | Age: 26
End: 2021-11-24
Payer: MEDICAID

## 2021-11-25 ENCOUNTER — HOME CARE VISIT (OUTPATIENT)
Dept: HOME HEALTH SERVICES | Facility: HOME HEALTH | Age: 26
End: 2021-11-25
Payer: MEDICAID

## 2021-11-26 ENCOUNTER — TELEPHONE (OUTPATIENT)
Dept: INTERNAL MEDICINE CLINIC | Age: 26
End: 2021-11-26

## 2021-11-26 ENCOUNTER — HOME CARE VISIT (OUTPATIENT)
Dept: SCHEDULING | Facility: HOME HEALTH | Age: 26
End: 2021-11-26
Payer: MEDICAID

## 2021-11-26 PROCEDURE — 400014 HH F/U

## 2021-11-26 PROCEDURE — G0300 HHS/HOSPICE OF LPN EA 15 MIN: HCPCS

## 2021-11-26 NOTE — TELEPHONE ENCOUNTER
Tami Wisdom with [facility]. Pt has a lot of mucous in his chest.  He wants rx sent to the pharmacy for Mucinex please.   Ms. Amarilislizzette Duvall # [phone]

## 2021-11-27 ENCOUNTER — HOME CARE VISIT (OUTPATIENT)
Dept: SCHEDULING | Facility: HOME HEALTH | Age: 26
End: 2021-11-27
Payer: MEDICAID

## 2021-11-27 VITALS
HEART RATE: 111 BPM | SYSTOLIC BLOOD PRESSURE: 114 MMHG | OXYGEN SATURATION: 96 % | RESPIRATION RATE: 18 BRPM | TEMPERATURE: 97.9 F | DIASTOLIC BLOOD PRESSURE: 60 MMHG

## 2021-11-27 VITALS
DIASTOLIC BLOOD PRESSURE: 62 MMHG | HEART RATE: 100 BPM | OXYGEN SATURATION: 96 % | RESPIRATION RATE: 16 BRPM | TEMPERATURE: 97.4 F | SYSTOLIC BLOOD PRESSURE: 128 MMHG

## 2021-11-27 PROCEDURE — A6210 FOAM DRG >16<=48 SQ IN W/O B: HCPCS

## 2021-11-27 PROCEDURE — A6216 NON-STERILE GAUZE<=16 SQ IN: HCPCS

## 2021-11-27 PROCEDURE — A6446 CONFORM BAND S W>=3" <5"/YD: HCPCS

## 2021-11-27 PROCEDURE — A4452 WATERPROOF TAPE: HCPCS

## 2021-11-27 PROCEDURE — G0300 HHS/HOSPICE OF LPN EA 15 MIN: HCPCS

## 2021-11-27 PROCEDURE — A4216 STERILE WATER/SALINE, 10 ML: HCPCS

## 2021-11-28 ENCOUNTER — HOME CARE VISIT (OUTPATIENT)
Dept: SCHEDULING | Facility: HOME HEALTH | Age: 26
End: 2021-11-28
Payer: MEDICAID

## 2021-11-28 VITALS
DIASTOLIC BLOOD PRESSURE: 62 MMHG | SYSTOLIC BLOOD PRESSURE: 120 MMHG | OXYGEN SATURATION: 96 % | RESPIRATION RATE: 16 BRPM | HEART RATE: 116 BPM | TEMPERATURE: 98.1 F

## 2021-11-28 PROCEDURE — G0300 HHS/HOSPICE OF LPN EA 15 MIN: HCPCS

## 2021-11-29 ENCOUNTER — HOME CARE VISIT (OUTPATIENT)
Dept: SCHEDULING | Facility: HOME HEALTH | Age: 26
End: 2021-11-29
Payer: MEDICAID

## 2021-11-29 PROCEDURE — G0300 HHS/HOSPICE OF LPN EA 15 MIN: HCPCS

## 2021-11-29 RX ORDER — GUAIFENESIN 600 MG/1
600 TABLET, EXTENDED RELEASE ORAL 2 TIMES DAILY
Qty: 60 TABLET | Refills: 0 | Status: SHIPPED | OUTPATIENT
Start: 2021-11-29

## 2021-11-30 ENCOUNTER — HOME CARE VISIT (OUTPATIENT)
Dept: HOME HEALTH SERVICES | Facility: HOME HEALTH | Age: 26
End: 2021-11-30
Payer: MEDICAID

## 2021-11-30 VITALS
TEMPERATURE: 98.4 F | HEART RATE: 89 BPM | OXYGEN SATURATION: 95 % | DIASTOLIC BLOOD PRESSURE: 70 MMHG | RESPIRATION RATE: 18 BRPM | SYSTOLIC BLOOD PRESSURE: 112 MMHG

## 2021-12-01 ENCOUNTER — TELEPHONE (OUTPATIENT)
Dept: SURGERY | Age: 26
End: 2021-12-01

## 2021-12-01 ENCOUNTER — HOME CARE VISIT (OUTPATIENT)
Dept: SCHEDULING | Facility: HOME HEALTH | Age: 26
End: 2021-12-01
Payer: MEDICAID

## 2021-12-01 PROCEDURE — G0300 HHS/HOSPICE OF LPN EA 15 MIN: HCPCS

## 2021-12-01 PROCEDURE — A4216 STERILE WATER/SALINE, 10 ML: HCPCS

## 2021-12-01 PROCEDURE — A6252 ABSORPT DRG >16 <=48 W/O BDR: HCPCS

## 2021-12-01 NOTE — TELEPHONE ENCOUNTER
Called Patient about missed appointment yesterday with Navya Miller. Patient was aware and said he would call back to reschedule.

## 2021-12-02 ENCOUNTER — HOME CARE VISIT (OUTPATIENT)
Dept: HOME HEALTH SERVICES | Facility: HOME HEALTH | Age: 26
End: 2021-12-02
Payer: MEDICAID

## 2021-12-03 ENCOUNTER — HOME CARE VISIT (OUTPATIENT)
Dept: HOME HEALTH SERVICES | Facility: HOME HEALTH | Age: 26
End: 2021-12-03
Payer: MEDICAID

## 2022-01-27 ENCOUNTER — OFFICE VISIT (OUTPATIENT)
Dept: INTERNAL MEDICINE CLINIC | Age: 27
End: 2022-01-27
Payer: MEDICAID

## 2022-01-27 VITALS
RESPIRATION RATE: 17 BRPM | HEART RATE: 110 BPM | BODY MASS INDEX: 19.4 KG/M2 | HEIGHT: 75 IN | SYSTOLIC BLOOD PRESSURE: 110 MMHG | DIASTOLIC BLOOD PRESSURE: 90 MMHG | WEIGHT: 156 LBS | TEMPERATURE: 98.2 F | OXYGEN SATURATION: 99 %

## 2022-01-27 DIAGNOSIS — R00.0 TACHYCARDIA: Primary | ICD-10-CM

## 2022-01-27 DIAGNOSIS — L73.2 HIDRADENITIS: ICD-10-CM

## 2022-01-27 PROCEDURE — 99213 OFFICE O/P EST LOW 20 MIN: CPT | Performed by: NURSE PRACTITIONER

## 2022-01-27 NOTE — PROGRESS NOTES
Chief Complaint   Patient presents with    Follow-up       1. Have you been to the ER, urgent care clinic since your last visit? Hospitalized since your last visit? Yes When: 12/02/2021 Where: [facility]    2. Have you seen or consulted any other health care providers outside of the [address] [place] [address] since your last visit? Include any pap smears or colon screening.  No

## 2022-01-28 NOTE — PROGRESS NOTES
Subjective: (As above and below)     Chief Complaint   Patient presents with    Follow-up     Vilma Blake is a 32y.o. year old male who presents for     Hidradenitis: doing own wound care, some struggle due to location  Running low on supplies  Continues to have drainage  Has    He has draining abscesses to bilat axilla and pilonidal area. He has been hospitalized twice for condition which has been refractory to several treatments. He needs outpatient follow up and additional wound care supplies. He need to change dressings twice daily and additionally as needed for drainage. He is to have outpatient fu w/ wound    Depression; he reports feeling better bc he can move around more    Noted tachycardia: denies cp, sob, leg swelling    Reviewed PmHx, RxHx, FmHx, SocHx, AllgHx and updated in chart. History reviewed. No pertinent family history. Past Medical History:   Diagnosis Date    Pilonidal cyst 9/30/2021      Social History     Socioeconomic History    Marital status: SINGLE   Tobacco Use    Smoking status: Current Some Day Smoker    Smokeless tobacco: Never Used    Tobacco comment: rare   Vaping Use    Vaping Use: Never used   Substance and Sexual Activity    Alcohol use: Yes     Comment: Socially    Social History Narrative    ** Merged History Encounter **               Current Outpatient Medications   Medication Sig    ibuprofen (MOTRIN) 800 mg tablet Take 1 Tablet by mouth every eight (8) hours as needed for Pain. With food. For mild to moderate pain    senna-docusate (PERICOLACE) 8.6-50 mg per tablet Take 1 Tablet by mouth daily.  guaiFENesin ER (MUCINEX) 600 mg ER tablet Take 1 Tablet by mouth two (2) times a day.  traMADoL (ULTRAM) 50 mg tablet Take 50 mg by mouth every six (6) hours as needed for Pain. (Patient not taking: Reported on 1/27/2022)    vitamin F-B-R-lutein-minerals (OCUVITE) tablet Take 1 Tablet by mouth daily.  (Patient not taking: Reported on 11/11/2021)    gabapentin (NEURONTIN) 600 mg tablet Take 600 mg by mouth daily. (Patient not taking: Reported on 11/11/2021)    ferrous sulfate 325 mg (65 mg iron) tablet Take 1 Tablet by mouth three (3) times daily (with meals). (Patient not taking: Reported on 1/27/2022)     No current facility-administered medications for this visit. Review of Systems:   Constitutional:    Negative for fever and chills, negative diaphoresis. HEENT:              Negative for neck pain and stiffness. Eyes:                  Negative for visual disturbance, itching, redness or discharge. Respiratory:        Negative for cough and shortness of breath. Cardiovascular:  Negative for chest pain and palpitations. Gastrointestinal: Negative for nausea, vomiting, abdominal pain, diarrhea or constipation. Genitourinary:     Negative for dysuria and frequency. Musculoskeletal: Negative for falls, tenderness and swelling. Skin:                    Negative for rash, masses or lesions. Neurological:       Negative for dizzyness, seizure, loss of consciousness, weakness and numbness. Objective:     Vitals:    01/27/22 1441   BP: (!) 110/90   Pulse: (!) 110   Resp: 17   Temp: 98.2 °F (36.8 °C)   TempSrc: Temporal   SpO2: 99%   Weight: 156 lb (70.8 kg)   Height: 6' 3\" (1.905 m)       Results for orders placed or performed in visit on 11/08/21   AMB POC HEMOGLOBIN (HGB)   Result Value Ref Range    Hemoglobin (POC) 7.4 G/DL         Physical Examination: General appearance - alert, well appearing, and in no distress  Mental status - alert, oriented to person, place, and time  Chest - clear to auscultation, no wheezes, rales or rhonchi, symmetric air entry  Heart - tachycardia  Extremities - peripheral pulses normal, no pedal edema, no clubbing or cyanosis      Assessment/ Plan:       1. Tachycardia    - THYROID CASCADE PROFILE; Future    2.  Hidradenitis    - AMB SUPPLY ORDER  - REFERRAL TO HOME HEALTH        I have discussed the diagnosis with the patient and the intended plan as seen in the above orders. The patient has received an after-visit summary and questions were answered concerning future plans. Pt conveyed understanding of plan. Medication Side Effects and Warnings were discussed with patient: yes  Patient Labs were reviewed: yes  Patient Past Records were reviewed:  yes    Jael Walter.  Sandor Solorio NP

## 2022-02-02 ENCOUNTER — TELEPHONE (OUTPATIENT)
Dept: INTERNAL MEDICINE CLINIC | Age: 27
End: 2022-02-02

## 2022-02-02 NOTE — TELEPHONE ENCOUNTER
Rodri Portering with [facility] home care states they are not accepting him  Ileana's # [phone]

## 2022-02-14 NOTE — TELEPHONE ENCOUNTER
Dean at home don't take [employer], [facility]

## 2022-02-24 NOTE — ROUTINE PROCESS
Primary Nurse Kaushik Haley RN and AMANDA RN performed a dual skin assessment on this patient Impairment noted- see wound doc flow sheet  \  \  WOUND CARE CONSULT PLACED-  BILATERAL AXILLA wounds-   GROIN wounds(FRONT AND BACK) and between buttocks    All wounds cleaned with NS and wet to dry applied
Yes

## 2022-03-18 PROBLEM — L08.9 WOUND INFECTION: Status: ACTIVE | Noted: 2021-10-07

## 2022-03-18 PROBLEM — T14.8XXA WOUND INFECTION: Status: ACTIVE | Noted: 2021-10-07

## 2022-03-19 PROBLEM — B35.1 DERMATOPHYTOSIS OF NAIL: Status: ACTIVE | Noted: 2021-10-16

## 2022-03-19 PROBLEM — L05.91 PILONIDAL CYST: Status: ACTIVE | Noted: 2021-09-30

## 2022-03-19 PROBLEM — Z51.89 ENCOUNTER FOR WOUND CARE: Status: ACTIVE | Noted: 2021-09-20

## 2022-03-19 PROBLEM — L73.2 HIDRADENITIS SUPPURATIVA: Status: ACTIVE | Noted: 2021-06-04

## 2022-03-19 PROBLEM — L08.9 SOFT TISSUE INFECTION: Status: ACTIVE | Noted: 2021-06-04

## 2022-03-20 PROBLEM — L73.2 HIDRADENITIS: Status: ACTIVE | Noted: 2021-06-12

## 2022-12-13 ENCOUNTER — VIRTUAL VISIT (OUTPATIENT)
Dept: INTERNAL MEDICINE CLINIC | Age: 27
End: 2022-12-13
Payer: MEDICAID

## 2022-12-13 DIAGNOSIS — L73.2 HIDRADENITIS: Primary | ICD-10-CM

## 2022-12-13 PROCEDURE — 99213 OFFICE O/P EST LOW 20 MIN: CPT | Performed by: NURSE PRACTITIONER

## 2022-12-13 RX ORDER — ADALIMUMAB 80MG/0.8ML
KIT SUBCUTANEOUS
COMMUNITY
Start: 2022-12-08

## 2022-12-13 NOTE — PROGRESS NOTES
Chief Complaint   Patient presents with    Follow-up     Disability     Form Completion     [facility] bed        1. \"Have you been to the ER, urgent care clinic since your last visit? Hospitalized since your last visit? \" No    2. \"Have you seen or consulted any other health care providers outside of the [address] [place] [address] since your last visit? \" No     3. For patients aged 39-70: Has the patient had a colonoscopy / FIT/ Cologuard? NA - based on age      If the patient is female:    4. For patients aged 41-77: Has the patient had a mammogram within the past 2 years? NA - based on age or sex      11. For patients aged 21-65: Has the patient had a pap smear?  NA - based on age or sex

## 2022-12-13 NOTE — PROGRESS NOTES
Norbert Sandoval is a 32 y.o. male who was seen by synchronous (real-time) audio-video technology on 12/13/2022 for Follow-up (Disability ) and Form Completion ([facility] )        Assessment & Plan:   Diagnoses and all orders for this visit:    1. Hidradenitis    Fu derm    Subjective:       Prior to Admission medications    Medication Sig Start Date End Date Taking? Authorizing Provider   BRYAN Beal, Pen 80 mg/0.8 mL pnkt injection  12/8/22  Yes Provider, Historical   guaiFENesin ER (MUCINEX) 600 mg ER tablet Take 1 Tablet by mouth two (2) times a day. Patient not taking: Reported on 12/13/2022 11/29/21 12/13/22  Yadira Reyes NP   traMADoL Araceli Sox) 50 mg tablet Take 50 mg by mouth every six (6) hours as needed for Pain. Patient not taking: Reported on 1/27/2022 12/13/22  Provider, Historical   ibuprofen (MOTRIN) 800 mg tablet Take 1 Tablet by mouth every eight (8) hours as needed for Pain. With food. For mild to moderate pain  Patient not taking: Reported on 12/13/2022 11/8/21 12/13/22  Tran Dawson NP   senna-docusate (PERICOLACE) 8.6-50 mg per tablet Take 1 Tablet by mouth daily. Patient not taking: Reported on 12/13/2022 11/2/21 12/13/22  Narcisa Whaley MD   vitamin D-W-D-lutein-minerals (OCUVITE) tablet Take 1 Tablet by mouth daily. Patient not taking: Reported on 11/11/2021 11/2/21 12/13/22  Narcisa Whaley MD   gabapentin (NEURONTIN) 600 mg tablet Take 600 mg by mouth daily. Patient not taking: Reported on 11/11/2021 12/13/22  Provider, Historical   ferrous sulfate 325 mg (65 mg iron) tablet Take 1 Tablet by mouth three (3) times daily (with meals). Patient not taking: Reported on 1/27/2022 9/24/21 12/13/22  Miko Jade MD     Patient Active Problem List   Diagnosis Code    Hidradenitis suppurativa L73.2    Soft tissue infection L08.9    Hidradenitis L73.2    Encounter for wound care Z51.89    Pilonidal cyst L05.91    Wound infection T14. 8XXA, L08.9    Dermatophytosis of nail B35.1       ROS    Severe HS: hospitalization over the summer for severe HS and sepsis  He has an electric hospital bed which helps w/ pain  He is doing much better - sees derm on humira  He asks for [employer] form for electric bed  He is on disability for HS      Objective:   No flowsheet data found. [INSTRUCTIONS:  \"[x]\" Indicates a positive item  \"[]\" Indicates a negative item  -- DELETE ALL ITEMS NOT EXAMINED]    Constitutional: [x] Appears well-developed and well-nourished [x] No apparent distress      [] Abnormal -     Mental status: [x] Alert and awake  [x] Oriented to person/place/time [x] Able to follow commands    [] Abnormal -     Eyes:   EOM    [x]  Normal    [] Abnormal -   Sclera  [x]  Normal    [] Abnormal -          Discharge [x]  None visible   [] Abnormal -     HENT: [x] Normocephalic, atraumatic  [] Abnormal -   [x] Mouth/Throat: Mucous membranes are moist    External Ears [x] Normal  [] Abnormal -    Neck: [x] No visualized mass [] Abnormal -     Pulmonary/Chest: [x] Respiratory effort normal   [x] No visualized signs of difficulty breathing or respiratory distress        [] Abnormal -      Musculoskeletal:   [x] Normal gait with no signs of ataxia         [x] Normal range of motion of neck        [] Abnormal -     Neurological:        [x] No Facial Asymmetry (Cranial nerve 7 motor function) (limited exam due to video visit)          [x] No gaze palsy        [] Abnormal -          Skin:        [x] No significant exanthematous lesions or discoloration noted on facial skin         [] Abnormal -            Psychiatric:       [x] Normal Affect [] Abnormal -        [x] No Hallucinations    Other pertinent observable physical exam findings:-        We discussed the expected course, resolution and complications of the diagnosis(es) in detail. Medication risks, benefits, costs, interactions, and alternatives were discussed as indicated.   I advised him to contact the office if his condition worsens, changes or fails to improve as anticipated. He expressed understanding with the diagnosis(es) and plan. Ria Favre, was evaluated through a synchronous (real-time) audio-video encounter. The patient (or guardian if applicable) is aware that this is a billable service, which includes applicable co-pays. This Virtual Visit was conducted with patient's (and/or legal guardian's) consent. The visit was conducted pursuant to the emergency declaration under the [address] [place] [address] authority and the [employer] and [employer] General Act. Patient identification was verified, and a caregiver was present when appropriate. The patient was located at: Home: [address] [place] [phone]  The provider was located at: Home: [email or web address]@        [facility].  Maik Morris NP

## 2023-08-16 ENCOUNTER — TELEPHONE (OUTPATIENT)
Facility: CLINIC | Age: 28
End: 2023-08-16

## 2023-08-16 NOTE — TELEPHONE ENCOUNTER
[facility] PT is requesting an order for PT with DX Hidradenitis, faxed to [phone]

## 2023-08-17 DIAGNOSIS — L73.2 HIDRADENITIS: Primary | ICD-10-CM

## 2024-01-29 ENCOUNTER — TELEPHONE (OUTPATIENT)
Facility: CLINIC | Age: 29
End: 2024-01-29

## 2024-01-29 NOTE — TELEPHONE ENCOUNTER
THOM Garcia from [facility] called stating that they will start seeing patient tomorrow

## 2024-01-30 ENCOUNTER — TELEPHONE (OUTPATIENT)
Facility: CLINIC | Age: 29
End: 2024-01-30

## 2024-01-30 NOTE — TELEPHONE ENCOUNTER
Lauren from [facility] home health called and would like to let the provider know the pt heart rate is elevated, and is asymptomatic. Her call back number is [phone].

## 2024-02-09 ENCOUNTER — TELEPHONE (OUTPATIENT)
Facility: CLINIC | Age: 29
End: 2024-02-09

## 2024-02-13 ENCOUNTER — TELEMEDICINE (OUTPATIENT)
Facility: CLINIC | Age: 29
End: 2024-02-13

## 2024-02-13 DIAGNOSIS — K52.832 LYMPHOCYTIC COLITIS: ICD-10-CM

## 2024-02-13 DIAGNOSIS — E55.9 VITAMIN D DEFICIENCY: ICD-10-CM

## 2024-02-13 DIAGNOSIS — L73.2 HIDRADENITIS SUPPURATIVA: ICD-10-CM

## 2024-02-13 DIAGNOSIS — Z76.89 ENCOUNTER FOR SUPPORT AND COORDINATION OF TRANSITION OF CARE: Primary | ICD-10-CM

## 2024-02-13 DIAGNOSIS — D50.9 IRON DEFICIENCY ANEMIA, UNSPECIFIED IRON DEFICIENCY ANEMIA TYPE: ICD-10-CM

## 2024-02-13 PROCEDURE — 99214 OFFICE O/P EST MOD 30 MIN: CPT | Performed by: NURSE PRACTITIONER

## 2024-02-13 RX ORDER — CHLORHEXIDINE GLUCONATE 4 G/100ML
SOLUTION TOPICAL
COMMUNITY
Start: 2022-01-14

## 2024-02-13 RX ORDER — FERROUS SULFATE 325(65) MG
325 TABLET ORAL EVERY OTHER DAY
Qty: 15 TABLET | Refills: 11 | COMMUNITY
Start: 2024-01-26 | End: 2025-01-25

## 2024-02-13 RX ORDER — NYSTATIN 100000 U/G
CREAM TOPICAL
COMMUNITY
Start: 2024-01-26

## 2024-02-13 RX ORDER — ERTAPENEM 1 G/1
INJECTION, POWDER, LYOPHILIZED, FOR SOLUTION INTRAMUSCULAR; INTRAVENOUS
COMMUNITY
Start: 2024-02-06

## 2024-02-13 RX ORDER — VALACYCLOVIR HYDROCHLORIDE 500 MG/1
TABLET, FILM COATED ORAL
COMMUNITY
Start: 2024-01-26

## 2024-02-13 NOTE — PROGRESS NOTES
Keith Liu, was evaluated through a synchronous (real-time) audio-video encounter. The patient (or guardian if applicable) is aware that this is a billable service, which includes applicable co-pays. This Virtual Visit was conducted with patient's (and/or legal guardian's) consent. Patient identification was verified, and a caregiver was present when appropriate.   The patient was located at Home: [address] [place] [id number]  Provider was located at Home (Appt Dept State): KATHLEEN Liu (:  1995) is a Established patient, presenting virtually for evaluation of the following:    Assessment & Plan   Below is the assessment and plan developed based on review of pertinent history, physical exam, labs, studies, and medications.    Labs faxed to  for [facility]     1. Encounter for support and coordination of transition of care  2. Hidradenitis suppurativa  -     Basic Metabolic Panel; Future  3. Vitamin D deficiency  -     Vitamin D 25 Hydroxy; Future  4. Iron deficiency anemia, unspecified iron deficiency anemia type  -     CBC with Auto Differential; Future  -     Ferritin; Future  5. Lymphocytic colitis    No follow-ups on file.       Subjective   HPI  Review of Systems    DANIELLE:  Admitted to [facility] from 24 to 24 for severe HS    Has failed treatment (rifampin, clindamycin, doxy, humira), sees derm- upcoming inflximab infusion on     JANINE: hgb on admit 6.l8, rc'd transfusion  He is not seeing heme  No active bleeding, thought to be from chronic wound drainage vs poor absorption from colitis  Has HH  On oral iron    Onychomycosis: improving per pt, ws recc outpatiet podiatry    Lymphocytic colitis: no GI s/s at present  Was recc bipospy, however has been deferred due to other ongoing health problems      He is on disability   thru [place] [phone]             Objective   Patient-Reported Vitals  No data recorded     Physical Exam  [INSTRUCTIONS:  \"[x]\"

## 2024-06-14 NOTE — PROGRESS NOTES
Comprehensive Nutrition Assessment     Type and Reason for Visit: (P) Follow-Up, Wound     Nutrition Recommendations/Plan: Double portions at mealtime with wound healing supplements on meal trays.   Add Ocuvite once per day for wound healing.  Enc po and fluid intake.  Ensure pt that nutrition will help his wounds to heal.     Nutrition Assessment:  (P) Pt admitted with wound infection/hidradenitis; d/c'd from [facility] 5 days ago and now back with lehthargy, foul-smelling wound, anemia.  Rec'd 1 unit packed cells and is on FeSO4 daily.  s/p cleaning and granulating today.  Possible wound vac.  Appropriate kcal and supplements ordered for meal trays.  Mild malnutrition noted for this patient at this time. Pt I eating well and has gained almost 3# since he's been with us.        Estimated Daily Nutrient Needs:  Energy (kcal): (P) 4952; Weight Used for Energy Requirements: (P) Ideal  Protein (g): (P) 92.5 (1.35 g/kg CBW); Weight Used for Protein Requirements: (P) Ideal  Fluid (ml/day): (P) 2474; Method Used for Fluid Requirements: (P) 1 ml/kcal        Nutrition Related Findings:  (P) Pt tolerating diet and willing to try supplements       Wounds:    (P) Open wounds, Deep tissue injury        Current Nutrition Therapies:  ADULT ORAL NUTRITION SUPPLEMENT Breakfast, Dinner; Standard High Calorie/High Protein  ADULT ORAL NUTRITION SUPPLEMENT Lunch; Frozen Supplement  ADULT ORAL NUTRITION SUPPLEMENT Dinner; Wound Healing Supplement  ADULT DIET Regular     Anthropometric Measures:  · Height:  (P) 6' 3\" (190.5 cm)  · Current Body Wt:  (P) 68.6 kg (154 lb)   · Admission Body Wt:       · Usual Body Wt:        · Ideal Body Wt:  (P) 196 lbs:  (P) 77.2 %   · Adjusted Body Weight:   ; Weight Adjustment for:     · Adjusted BMI:       · BMI Category:  (P) Normal weight (BMI 18.5-24. 9)        Nutrition Diagnosis:   · (P) Predicted inadequate energy intake, Increased nutrient needs, Mild malnutrition, In context of acute illness or injury, Unintended weight loss, Limited adherence to nutrition-related recommendations related to (P) catabolic illness, acute injury/trauma, lack or limited access to food, inadequate protein-energy intake, psychological cause or life stress, increased demand for energy/nutrients, food and nutrition related knowledge deficit as evidenced by (P) intake 51-75%, poor intake prior to admission, wounds, BMI, weight loss, moderate loss of subcutaneous fat, mild muscle loss, constipation     Nutrition Interventions:   Food and/or Nutrient Delivery: (P) Modify current diet, Modify oral nutrition supplement  Nutrition Education and Counseling: (P) Education not indicated  Coordination of Nutrition Care: (P) Continue to monitor while inpatient     Goals:  (P) PO intake of meals and ONS > 75% most meals next 4-7 days.  Previous goal met.        Nutrition Monitoring and Evaluation:   Behavioral-Environmental Outcomes: (P) Knowledge or skill, Beliefs and attitudes  Food/Nutrient Intake Outcomes: (P) Food and nutrient intake, Supplement intake  Physical Signs/Symptoms Outcomes: (P) Biochemical data, Constipation, Meal time behavior, Skin, Weight, Nutrition focused physical findings     Discharge Planning:    (P) Continue current diet, Continue oral nutrition supplement      Electronically signed Karla Cedeno, PhD, [address] [place] [address] on 10/31/2021 at 11:29 AM     [id number]: [phone]

## (undated) DEVICE — SPONGE LAP 18X18IN STRL -- 5/PK

## (undated) DEVICE — SPONGE GZ W4XL4IN COT 12 PLY TYP VII WVN C FLD DSGN

## (undated) DEVICE — DRESSING GEL 4OZ WATER/GLYCERIN/ALOE HYDRGEL TB SKINTEGRITY

## (undated) DEVICE — NEEDLE HYPO 25GA L1.5IN BVL ORIENTED ECLIPSE

## (undated) DEVICE — KIT,1200CC CANISTER,3/16"X6' TUBING: Brand: MEDLINE INDUSTRIES, INC.

## (undated) DEVICE — SUTURE VCRL SZ 2-0 L27IN ABSRB UD L26MM SH 1/2 CIR J417H

## (undated) DEVICE — ROCKER SWITCH PENCIL BLADE ELECTRODE, HOLSTER: Brand: EDGE

## (undated) DEVICE — COVER LT HNDL PLAS RIG 1 PER PK

## (undated) DEVICE — BASIN - MRMC: Brand: MEDLINE INDUSTRIES, INC.

## (undated) DEVICE — PAD,ABDOMINAL,5"X9",ST,LF,25/BX: Brand: MEDLINE INDUSTRIES, INC.

## (undated) DEVICE — TRAY PREP DRY W/ PREM GLV 2 APPL 6 SPNG 2 UNDPD 1 OVERWRAP

## (undated) DEVICE — TOWEL SURG W17XL27IN STD BLU COT NONFENESTRATED PREWASHED

## (undated) DEVICE — DRESSING PETRO W3XL8IN N ADH OIL EMUL GZ CURAD

## (undated) DEVICE — SOLUTION IRRIG 1000ML 0.9% SOD CHL USP POUR PLAS BTL

## (undated) DEVICE — SYR 10ML CTRL LR LCK NSAF LF --